# Patient Record
Sex: MALE | Race: WHITE | NOT HISPANIC OR LATINO | Employment: FULL TIME | URBAN - METROPOLITAN AREA
[De-identification: names, ages, dates, MRNs, and addresses within clinical notes are randomized per-mention and may not be internally consistent; named-entity substitution may affect disease eponyms.]

---

## 2018-01-09 ENCOUNTER — HISTORICAL (OUTPATIENT)
Dept: BARIATRICS | Facility: HOSPITAL | Age: 48
End: 2018-01-09

## 2018-01-15 ENCOUNTER — HISTORICAL (OUTPATIENT)
Dept: BARIATRICS | Facility: HOSPITAL | Age: 48
End: 2018-01-15

## 2018-02-06 ENCOUNTER — HISTORICAL (OUTPATIENT)
Dept: BARIATRICS | Facility: HOSPITAL | Age: 48
End: 2018-02-06

## 2018-03-20 ENCOUNTER — HISTORICAL (OUTPATIENT)
Dept: BARIATRICS | Facility: HOSPITAL | Age: 48
End: 2018-03-20

## 2018-05-01 ENCOUNTER — HISTORICAL (OUTPATIENT)
Dept: BARIATRICS | Facility: HOSPITAL | Age: 48
End: 2018-05-01

## 2018-07-17 ENCOUNTER — HISTORICAL (OUTPATIENT)
Dept: BARIATRICS | Facility: HOSPITAL | Age: 48
End: 2018-07-17

## 2018-07-31 ENCOUNTER — HISTORICAL (OUTPATIENT)
Dept: BARIATRICS | Facility: HOSPITAL | Age: 48
End: 2018-07-31

## 2018-08-21 ENCOUNTER — HISTORICAL (OUTPATIENT)
Dept: BARIATRICS | Facility: HOSPITAL | Age: 48
End: 2018-08-21

## 2018-12-14 ENCOUNTER — TELEPHONE (OUTPATIENT)
Dept: SURGERY | Facility: CLINIC | Age: 48
End: 2018-12-14

## 2018-12-17 ENCOUNTER — INITIAL CONSULT (OUTPATIENT)
Dept: SURGERY | Facility: CLINIC | Age: 48
End: 2018-12-17
Payer: COMMERCIAL

## 2018-12-17 DIAGNOSIS — C17.9 SMALL BOWEL CANCER: ICD-10-CM

## 2018-12-17 PROCEDURE — 99999 PR PBB SHADOW E&M-EST. PATIENT-LVL II: CPT | Mod: PBBFAC,,, | Performed by: SURGERY

## 2018-12-17 PROCEDURE — 99204 OFFICE O/P NEW MOD 45 MIN: CPT | Mod: S$GLB,,, | Performed by: SURGERY

## 2018-12-17 RX ORDER — ATENOLOL 100 MG/1
50 TABLET ORAL 2 TIMES DAILY
Status: ON HOLD | COMMUNITY
End: 2019-02-06 | Stop reason: HOSPADM

## 2018-12-17 RX ORDER — FLUTICASONE PROPIONATE 50 MCG
1 SPRAY, SUSPENSION (ML) NASAL DAILY
COMMUNITY

## 2018-12-17 RX ORDER — FEXOFENADINE HCL 60 MG
60 TABLET ORAL DAILY
COMMUNITY

## 2018-12-17 RX ORDER — LORATADINE 10 MG/1
10 TABLET ORAL DAILY
COMMUNITY
End: 2019-01-07

## 2018-12-17 NOTE — PROGRESS NOTES
History & Physical    SUBJECTIVE:     History of Present Illness:  Patient is a 48 y.o. male presents for evaluation of a newly diagnosed small bowel mass. Patient reports that he began having lower abdominal/pelvic pain toward the end of October of this year. This prompted a Urologic evaluation and subsequent treatment for a presumed UTI. He states that his pain continued into November and evolved into more upper abdominal/epigastric pain that was worse with eating. He was subsequently seen by his PCP who ultimately ordered a CT abdomen/pelvis that revealed an abnormal thickening of an 8 cm segment of small bowel. The patient denies any other sx at this time, including nausea, vomiting, fever, chills, night sweats, or anorexia. He does report ~10 lb weight loss over the past 1-2 months, but he is actively taking part in a weight loss program.     Patient denies personal history of cancer. Family history significant for two distant cousins with lymphoma and sister with history of melanoma. Patient denies previous abdominal surgery.     Chief Complaint   Patient presents with    Consult       Review of patient's allergies indicates:  No Known Allergies    Current Outpatient Medications   Medication Sig Dispense Refill    atenolol (TENORMIN) 100 MG tablet Take 50 mg by mouth 2 (two) times daily.      fexofenadine (ALLEGRA) 60 MG tablet Take 60 mg by mouth once daily.      fluticasone (FLONASE) 50 mcg/actuation nasal spray 1 spray by Each Nare route once daily.      loratadine (CLARITIN) 10 mg tablet Take 10 mg by mouth once daily.       No current facility-administered medications for this visit.        No past medical history on file.  No past surgical history on file.  No family history on file.  Social History     Tobacco Use    Smoking status: Never Smoker    Smokeless tobacco: Never Used   Substance Use Topics    Alcohol use: Yes     Frequency: Monthly or less    Drug use: Not on file        Review of  Systems:  Review of Systems  A 10-point ROS was negative, except as above.     OBJECTIVE:     Vital Signs (Most Recent)              Physical Exam:  Physical Exam   Constitutional: He is oriented to person, place, and time. He appears well-developed and well-nourished. No distress.   HENT:   Head: Normocephalic and atraumatic.   Eyes: Conjunctivae are normal.   Neck: Normal range of motion. Neck supple.   No supraclavicular lymphadenopathy   Cardiovascular: Normal rate and regular rhythm.   Pulmonary/Chest: Effort normal and breath sounds normal. No respiratory distress.   Abdominal: Soft. He exhibits no distension and no mass. There is tenderness (mild lower abdominal). There is no rebound and no guarding.   Musculoskeletal: Normal range of motion.   No axillary lymphadenopathy   Lymphadenopathy:     He has no cervical adenopathy.   Neurological: He is alert and oriented to person, place, and time.   Skin: Skin is warm and dry.       Laboratory  Outside labs from 12/5 reviewed; unremarkable     Diagnostic Results:  Outside CT 12/7 reviewed - Irregular significant wall thickening of an 8 cm segment of small bowel (most likely mid to distal jejunum); no stranding or surrounding fluid; no other obvious intra-abdominal lymphadenopathy     ASSESSMENT/PLAN:     47 yo M with history of abdominal pain and radiographic findings concerning for a small bowel malignancy, most likely lymphoma vs carcinoma.     PLAN:Plan     - Discussed findings with patient and recommended open surgical resection, as this could offer both diagnostic and potentially therapeutic results. Discussed that further treatment would likely be indicated if this does prove to be a malignancy. He and his family understand and would like to proceed at the earliest available date.

## 2018-12-17 NOTE — PROGRESS NOTES
Patient seen with Dr Sadler; history obtained, patient examined, outside records reviewed, outside CT personally reviewed.   His sister is a patient of Dr Meneses.  CT shows a short segment of markedly abnormal mid small bowel with a markedly and concentrically thickened, but irregular, bowel wall. No other abnormalities noted. On exam, no peripheral adenopathy noted.   No prior history of small bowel or digestive issues, and no family history of bowel tumor, IBD, celiac disease  The differential diagnosis included lymphoma or carcinoma, with IBD, stromal tumor, or carcinoid much less likely. I believe his GI symptoms are related to the small bowel abnormality.     Long talk with patient and family. I have recommended open surgical small bowel resection and primary anastomosis. Alternatives including attempt at push enteroscopy, and/or percutaneous biopsy discussed.

## 2018-12-17 NOTE — H&P (VIEW-ONLY)
History & Physical    SUBJECTIVE:     History of Present Illness:  Patient is a 48 y.o. male presents for evaluation of a newly diagnosed small bowel mass. Patient reports that he began having lower abdominal/pelvic pain toward the end of October of this year. This prompted a Urologic evaluation and subsequent treatment for a presumed UTI. He states that his pain continued into November and evolved into more upper abdominal/epigastric pain that was worse with eating. He was subsequently seen by his PCP who ultimately ordered a CT abdomen/pelvis that revealed an abnormal thickening of an 8 cm segment of small bowel. The patient denies any other sx at this time, including nausea, vomiting, fever, chills, night sweats, or anorexia. He does report ~10 lb weight loss over the past 1-2 months, but he is actively taking part in a weight loss program.     Patient denies personal history of cancer. Family history significant for two distant cousins with lymphoma and sister with history of melanoma. Patient denies previous abdominal surgery.     Chief Complaint   Patient presents with    Consult       Review of patient's allergies indicates:  No Known Allergies    Current Outpatient Medications   Medication Sig Dispense Refill    atenolol (TENORMIN) 100 MG tablet Take 50 mg by mouth 2 (two) times daily.      fexofenadine (ALLEGRA) 60 MG tablet Take 60 mg by mouth once daily.      fluticasone (FLONASE) 50 mcg/actuation nasal spray 1 spray by Each Nare route once daily.      loratadine (CLARITIN) 10 mg tablet Take 10 mg by mouth once daily.       No current facility-administered medications for this visit.        No past medical history on file.  No past surgical history on file.  No family history on file.  Social History     Tobacco Use    Smoking status: Never Smoker    Smokeless tobacco: Never Used   Substance Use Topics    Alcohol use: Yes     Frequency: Monthly or less    Drug use: Not on file        Review of  Systems:  Review of Systems  A 10-point ROS was negative, except as above.     OBJECTIVE:     Vital Signs (Most Recent)              Physical Exam:  Physical Exam   Constitutional: He is oriented to person, place, and time. He appears well-developed and well-nourished. No distress.   HENT:   Head: Normocephalic and atraumatic.   Eyes: Conjunctivae are normal.   Neck: Normal range of motion. Neck supple.   No supraclavicular lymphadenopathy   Cardiovascular: Normal rate and regular rhythm.   Pulmonary/Chest: Effort normal and breath sounds normal. No respiratory distress.   Abdominal: Soft. He exhibits no distension and no mass. There is tenderness (mild lower abdominal). There is no rebound and no guarding.   Musculoskeletal: Normal range of motion.   No axillary lymphadenopathy   Lymphadenopathy:     He has no cervical adenopathy.   Neurological: He is alert and oriented to person, place, and time.   Skin: Skin is warm and dry.       Laboratory  Outside labs from 12/5 reviewed; unremarkable     Diagnostic Results:  Outside CT 12/7 reviewed - Irregular significant wall thickening of an 8 cm segment of small bowel (most likely mid to distal jejunum); no stranding or surrounding fluid; no other obvious intra-abdominal lymphadenopathy     ASSESSMENT/PLAN:     49 yo M with history of abdominal pain and radiographic findings concerning for a small bowel malignancy, most likely lymphoma vs carcinoma.     PLAN:Plan     - Discussed findings with patient and recommended open surgical resection, as this could offer both diagnostic and potentially therapeutic results. Discussed that further treatment would likely be indicated if this does prove to be a malignancy. He and his family understand and would like to proceed at the earliest available date.

## 2018-12-18 DIAGNOSIS — C17.9 SMALL BOWEL CANCER: Primary | ICD-10-CM

## 2018-12-20 ENCOUNTER — TELEPHONE (OUTPATIENT)
Dept: SURGERY | Facility: CLINIC | Age: 48
End: 2018-12-20

## 2018-12-20 ENCOUNTER — ANESTHESIA EVENT (OUTPATIENT)
Dept: SURGERY | Facility: HOSPITAL | Age: 48
DRG: 824 | End: 2018-12-20
Payer: COMMERCIAL

## 2018-12-20 RX ORDER — LOSARTAN POTASSIUM AND HYDROCHLOROTHIAZIDE 12.5; 1 MG/1; MG/1
TABLET ORAL
Status: ON HOLD | COMMUNITY
End: 2019-02-06 | Stop reason: SDUPTHER

## 2018-12-20 NOTE — TELEPHONE ENCOUNTER
Instructed to shower twice with hibiclens (pm and am), no food after midnight but okay to have clear liquids including 12 oz gatorade before 5am.    Will take allergy medication, tenormin in am.  Will hold losasrtan-HCTZ.

## 2018-12-21 ENCOUNTER — HOSPITAL ENCOUNTER (INPATIENT)
Facility: HOSPITAL | Age: 48
LOS: 3 days | Discharge: HOME | DRG: 824 | End: 2018-12-24
Attending: SURGERY | Admitting: SURGERY
Payer: COMMERCIAL

## 2018-12-21 ENCOUNTER — ANESTHESIA (OUTPATIENT)
Dept: SURGERY | Facility: HOSPITAL | Age: 48
DRG: 824 | End: 2018-12-21
Payer: COMMERCIAL

## 2018-12-21 DIAGNOSIS — K63.89 SMALL BOWEL MASS: Primary | ICD-10-CM

## 2018-12-21 PROBLEM — E66.01 MORBID OBESITY: Status: ACTIVE | Noted: 2018-12-21

## 2018-12-21 LAB
ALBUMIN SERPL BCP-MCNC: 3.2 G/DL
ALP SERPL-CCNC: 51 U/L
ALT SERPL W/O P-5'-P-CCNC: 27 U/L
ANION GAP SERPL CALC-SCNC: 12 MMOL/L
ANION GAP SERPL CALC-SCNC: 12 MMOL/L
AST SERPL-CCNC: 28 U/L
BASOPHILS # BLD AUTO: 0.05 K/UL
BASOPHILS NFR BLD: 0.5 %
BILIRUB SERPL-MCNC: 0.8 MG/DL
BUN SERPL-MCNC: 11 MG/DL
BUN SERPL-MCNC: 11 MG/DL
CALCIUM SERPL-MCNC: 8.2 MG/DL
CALCIUM SERPL-MCNC: 8.2 MG/DL
CHLORIDE SERPL-SCNC: 106 MMOL/L
CHLORIDE SERPL-SCNC: 106 MMOL/L
CO2 SERPL-SCNC: 20 MMOL/L
CO2 SERPL-SCNC: 20 MMOL/L
CREAT SERPL-MCNC: 0.8 MG/DL
CREAT SERPL-MCNC: 0.8 MG/DL
DIFFERENTIAL METHOD: ABNORMAL
EOSINOPHIL # BLD AUTO: 0 K/UL
EOSINOPHIL NFR BLD: 0.3 %
ERYTHROCYTE [DISTWIDTH] IN BLOOD BY AUTOMATED COUNT: 13.2 %
EST. GFR  (AFRICAN AMERICAN): >60 ML/MIN/1.73 M^2
EST. GFR  (AFRICAN AMERICAN): >60 ML/MIN/1.73 M^2
EST. GFR  (NON AFRICAN AMERICAN): >60 ML/MIN/1.73 M^2
EST. GFR  (NON AFRICAN AMERICAN): >60 ML/MIN/1.73 M^2
GLUCOSE SERPL-MCNC: 158 MG/DL
GLUCOSE SERPL-MCNC: 158 MG/DL
HCT VFR BLD AUTO: 36.7 %
HGB BLD-MCNC: 11.9 G/DL
IMM GRANULOCYTES # BLD AUTO: 0.04 K/UL
IMM GRANULOCYTES NFR BLD AUTO: 0.4 %
LYMPHOCYTES # BLD AUTO: 0.6 K/UL
LYMPHOCYTES NFR BLD: 5.2 %
MAGNESIUM SERPL-MCNC: 1.6 MG/DL
MCH RBC QN AUTO: 28.1 PG
MCHC RBC AUTO-ENTMCNC: 32.4 G/DL
MCV RBC AUTO: 87 FL
MONOCYTES # BLD AUTO: 0.2 K/UL
MONOCYTES NFR BLD: 1.8 %
NEUTROPHILS # BLD AUTO: 10.1 K/UL
NEUTROPHILS NFR BLD: 91.8 %
NRBC BLD-RTO: 0 /100 WBC
PHOSPHATE SERPL-MCNC: 2.6 MG/DL
PLATELET # BLD AUTO: 295 K/UL
PMV BLD AUTO: 9.6 FL
POTASSIUM SERPL-SCNC: 3.5 MMOL/L
POTASSIUM SERPL-SCNC: 3.5 MMOL/L
PROT SERPL-MCNC: 6.9 G/DL
RBC # BLD AUTO: 4.23 M/UL
SODIUM SERPL-SCNC: 138 MMOL/L
SODIUM SERPL-SCNC: 138 MMOL/L
WBC # BLD AUTO: 10.95 K/UL

## 2018-12-21 PROCEDURE — 63600175 PHARM REV CODE 636 W HCPCS: Performed by: NURSE ANESTHETIST, CERTIFIED REGISTERED

## 2018-12-21 PROCEDURE — 36415 COLL VENOUS BLD VENIPUNCTURE: CPT

## 2018-12-21 PROCEDURE — 88365 INSITU HYBRIDIZATION (FISH): CPT | Mod: 26,,, | Performed by: PATHOLOGY

## 2018-12-21 PROCEDURE — D9220A PRA ANESTHESIA: Mod: ANES,,, | Performed by: ANESTHESIOLOGY

## 2018-12-21 PROCEDURE — 25000003 PHARM REV CODE 250: Performed by: STUDENT IN AN ORGANIZED HEALTH CARE EDUCATION/TRAINING PROGRAM

## 2018-12-21 PROCEDURE — 88342 TISSUE SPECIMEN TO PATHOLOGY - SURGERY: ICD-10-PCS | Mod: 26,59,, | Performed by: PATHOLOGY

## 2018-12-21 PROCEDURE — 63600175 PHARM REV CODE 636 W HCPCS: Performed by: STUDENT IN AN ORGANIZED HEALTH CARE EDUCATION/TRAINING PROGRAM

## 2018-12-21 PROCEDURE — 25000003 PHARM REV CODE 250: Performed by: NURSE ANESTHETIST, CERTIFIED REGISTERED

## 2018-12-21 PROCEDURE — 63600175 PHARM REV CODE 636 W HCPCS: Performed by: SURGERY

## 2018-12-21 PROCEDURE — D9220A PRA ANESTHESIA: Mod: CRNA,,, | Performed by: NURSE ANESTHETIST, CERTIFIED REGISTERED

## 2018-12-21 PROCEDURE — S0020 INJECTION, BUPIVICAINE HYDRO: HCPCS | Performed by: SURGERY

## 2018-12-21 PROCEDURE — 37000008 HC ANESTHESIA 1ST 15 MINUTES: Performed by: SURGERY

## 2018-12-21 PROCEDURE — 88331 PATH CONSLTJ SURG 1 BLK 1SPC: CPT | Mod: 26,,, | Performed by: PATHOLOGY

## 2018-12-21 PROCEDURE — 27201423 OPTIME MED/SURG SUP & DEVICES STERILE SUPPLY: Performed by: SURGERY

## 2018-12-21 PROCEDURE — S0028 INJECTION, FAMOTIDINE, 20 MG: HCPCS | Performed by: NURSE ANESTHETIST, CERTIFIED REGISTERED

## 2018-12-21 PROCEDURE — 36000708 HC OR TIME LEV III 1ST 15 MIN: Performed by: SURGERY

## 2018-12-21 PROCEDURE — 27100025 HC TUBING, SET FLUID WARMER: Performed by: NURSE ANESTHETIST, CERTIFIED REGISTERED

## 2018-12-21 PROCEDURE — 88341 TISSUE SPECIMEN TO PATHOLOGY - SURGERY: ICD-10-PCS | Mod: 26,59,, | Performed by: PATHOLOGY

## 2018-12-21 PROCEDURE — 88307 TISSUE EXAM BY PATHOLOGIST: CPT | Mod: 26,,, | Performed by: PATHOLOGY

## 2018-12-21 PROCEDURE — 44120 PR RESECT SMALL INTEST,SINGL RESEC/ANAS: ICD-10-PCS | Mod: ,,, | Performed by: SURGERY

## 2018-12-21 PROCEDURE — 84100 ASSAY OF PHOSPHORUS: CPT

## 2018-12-21 PROCEDURE — C9290 INJ, BUPIVACAINE LIPOSOME: HCPCS | Performed by: SURGERY

## 2018-12-21 PROCEDURE — 85025 COMPLETE CBC W/AUTO DIFF WBC: CPT

## 2018-12-21 PROCEDURE — 88189 PR  FLOWCYTOMETRY/READ, 16 & > MARKERS: ICD-10-PCS | Mod: ,,, | Performed by: PATHOLOGY

## 2018-12-21 PROCEDURE — 83735 ASSAY OF MAGNESIUM: CPT

## 2018-12-21 PROCEDURE — 88184 FLOWCYTOMETRY/ TC 1 MARKER: CPT | Performed by: PATHOLOGY

## 2018-12-21 PROCEDURE — 25000003 PHARM REV CODE 250: Performed by: SURGERY

## 2018-12-21 PROCEDURE — 20600001 HC STEP DOWN PRIVATE ROOM

## 2018-12-21 PROCEDURE — 71000039 HC RECOVERY, EACH ADD'L HOUR: Performed by: SURGERY

## 2018-12-21 PROCEDURE — 88342 IMHCHEM/IMCYTCHM 1ST ANTB: CPT | Mod: 26,59,, | Performed by: PATHOLOGY

## 2018-12-21 PROCEDURE — 88342 IMHCHEM/IMCYTCHM 1ST ANTB: CPT | Performed by: PATHOLOGY

## 2018-12-21 PROCEDURE — 37000009 HC ANESTHESIA EA ADD 15 MINS: Performed by: SURGERY

## 2018-12-21 PROCEDURE — 36000709 HC OR TIME LEV III EA ADD 15 MIN: Performed by: SURGERY

## 2018-12-21 PROCEDURE — 71000033 HC RECOVERY, INTIAL HOUR: Performed by: SURGERY

## 2018-12-21 PROCEDURE — 88341 IMHCHEM/IMCYTCHM EA ADD ANTB: CPT | Mod: 26,59,, | Performed by: PATHOLOGY

## 2018-12-21 PROCEDURE — 88365 TISSUE SPECIMEN TO PATHOLOGY - SURGERY: ICD-10-PCS | Mod: 26,,, | Performed by: PATHOLOGY

## 2018-12-21 PROCEDURE — 44120 REMOVAL OF SMALL INTESTINE: CPT | Mod: ,,, | Performed by: SURGERY

## 2018-12-21 PROCEDURE — 88185 FLOWCYTOMETRY/TC ADD-ON: CPT | Mod: 59 | Performed by: PATHOLOGY

## 2018-12-21 PROCEDURE — 88331 TISSUE SPECIMEN TO PATHOLOGY - SURGERY: ICD-10-PCS | Mod: 26,,, | Performed by: PATHOLOGY

## 2018-12-21 PROCEDURE — 94761 N-INVAS EAR/PLS OXIMETRY MLT: CPT

## 2018-12-21 PROCEDURE — 27000221 HC OXYGEN, UP TO 24 HOURS

## 2018-12-21 PROCEDURE — 80053 COMPREHEN METABOLIC PANEL: CPT

## 2018-12-21 PROCEDURE — 88307 TISSUE SPECIMEN TO PATHOLOGY - SURGERY: ICD-10-PCS | Mod: 26,,, | Performed by: PATHOLOGY

## 2018-12-21 PROCEDURE — 88189 FLOWCYTOMETRY/READ 16 & >: CPT | Mod: ,,, | Performed by: PATHOLOGY

## 2018-12-21 RX ORDER — SODIUM CHLORIDE 0.9 % (FLUSH) 0.9 %
3 SYRINGE (ML) INJECTION
Status: DISCONTINUED | OUTPATIENT
Start: 2018-12-21 | End: 2018-12-24 | Stop reason: HOSPADM

## 2018-12-21 RX ORDER — ENOXAPARIN SODIUM 100 MG/ML
40 INJECTION SUBCUTANEOUS ONCE
Status: COMPLETED | OUTPATIENT
Start: 2018-12-21 | End: 2018-12-21

## 2018-12-21 RX ORDER — GLYCOPYRROLATE 0.2 MG/ML
INJECTION INTRAMUSCULAR; INTRAVENOUS
Status: DISCONTINUED | OUTPATIENT
Start: 2018-12-21 | End: 2018-12-21

## 2018-12-21 RX ORDER — HYDROMORPHONE HYDROCHLORIDE 1 MG/ML
INJECTION, SOLUTION INTRAMUSCULAR; INTRAVENOUS; SUBCUTANEOUS
Status: DISPENSED
Start: 2018-12-21 | End: 2018-12-21

## 2018-12-21 RX ORDER — CEFOXITIN 1 G/1
2 INJECTION, POWDER, FOR SOLUTION INTRAVENOUS
Status: DISCONTINUED | OUTPATIENT
Start: 2018-12-21 | End: 2018-12-22

## 2018-12-21 RX ORDER — FAMOTIDINE 10 MG/ML
INJECTION INTRAVENOUS
Status: DISCONTINUED | OUTPATIENT
Start: 2018-12-21 | End: 2018-12-21

## 2018-12-21 RX ORDER — SUCCINYLCHOLINE CHLORIDE 20 MG/ML
INJECTION INTRAMUSCULAR; INTRAVENOUS
Status: DISCONTINUED | OUTPATIENT
Start: 2018-12-21 | End: 2018-12-21

## 2018-12-21 RX ORDER — HYDROMORPHONE HYDROCHLORIDE 1 MG/ML
0.2 INJECTION, SOLUTION INTRAMUSCULAR; INTRAVENOUS; SUBCUTANEOUS EVERY 5 MIN PRN
Status: DISCONTINUED | OUTPATIENT
Start: 2018-12-21 | End: 2018-12-21 | Stop reason: HOSPADM

## 2018-12-21 RX ORDER — NALOXONE HCL 0.4 MG/ML
0.02 VIAL (ML) INJECTION
Status: DISCONTINUED | OUTPATIENT
Start: 2018-12-21 | End: 2018-12-22

## 2018-12-21 RX ORDER — HYDROMORPHONE HCL IN 0.9% NACL 6 MG/30 ML
PATIENT CONTROLLED ANALGESIA SYRINGE INTRAVENOUS CONTINUOUS
Status: DISCONTINUED | OUTPATIENT
Start: 2018-12-21 | End: 2018-12-22

## 2018-12-21 RX ORDER — ROCURONIUM BROMIDE 10 MG/ML
INJECTION, SOLUTION INTRAVENOUS
Status: DISCONTINUED | OUTPATIENT
Start: 2018-12-21 | End: 2018-12-21

## 2018-12-21 RX ORDER — METOPROLOL TARTRATE 1 MG/ML
10 INJECTION, SOLUTION INTRAVENOUS EVERY 6 HOURS
Status: DISCONTINUED | OUTPATIENT
Start: 2018-12-21 | End: 2018-12-21

## 2018-12-21 RX ORDER — LIDOCAINE HYDROCHLORIDE 10 MG/ML
1 INJECTION, SOLUTION EPIDURAL; INFILTRATION; INTRACAUDAL; PERINEURAL ONCE
Status: DISCONTINUED | OUTPATIENT
Start: 2018-12-21 | End: 2018-12-21

## 2018-12-21 RX ORDER — CEFOXITIN 1 G/1
2 INJECTION, POWDER, FOR SOLUTION INTRAVENOUS
Status: COMPLETED | OUTPATIENT
Start: 2018-12-21 | End: 2018-12-21

## 2018-12-21 RX ORDER — LIDOCAINE HCL/PF 100 MG/5ML
SYRINGE (ML) INTRAVENOUS
Status: DISCONTINUED | OUTPATIENT
Start: 2018-12-21 | End: 2018-12-21

## 2018-12-21 RX ORDER — SODIUM CHLORIDE 0.9 % (FLUSH) 0.9 %
3 SYRINGE (ML) INJECTION
Status: DISCONTINUED | OUTPATIENT
Start: 2018-12-21 | End: 2018-12-21

## 2018-12-21 RX ORDER — DEXAMETHASONE SODIUM PHOSPHATE 4 MG/ML
INJECTION, SOLUTION INTRA-ARTICULAR; INTRALESIONAL; INTRAMUSCULAR; INTRAVENOUS; SOFT TISSUE
Status: DISCONTINUED | OUTPATIENT
Start: 2018-12-21 | End: 2018-12-21

## 2018-12-21 RX ORDER — SODIUM CHLORIDE 9 MG/ML
INJECTION, SOLUTION INTRAVENOUS CONTINUOUS
Status: DISCONTINUED | OUTPATIENT
Start: 2018-12-21 | End: 2018-12-21

## 2018-12-21 RX ORDER — CEFOXITIN 1 G/1
2 INJECTION, POWDER, FOR SOLUTION INTRAVENOUS
Status: DISCONTINUED | OUTPATIENT
Start: 2018-12-21 | End: 2018-12-21

## 2018-12-21 RX ORDER — HYDROMORPHONE HYDROCHLORIDE 2 MG/ML
INJECTION, SOLUTION INTRAMUSCULAR; INTRAVENOUS; SUBCUTANEOUS
Status: DISCONTINUED | OUTPATIENT
Start: 2018-12-21 | End: 2018-12-21

## 2018-12-21 RX ORDER — EPHEDRINE SULFATE 50 MG/ML
INJECTION, SOLUTION INTRAVENOUS
Status: DISCONTINUED | OUTPATIENT
Start: 2018-12-21 | End: 2018-12-21

## 2018-12-21 RX ORDER — KETAMINE HYDROCHLORIDE 10 MG/ML
INJECTION, SOLUTION INTRAMUSCULAR; INTRAVENOUS
Status: DISCONTINUED | OUTPATIENT
Start: 2018-12-21 | End: 2018-12-21

## 2018-12-21 RX ORDER — NEOSTIGMINE METHYLSULFATE 1 MG/ML
INJECTION, SOLUTION INTRAVENOUS
Status: DISCONTINUED | OUTPATIENT
Start: 2018-12-21 | End: 2018-12-21

## 2018-12-21 RX ORDER — ACETAMINOPHEN 10 MG/ML
INJECTION, SOLUTION INTRAVENOUS
Status: DISCONTINUED | OUTPATIENT
Start: 2018-12-21 | End: 2018-12-21

## 2018-12-21 RX ORDER — FENTANYL CITRATE 50 UG/ML
INJECTION, SOLUTION INTRAMUSCULAR; INTRAVENOUS
Status: DISCONTINUED | OUTPATIENT
Start: 2018-12-21 | End: 2018-12-21

## 2018-12-21 RX ORDER — MIDAZOLAM HYDROCHLORIDE 1 MG/ML
INJECTION, SOLUTION INTRAMUSCULAR; INTRAVENOUS
Status: DISCONTINUED | OUTPATIENT
Start: 2018-12-21 | End: 2018-12-21

## 2018-12-21 RX ORDER — PROPOFOL 10 MG/ML
VIAL (ML) INTRAVENOUS
Status: DISCONTINUED | OUTPATIENT
Start: 2018-12-21 | End: 2018-12-21

## 2018-12-21 RX ORDER — BUPIVACAINE HYDROCHLORIDE 5 MG/ML
INJECTION, SOLUTION EPIDURAL; INTRACAUDAL
Status: DISCONTINUED | OUTPATIENT
Start: 2018-12-21 | End: 2018-12-21 | Stop reason: HOSPADM

## 2018-12-21 RX ORDER — CEFOXITIN 1 G/1
INJECTION, POWDER, FOR SOLUTION INTRAVENOUS
Status: COMPLETED
Start: 2018-12-21 | End: 2018-12-21

## 2018-12-21 RX ORDER — ONDANSETRON 2 MG/ML
4 INJECTION INTRAMUSCULAR; INTRAVENOUS EVERY 8 HOURS PRN
Status: DISCONTINUED | OUTPATIENT
Start: 2018-12-21 | End: 2018-12-24 | Stop reason: HOSPADM

## 2018-12-21 RX ORDER — METOCLOPRAMIDE HYDROCHLORIDE 5 MG/ML
INJECTION INTRAMUSCULAR; INTRAVENOUS
Status: DISCONTINUED | OUTPATIENT
Start: 2018-12-21 | End: 2018-12-21

## 2018-12-21 RX ORDER — SODIUM CHLORIDE, SODIUM LACTATE, POTASSIUM CHLORIDE, CALCIUM CHLORIDE 600; 310; 30; 20 MG/100ML; MG/100ML; MG/100ML; MG/100ML
INJECTION, SOLUTION INTRAVENOUS CONTINUOUS
Status: DISCONTINUED | OUTPATIENT
Start: 2018-12-21 | End: 2018-12-23

## 2018-12-21 RX ORDER — ONDANSETRON 2 MG/ML
INJECTION INTRAMUSCULAR; INTRAVENOUS
Status: DISCONTINUED | OUTPATIENT
Start: 2018-12-21 | End: 2018-12-21

## 2018-12-21 RX ORDER — ENOXAPARIN SODIUM 100 MG/ML
40 INJECTION SUBCUTANEOUS EVERY 12 HOURS
Status: DISCONTINUED | OUTPATIENT
Start: 2018-12-21 | End: 2018-12-24 | Stop reason: HOSPADM

## 2018-12-21 RX ORDER — METOPROLOL TARTRATE 1 MG/ML
5 INJECTION, SOLUTION INTRAVENOUS EVERY 6 HOURS
Status: DISCONTINUED | OUTPATIENT
Start: 2018-12-22 | End: 2018-12-22

## 2018-12-21 RX ADMIN — GLYCOPYRROLATE 0.6 MG: 0.2 INJECTION, SOLUTION INTRAMUSCULAR; INTRAVENOUS at 09:12

## 2018-12-21 RX ADMIN — LIDOCAINE HYDROCHLORIDE 100 MG: 20 INJECTION, SOLUTION INTRAVENOUS at 07:12

## 2018-12-21 RX ADMIN — CEFOXITIN 2 G: 1 INJECTION, POWDER, FOR SOLUTION INTRAVENOUS at 05:12

## 2018-12-21 RX ADMIN — PROPOFOL 50 MG: 10 INJECTION, EMULSION INTRAVENOUS at 09:12

## 2018-12-21 RX ADMIN — Medication: at 05:12

## 2018-12-21 RX ADMIN — ENOXAPARIN SODIUM 40 MG: 100 INJECTION SUBCUTANEOUS at 06:12

## 2018-12-21 RX ADMIN — SODIUM CHLORIDE, SODIUM GLUCONATE, SODIUM ACETATE, POTASSIUM CHLORIDE, MAGNESIUM CHLORIDE, SODIUM PHOSPHATE, DIBASIC, AND POTASSIUM PHOSPHATE: .53; .5; .37; .037; .03; .012; .00082 INJECTION, SOLUTION INTRAVENOUS at 07:12

## 2018-12-21 RX ADMIN — ROCURONIUM BROMIDE 10 MG: 10 INJECTION, SOLUTION INTRAVENOUS at 09:12

## 2018-12-21 RX ADMIN — DEXAMETHASONE SODIUM PHOSPHATE 4 MG: 4 INJECTION, SOLUTION INTRAMUSCULAR; INTRAVENOUS at 07:12

## 2018-12-21 RX ADMIN — METOCLOPRAMIDE 10 MG: 5 INJECTION, SOLUTION INTRAMUSCULAR; INTRAVENOUS at 07:12

## 2018-12-21 RX ADMIN — SUCCINYLCHOLINE CHLORIDE 160 MG: 20 INJECTION, SOLUTION INTRAMUSCULAR; INTRAVENOUS at 07:12

## 2018-12-21 RX ADMIN — CEFOXITIN 2 G: 1 INJECTION, POWDER, FOR SOLUTION INTRAVENOUS at 07:12

## 2018-12-21 RX ADMIN — KETAMINE HYDROCHLORIDE 30 MG: 10 INJECTION, SOLUTION INTRAMUSCULAR; INTRAVENOUS at 07:12

## 2018-12-21 RX ADMIN — ONDANSETRON 4 MG: 2 INJECTION INTRAMUSCULAR; INTRAVENOUS at 09:12

## 2018-12-21 RX ADMIN — SODIUM CHLORIDE, SODIUM GLUCONATE, SODIUM ACETATE, POTASSIUM CHLORIDE, MAGNESIUM CHLORIDE, SODIUM PHOSPHATE, DIBASIC, AND POTASSIUM PHOSPHATE: .53; .5; .37; .037; .03; .012; .00082 INJECTION, SOLUTION INTRAVENOUS at 09:12

## 2018-12-21 RX ADMIN — SODIUM CHLORIDE, SODIUM LACTATE, POTASSIUM CHLORIDE, AND CALCIUM CHLORIDE: .6; .31; .03; .02 INJECTION, SOLUTION INTRAVENOUS at 10:12

## 2018-12-21 RX ADMIN — FAMOTIDINE 20 MG: 10 INJECTION, SOLUTION INTRAVENOUS at 07:12

## 2018-12-21 RX ADMIN — EPHEDRINE SULFATE 10 MG: 50 INJECTION, SOLUTION INTRAMUSCULAR; INTRAVENOUS; SUBCUTANEOUS at 07:12

## 2018-12-21 RX ADMIN — MAGNESIUM SULFATE HEPTAHYDRATE 3 G: 500 INJECTION, SOLUTION INTRAMUSCULAR; INTRAVENOUS at 05:12

## 2018-12-21 RX ADMIN — Medication: at 10:12

## 2018-12-21 RX ADMIN — CEFOXITIN 2 G: 1 INJECTION, POWDER, FOR SOLUTION INTRAVENOUS at 11:12

## 2018-12-21 RX ADMIN — ROCURONIUM BROMIDE 50 MG: 10 INJECTION, SOLUTION INTRAVENOUS at 07:12

## 2018-12-21 RX ADMIN — GLYCOPYRROLATE 0.1 MG: 0.2 INJECTION, SOLUTION INTRAMUSCULAR; INTRAVENOUS at 06:12

## 2018-12-21 RX ADMIN — MIDAZOLAM HYDROCHLORIDE 2 MG: 1 INJECTION, SOLUTION INTRAMUSCULAR; INTRAVENOUS at 06:12

## 2018-12-21 RX ADMIN — NEOSTIGMINE METHYLSULFATE 5 MG: 1 INJECTION INTRAVENOUS at 09:12

## 2018-12-21 RX ADMIN — HYDROMORPHONE HYDROCHLORIDE 0.5 MG: 2 INJECTION, SOLUTION INTRAMUSCULAR; INTRAVENOUS; SUBCUTANEOUS at 09:12

## 2018-12-21 RX ADMIN — FENTANYL CITRATE 100 MCG: 50 INJECTION, SOLUTION INTRAMUSCULAR; INTRAVENOUS at 07:12

## 2018-12-21 RX ADMIN — METOPROLOL TARTRATE 5 MG: 1 INJECTION, SOLUTION INTRAVENOUS at 11:12

## 2018-12-21 RX ADMIN — PROPOFOL 160 MG: 10 INJECTION, EMULSION INTRAVENOUS at 07:12

## 2018-12-21 RX ADMIN — SODIUM CHLORIDE: 0.9 INJECTION, SOLUTION INTRAVENOUS at 06:12

## 2018-12-21 RX ADMIN — SODIUM CHLORIDE: 0.9 INJECTION, SOLUTION INTRAVENOUS at 10:12

## 2018-12-21 RX ADMIN — ACETAMINOPHEN 1000 MG: 10 INJECTION, SOLUTION INTRAVENOUS at 07:12

## 2018-12-21 RX ADMIN — SODIUM CHLORIDE, SODIUM LACTATE, POTASSIUM CHLORIDE, AND CALCIUM CHLORIDE: .6; .31; .03; .02 INJECTION, SOLUTION INTRAVENOUS at 06:12

## 2018-12-21 RX ADMIN — KETAMINE HYDROCHLORIDE 20 MG: 10 INJECTION, SOLUTION INTRAMUSCULAR; INTRAVENOUS at 07:12

## 2018-12-21 RX ADMIN — PROPOFOL 20 MG: 10 INJECTION, EMULSION INTRAVENOUS at 09:12

## 2018-12-21 RX ADMIN — ENOXAPARIN SODIUM 40 MG: 100 INJECTION SUBCUTANEOUS at 09:12

## 2018-12-21 RX ADMIN — FENTANYL CITRATE 25 MCG: 50 INJECTION, SOLUTION INTRAMUSCULAR; INTRAVENOUS at 09:12

## 2018-12-21 NOTE — OP NOTE
DATE OF PROCEDURE:  12/21/2018    OPERATING SURGEON:  Cecilio Casanova M.D.    ASSISTANT:  Gurdeep Sadler M.D. (RES)    PREOPERATIVE DIAGNOSIS:  Small bowel mass with partial small bowel obstruction.    POSTOPERATIVE DIAGNOSIS:  Small bowel mass with partial small bowel obstruction.    OPERATIVE PROCEDURES:  Segmental small bowel resection.    PROCEDURE IN DETAIL:  The patient is placed in the supine position on the   operating table.  Adequate general endotracheal anesthesia is induced.  The   abdomen is prepped and draped in sterile fashion and entered through a   midabdominal midline incision.  Readily apparent is a sizable mass in the small   bowel.  This is adherent to the perivesical fat.  This attachment is taken down   and the mass is delivered into the wound.  The mass is approximately 6 cm in   length and is circumferential.  There is dilation of the small bowel above it   suggesting a partial bowel obstruction.  There is no adenopathy in the adjacent   mesentery.  The tumor clearly extends to the serosal surface of the bowel.    Careful search is made for peritoneal implants and there are no lesions noted on   the peritoneum or omentum.  The liver cannot be inspected through our incision,   but I carefully palpate the liver and there are no focal lesions within it.    The remainder of the small bowel was run from the ligament of Treitz to the   ileocecal valves and no other small bowel lesions are noted.  The small bowel is   divided on either side of the mass providing about an 8 to 10 cm margin on   either side as deep.  A V of the mesentery is taken in the resection down to the   base of the mesentery.  Mesenteric vessels were tied with 3-0 silk.  A   side-to-side stapled JOELLEN anastomosis is then performed in the usual fashion.    The transverse enterotomies are closed with 3-0 Vicryl.  The mesenteric defect   is closed with interrupted 3-0 silk figure-of-eight sutures.  The abdomen is   irrigated with  sterile saline.  Hemostasis is excellent.  Exparel solution is   infiltrated into the deep muscular layer on either side of the incision.  The   abdominal incision is closed in layers in the usual fashion.  A sterile dressing   is applied.  The pathologist has reported on frozen section that this is a   lymphoproliferative disorder, strongly suggesting a lymphoma.  A tissue is set   aside in tissue medium for flow cytometry and all appropriate lymphoma studies   are ordered.  The patient has tolerated the procedure well.  He is brought to   the recovery room in stable condition.      PARKER  dd: 12/21/2018 09:42:10 (CST)  td: 12/21/2018 12:08:45 (CST)  Doc ID   #[id number]  Job ID #[id number]    CC:

## 2018-12-21 NOTE — ANESTHESIA PREPROCEDURE EVALUATION
12/21/2018  Giorgio Streeter is a 48 y.o., male.    Anesthesia Evaluation    I have reviewed the Patient Summary Reports.    I have reviewed the Nursing Notes.   I have reviewed the Medications.     Review of Systems  Anesthesia Hx:  No problems with previous Anesthesia  History of prior surgery of interest to airway management or planning: Denies Family Hx of Anesthesia complications.   Denies Personal Hx of Anesthesia complications.   Hematology/Oncology:  Hematology Normal   Oncology Normal     EENT/Dental:EENT/Dental Normal   Cardiovascular:   Exercise tolerance: good Hypertension, well controlled    Pulmonary:   Sleep Apnea    Renal/:   renal calculi    Hepatic/GI:  Hepatic/GI Normal    Musculoskeletal:  Musculoskeletal Normal    Neurological:  Neurology Normal    Endocrine:  Endocrine Normal    Dermatological:  Skin Normal    Psych:  Psychiatric Normal           Physical Exam  General:  Morbid Obesity    Airway/Jaw/Neck:  Airway Findings: Mouth Opening: Normal Tongue: Normal  General Airway Assessment: Adult  Mallampati: II  TM Distance: Normal, at least 6 cm        Eyes/Ears/Nose:  EYES/EARS/NOSE FINDINGS: Normal   Dental:  DENTAL FINDINGS: Normal   Chest/Lungs:  Chest/Lungs Clear    Heart/Vascular:  Heart Findings: Normal Heart murmur: negative Vascular Findings: Normal    Abdomen:  Abdomen Findings: Normal    Musculoskeletal:  Musculoskeletal Findings: Normal   Skin:  Skin Findings: Normal    Mental Status:  Mental Status Findings: Normal        Anesthesia Plan  Type of Anesthesia, risks & benefits discussed:  Anesthesia Type:  general  Patient's Preference:   Intra-op Monitoring Plan: standard ASA monitors and arterial line  Intra-op Monitoring Plan Comments:   Post Op Pain Control Plan: per primary service following discharge from PACU, IV/PO Opioids PRN and multimodal analgesia  Post Op  Pain Control Plan Comments:   Induction:   IV  Beta Blocker:  Patient is on a Beta-Blocker and has received one dose within the past 24 hours (No further documentation required).       Informed Consent: Patient understands risks and agrees with Anesthesia plan.  Questions answered. Anesthesia consent signed with patient.  ASA Score: 3     Day of Surgery Review of History & Physical:    H&P update referred to the surgeon.         Ready For Surgery From Anesthesia Perspective.

## 2018-12-21 NOTE — BRIEF OP NOTE
Preop Dx: Small bowel mass with partial small bowel obstruction  Postop Dx: same  Surgeon: Edilberto  Assistant: Doroteo  Operative Procedure: Small bowel resection  Brief Detail: 6 cm, partially obstructing mass in distal jejunum. No metastatic disease. Segmental small bowel resection performed. Frozen section suggestive of lymphoproliferative disorder. Tissue reserved for flow cytometry and lymphoma studies.   EBL: 40 ccs  CPT code:

## 2018-12-21 NOTE — ANESTHESIA POSTPROCEDURE EVALUATION
"Anesthesia Post Evaluation    Patient: Giorigo Streeter    Procedure(s) Performed: Procedure(s) (LRB):  EXCISION, SMALL INTESTINE (N/A)    Final Anesthesia Type: general  Patient location during evaluation: PACU  Patient participation: Yes- Able to Participate  Level of consciousness: awake and alert and oriented  Post-procedure vital signs: reviewed and stable  Pain management: adequate  Airway patency: patent  PONV status at discharge: No PONV  Anesthetic complications: no      Cardiovascular status: blood pressure returned to baseline  Respiratory status: unassisted, spontaneous ventilation and face mask  Hydration status: euvolemic  Follow-up not needed.        Visit Vitals  /62   Pulse 75   Temp 37.2 °C (99 °F) (Axillary)   Resp (!) 35   Ht 5' 7" (1.702 m)   Wt (!) 140.6 kg (310 lb)   SpO2 97%   BMI 48.55 kg/m²       Pain/Demetrius Score: No Data Recorded      "

## 2018-12-21 NOTE — NURSING TRANSFER
Nursing Transfer Note      12/21/2018     Transfer To: 1044    Transfer via bed    Transfer with cardiac monitoring, O2    Transported by PCT    Medicines sent: MIVF/PCA    Chart send with patient: Yes    Notified: spouse    Patient reassessed at: 12/21/18

## 2018-12-21 NOTE — NURSING
Received patient from PACU, AAOx4, speech clear & fluent. Receiving O2 4LNC, patient states it was increased prior to transfer. Vitals assessed, WNL, receiving IV fluid & dilaudid PCA. States pain is 4/10 & tolerable. Abd. Observed, incision CDI with dermabond. Full assessment to follow.

## 2018-12-21 NOTE — TRANSFER OF CARE
"Anesthesia Transfer of Care Note    Patient: Giorgio Streeter    Procedure(s) Performed: Procedure(s) (LRB):  EXCISION, SMALL INTESTINE (N/A)    Patient location: PACU    Anesthesia Type: general    Transport from OR: Transported from OR on 6-10 L/min O2 by face mask with adequate spontaneous ventilation    Post pain: adequate analgesia    Post assessment: no apparent anesthetic complications    Post vital signs: stable    Level of consciousness: awake and lethargic    Nausea/Vomiting: no nausea/vomiting    Complications: none    Transfer of care protocol was followed      Last vitals:   Visit Vitals  BP (!) 144/68 (BP Location: Right arm, Patient Position: Lying)   Pulse 63   Temp 37 °C (98.6 °F) (Oral)   Resp 16   Ht 5' 7" (1.702 m)   Wt (!) 140.6 kg (310 lb)   SpO2 99%   BMI 48.55 kg/m²     "

## 2018-12-22 LAB
ALBUMIN SERPL BCP-MCNC: 3 G/DL
ALP SERPL-CCNC: 47 U/L
ALT SERPL W/O P-5'-P-CCNC: 24 U/L
ANION GAP SERPL CALC-SCNC: 8 MMOL/L
ANION GAP SERPL CALC-SCNC: 8 MMOL/L
AST SERPL-CCNC: 21 U/L
BASOPHILS # BLD AUTO: 0.02 K/UL
BASOPHILS NFR BLD: 0.2 %
BILIRUB SERPL-MCNC: 0.6 MG/DL
BUN SERPL-MCNC: 10 MG/DL
BUN SERPL-MCNC: 10 MG/DL
CALCIUM SERPL-MCNC: 8.3 MG/DL
CALCIUM SERPL-MCNC: 8.3 MG/DL
CHLORIDE SERPL-SCNC: 102 MMOL/L
CHLORIDE SERPL-SCNC: 102 MMOL/L
CO2 SERPL-SCNC: 27 MMOL/L
CO2 SERPL-SCNC: 27 MMOL/L
CREAT SERPL-MCNC: 1 MG/DL
CREAT SERPL-MCNC: 1 MG/DL
DIFFERENTIAL METHOD: ABNORMAL
EOSINOPHIL # BLD AUTO: 0 K/UL
EOSINOPHIL NFR BLD: 0 %
ERYTHROCYTE [DISTWIDTH] IN BLOOD BY AUTOMATED COUNT: 13.3 %
EST. GFR  (AFRICAN AMERICAN): >60 ML/MIN/1.73 M^2
EST. GFR  (AFRICAN AMERICAN): >60 ML/MIN/1.73 M^2
EST. GFR  (NON AFRICAN AMERICAN): >60 ML/MIN/1.73 M^2
EST. GFR  (NON AFRICAN AMERICAN): >60 ML/MIN/1.73 M^2
GLUCOSE SERPL-MCNC: 104 MG/DL
GLUCOSE SERPL-MCNC: 104 MG/DL
HCT VFR BLD AUTO: 33.3 %
HGB BLD-MCNC: 11 G/DL
IMM GRANULOCYTES # BLD AUTO: 0.03 K/UL
IMM GRANULOCYTES NFR BLD AUTO: 0.3 %
LYMPHOCYTES # BLD AUTO: 0.9 K/UL
LYMPHOCYTES NFR BLD: 7.9 %
MAGNESIUM SERPL-MCNC: 2.1 MG/DL
MCH RBC QN AUTO: 28.1 PG
MCHC RBC AUTO-ENTMCNC: 33 G/DL
MCV RBC AUTO: 85 FL
MONOCYTES # BLD AUTO: 0.8 K/UL
MONOCYTES NFR BLD: 7.3 %
NEUTROPHILS # BLD AUTO: 9.5 K/UL
NEUTROPHILS NFR BLD: 84.3 %
NRBC BLD-RTO: 0 /100 WBC
PHOSPHATE SERPL-MCNC: 2.8 MG/DL
PLATELET # BLD AUTO: 272 K/UL
PMV BLD AUTO: 9.6 FL
POTASSIUM SERPL-SCNC: 3.3 MMOL/L
POTASSIUM SERPL-SCNC: 3.3 MMOL/L
PROT SERPL-MCNC: 6.7 G/DL
RBC # BLD AUTO: 3.91 M/UL
SODIUM SERPL-SCNC: 137 MMOL/L
SODIUM SERPL-SCNC: 137 MMOL/L
WBC # BLD AUTO: 11.24 K/UL

## 2018-12-22 PROCEDURE — 85025 COMPLETE CBC W/AUTO DIFF WBC: CPT

## 2018-12-22 PROCEDURE — 84100 ASSAY OF PHOSPHORUS: CPT

## 2018-12-22 PROCEDURE — C9113 INJ PANTOPRAZOLE SODIUM, VIA: HCPCS | Performed by: STUDENT IN AN ORGANIZED HEALTH CARE EDUCATION/TRAINING PROGRAM

## 2018-12-22 PROCEDURE — 25000003 PHARM REV CODE 250: Performed by: SURGERY

## 2018-12-22 PROCEDURE — 20600001 HC STEP DOWN PRIVATE ROOM

## 2018-12-22 PROCEDURE — 63600175 PHARM REV CODE 636 W HCPCS: Performed by: STUDENT IN AN ORGANIZED HEALTH CARE EDUCATION/TRAINING PROGRAM

## 2018-12-22 PROCEDURE — 25000003 PHARM REV CODE 250: Performed by: STUDENT IN AN ORGANIZED HEALTH CARE EDUCATION/TRAINING PROGRAM

## 2018-12-22 PROCEDURE — 36415 COLL VENOUS BLD VENIPUNCTURE: CPT

## 2018-12-22 PROCEDURE — 63600175 PHARM REV CODE 636 W HCPCS: Performed by: SURGERY

## 2018-12-22 PROCEDURE — 80053 COMPREHEN METABOLIC PANEL: CPT

## 2018-12-22 PROCEDURE — 83735 ASSAY OF MAGNESIUM: CPT

## 2018-12-22 RX ORDER — POTASSIUM CHLORIDE 750 MG/1
30 CAPSULE, EXTENDED RELEASE ORAL
Status: COMPLETED | OUTPATIENT
Start: 2018-12-22 | End: 2018-12-22

## 2018-12-22 RX ORDER — ACETAMINOPHEN 10 MG/ML
1000 INJECTION, SOLUTION INTRAVENOUS EVERY 8 HOURS
Status: COMPLETED | OUTPATIENT
Start: 2018-12-22 | End: 2018-12-22

## 2018-12-22 RX ORDER — NALOXONE HCL 0.4 MG/ML
0.02 VIAL (ML) INJECTION
Status: DISCONTINUED | OUTPATIENT
Start: 2018-12-22 | End: 2018-12-23

## 2018-12-22 RX ORDER — DEXTROSE MONOHYDRATE, SODIUM CHLORIDE, AND POTASSIUM CHLORIDE 50; 1.49; 4.5 G/1000ML; G/1000ML; G/1000ML
INJECTION, SOLUTION INTRAVENOUS CONTINUOUS
Status: DISCONTINUED | OUTPATIENT
Start: 2018-12-22 | End: 2018-12-23

## 2018-12-22 RX ORDER — OXYCODONE HYDROCHLORIDE 10 MG/1
10 TABLET ORAL EVERY 6 HOURS PRN
Status: DISCONTINUED | OUTPATIENT
Start: 2018-12-22 | End: 2018-12-24 | Stop reason: HOSPADM

## 2018-12-22 RX ORDER — KETOROLAC TROMETHAMINE 30 MG/ML
30 INJECTION, SOLUTION INTRAMUSCULAR; INTRAVENOUS EVERY 6 HOURS
Status: DISCONTINUED | OUTPATIENT
Start: 2018-12-22 | End: 2018-12-24 | Stop reason: HOSPADM

## 2018-12-22 RX ORDER — CYCLOBENZAPRINE HCL 5 MG
5 TABLET ORAL 3 TIMES DAILY PRN
Status: DISCONTINUED | OUTPATIENT
Start: 2018-12-22 | End: 2018-12-24 | Stop reason: HOSPADM

## 2018-12-22 RX ORDER — HYDROMORPHONE HYDROCHLORIDE 1 MG/ML
1 INJECTION, SOLUTION INTRAMUSCULAR; INTRAVENOUS; SUBCUTANEOUS EVERY 6 HOURS PRN
Status: DISCONTINUED | OUTPATIENT
Start: 2018-12-22 | End: 2018-12-24

## 2018-12-22 RX ORDER — ATENOLOL 50 MG/1
50 TABLET ORAL 2 TIMES DAILY
Status: DISCONTINUED | OUTPATIENT
Start: 2018-12-22 | End: 2018-12-24 | Stop reason: HOSPADM

## 2018-12-22 RX ORDER — PANTOPRAZOLE SODIUM 40 MG/10ML
40 INJECTION, POWDER, LYOPHILIZED, FOR SOLUTION INTRAVENOUS DAILY
Status: DISCONTINUED | OUTPATIENT
Start: 2018-12-22 | End: 2018-12-24 | Stop reason: HOSPADM

## 2018-12-22 RX ORDER — KETOROLAC TROMETHAMINE 15 MG/ML
15 INJECTION, SOLUTION INTRAMUSCULAR; INTRAVENOUS EVERY 6 HOURS
Status: DISCONTINUED | OUTPATIENT
Start: 2018-12-22 | End: 2018-12-22

## 2018-12-22 RX ORDER — OXYCODONE HYDROCHLORIDE 5 MG/1
5 TABLET ORAL EVERY 6 HOURS PRN
Status: DISCONTINUED | OUTPATIENT
Start: 2018-12-22 | End: 2018-12-24 | Stop reason: HOSPADM

## 2018-12-22 RX ORDER — CETIRIZINE HYDROCHLORIDE 10 MG/1
10 TABLET ORAL DAILY
Status: DISCONTINUED | OUTPATIENT
Start: 2018-12-22 | End: 2018-12-24 | Stop reason: HOSPADM

## 2018-12-22 RX ORDER — HYDROMORPHONE HCL IN 0.9% NACL 6 MG/30 ML
PATIENT CONTROLLED ANALGESIA SYRINGE INTRAVENOUS CONTINUOUS
Status: DISCONTINUED | OUTPATIENT
Start: 2018-12-22 | End: 2018-12-23

## 2018-12-22 RX ADMIN — CEFOXITIN 2 G: 1 INJECTION, POWDER, FOR SOLUTION INTRAVENOUS at 10:12

## 2018-12-22 RX ADMIN — POTASSIUM CHLORIDE 30 MEQ: 750 CAPSULE, EXTENDED RELEASE ORAL at 03:12

## 2018-12-22 RX ADMIN — CETIRIZINE HYDROCHLORIDE 10 MG: 10 TABLET, FILM COATED ORAL at 10:12

## 2018-12-22 RX ADMIN — ACETAMINOPHEN 1000 MG: 10 INJECTION, SOLUTION INTRAVENOUS at 10:12

## 2018-12-22 RX ADMIN — Medication: at 11:12

## 2018-12-22 RX ADMIN — KETOROLAC TROMETHAMINE 30 MG: 30 INJECTION, SOLUTION INTRAMUSCULAR at 05:12

## 2018-12-22 RX ADMIN — SODIUM CHLORIDE, SODIUM LACTATE, POTASSIUM CHLORIDE, AND CALCIUM CHLORIDE: .6; .31; .03; .02 INJECTION, SOLUTION INTRAVENOUS at 03:12

## 2018-12-22 RX ADMIN — KETOROLAC TROMETHAMINE 30 MG: 30 INJECTION, SOLUTION INTRAMUSCULAR at 11:12

## 2018-12-22 RX ADMIN — PANTOPRAZOLE SODIUM 40 MG: 40 INJECTION, POWDER, FOR SOLUTION INTRAVENOUS at 10:12

## 2018-12-22 RX ADMIN — ENOXAPARIN SODIUM 40 MG: 100 INJECTION SUBCUTANEOUS at 08:12

## 2018-12-22 RX ADMIN — Medication: at 06:12

## 2018-12-22 RX ADMIN — POTASSIUM CHLORIDE, DEXTROSE MONOHYDRATE AND SODIUM CHLORIDE: 150; 5; 450 INJECTION, SOLUTION INTRAVENOUS at 10:12

## 2018-12-22 RX ADMIN — POTASSIUM CHLORIDE 30 MEQ: 750 CAPSULE, EXTENDED RELEASE ORAL at 11:12

## 2018-12-22 RX ADMIN — CEFOXITIN 2 G: 1 INJECTION, POWDER, FOR SOLUTION INTRAVENOUS at 03:12

## 2018-12-22 RX ADMIN — ACETAMINOPHEN 1000 MG: 10 INJECTION, SOLUTION INTRAVENOUS at 02:12

## 2018-12-22 RX ADMIN — METOPROLOL TARTRATE 5 MG: 1 INJECTION, SOLUTION INTRAVENOUS at 06:12

## 2018-12-22 RX ADMIN — ATENOLOL 50 MG: 50 TABLET ORAL at 08:12

## 2018-12-22 RX ADMIN — ACETAMINOPHEN 1000 MG: 10 INJECTION, SOLUTION INTRAVENOUS at 09:12

## 2018-12-22 RX ADMIN — ENOXAPARIN SODIUM 40 MG: 100 INJECTION SUBCUTANEOUS at 10:12

## 2018-12-22 RX ADMIN — ATENOLOL 50 MG: 50 TABLET ORAL at 10:12

## 2018-12-22 RX ADMIN — POTASSIUM CHLORIDE, DEXTROSE MONOHYDRATE AND SODIUM CHLORIDE: 150; 5; 450 INJECTION, SOLUTION INTRAVENOUS at 08:12

## 2018-12-22 NOTE — ASSESSMENT & PLAN NOTE
48-year-old male POD 1 from small bowel mass excision with resection. Doing well now. Some pain control issues and itching.     -Continue NPO for the time being  -Start multimodal pain therapy  -GI and DVT ppx  -continue mIVF  -daily labs  -ambulate today

## 2018-12-22 NOTE — PLAN OF CARE
Problem: Adult Inpatient Plan of Care  Goal: Plan of Care Review  Outcome: Ongoing (interventions implemented as appropriate)  POC reviewed with patient & family, verbalizes understanding. Able to stand at bedside with minimal/stand by assist. Void in urinal with assist from spouse. Educated on fall risk & calling for help with mobility. Cont. Receiving PCA dilaudid & iv fluids. Mag, currently running. SCD's applied & tolerating well. States pain is well controlled with medication. No c/o nausea or vomiting. O2 weaned to 2LNC. No s/s of distress noted.

## 2018-12-22 NOTE — PLAN OF CARE
"Problem: Adult Inpatient Plan of Care  Goal: Plan of Care Review  Outcome: Ongoing (interventions implemented as appropriate)  Pt alert and oriented 0 distress wound to abdomen intact. Pt c/o pain and feels like something is "pulling" when he moves a certain way it was explained to him that his muscles of the abdomen have been cut and it'll feel that way he has a PCA pump to use at his leisure for the discomfort. During the night he wore a CPAP and was reminded to breathe deeply d/t 02 level decreasing there was also some bradycardia during the shift. VS were monitored closely during the administration of the IV lopressor he's voiding dark concentrated urine in his urinal and stated that his stomach is beginning to growl and hopes to get at least some water to drink since he was made NPO      "

## 2018-12-22 NOTE — SUBJECTIVE & OBJECTIVE
Interval History: No acute events overnight. Has some moderate abdominal soreness and itching. Doing well otherwise.     Medications:  Continuous Infusions:   hydromorphone in 0.9 % NaCl 6 mg/30 ml      lactated ringers 125 mL/hr at 12/22/18 0343     Scheduled Meds:   ceFOXItin (MEFOXIN) IVPB  2 g Intravenous Q6H    enoxparin  40 mg Subcutaneous Q12H    metoprolol  5 mg Intravenous Q6H     PRN Meds:naloxone, ondansetron, sodium chloride 0.9%     Review of patient's allergies indicates:  No Known Allergies  Objective:     Vital Signs (Most Recent):  Temp: 99.2 °F (37.3 °C) (12/22/18 0344)  Pulse: 82 (12/22/18 0747)  Resp: 18 (12/22/18 0344)  BP: (!) 144/76 (12/22/18 0658)  SpO2: 95 % (12/22/18 0658) Vital Signs (24h Range):  Temp:  [97.4 °F (36.3 °C)-99.2 °F (37.3 °C)] 99.2 °F (37.3 °C)  Pulse:  [54-91] 82  Resp:  [14-35] 18  SpO2:  [92 %-99 %] 95 %  BP: (125-149)/(62-85) 144/76     Weight: (!) 140.6 kg (310 lb)  Body mass index is 48.55 kg/m².    Intake/Output - Last 3 Shifts       12/20 0700 - 12/21 0659 12/21 0700 - 12/22 0659 12/22 0700 - 12/23 0659    P.O.  0     I.V. (mL/kg)  3053.6 (21.7)     Total Intake(mL/kg)  3053.6 (21.7)     Urine (mL/kg/hr)  880 (0.3)     Stool  0     Total Output  880     Net  +2173.6            Stool Occurrence  0 x           Physical Exam   Constitutional: He is oriented to person, place, and time. He appears well-developed and well-nourished. No distress.   HENT:   Head: Normocephalic and atraumatic.   Eyes: EOM are normal.   Neck: Normal range of motion.   Pulmonary/Chest: Effort normal.   Abdominal: Soft. There is tenderness (appropriate incisional tenderness).   Midline incision with staples appears clean, dry, and intact.    Neurological: He is alert and oriented to person, place, and time.   Skin: Skin is warm and dry.   Psychiatric: He has a normal mood and affect. His behavior is normal.   Nursing note and vitals reviewed.      Significant Labs:  CBC:   Recent Labs   Lab  12/22/18  0406   WBC 11.24   RBC 3.91*   HGB 11.0*   HCT 33.3*      MCV 85   MCH 28.1   MCHC 33.0     CMP:   Recent Labs   Lab 12/22/18  0406     104   CALCIUM 8.3*  8.3*   ALBUMIN 3.0*   PROT 6.7     137   K 3.3*  3.3*   CO2 27  27     102   BUN 10  10   CREATININE 1.0  1.0   ALKPHOS 47*   ALT 24   AST 21   BILITOT 0.6       Significant Diagnostics:  I have reviewed all pertinent imaging results/findings within the past 24 hours.

## 2018-12-23 LAB
ALBUMIN SERPL BCP-MCNC: 2.9 G/DL
ALP SERPL-CCNC: 49 U/L
ALT SERPL W/O P-5'-P-CCNC: 23 U/L
ANION GAP SERPL CALC-SCNC: 5 MMOL/L
ANION GAP SERPL CALC-SCNC: 5 MMOL/L
AST SERPL-CCNC: 29 U/L
BASOPHILS # BLD AUTO: 0.03 K/UL
BASOPHILS NFR BLD: 0.3 %
BILIRUB SERPL-MCNC: 0.6 MG/DL
BUN SERPL-MCNC: 8 MG/DL
BUN SERPL-MCNC: 8 MG/DL
CALCIUM SERPL-MCNC: 8.6 MG/DL
CALCIUM SERPL-MCNC: 8.6 MG/DL
CHLORIDE SERPL-SCNC: 108 MMOL/L
CHLORIDE SERPL-SCNC: 108 MMOL/L
CO2 SERPL-SCNC: 27 MMOL/L
CO2 SERPL-SCNC: 27 MMOL/L
CREAT SERPL-MCNC: 0.8 MG/DL
CREAT SERPL-MCNC: 0.8 MG/DL
DIFFERENTIAL METHOD: ABNORMAL
EOSINOPHIL # BLD AUTO: 0.2 K/UL
EOSINOPHIL NFR BLD: 2.4 %
ERYTHROCYTE [DISTWIDTH] IN BLOOD BY AUTOMATED COUNT: 13.6 %
EST. GFR  (AFRICAN AMERICAN): >60 ML/MIN/1.73 M^2
EST. GFR  (AFRICAN AMERICAN): >60 ML/MIN/1.73 M^2
EST. GFR  (NON AFRICAN AMERICAN): >60 ML/MIN/1.73 M^2
EST. GFR  (NON AFRICAN AMERICAN): >60 ML/MIN/1.73 M^2
FLOW CYTOMETRY ANTIBODIES ANALYZED - TISSUE: NORMAL
FLOW CYTOMETRY COMMENT - TISSUE: NORMAL
FLOW CYTOMETRY INTERPRETATION - TISSUE: NORMAL
GLUCOSE SERPL-MCNC: 104 MG/DL
GLUCOSE SERPL-MCNC: 104 MG/DL
HCT VFR BLD AUTO: 31.7 %
HGB BLD-MCNC: 10.4 G/DL
IMM GRANULOCYTES # BLD AUTO: 0.04 K/UL
IMM GRANULOCYTES NFR BLD AUTO: 0.5 %
LYMPHOCYTES # BLD AUTO: 0.6 K/UL
LYMPHOCYTES NFR BLD: 7.5 %
MAGNESIUM SERPL-MCNC: 1.9 MG/DL
MCH RBC QN AUTO: 27.8 PG
MCHC RBC AUTO-ENTMCNC: 32.8 G/DL
MCV RBC AUTO: 85 FL
MONOCYTES # BLD AUTO: 0.7 K/UL
MONOCYTES NFR BLD: 7.8 %
NEUTROPHILS # BLD AUTO: 7 K/UL
NEUTROPHILS NFR BLD: 81.5 %
NRBC BLD-RTO: 0 /100 WBC
PHOSPHATE SERPL-MCNC: 1.6 MG/DL
PLATELET # BLD AUTO: 241 K/UL
PMV BLD AUTO: 9.5 FL
POTASSIUM SERPL-SCNC: 4 MMOL/L
POTASSIUM SERPL-SCNC: 4 MMOL/L
PROT SERPL-MCNC: 6.8 G/DL
RBC # BLD AUTO: 3.74 M/UL
SODIUM SERPL-SCNC: 140 MMOL/L
SODIUM SERPL-SCNC: 140 MMOL/L
SPECIMEN TYPE - TISSUE: NORMAL
WBC # BLD AUTO: 8.58 K/UL

## 2018-12-23 PROCEDURE — C9113 INJ PANTOPRAZOLE SODIUM, VIA: HCPCS | Performed by: STUDENT IN AN ORGANIZED HEALTH CARE EDUCATION/TRAINING PROGRAM

## 2018-12-23 PROCEDURE — 25000003 PHARM REV CODE 250: Performed by: SURGERY

## 2018-12-23 PROCEDURE — 36415 COLL VENOUS BLD VENIPUNCTURE: CPT

## 2018-12-23 PROCEDURE — 63600175 PHARM REV CODE 636 W HCPCS: Performed by: STUDENT IN AN ORGANIZED HEALTH CARE EDUCATION/TRAINING PROGRAM

## 2018-12-23 PROCEDURE — 84100 ASSAY OF PHOSPHORUS: CPT

## 2018-12-23 PROCEDURE — 63600175 PHARM REV CODE 636 W HCPCS: Performed by: SURGERY

## 2018-12-23 PROCEDURE — 80053 COMPREHEN METABOLIC PANEL: CPT

## 2018-12-23 PROCEDURE — 25000003 PHARM REV CODE 250: Performed by: STUDENT IN AN ORGANIZED HEALTH CARE EDUCATION/TRAINING PROGRAM

## 2018-12-23 PROCEDURE — 83735 ASSAY OF MAGNESIUM: CPT

## 2018-12-23 PROCEDURE — 85025 COMPLETE CBC W/AUTO DIFF WBC: CPT

## 2018-12-23 PROCEDURE — 20600001 HC STEP DOWN PRIVATE ROOM

## 2018-12-23 RX ORDER — SODIUM,POTASSIUM PHOSPHATES 280-250MG
2 POWDER IN PACKET (EA) ORAL
Status: COMPLETED | OUTPATIENT
Start: 2018-12-23 | End: 2018-12-23

## 2018-12-23 RX ORDER — MAGNESIUM SULFATE HEPTAHYDRATE 40 MG/ML
2 INJECTION, SOLUTION INTRAVENOUS ONCE
Status: COMPLETED | OUTPATIENT
Start: 2018-12-23 | End: 2018-12-23

## 2018-12-23 RX ORDER — LABETALOL HCL 20 MG/4 ML
10 SYRINGE (ML) INTRAVENOUS EVERY 4 HOURS PRN
Status: DISCONTINUED | OUTPATIENT
Start: 2018-12-23 | End: 2018-12-24 | Stop reason: HOSPADM

## 2018-12-23 RX ADMIN — POTASSIUM CHLORIDE, DEXTROSE MONOHYDRATE AND SODIUM CHLORIDE: 150; 5; 450 INJECTION, SOLUTION INTRAVENOUS at 02:12

## 2018-12-23 RX ADMIN — KETOROLAC TROMETHAMINE 30 MG: 30 INJECTION, SOLUTION INTRAMUSCULAR at 11:12

## 2018-12-23 RX ADMIN — MAGNESIUM SULFATE IN WATER 2 G: 40 INJECTION, SOLUTION INTRAVENOUS at 10:12

## 2018-12-23 RX ADMIN — OXYCODONE HYDROCHLORIDE 5 MG: 5 TABLET ORAL at 11:12

## 2018-12-23 RX ADMIN — KETOROLAC TROMETHAMINE 30 MG: 30 INJECTION, SOLUTION INTRAMUSCULAR at 12:12

## 2018-12-23 RX ADMIN — POTASSIUM & SODIUM PHOSPHATES POWDER PACK 280-160-250 MG 2 PACKET: 280-160-250 PACK at 12:12

## 2018-12-23 RX ADMIN — PANTOPRAZOLE SODIUM 40 MG: 40 INJECTION, POWDER, FOR SOLUTION INTRAVENOUS at 08:12

## 2018-12-23 RX ADMIN — KETOROLAC TROMETHAMINE 30 MG: 30 INJECTION, SOLUTION INTRAMUSCULAR at 05:12

## 2018-12-23 RX ADMIN — POTASSIUM & SODIUM PHOSPHATES POWDER PACK 280-160-250 MG 2 PACKET: 280-160-250 PACK at 05:12

## 2018-12-23 RX ADMIN — KETOROLAC TROMETHAMINE 30 MG: 30 INJECTION, SOLUTION INTRAMUSCULAR at 06:12

## 2018-12-23 RX ADMIN — ENOXAPARIN SODIUM 40 MG: 100 INJECTION SUBCUTANEOUS at 08:12

## 2018-12-23 RX ADMIN — OXYCODONE HYDROCHLORIDE 5 MG: 5 TABLET ORAL at 05:12

## 2018-12-23 RX ADMIN — CETIRIZINE HYDROCHLORIDE 10 MG: 10 TABLET, FILM COATED ORAL at 08:12

## 2018-12-23 RX ADMIN — ATENOLOL 50 MG: 50 TABLET ORAL at 08:12

## 2018-12-23 RX ADMIN — POTASSIUM & SODIUM PHOSPHATES POWDER PACK 280-160-250 MG 2 PACKET: 280-160-250 PACK at 08:12

## 2018-12-23 NOTE — SUBJECTIVE & OBJECTIVE
Interval History: No acute events overnight. Ambulating in halls. Feels well. Passing flatus.     Medications:  Continuous Infusions:   dextrose 5 % and 0.45 % NaCl with KCl 20 mEq 125 mL/hr at 12/23/18 0210    hydromorphone in 0.9 % NaCl 6 mg/30 ml      lactated ringers 125 mL/hr at 12/22/18 0343     Scheduled Meds:   atenolol  50 mg Oral BID    cetirizine  10 mg Oral Daily    enoxparin  40 mg Subcutaneous Q12H    ketorolac  30 mg Intravenous Q6H    pantoprazole  40 mg Intravenous Daily     PRN Meds:cyclobenzaprine, HYDROmorphone, naloxone, ondansetron, oxyCODONE, oxyCODONE, sodium chloride 0.9%     Review of patient's allergies indicates:  No Known Allergies  Objective:     Vital Signs (Most Recent):  Temp: 99.3 °F (37.4 °C) (12/23/18 0709)  Pulse: 67 (12/23/18 0709)  Resp: 16 (12/23/18 0709)  BP: (!) 157/83 (12/23/18 0709)  SpO2: 98 % (12/23/18 0709) Vital Signs (24h Range):  Temp:  [98.2 °F (36.8 °C)-99.3 °F (37.4 °C)] 99.3 °F (37.4 °C)  Pulse:  [58-98] 67  Resp:  [16-20] 16  SpO2:  [91 %-98 %] 98 %  BP: (127-169)/(59-84) 157/83     Weight: (!) 140.6 kg (310 lb)  Body mass index is 48.55 kg/m².    Intake/Output - Last 3 Shifts       12/21 0700 - 12/22 0659 12/22 0700 - 12/23 0659 12/23 0700 - 12/24 0659    P.O. 0 580     I.V. (mL/kg) 3053.6 (21.7) 2846.1 (20.2)     Total Intake(mL/kg) 3053.6 (21.7) 3426.1 (24.4)     Urine (mL/kg/hr) 880 (0.3) 1425 (0.4) 600 (4.6)    Stool 0      Total Output 880 1425 600    Net +2173.6 +2001.1 -600           Stool Occurrence 0 x            Physical Exam   Constitutional: He is oriented to person, place, and time. He appears well-developed and well-nourished. No distress.   HENT:   Head: Normocephalic and atraumatic.   Eyes: EOM are normal.   Neck: Normal range of motion.   Pulmonary/Chest: Effort normal.   Abdominal: Soft. There is tenderness (appropriate incisional tenderness).   Midline incision with staples appears clean, dry, and intact.    Neurological: He is alert  and oriented to person, place, and time.   Skin: Skin is warm and dry.   Psychiatric: He has a normal mood and affect. His behavior is normal.   Nursing note and vitals reviewed.      Significant Labs:  Pending    Significant Diagnostics:  none

## 2018-12-23 NOTE — ASSESSMENT & PLAN NOTE
48-year-old male POD 2 from small bowel mass excision with resection. Doing well now. Ambulating in halls. Tolerating some clears. Passing flatus.     -Advance to regular diet  -d/c PCA and start PO pain meds  -d/c mIVF  -GI and DVT ppx  -ambulate  -daily labs

## 2018-12-23 NOTE — PROGRESS NOTES
[facility]  General Surgery  Progress Note    Subjective:     History of Present Illness:  No notes on file    Post-Op Info:  Procedure(s) (LRB):  EXCISION, SMALL INTESTINE (N/A)   2 Days Post-Op     Interval History: No acute events overnight. Ambulating in halls. Feels well. Passing flatus.     Medications:  Continuous Infusions:   dextrose 5 % and 0.45 % NaCl with KCl 20 mEq 125 mL/hr at 12/23/18 0210    hydromorphone in 0.9 % NaCl 6 mg/30 ml      lactated ringers 125 mL/hr at 12/22/18 0343     Scheduled Meds:   atenolol  50 mg Oral BID    cetirizine  10 mg Oral Daily    enoxparin  40 mg Subcutaneous Q12H    ketorolac  30 mg Intravenous Q6H    pantoprazole  40 mg Intravenous Daily     PRN Meds:cyclobenzaprine, HYDROmorphone, naloxone, ondansetron, oxyCODONE, oxyCODONE, sodium chloride 0.9%     Review of patient's allergies indicates:  No Known Allergies  Objective:     Vital Signs (Most Recent):  Temp: 99.3 °F (37.4 °C) (12/23/18 0709)  Pulse: 67 (12/23/18 0709)  Resp: 16 (12/23/18 0709)  BP: (!) 157/83 (12/23/18 0709)  SpO2: 98 % (12/23/18 0709) Vital Signs (24h Range):  Temp:  [98.2 °F (36.8 °C)-99.3 °F (37.4 °C)] 99.3 °F (37.4 °C)  Pulse:  [58-98] 67  Resp:  [16-20] 16  SpO2:  [91 %-98 %] 98 %  BP: (127-169)/(59-84) 157/83     Weight: (!) 140.6 kg (310 lb)  Body mass index is 48.55 kg/m².    Intake/Output - Last 3 Shifts       12/21 0700 - 12/22 0659 12/22 0700 - 12/23 0659 12/23 0700 - 12/24 0659    P.O. 0 580     I.V. (mL/kg) 3053.6 (21.7) 2846.1 (20.2)     Total Intake(mL/kg) 3053.6 (21.7) 3426.1 (24.4)     Urine (mL/kg/hr) 880 (0.3) 1425 (0.4) 600 (4.6)    Stool 0      Total Output 880 1425 600    Net +2173.6 +2001.1 -600           Stool Occurrence 0 x            Physical Exam   Constitutional: He is oriented to person, place, and time. He appears well-developed and well-nourished. No distress.   HENT:   Head: Normocephalic and atraumatic.   Eyes: EOM are normal.   Neck: Normal  range of motion.   Pulmonary/Chest: Effort normal.   Abdominal: Soft. There is tenderness (appropriate incisional tenderness).   Midline incision with staples appears clean, dry, and intact.    Neurological: He is alert and oriented to person, place, and time.   Skin: Skin is warm and dry.   Psychiatric: He has a normal mood and affect. His behavior is normal.   Nursing note and vitals reviewed.      Significant Labs:  Pending    Significant Diagnostics:  none    Assessment/Plan:     * Small bowel mass    48-year-old male POD 2 from small bowel mass excision with resection. Doing well now. Ambulating in halls. Tolerating some clears. Passing flatus.     -Advance to regular diet  -d/c PCA and start PO pain meds  -d/c mIVF  -GI and DVT ppx  -ambulate  -daily labs             Anderson Howard MD  General Surgery  [facility]

## 2018-12-23 NOTE — PROGRESS NOTES
[facility]  General Surgery  Progress Note    Subjective:     Post-Op Info:  Procedure(s) (LRB):  EXCISION, SMALL INTESTINE (N/A)   2 Days Post-Op     Interval History: No acute events overnight. Has some moderate abdominal soreness and itching. Doing well otherwise.     Medications:  Continuous Infusions:   hydromorphone in 0.9 % NaCl 6 mg/30 ml      lactated ringers 125 mL/hr at 12/22/18 0343     Scheduled Meds:   ceFOXItin (MEFOXIN) IVPB  2 g Intravenous Q6H    enoxparin  40 mg Subcutaneous Q12H    metoprolol  5 mg Intravenous Q6H     PRN Meds:naloxone, ondansetron, sodium chloride 0.9%     Review of patient's allergies indicates:  No Known Allergies  Objective:     Vital Signs (Most Recent):  Temp: 99.2 °F (37.3 °C) (12/22/18 0344)  Pulse: 82 (12/22/18 0747)  Resp: 18 (12/22/18 0344)  BP: (!) 144/76 (12/22/18 0658)  SpO2: 95 % (12/22/18 0658) Vital Signs (24h Range):  Temp:  [97.4 °F (36.3 °C)-99.2 °F (37.3 °C)] 99.2 °F (37.3 °C)  Pulse:  [54-91] 82  Resp:  [14-35] 18  SpO2:  [92 %-99 %] 95 %  BP: (125-149)/(62-85) 144/76     Weight: (!) 140.6 kg (310 lb)  Body mass index is 48.55 kg/m².    Intake/Output - Last 3 Shifts       12/20 0700 - 12/21 0659 12/21 0700 - 12/22 0659 12/22 0700 - 12/23 0659    P.O.  0     I.V. (mL/kg)  3053.6 (21.7)     Total Intake(mL/kg)  3053.6 (21.7)     Urine (mL/kg/hr)  880 (0.3)     Stool  0     Total Output  880     Net  +2173.6            Stool Occurrence  0 x           Physical Exam   Constitutional: He is oriented to person, place, and time. He appears well-developed and well-nourished. No distress.   HENT:   Head: Normocephalic and atraumatic.   Eyes: EOM are normal.   Neck: Normal range of motion.   Pulmonary/Chest: Effort normal.   Abdominal: Soft. There is tenderness (appropriate incisional tenderness).   Midline incision with staples appears clean, dry, and intact.    Neurological: He is alert and oriented to person, place, and time.   Skin: Skin is  warm and dry.   Psychiatric: He has a normal mood and affect. His behavior is normal.   Nursing note and vitals reviewed.      Significant Labs:  CBC:   Recent Labs   Lab 12/22/18  0406   WBC 11.24   RBC 3.91*   HGB 11.0*   HCT 33.3*      MCV 85   MCH 28.1   MCHC 33.0     CMP:   Recent Labs   Lab 12/22/18  0406     104   CALCIUM 8.3*  8.3*   ALBUMIN 3.0*   PROT 6.7     137   K 3.3*  3.3*   CO2 27  27     102   BUN 10  10   CREATININE 1.0  1.0   ALKPHOS 47*   ALT 24   AST 21   BILITOT 0.6       Significant Diagnostics:  I have reviewed all pertinent imaging results/findings within the past 24 hours.    Assessment/Plan:     * Small bowel mass    48-year-old male POD 1 from small bowel mass excision with resection. Doing well now. Some pain control issues and itching.     -Continue NPO for the time being  -Start multimodal pain therapy  -GI and DVT ppx  -continue mIVF  -daily labs  -ambulate today           Anderson Howard MD  General Surgery  [facility]

## 2018-12-23 NOTE — PLAN OF CARE
POC reviewed with patient, stated understanding, AOX4, VSS, ABD midline WDL ABD binder in place.  Belching noted, denies flatus at this time, AMB to BR to Void, adequate amounts.  PIV infusing per order, PCA managing pain at this time, CPAP at HS, tolerated well, O2@1LN/NC.  Tele WDL this shift. Tolerated clears, denies any N/V this shift as well. No adverse events, bed low, call light in reach, will cont to manage POC.

## 2018-12-24 VITALS
WEIGHT: 310 LBS | RESPIRATION RATE: 17 BRPM | TEMPERATURE: 99 F | OXYGEN SATURATION: 95 % | DIASTOLIC BLOOD PRESSURE: 90 MMHG | BODY MASS INDEX: 48.65 KG/M2 | SYSTOLIC BLOOD PRESSURE: 141 MMHG | HEART RATE: 76 BPM | HEIGHT: 67 IN

## 2018-12-24 LAB
ALBUMIN SERPL BCP-MCNC: 2.8 G/DL
ALP SERPL-CCNC: 53 U/L
ALT SERPL W/O P-5'-P-CCNC: 30 U/L
ANION GAP SERPL CALC-SCNC: 9 MMOL/L
ANION GAP SERPL CALC-SCNC: 9 MMOL/L
AST SERPL-CCNC: 35 U/L
BASOPHILS # BLD AUTO: 0.03 K/UL
BASOPHILS NFR BLD: 0.4 %
BILIRUB SERPL-MCNC: 0.7 MG/DL
BUN SERPL-MCNC: 11 MG/DL
BUN SERPL-MCNC: 11 MG/DL
CALCIUM SERPL-MCNC: 8.8 MG/DL
CALCIUM SERPL-MCNC: 8.8 MG/DL
CHLORIDE SERPL-SCNC: 107 MMOL/L
CHLORIDE SERPL-SCNC: 107 MMOL/L
CO2 SERPL-SCNC: 25 MMOL/L
CO2 SERPL-SCNC: 25 MMOL/L
CREAT SERPL-MCNC: 0.8 MG/DL
CREAT SERPL-MCNC: 0.8 MG/DL
DIFFERENTIAL METHOD: ABNORMAL
EOSINOPHIL # BLD AUTO: 0.3 K/UL
EOSINOPHIL NFR BLD: 3.2 %
ERYTHROCYTE [DISTWIDTH] IN BLOOD BY AUTOMATED COUNT: 13.6 %
EST. GFR  (AFRICAN AMERICAN): >60 ML/MIN/1.73 M^2
EST. GFR  (AFRICAN AMERICAN): >60 ML/MIN/1.73 M^2
EST. GFR  (NON AFRICAN AMERICAN): >60 ML/MIN/1.73 M^2
EST. GFR  (NON AFRICAN AMERICAN): >60 ML/MIN/1.73 M^2
GLUCOSE SERPL-MCNC: 97 MG/DL
GLUCOSE SERPL-MCNC: 97 MG/DL
HCT VFR BLD AUTO: 32.5 %
HGB BLD-MCNC: 10.2 G/DL
IMM GRANULOCYTES # BLD AUTO: 0.04 K/UL
IMM GRANULOCYTES NFR BLD AUTO: 0.5 %
LYMPHOCYTES # BLD AUTO: 0.7 K/UL
LYMPHOCYTES NFR BLD: 9 %
MAGNESIUM SERPL-MCNC: 1.6 MG/DL
MCH RBC QN AUTO: 27.9 PG
MCHC RBC AUTO-ENTMCNC: 31.4 G/DL
MCV RBC AUTO: 89 FL
MONOCYTES # BLD AUTO: 0.7 K/UL
MONOCYTES NFR BLD: 8.7 %
NEUTROPHILS # BLD AUTO: 6.2 K/UL
NEUTROPHILS NFR BLD: 78.2 %
NRBC BLD-RTO: 0 /100 WBC
PHOSPHATE SERPL-MCNC: 3.2 MG/DL
PLATELET # BLD AUTO: 230 K/UL
PMV BLD AUTO: 9.7 FL
POTASSIUM SERPL-SCNC: 3.9 MMOL/L
POTASSIUM SERPL-SCNC: 3.9 MMOL/L
PROT SERPL-MCNC: 6.8 G/DL
RBC # BLD AUTO: 3.66 M/UL
SODIUM SERPL-SCNC: 141 MMOL/L
SODIUM SERPL-SCNC: 141 MMOL/L
WBC # BLD AUTO: 7.91 K/UL

## 2018-12-24 PROCEDURE — 85025 COMPLETE CBC W/AUTO DIFF WBC: CPT

## 2018-12-24 PROCEDURE — 63600175 PHARM REV CODE 636 W HCPCS: Performed by: STUDENT IN AN ORGANIZED HEALTH CARE EDUCATION/TRAINING PROGRAM

## 2018-12-24 PROCEDURE — 36415 COLL VENOUS BLD VENIPUNCTURE: CPT

## 2018-12-24 PROCEDURE — 84100 ASSAY OF PHOSPHORUS: CPT

## 2018-12-24 PROCEDURE — 80053 COMPREHEN METABOLIC PANEL: CPT

## 2018-12-24 PROCEDURE — 83735 ASSAY OF MAGNESIUM: CPT

## 2018-12-24 PROCEDURE — 25000003 PHARM REV CODE 250: Performed by: SURGERY

## 2018-12-24 PROCEDURE — C9113 INJ PANTOPRAZOLE SODIUM, VIA: HCPCS | Performed by: STUDENT IN AN ORGANIZED HEALTH CARE EDUCATION/TRAINING PROGRAM

## 2018-12-24 PROCEDURE — 25000003 PHARM REV CODE 250: Performed by: STUDENT IN AN ORGANIZED HEALTH CARE EDUCATION/TRAINING PROGRAM

## 2018-12-24 PROCEDURE — 63600175 PHARM REV CODE 636 W HCPCS: Performed by: SURGERY

## 2018-12-24 RX ORDER — POLYETHYLENE GLYCOL 3350 17 G/17G
17 POWDER, FOR SOLUTION ORAL DAILY
Qty: 510 G | Refills: 0 | Status: SHIPPED | OUTPATIENT
Start: 2018-12-24 | End: 2019-01-07

## 2018-12-24 RX ORDER — OXYCODONE HYDROCHLORIDE 5 MG/1
5 TABLET ORAL EVERY 4 HOURS PRN
Qty: 28 TABLET | Refills: 0 | Status: SHIPPED | OUTPATIENT
Start: 2018-12-24 | End: 2019-01-07

## 2018-12-24 RX ORDER — POLYETHYLENE GLYCOL 3350 17 G/17G
17 POWDER, FOR SOLUTION ORAL DAILY
Qty: 30 EACH | Refills: 0 | Status: SHIPPED | OUTPATIENT
Start: 2018-12-24 | End: 2018-12-24 | Stop reason: HOSPADM

## 2018-12-24 RX ORDER — MAGNESIUM SULFATE HEPTAHYDRATE 40 MG/ML
2 INJECTION, SOLUTION INTRAVENOUS ONCE
Status: COMPLETED | OUTPATIENT
Start: 2018-12-24 | End: 2018-12-24

## 2018-12-24 RX ORDER — POLYETHYLENE GLYCOL 3350 17 G/17G
17 POWDER, FOR SOLUTION ORAL DAILY
Qty: 30 EACH | Refills: 0 | Status: SHIPPED | OUTPATIENT
Start: 2018-12-24 | End: 2018-12-24 | Stop reason: SDUPTHER

## 2018-12-24 RX ADMIN — CETIRIZINE HYDROCHLORIDE 10 MG: 10 TABLET, FILM COATED ORAL at 09:12

## 2018-12-24 RX ADMIN — KETOROLAC TROMETHAMINE 30 MG: 30 INJECTION, SOLUTION INTRAMUSCULAR at 05:12

## 2018-12-24 RX ADMIN — ATENOLOL 50 MG: 50 TABLET ORAL at 09:12

## 2018-12-24 RX ADMIN — OXYCODONE HYDROCHLORIDE 5 MG: 5 TABLET ORAL at 07:12

## 2018-12-24 RX ADMIN — ENOXAPARIN SODIUM 40 MG: 100 INJECTION SUBCUTANEOUS at 09:12

## 2018-12-24 RX ADMIN — PANTOPRAZOLE SODIUM 40 MG: 40 INJECTION, POWDER, FOR SOLUTION INTRAVENOUS at 09:12

## 2018-12-24 RX ADMIN — MAGNESIUM SULFATE IN WATER 2 G: 40 INJECTION, SOLUTION INTRAVENOUS at 09:12

## 2018-12-24 NOTE — SUBJECTIVE & OBJECTIVE
Interval History: No acute events overnight. Ambulating in halls. Nazario diet, no nausea, pain controlled. +BM.    Medications:  Continuous Infusions:    Scheduled Meds:   atenolol  50 mg Oral BID    cetirizine  10 mg Oral Daily    enoxparin  40 mg Subcutaneous Q12H    ketorolac  30 mg Intravenous Q6H    magnesium sulfate IVPB  2 g Intravenous Once    pantoprazole  40 mg Intravenous Daily     PRN Meds:cyclobenzaprine, labetalol, ondansetron, oxyCODONE, oxyCODONE, sodium chloride 0.9%     Review of patient's allergies indicates:  No Known Allergies  Objective:     Vital Signs (Most Recent):  Temp: 98.5 °F (36.9 °C) (12/24/18 0727)  Pulse: 72 (12/24/18 0727)  Resp: 17 (12/24/18 0727)  BP: (!) 141/90 (12/24/18 0727)  SpO2: 95 % (12/24/18 0727) Vital Signs (24h Range):  Temp:  [98.5 °F (36.9 °C)-99.3 °F (37.4 °C)] 98.5 °F (36.9 °C)  Pulse:  [63-89] 72  Resp:  [15-17] 17  SpO2:  [94 %-98 %] 95 %  BP: (133-162)/(64-92) 141/90     Weight: (!) 140.6 kg (310 lb)  Body mass index is 48.55 kg/m².    Intake/Output - Last 3 Shifts       12/22 0700 - 12/23 0659 12/23 0700 - 12/24 0659 12/24 0700 - 12/25 0659    P.O. 580 1080     I.V. (mL/kg) 2846.1 (20.2)      Total Intake(mL/kg) 3426.1 (24.4) 1080 (7.7)     Urine (mL/kg/hr) 1425 (0.4) 1850 (0.5) 200 (1)    Stool  0     Total Output 1425 1850 200    Net +2001.1 -770 -200           Urine Occurrence  2 x     Stool Occurrence  2 x           Physical Exam   Constitutional: He is oriented to person, place, and time. He appears well-developed and well-nourished. No distress.   HENT:   Head: Normocephalic and atraumatic.   Eyes: EOM are normal.   Neck: Normal range of motion.   Pulmonary/Chest: Effort normal.   Abdominal: Soft. Tenderness: appropriate incisional tenderness.   Midline incision appears clean, dry, and intact.    Neurological: He is alert and oriented to person, place, and time.   Skin: Skin is warm and dry.   Psychiatric: He has a normal mood and affect. His behavior  is normal.   Nursing note and vitals reviewed.      Significant Labs:  Lab Results   Component Value Date    WBC 7.91 12/24/2018    HGB 10.2 (L) 12/24/2018    HCT 32.5 (L) 12/24/2018    MCV 89 12/24/2018     12/24/2018         Significant Diagnostics:  none

## 2018-12-24 NOTE — NURSING
Patient discharge instructions provided. Medications reconciled. Patient escorted via transport by wheel chair.

## 2018-12-24 NOTE — ASSESSMENT & PLAN NOTE
48-year-old male POD 3 from small bowel resection.      -cont regular diet  -cont PO pain meds  -GI and DVT ppx  -replace lytes prn  -ambulate  -d/c today

## 2018-12-24 NOTE — PLAN OF CARE
Patient is a 48 year old male admitted from home 12/21/2018 and underwent Small Bowel Resection.  Patient discharged home with no needs today.    Patient lives in a one story home with his wife and two boys.  Patient's family will dive him home and obtain anything he needs after discharge.  Patient given [facility] with understanding verbalized.    PCP  Yoanna Ron MD  [address] [facility] [address]  [phone]      [facility] [address] [place] [id number]  Phone: [phone] Fax: [phone]      Extended Emergency Contact Information  Primary Emergency Contact: Tera Valadez  [place] Phone: [phone]  Mobile Phone: [phone]  Relation: Father  Preferred language: English   needed? No  Secondary Emergency Contact: boyd valadez  Mobile Phone: [phone]  Relation: Spouse   needed? No       12/24/18 1342   Discharge Assessment   Assessment Type Discharge Planning Assessment   Confirmed/corrected address and phone number on facesheet? Yes   Assessment information obtained from? Patient   Expected Length of Stay (days) 3   Communicated expected length of stay with patient/caregiver yes   Prior to hospitilization cognitive status: Alert/Oriented   Prior to hospitalization functional status: Independent   Current cognitive status: Alert/Oriented   Current Functional Status: Independent   Lives With spouse;child(samantha), dependent   Able to Return to Prior Arrangements yes   Is patient able to care for self after discharge? Yes   Who are your caregiver(s) and their phone number(s)? family   Patient's perception of discharge disposition home or selfcare   Equipment Currently Used at Home none   Do you have any problems affording any of your prescribed medications? No   Is the patient taking medications as prescribed? yes   Does the patient have transportation home? Yes    Transportation Anticipated family or friend will provide   Discharge Plan A Home with family   Patient/Family in Agreement with Plan yes

## 2018-12-24 NOTE — DISCHARGE SUMMARY
[facility]  General Surgery  Discharge Summary      Patient Name: Giorgio Streeter  MRN: [id number]  Admission Date: 12/21/2018  Hospital Length of Stay: 3 days  Discharge Date and Time:  12/24/2018 8:29 AM  Attending Physician: Cecilio Casanova MD   Discharging Provider: Kristi Luu MD  Primary Care Provider: Yoanna Ron MD     HPI: Patient is a 48 y.o. male presents for evaluation of a newly diagnosed small bowel mass. Patient reports that he began having lower abdominal/pelvic pain toward the end of October of this year. This prompted a Urologic evaluation and subsequent treatment for a presumed UTI. He states that his pain continued into November and evolved into more upper abdominal/epigastric pain that was worse with eating. He was subsequently seen by his PCP who ultimately ordered a CT abdomen/pelvis that revealed an abnormal thickening of an 8 cm segment of small bowel. The patient denies any other sx at this time, including nausea, vomiting, fever, chills, night sweats, or anorexia. He does report ~10 lb weight loss over the past 1-2 months, but he is actively taking part in a weight loss program.      Patient denies personal history of cancer. Family history significant for two distant cousins with lymphoma and sister with history of melanoma. Patient denies previous abdominal surgery.         Procedure(s) (LRB):  EXCISION, SMALL INTESTINE (N/A)     Hospital Course: The patient tolerated the procedure well. He had return of bowel function. He tolerated a diet. His pain was well controlled. He ambulated well. He was discharged to home on POD#3.     Consults:     Significant Diagnostic Studies: Labs:   CBC   Recent Labs   Lab 12/23/18  0700 12/24/18  0538   WBC 8.58 7.91   HGB 10.4* 10.2*   HCT 31.7* 32.5*    230       Pending Diagnostic Studies:     None        Final Active Diagnoses:    Diagnosis Date Noted POA    PRINCIPAL PROBLEM:  Small bowel mass [K63.89]  12/21/2018 Yes    Morbid obesity [E66.01] 12/21/2018 Yes      Problems Resolved During this Admission:      Discharged Condition: good    Disposition:     Follow Up:  Follow-up Information     Cecilio Casanova MD In 2 weeks.    Specialty:  General Surgery  Contact information:  [address]  [facility] [address]  [phone]                 Patient Instructions:      Diet Adult Regular     Other restrictions (specify):   Order Comments: No driving while still taking pain medications daily.   Take a stool softener while taking pain medications daily.   No lifting more than 10 lbs   Ok to shower     Notify your health care provider if you experience any of the following:  temperature >100.4     Notify your health care provider if you experience any of the following:  persistent nausea and vomiting or diarrhea     Notify your health care provider if you experience any of the following:  severe uncontrolled pain     Notify your health care provider if you experience any of the following:  redness, tenderness, or signs of infection (pain, swelling, redness, odor or green/yellow discharge around incision site)     Notify your health care provider if you experience any of the following:  worsening rash     Notify your health care provider if you experience any of the following:  persistent dizziness, light-headedness, or visual disturbances     No dressing needed     Medications:  Reconciled Home Medications:      Medication List      START taking these medications    oxyCODONE 5 MG immediate release tablet  Commonly known as:  ROXICODONE  Take 1 tablet (5 mg total) by mouth every 4 (four) hours as needed.     polyethylene glycol 17 gram Pwpk  Commonly known as:  GLYCOLAX  Take 17 g by mouth once daily.        CONTINUE taking these medications    atenolol 100 MG tablet  Commonly known as:  TENORMIN  Take 50 mg by mouth 2 (two) times daily.     fexofenadine 60 MG tablet  Commonly known as:  ALLEGRA  Take 60 mg by  mouth once daily.     fluticasone 50 mcg/actuation nasal spray  Commonly known as:  FLONASE  1 spray by Each Nare route once daily.     loratadine 10 mg tablet  Commonly known as:  CLARITIN  Take 10 mg by mouth once daily.     losartan-hydrochlorothiazide 100-12.5 mg 100-12.5 mg Tab  Commonly known as:  CHAN Luu MD  General Surgery  [facility]

## 2018-12-24 NOTE — PROGRESS NOTES
[facility]  General Surgery  Progress Note    Subjective:     History of Present Illness:  No notes on file    Post-Op Info:  Procedure(s) (LRB):  EXCISION, SMALL INTESTINE (N/A)   3 Days Post-Op     Interval History: No acute events overnight. Ambulating in halls. Nazario diet, no nausea, pain controlled. +BM.    Medications:  Continuous Infusions:    Scheduled Meds:   atenolol  50 mg Oral BID    cetirizine  10 mg Oral Daily    enoxparin  40 mg Subcutaneous Q12H    ketorolac  30 mg Intravenous Q6H    magnesium sulfate IVPB  2 g Intravenous Once    pantoprazole  40 mg Intravenous Daily     PRN Meds:cyclobenzaprine, labetalol, ondansetron, oxyCODONE, oxyCODONE, sodium chloride 0.9%     Review of patient's allergies indicates:  No Known Allergies  Objective:     Vital Signs (Most Recent):  Temp: 98.5 °F (36.9 °C) (12/24/18 0727)  Pulse: 72 (12/24/18 0727)  Resp: 17 (12/24/18 0727)  BP: (!) 141/90 (12/24/18 0727)  SpO2: 95 % (12/24/18 0727) Vital Signs (24h Range):  Temp:  [98.5 °F (36.9 °C)-99.3 °F (37.4 °C)] 98.5 °F (36.9 °C)  Pulse:  [63-89] 72  Resp:  [15-17] 17  SpO2:  [94 %-98 %] 95 %  BP: (133-162)/(64-92) 141/90     Weight: (!) 140.6 kg (310 lb)  Body mass index is 48.55 kg/m².    Intake/Output - Last 3 Shifts       12/22 0700 - 12/23 0659 12/23 0700 - 12/24 0659 12/24 0700 - 12/25 0659    P.O. 580 1080     I.V. (mL/kg) 2846.1 (20.2)      Total Intake(mL/kg) 3426.1 (24.4) 1080 (7.7)     Urine (mL/kg/hr) 1425 (0.4) 1850 (0.5) 200 (1)    Stool  0     Total Output 1425 1850 200    Net +2001.1 -770 -200           Urine Occurrence  2 x     Stool Occurrence  2 x           Physical Exam   Constitutional: He is oriented to person, place, and time. He appears well-developed and well-nourished. No distress.   HENT:   Head: Normocephalic and atraumatic.   Eyes: EOM are normal.   Neck: Normal range of motion.   Pulmonary/Chest: Effort normal.   Abdominal: Soft. Tenderness: appropriate incisional  tenderness.   Midline incision appears clean, dry, and intact.    Neurological: He is alert and oriented to person, place, and time.   Skin: Skin is warm and dry.   Psychiatric: He has a normal mood and affect. His behavior is normal.   Nursing note and vitals reviewed.      Significant Labs:  Lab Results   Component Value Date    WBC 7.91 12/24/2018    HGB 10.2 (L) 12/24/2018    HCT 32.5 (L) 12/24/2018    MCV 89 12/24/2018     12/24/2018         Significant Diagnostics:  none    Assessment/Plan:     * Small bowel mass    48-year-old male POD 3 from small bowel resection.      -cont regular diet  -cont PO pain meds  -GI and DVT ppx  -replace lytes prn  -ambulate  -d/c today             Kristi Luu MD  General Surgery  [facility]

## 2018-12-24 NOTE — PLAN OF CARE
POC reviewed with pt and wife, stated understanding, AOX4, VSS, BM reported 23 Dec. AMB  IND to BR to void, adequate amounts. Tolerated reg diet, no C/O N/V this shift.  NSR on tele this shift, pain effectively managed with meds per order.  ABD midline WDL, ABD binder in place, no adverse events this shift, bed low, call light in reach, will cont to manage POC.

## 2018-12-24 NOTE — PLAN OF CARE
Patient discharged home with no needs.    Future Appointments   Date Time Provider Department Center   1/7/2019 10:00 AM Isabel Ray NP [facility] Donta Ramírez          12/24/18 8948   Final Note   Assessment Type Final Discharge Note   Anticipated Discharge Disposition Home   Hospital Follow Up  Appt(s) scheduled? Yes   Right Care Referral Info   Post Acute Recommendation No Care

## 2018-12-26 ENCOUNTER — TELEPHONE (OUTPATIENT)
Dept: HEMATOLOGY/ONCOLOGY | Facility: CLINIC | Age: 48
End: 2018-12-26

## 2018-12-26 NOTE — TELEPHONE ENCOUNTER
"No 1-hr opening with Dr Hwang at 10:40.  Offered pt 8:40 am with Dr Hwang or 3pm with Dr Galvin.  Pt & wife decided on 3pm.  Scheduled for 1/7 as requested, with Dr Galvin.    ----- Message from Chrystal Wilkerson RN sent at 12/24/2018 12:12 PM CST -----  I'm off on Wednesday. Can you assist. Only 1040 available with Eri that day for Hem. I asked if they could move up Dr. Ray appt.    ThanksChrystal  ----- Message -----  From: Becky Szymanski RN  Sent: 12/24/2018  10:13 AM  To: LOU Carrizales,  This patient had surgery with Dr Casanova last week.  The following is from his op note" The pathologist has reported on frozen section that this is a   lymphoproliferative disorder, strongly suggesting a lymphoma.  ""    Dr Casanova asks that he get an appt with one of the Oncologists here on 1/7 when he comes back for his post op appt with JOSE Ray NP.    Please set this up.    Thanks,  Kavita            "

## 2018-12-28 ENCOUNTER — PATIENT MESSAGE (OUTPATIENT)
Dept: SURGERY | Facility: CLINIC | Age: 48
End: 2018-12-28

## 2018-12-28 NOTE — TELEPHONE ENCOUNTER
"Called pt. Denies fevers/ chills and no other complaints. Instructed to just keep area clean and dry with soap/ water. Pt states he sweats with binder on and instructed him that he can "air out" when he is sitting down but to reapply the binder when up/ walking. Also instructed to call us back if it gets worse or fails to improve. Ge verbalized understanding.  "

## 2019-01-04 NOTE — PHYSICIAN QUERY
PT Name: Giorgio Streeter  MR #: [id number]    Physician Query Form - Pathology Findings Clarification     CDS/: Brandy E Capley               Contact information:  Spectralink:  [phone]  This form is a permanent document in the medical record.     Query Date: January 4, 2019      By submitting this query, we are merely seeking further clarification of documentation.  Please utilize your independent clinical judgment when addressing the question(s) below.      The medical record contains the following:     Findings Supporting Clinical Information Location in Medical Record   SPECIMEN  1) Segment of jejunum with mass.    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    Comment:   Concurrent flow cytometric analysis confirms a Kappa restricted clonal B-cell population that expresses  CD19, CD20, and CD10 without coexpression of CD5. The histologic sections demonstrate a submucosal mass  comprised of a diffuse population of large CD20 positive B cells compatible with immunoblast intermixed with  moderate numbers of CD5/CD3 positive T cells. The large lymphocytes are BCL6 and CD10 (weak) positive and  BCL6, MUM1, and BCL-2 are negative. CD23 shows no follicular dendritic network. The Ki-67 proliferation index  is high (>60%). In situ hybridization for David bar viral RNA is negative. The overall findings are compatible with  diffuse large B-cell lymphoma, germinal center type. Molecular studies are pending to exclude the possibility of a  high-grade B-cell lymphoma with myc and BCL-2/ BCL6 rearrangements and will be reported. All histologic  chemical stains have satisfactory positive and negative controls                                         DATE OF PROCEDURE:  12/21/2018     POSTOPERATIVE DIAGNOSIS:    Small bowel mass with  partial small bowel obstruction.     OPERATIVE PROCEDURES: Segmental small bowel resection.    Surgical pathology 12/21                                        Op note 12/21     Please document the clinical significance of the Pathologists findings of __Diffuse Large B-cell lymphoma_____.    [xxx   ] I agree with the Pathology Findings   [   ] I do not agree with the Pathology Findings   [   ] Other/Clarification of Findings:   [  ] Clinically Undetermined       Please document in your progress notes daily for the duration of treatment until resolved and include in your discharge summary.

## 2019-01-07 ENCOUNTER — OFFICE VISIT (OUTPATIENT)
Dept: SURGERY | Facility: CLINIC | Age: 49
End: 2019-01-07
Payer: COMMERCIAL

## 2019-01-07 ENCOUNTER — INITIAL CONSULT (OUTPATIENT)
Dept: HEMATOLOGY/ONCOLOGY | Facility: CLINIC | Age: 49
End: 2019-01-07
Payer: COMMERCIAL

## 2019-01-07 ENCOUNTER — LAB VISIT (OUTPATIENT)
Dept: LAB | Facility: HOSPITAL | Age: 49
End: 2019-01-07
Payer: COMMERCIAL

## 2019-01-07 VITALS
TEMPERATURE: 97 F | SYSTOLIC BLOOD PRESSURE: 144 MMHG | HEART RATE: 57 BPM | RESPIRATION RATE: 18 BRPM | SYSTOLIC BLOOD PRESSURE: 144 MMHG | DIASTOLIC BLOOD PRESSURE: 83 MMHG | DIASTOLIC BLOOD PRESSURE: 83 MMHG | HEART RATE: 57 BPM | HEIGHT: 67 IN | WEIGHT: 315 LBS | BODY MASS INDEX: 49.44 KG/M2 | OXYGEN SATURATION: 100 % | TEMPERATURE: 97 F | RESPIRATION RATE: 18 BRPM | BODY MASS INDEX: 49.51 KG/M2 | WEIGHT: 315 LBS

## 2019-01-07 DIAGNOSIS — T81.49XA CELLULITIS, WOUND, POST-OPERATIVE: Primary | ICD-10-CM

## 2019-01-07 DIAGNOSIS — C83.30 DIFFUSE LARGE B-CELL LYMPHOMA, UNSPECIFIED BODY REGION: Primary | ICD-10-CM

## 2019-01-07 DIAGNOSIS — E66.01 MORBID OBESITY: ICD-10-CM

## 2019-01-07 DIAGNOSIS — I10 HYPERTENSION, UNSPECIFIED TYPE: ICD-10-CM

## 2019-01-07 DIAGNOSIS — K63.89 SMALL BOWEL MASS: ICD-10-CM

## 2019-01-07 DIAGNOSIS — K56.600 PARTIAL SMALL BOWEL OBSTRUCTION: ICD-10-CM

## 2019-01-07 DIAGNOSIS — C83.30 DIFFUSE LARGE B-CELL LYMPHOMA, UNSPECIFIED BODY REGION: ICD-10-CM

## 2019-01-07 DIAGNOSIS — Z90.49 S/P SMALL BOWEL RESECTION: ICD-10-CM

## 2019-01-07 LAB
ALBUMIN SERPL BCP-MCNC: 3.8 G/DL
ALP SERPL-CCNC: 63 U/L
ALT SERPL W/O P-5'-P-CCNC: 30 U/L
ANION GAP SERPL CALC-SCNC: 8 MMOL/L
AST SERPL-CCNC: 24 U/L
BASOPHILS # BLD AUTO: 0.09 K/UL
BASOPHILS NFR BLD: 1.1 %
BILIRUB SERPL-MCNC: 0.3 MG/DL
BUN SERPL-MCNC: 18 MG/DL
CALCIUM SERPL-MCNC: 10.1 MG/DL
CHLORIDE SERPL-SCNC: 102 MMOL/L
CO2 SERPL-SCNC: 29 MMOL/L
CREAT SERPL-MCNC: 0.9 MG/DL
DIFFERENTIAL METHOD: ABNORMAL
EOSINOPHIL # BLD AUTO: 0.2 K/UL
EOSINOPHIL NFR BLD: 2.2 %
ERYTHROCYTE [DISTWIDTH] IN BLOOD BY AUTOMATED COUNT: 13.6 %
EST. GFR  (AFRICAN AMERICAN): >60 ML/MIN/1.73 M^2
EST. GFR  (NON AFRICAN AMERICAN): >60 ML/MIN/1.73 M^2
GLUCOSE SERPL-MCNC: 98 MG/DL
HBV CORE IGM SERPL QL IA: NEGATIVE
HBV SURFACE AB SER-ACNC: NEGATIVE M[IU]/ML
HBV SURFACE AG SERPL QL IA: NEGATIVE
HCT VFR BLD AUTO: 39 %
HCV AB SERPL QL IA: NEGATIVE
HGB BLD-MCNC: 12.2 G/DL
HIV 1+2 AB+HIV1 P24 AG SERPL QL IA: NEGATIVE
IMM GRANULOCYTES # BLD AUTO: 0.1 K/UL
IMM GRANULOCYTES NFR BLD AUTO: 1.2 %
LDH SERPL L TO P-CCNC: 207 U/L
LYMPHOCYTES # BLD AUTO: 1.1 K/UL
LYMPHOCYTES NFR BLD: 13.2 %
MCH RBC QN AUTO: 26.7 PG
MCHC RBC AUTO-ENTMCNC: 31.3 G/DL
MCV RBC AUTO: 85 FL
MONOCYTES # BLD AUTO: 0.6 K/UL
MONOCYTES NFR BLD: 7.7 %
NEUTROPHILS # BLD AUTO: 6 K/UL
NEUTROPHILS NFR BLD: 74.6 %
NRBC BLD-RTO: 0 /100 WBC
PLATELET # BLD AUTO: 434 K/UL
PMV BLD AUTO: 9.2 FL
POTASSIUM SERPL-SCNC: 4 MMOL/L
PROT SERPL-MCNC: 8.8 G/DL
RBC # BLD AUTO: 4.57 M/UL
SODIUM SERPL-SCNC: 139 MMOL/L
URATE SERPL-MCNC: 6.8 MG/DL
WBC # BLD AUTO: 8.04 K/UL

## 2019-01-07 PROCEDURE — 99999 PR PBB SHADOW E&M-EST. PATIENT-LVL III: ICD-10-PCS | Mod: PBBFAC,,, | Performed by: NURSE PRACTITIONER

## 2019-01-07 PROCEDURE — 86705 HEP B CORE ANTIBODY IGM: CPT

## 2019-01-07 PROCEDURE — 86706 HEP B SURFACE ANTIBODY: CPT

## 2019-01-07 PROCEDURE — 3079F PR MOST RECENT DIASTOLIC BLOOD PRESSURE 80-89 MM HG: ICD-10-PCS | Mod: CPTII,S$GLB,, | Performed by: INTERNAL MEDICINE

## 2019-01-07 PROCEDURE — 3077F PR MOST RECENT SYSTOLIC BLOOD PRESSURE >= 140 MM HG: ICD-10-PCS | Mod: CPTII,S$GLB,, | Performed by: INTERNAL MEDICINE

## 2019-01-07 PROCEDURE — 3008F BODY MASS INDEX DOCD: CPT | Mod: CPTII,S$GLB,, | Performed by: INTERNAL MEDICINE

## 2019-01-07 PROCEDURE — 85025 COMPLETE CBC W/AUTO DIFF WBC: CPT

## 2019-01-07 PROCEDURE — 99024 POSTOP FOLLOW-UP VISIT: CPT | Mod: S$GLB,,, | Performed by: NURSE PRACTITIONER

## 2019-01-07 PROCEDURE — 99024 PR POST-OP FOLLOW-UP VISIT: ICD-10-PCS | Mod: S$GLB,,, | Performed by: NURSE PRACTITIONER

## 2019-01-07 PROCEDURE — 3079F DIAST BP 80-89 MM HG: CPT | Mod: CPTII,S$GLB,, | Performed by: INTERNAL MEDICINE

## 2019-01-07 PROCEDURE — 3008F PR BODY MASS INDEX (BMI) DOCUMENTED: ICD-10-PCS | Mod: CPTII,S$GLB,, | Performed by: INTERNAL MEDICINE

## 2019-01-07 PROCEDURE — 36415 COLL VENOUS BLD VENIPUNCTURE: CPT

## 2019-01-07 PROCEDURE — 99205 OFFICE O/P NEW HI 60 MIN: CPT | Mod: S$GLB,,, | Performed by: INTERNAL MEDICINE

## 2019-01-07 PROCEDURE — 87340 HEPATITIS B SURFACE AG IA: CPT

## 2019-01-07 PROCEDURE — 99999 PR PBB SHADOW E&M-EST. PATIENT-LVL III: CPT | Mod: PBBFAC,,, | Performed by: NURSE PRACTITIONER

## 2019-01-07 PROCEDURE — 83615 LACTATE (LD) (LDH) ENZYME: CPT

## 2019-01-07 PROCEDURE — 86703 HIV-1/HIV-2 1 RESULT ANTBDY: CPT

## 2019-01-07 PROCEDURE — 99999 PR PBB SHADOW E&M-EST. PATIENT-LVL IV: ICD-10-PCS | Mod: PBBFAC,,, | Performed by: INTERNAL MEDICINE

## 2019-01-07 PROCEDURE — 3077F SYST BP >= 140 MM HG: CPT | Mod: CPTII,S$GLB,, | Performed by: INTERNAL MEDICINE

## 2019-01-07 PROCEDURE — 80053 COMPREHEN METABOLIC PANEL: CPT

## 2019-01-07 PROCEDURE — 99999 PR PBB SHADOW E&M-EST. PATIENT-LVL IV: CPT | Mod: PBBFAC,,, | Performed by: INTERNAL MEDICINE

## 2019-01-07 PROCEDURE — 99205 PR OFFICE/OUTPT VISIT, NEW, LEVL V, 60-74 MIN: ICD-10-PCS | Mod: S$GLB,,, | Performed by: INTERNAL MEDICINE

## 2019-01-07 PROCEDURE — 84550 ASSAY OF BLOOD/URIC ACID: CPT

## 2019-01-07 PROCEDURE — 86803 HEPATITIS C AB TEST: CPT

## 2019-01-07 RX ORDER — IBUPROFEN 200 MG
TABLET ORAL
Status: ON HOLD | COMMUNITY
Start: 2018-12-24 | End: 2019-02-06 | Stop reason: HOSPADM

## 2019-01-07 RX ORDER — SULFAMETHOXAZOLE AND TRIMETHOPRIM 800; 160 MG/1; MG/1
1 TABLET ORAL 2 TIMES DAILY
Qty: 20 TABLET | Refills: 0 | Status: SHIPPED | OUTPATIENT
Start: 2019-01-07 | End: 2019-01-17

## 2019-01-07 NOTE — Clinical Note
Labs today Bone marrow biopsy, [facility] 2 weeksThank you

## 2019-01-07 NOTE — LETTER
January 8, 2019      Cecilio Casanova MD  [address]  [facility] [address]           [facility]  [address]  [facility] [address]  Phone: [phone]          Patient: Giorgio Streeter   MR Number: [id number]   YOB: 1970   Date of Visit: 1/7/2019       Dear Dr. Cecilio Casanova:    Thank you for referring Giorgio Streeter to me for evaluation. Attached you will find relevant portions of my assessment and plan of care.    If you have questions, please do not hesitate to call me. I look forward to following Giorgio Streeter along with you.    Sincerely,    Ector Hwang MD    Enclosure  CC:  No Recipients    If you would like to receive this communication electronically, please contact [email or web address] or [phone] to request more information on [employer] Link access.    For providers and/or their staff who would like to refer a patient to [facility], please contact us through our one-stop-shop provider referral line, [facility] , at [phone].    If you feel you have received this communication in error or would no longer like to receive these types of communications, please e-mail [email or web address]

## 2019-01-07 NOTE — PROGRESS NOTES
HPI:    Mr Streeter is a 48 yp male who is accompanied by his sister, wife, father, mom and brother. 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 He is doing well since surgery- denies fever, chills, N/V/D, constipation, malaise, increasing pain or fatigue. He has been wearing an abdominal binder for support and has had no leakage on to the binder. When examining his incision, there is a very small amount of cellulitis on both sides of the incision, proximal to the navel; there is not drainage or fluctuance.       PHYSICAL EXAM:  Physical Exam   Constitutional: He appears well-developed and well-nourished. He is cooperative. He does not appear ill. No distress.   Cardiovascular: Normal rate, regular rhythm, S1 normal, S2 normal and normal heart sounds.   Pulses:       Radial pulses are 2+ on the right side, and 2+ on the left side.   No edema   Pulmonary/Chest: Effort normal. He has decreased breath sounds in the right lower field and the left lower field.   Abdominal: Soft. Bowel sounds are normal. There is no hepatosplenomegaly. There is no tenderness. No hernia.       Neurological: He is alert.       ASSESSMENT: doing well post op, cellulitis- abx given    PLAN:    1. Bactrim for small amount of cellulitis ay incision- not draining.   2. Pt advised that drainage may come to surface and wound may open- not to worry- come back to clinic, ca send pic for quick eval over Epic messaging  3. Follow up w Dr. Hwang  4. RTC in 2 weeks- sooner if any problems

## 2019-01-07 NOTE — PROGRESS NOTES
PATIENT: Giorgio Streeter  MRN: [id number]  DATE: 1/7/2019      Diagnosis:   1. Diffuse large B-cell lymphoma, unspecified body region    2. Morbid obesity    3. Small bowel mass    4. S/P small bowel resection    5. Partial small bowel obstruction    6. Hypertension, unspecified type        Chief Complaint: Lymphoma and Results     is a 48 y.o. male with PMHx of HTN, THUAN here for hematology consultation.     He began having lower abdominal pain around end of October 2018 and was treated for a UTI by his primary care physician. He continued to have lower abdominal/pelvic pain and was referred to Urology and had renal US and further testing was normal. Around mid November he began to have abdominal pain with eating and loss of appetite. He saw his primary who got a CT abdomen/pelvis which revealed an abnormal thickening of an 8 cm segment of small bowel. He was referred to Dr. Casanova for further evaluation of his small bowel mass.  He lost around 10 lbs which he attributes to taking part in weight loss program along with loss of appetite from abdominal pain on eating.    CT of abdomen on12/7 revelaed significant Irregular wall thickening of an 8 cm segment of small bowel (most likely mid to distal jejunum); no stranding or surrounding fluid; no other obvious intra-abdominal lymphadenopathy     On 12/21/18, he underwent segmental small bowel resection.The mass was approximately 6 cm in length and was circumferential.  There was dilation of the small bowel above it suggesting a partial bowel obstruction.  There was no adenopathy in the adjacent mesentery.  The tumor was clearly extending to the serosal surface of the bowel. Careful search was made for peritoneal implants and there were no lesions noted on the peritoneum or omentum.  The liver could not be inspected through the incision, but it was palpated and there were no focal lesions within it. The remainder of the small bowel was run from the  ligament of Treitz to the ileocecal valves and no other small bowel lesions were noted.     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 The Ki-67 proliferation index  is high (>60%). In situ hybridization for David bar viral RNA is negative. Molecular studies are pending to exclude the possibility of a high-grade B-cell lymphoma with myc and BCL-2/ BCL6 rearrangements.    Currently his appetite is better and does not have any abdominal pain like before but does endorse on mild soreness at the incision site. He denied fever, chills, night sweats at any point. He denied any nausea, vomiting, diarrhea, constipation, headaches, blurring of vision, leg pain or bleeding issues.    His sister has hx of melanoma and his mother first cousins has history of lymphoma       Subjective:    Initial History: Mr. Streeter is a 48 y.o. male who returns for follow up.     Past Medical History:   Past Medical History:   Diagnosis Date    Hypertension     Sleep apnea     Vision abnormalities        Past Surgical HIstory:   Past Surgical History:   Procedure Laterality Date    EXCISION, SMALL INTESTINE N/A 12/21/2018    Performed by Cecilio Casanova MD at [facility] OR [address] [place] [address]       Family History: History reviewed. No pertinent family history.    Social History:  reports that  has never smoked. he has never used smokeless tobacco. He reports that he drinks alcohol. He reports that he does not use drugs.    Allergies:  Review of patient's allergies indicates:  No Known Allergies    Medications:  Current Outpatient Medications   Medication Sig Dispense Refill    atenolol (TENORMIN) 100 MG tablet Take 50 mg by mouth 2 (two) times daily.      fexofenadine (ALLEGRA) 60 MG tablet Take 60 mg by mouth once daily.      fluticasone (FLONASE) 50 mcg/actuation nasal spray 1 spray by Each Nare route once daily.       "ibuprofen (ADVIL,MOTRIN) 200 MG tablet       losartan-hydrochlorothiazide 100-12.5 mg (HYZAAR) 100-12.5 mg Tab       sulfamethoxazole-trimethoprim 800-160mg (BACTRIM DS) 800-160 mg Tab Take 1 tablet by mouth 2 (two) times daily. for 10 days 20 tablet 0     No current facility-administered medications for this visit.        Review of Systems   Constitutional: Positive for unexpected weight change. Negative for appetite change, chills, diaphoresis, fatigue and fever.   HENT: Negative for mouth sores, nosebleeds and sore throat.    Respiratory: Negative for cough and shortness of breath.    Cardiovascular: Negative for chest pain, palpitations and leg swelling.   Gastrointestinal: Negative for abdominal distention, abdominal pain, blood in stool, diarrhea, nausea and vomiting.   Genitourinary: Negative for dysuria, flank pain, frequency and hematuria.   Musculoskeletal: Negative for back pain, joint swelling and neck pain.   Neurological: Negative for seizures, syncope and headaches.   Hematological: Negative for adenopathy. Does not bruise/bleed easily.   Psychiatric/Behavioral: Negative for agitation and behavioral problems.       ECOG Performance Status: 1   Objective:      Vitals:   Vitals:    01/07/19 1034   BP: (!) 144/83   BP Location: Right arm   Patient Position: Sitting   BP Method: Large (Automatic)   Pulse: (!) 57   Resp: 18   Temp: 97 °F (36.1 °C)   TempSrc: Oral   SpO2: 100%   Weight: (!) 143.4 kg (316 lb 2.2 oz)   Height: 5' 7" (1.702 m)     BMI: Body mass index is 49.51 kg/m².    Physical Exam   Constitutional: He is oriented to person, place, and time. He appears well-developed. No distress.   Obese male    HENT:   Head: Normocephalic and atraumatic.   Eyes: EOM are normal. Pupils are equal, round, and reactive to light.   Neck: Normal range of motion. Neck supple.   Cardiovascular: Normal rate, regular rhythm and normal heart sounds.   Pulmonary/Chest: Effort normal and breath sounds normal. No " respiratory distress.   Abdominal: Soft. Bowel sounds are normal. He exhibits no distension. There is no tenderness. There is no guarding.   Mid line incision, healing well. Has mild erythema along the inferior border of incision but no discharge or swelling seen   Musculoskeletal: Normal range of motion. He exhibits no edema, tenderness or deformity.   Neurological: He is alert and oriented to person, place, and time. No cranial nerve deficit.   Skin: Skin is warm and dry. Capillary refill takes less than 2 seconds. No rash noted. He is not diaphoretic.   Psychiatric: He has a normal mood and affect. His behavior is normal.       Laboratory Data:  Lab Visit on 01/07/2019   Component Date Value Ref Range Status    WBC 01/07/2019 8.04  3.90 - 12.70 K/uL Final    RBC 01/07/2019 4.57* 4.60 - 6.20 M/uL Final    Hemoglobin 01/07/2019 12.2* 14.0 - 18.0 g/dL Final    Hematocrit 01/07/2019 39.0* 40.0 - 54.0 % Final    MCV 01/07/2019 85  82 - 98 fL Final    MCH 01/07/2019 26.7* 27.0 - 31.0 pg Final    MCHC 01/07/2019 31.3* 32.0 - 36.0 g/dL Final    RDW 01/07/2019 13.6  11.5 - 14.5 % Final    Platelets 01/07/2019 434* 150 - 350 K/uL Final    MPV 01/07/2019 9.2  9.2 - 12.9 fL Final    Immature Granulocytes 01/07/2019 1.2* 0.0 - 0.5 % Final    Gran # (ANC) 01/07/2019 6.0  1.8 - 7.7 K/uL Final    Immature Grans (Abs) 01/07/2019 0.10* 0.00 - 0.04 K/uL Final    Comment: Mild elevation in immature granulocytes is non specific and   can be seen in a variety of conditions including stress response,   acute inflammation, trauma and pregnancy. Correlation with other   laboratory and clinical findings is essential.      Lymph # 01/07/2019 1.1  1.0 - 4.8 K/uL Final    Mono # 01/07/2019 0.6  0.3 - 1.0 K/uL Final    Eos # 01/07/2019 0.2  0.0 - 0.5 K/uL Final    Baso # 01/07/2019 0.09  0.00 - 0.20 K/uL Final    nRBC 01/07/2019 0  0 /100 WBC Final    Gran% 01/07/2019 74.6* 38.0 - 73.0 % Final    Lymph% 01/07/2019 13.2*  18.0 - 48.0 % Final    Mono% 01/07/2019 7.7  4.0 - 15.0 % Final    Eosinophil% 01/07/2019 2.2  0.0 - 8.0 % Final    Basophil% 01/07/2019 1.1  0.0 - 1.9 % Final    Differential Method 01/07/2019 Automated   Final    Sodium 01/07/2019 139  136 - 145 mmol/L Final    Potassium 01/07/2019 4.0  3.5 - 5.1 mmol/L Final    Chloride 01/07/2019 102  95 - 110 mmol/L Final    CO2 01/07/2019 29  23 - 29 mmol/L Final    Glucose 01/07/2019 98  70 - 110 mg/dL Final    BUN, Bld 01/07/2019 18  6 - 20 mg/dL Final    Creatinine 01/07/2019 0.9  0.5 - 1.4 mg/dL Final    Calcium 01/07/2019 10.1  8.7 - 10.5 mg/dL Final    Total Protein 01/07/2019 8.8* 6.0 - 8.4 g/dL Final    Albumin 01/07/2019 3.8  3.5 - 5.2 g/dL Final    Total Bilirubin 01/07/2019 0.3  0.1 - 1.0 mg/dL Final    Comment: For infants and newborns, interpretation of results should be based  on gestational age, weight and in agreement with clinical  observations.  Premature Infant recommended reference ranges:  Up to 24 hours.............<8.0 mg/dL  Up to 48 hours............<12.0 mg/dL  3-5 days..................<15.0 mg/dL  6-29 days.................<15.0 mg/dL      Alkaline Phosphatase 01/07/2019 63  55 - 135 U/L Final    AST 01/07/2019 24  10 - 40 U/L Final    ALT 01/07/2019 30  10 - 44 U/L Final    Anion Gap 01/07/2019 8  8 - 16 mmol/L Final    eGFR if African American 01/07/2019 >60.0  >60 mL/min/1.73 m^2 Final    eGFR if non African American 01/07/2019 >60.0  >60 mL/min/1.73 m^2 Final    Comment: Calculation used to obtain the estimated glomerular filtration  rate (eGFR) is the CKD-EPI equation.       LD 01/07/2019 207  110 - 260 U/L Final    Results are increased in hemolyzed samples.    Uric Acid 01/07/2019 6.8  3.4 - 7.0 mg/dL Final    HIV 1/2 Ag/Ab 01/07/2019 Negative  Negative Final    Hepatitis C Ab 01/07/2019 Negative   Final    Hep B S Ab 01/07/2019 Negative   Final    Hepatitis B Surface Ag 01/07/2019 Negative   Final    Hep B C IgM  01/07/2019 Negative   Final         Imaging:       Outside CT 12/7 reviewed - Irregular significant wall thickening of an 8 cm segment of small bowel (most likely mid to distal jejunum); no stranding or surrounding fluid; no other obvious intra-abdominal lymphadenopathy       Pathology of Jejunal mass s/p small bowel resection:  -High-grade B-cell lymphoma compatible with Diffuse Large B-cell lymphoma, germinal center phenotype, see  comment.  Comment: Concurrent flow cytometric analysis confirms a Kappa restricted clonal B-cell population that expresses  CD19, CD20, and CD10 without coexpression of CD5. The histologic sections demonstrate a submucosal mass  comprised of a diffuse population of large CD20 positive B cells compatible with immunoblast intermixed with  moderate numbers of CD5/CD3 positive T cells. The large lymphocytes are BCL6 and CD10 (weak) positive and  BCL6, MUM1, and BCL-2 are negative. CD23 shows no follicular dendritic network. The Ki-67 proliferation index  is high (>60%). In situ hybridization for David bar viral RNA is negative. The overall findings are compatible with  diffuse large B-cell lymphoma, germinal center type. Molecular studies are pending to exclude the possibility of a  high-grade B-cell lymphoma with myc and BCL-2/ BCL6 rearrangements and will be reported. All histologic  chemical stains have satisfactory positive and negative controls.    Assessment:       1. Diffuse large B-cell lymphoma, unspecified body region    2. Morbid obesity    3. Small bowel mass    4. S/P small bowel resection    5. Partial small bowel obstruction    6. Hypertension, unspecified type        CT of abdomen on12/7 revelaed significant Irregular wall thickening of an 8 cm segment of small bowel (most likely mid to distal jejunum); no stranding or surrounding fluid; no other obvious intra-abdominal lymphadenopathy     On 12/21/18, he underwent segmental small bowel resection.The mass was approximately  6 cm in length and was circumferential.  There was dilation of the small bowel above it suggesting a partial bowel obstruction.  There was no adenopathy in the adjacent mesentery.  The tumor was clearly extending to the serosal surface of the bowel. Careful search was made for peritoneal implants and there were no lesions noted on the peritoneum or omentum.  The liver could not be inspected through the incision, but it was palpated and there were no focal lesions within it. The remainder of the small bowel was run from the ligament of Treitz to the ileocecal valves and no other small bowel lesions were noted.     1. Pathology of Jejunal mass s/p small bowel resection revealed High-grade B-cell lymphoma compatible with Diffuse Large B-cell lymphoma, germinal center phenotype. The Ki-67 proliferation index  is high (>60%). In situ hybridization for David bar viral RNA is negative.   Molecular studies are pending to exclude the possibility of a high-grade B-cell lymphoma with myc and BCL-2/ BCL6 rearrangements    2. Imaging results, pathology, plan of care regarding the diagnosis, further testing and possible treatment options (R-CHOP vs R-EPOCH vs R-HyperCVAD/MA) have been discussed in detail  3. Patient and family want to know if he could receive treatments at [place]. We told them that treatment choices will depend on the results of molecular studies which are still pending  4. His abdominal surgery was on 12/21/18 and the earliest treatments will be after 1/21/2018.         Plan:     1. We will get Bone marrow biopsy at our clinic to see for any marrow involvement  2. We will get PET/CT, ECHO and IR PORT placement at [facility] as it is closer to his home, as per patient request  3. We will get labs today -CBC, CMP, uric acid, LDH, HIV, Hep B & C   4. We will follow up in 2 weeks with final path results, for chemotherapy consent and  treatments choices (R-CHOP vs R-EPOCH vs R-HyperCVAD/MA)    Plan of  care discussed with Dr. Eri Chavez MD PGY-IV  Hematology and Oncology  Pager:[phone]

## 2019-01-07 NOTE — PROGRESS NOTES
CC: Lymphoma, initial visit      HPI:  is a 48 y.o. male here for hematology consultation. He has hypertension, obstructive sleep apnea. He was hospitalized around Christmas 2018 for small bowel mass. Patient reports that he began having lower abdominal/pelvic pain toward the end of October 2018. This prompted a urologic evaluation and subsequent treatment for a presumed UTI. He states that his pain continued into November and evolved into more upper abdominal/epigastric pain that was worse with eating. He was subsequently seen by his PCP who ultimately ordered a CT abdomen/pelvis that revealed an abnormal thickening of an 8 cm segment of small bowel. He denied fever, chills, night sweats, or anorexia at that time. He had ~10 lb weight loss over the 1-2 months prior to his hospitalization in Dec 2018, but he was also actively taking part in a weight loss program.  On 12/21/18, he underwent Segmental small bowel resection.The mass was approximately 6 cm in length and was circumferential.  There was dilation of the small bowel above it suggesting a partial bowel obstruction.  There was no adenopathy in the adjacent   mesentery.  The tumor was clearly extending to the serosal surface of the bowel.Careful search was made for peritoneal implants and there were no lesions noted on the peritoneum or omentum.  The liver could not be inspected through the incision,   but it was palpated and there were no focal lesions within it. The remainder of the small bowel was run from the ligament of Treitz to the ileocecal valves and no other small bowel lesions were noted.         Review of Systems   Constitutional: Positive for malaise/fatigue and weight loss. Negative for chills, diaphoresis and fever.   HENT: Negative for congestion, ear discharge and nosebleeds.    Eyes: Negative for blurred vision, double vision, photophobia and pain.   Respiratory: Negative for cough, hemoptysis, shortness of breath and stridor.     Cardiovascular: Negative for chest pain, palpitations, orthopnea and claudication.   Gastrointestinal: Negative for abdominal pain, blood in stool, constipation, diarrhea, heartburn, nausea and vomiting.   Genitourinary: Negative for dysuria, frequency, hematuria and urgency.   Skin: Negative for rash.   Neurological: Negative for dizziness, tingling, tremors, sensory change, speech change, focal weakness, weakness and headaches.   Endo/Heme/Allergies: Does not bruise/bleed easily.   Psychiatric/Behavioral: Negative for depression, hallucinations, substance abuse and suicidal ideas.       Past Medical History:   Diagnosis Date    Hypertension     Sleep apnea     Vision abnormalities          Past Surgical History:   Procedure Laterality Date    EXCISION, SMALL INTESTINE N/A 12/21/2018    Performed by Cecilio Casanova MD at [facility] OR [address] [place] [address]         Social History     Socioeconomic History    Marital status:      Spouse name: Not on file    Number of children: Not on file    Years of education: Not on file    Highest education level: Not on file   Social Needs    Financial resource strain: Not on file    Food insecurity - worry: Not on file    Food insecurity - inability: Not on file    Transportation needs - medical: Not on file    Transportation needs - non-medical: Not on file   Occupational History    Not on file   Tobacco Use    Smoking status: Never Smoker    Smokeless tobacco: Never Used   Substance and Sexual Activity    Alcohol use: Yes     Frequency: Monthly or less    Drug use: No    Sexual activity: Not on file   Other Topics Concern    Not on file   Social History Narrative    Not on file       No family history on file.      Review of patient's allergies indicates: No Known Allergies        Current Outpatient Medications   Medication Sig    atenolol (TENORMIN) 100 MG tablet Take 50 mg by mouth 2 (two) times daily.    fexofenadine (ALLEGRA) 60 MG tablet Take 60 mg by mouth  once daily.    fluticasone (FLONASE) 50 mcg/actuation nasal spray 1 spray by Each Nare route once daily.    loratadine (CLARITIN) 10 mg tablet Take 10 mg by mouth once daily.    losartan-hydrochlorothiazide 100-12.5 mg (HYZAAR) 100-12.5 mg Tab     oxyCODONE (ROXICODONE) 5 MG immediate release tablet Take 1 tablet (5 mg total) by mouth every 4 (four) hours as needed.    polyethylene glycol (GLYCOLAX) 17 gram/dose powder Mix 1 capful (17 g) with liquid and take by mouth once daily.     No current facility-administered medications for this visit.          Vitals:    01/07/19 1034   BP: (!) 144/83   Pulse: (!) 57   Resp: 18   Temp: 97 °F (36.1 °C)         PS: ECOG1    Physical Exam   Constitutional: He is oriented to person, place, and time. He appears well-developed. No distress.   HENT:   Head: Normocephalic and atraumatic.   Mouth/Throat: No oropharyngeal exudate.   Eyes: Pupils are equal, round, and reactive to light. No scleral icterus.   Neck: Normal range of motion.   Cardiovascular: Normal rate and regular rhythm.   No murmur heard.  Pulmonary/Chest: Effort normal and breath sounds normal. No respiratory distress. He has no wheezes. He has no rales.   Abdominal: Bowel sounds are normal. He exhibits no distension. There is no tenderness. There is no rebound.   Surgical scar on anterior abdomen healing well.   Musculoskeletal: Normal range of motion. He exhibits no edema.   Lymphadenopathy:     He has no cervical adenopathy.   Neurological: He is alert and oriented to person, place, and time. No cranial nerve deficit.   Skin: Skin is warm.   Psychiatric: He has a normal mood and affect.     Component      Latest Ref Rng & Units 12/24/2018   WBC      3.90 - 12.70 K/uL 7.91   RBC      4.60 - 6.20 M/uL 3.66 (L)   Hemoglobin      14.0 - 18.0 g/dL 10.2 (L)   Hematocrit      40.0 - 54.0 % 32.5 (L)   MCV      82 - 98 fL 89   MCH      27.0 - 31.0 pg 27.9   MCHC      32.0 - 36.0 g/dL 31.4 (L)   RDW      11.5 - 14.5 %  13.6   Platelets      150 - 350 K/uL 230   MPV      9.2 - 12.9 fL 9.7   Immature Granulocytes      0.0 - 0.5 % 0.5   Gran # (ANC)      1.8 - 7.7 K/uL 6.2   Immature Grans (Abs)      0.00 - 0.04 K/uL 0.04   Lymph #      1.0 - 4.8 K/uL 0.7 (L)   Mono #      0.3 - 1.0 K/uL 0.7   Eos #      0.0 - 0.5 K/uL 0.3   Baso #      0.00 - 0.20 K/uL 0.03   nRBC      0 /100 WBC 0   Gran%      38.0 - 73.0 % 78.2 (H)   Lymph%      18.0 - 48.0 % 9.0 (L)   Mono%      4.0 - 15.0 % 8.7   Eosinophil%      0.0 - 8.0 % 3.2   Basophil%      0.0 - 1.9 % 0.4   Differential Method       Automated   Sodium      136 - 145 mmol/L 141   Potassium      3.5 - 5.1 mmol/L 3.9   Chloride      95 - 110 mmol/L 107   CO2      23 - 29 mmol/L 25   Glucose      70 - 110 mg/dL 97   BUN, Bld      6 - 20 mg/dL 11   Creatinine      0.5 - 1.4 mg/dL 0.8   Calcium      8.7 - 10.5 mg/dL 8.8   Total Protein      6.0 - 8.4 g/dL 6.8   Albumin      3.5 - 5.2 g/dL 2.8 (L)   Total Bilirubin      0.1 - 1.0 mg/dL 0.7   Alkaline Phosphatase      55 - 135 U/L 53 (L)   AST      10 - 40 U/L 35   ALT      10 - 44 U/L 30   Anion Gap      8 - 16 mmol/L 9   eGFR if African American      >60 mL/min/1.73 m:2 >60.0   eGFR if non African American      >60 mL/min/1.73 m:2 >60.0   Magnesium      1.6 - 2.6 mg/dL 1.6   Phosphorus      2.7 - 4.5 mg/dL 3.2     Component      Latest Ref Rng & Units 1/7/2019   WBC      3.90 - 12.70 K/uL 8.04   RBC      4.60 - 6.20 M/uL 4.57 (L)   Hemoglobin      14.0 - 18.0 g/dL 12.2 (L)   Hematocrit      40.0 - 54.0 % 39.0 (L)   MCV      82 - 98 fL 85   MCH      27.0 - 31.0 pg 26.7 (L)   MCHC      32.0 - 36.0 g/dL 31.3 (L)   RDW      11.5 - 14.5 % 13.6   Platelets      150 - 350 K/uL 434 (H)   MPV      9.2 - 12.9 fL 9.2   Immature Granulocytes      0.0 - 0.5 % 1.2 (H)   Gran # (ANC)      1.8 - 7.7 K/uL 6.0   Immature Grans (Abs)      0.00 - 0.04 K/uL 0.10 (H)   Lymph #      1.0 - 4.8 K/uL 1.1   Mono #      0.3 - 1.0 K/uL 0.6   Eos #      0.0 - 0.5 K/uL 0.2    Baso #      0.00 - 0.20 K/uL 0.09   nRBC      0 /100 WBC 0   Gran%      38.0 - 73.0 % 74.6 (H)   Lymph%      18.0 - 48.0 % 13.2 (L)   Mono%      4.0 - 15.0 % 7.7   Eosinophil%      0.0 - 8.0 % 2.2   Basophil%      0.0 - 1.9 % 1.1   Differential Method       Automated   Sodium      136 - 145 mmol/L 139   Potassium      3.5 - 5.1 mmol/L 4.0   Chloride      95 - 110 mmol/L 102   CO2      23 - 29 mmol/L 29   Glucose      70 - 110 mg/dL 98   BUN, Bld      6 - 20 mg/dL 18   Creatinine      0.5 - 1.4 mg/dL 0.9   Calcium      8.7 - 10.5 mg/dL 10.1   Total Protein      6.0 - 8.4 g/dL 8.8 (H)   Albumin      3.5 - 5.2 g/dL 3.8   Total Bilirubin      0.1 - 1.0 mg/dL 0.3   Alkaline Phosphatase      55 - 135 U/L 63   AST      10 - 40 U/L 24   ALT      10 - 44 U/L 30   Anion Gap      8 - 16 mmol/L 8   eGFR if African American      >60 mL/min/1.73 m:2 >60.0   eGFR if non African American      >60 mL/min/1.73 m:2 >60.0   LD      110 - 260 U/L 207   Uric Acid      3.4 - 7.0 mg/dL 6.8   HIV 1/2 Ag/Ab      Negative Negative   Hepatitis C Ab       Negative   Hep B S Ab       Negative   Hepatitis B Surface Ag       Negative   Hep B C IgM       Negative       12/21/18 flow cytometry of small bowel mass    Flow cytometric analysis of tissue detects a kappa restricted B lymphocyte population that expresses CD19, CD20, and CD10.  CD5 is negative.  The differential diagnosis includes diffuse large B cell lymphoma, follicular lymphoma, and Burkitt's lymphoma.     Flow differential:  Lymphocytes 88.2%, Monocytes 6.0%, Granulocytes  2.3%, Blast  2.6%, Debris/nRBC 0.5%,  Viability 90.7%.  Conclusion: B cell lymphoma of germinal center origin (CD10+); correlate with morphology and cytogenetic /molecular testing for further sub classification    12/21/18 pathology :Jejunum, small bowel resection:  -High-grade B-cell lymphoma compatible with Diffuse Large B-cell lymphoma, germinal center phenotype, see comment.  Comment: Concurrent flow cytometric  analysis confirms a Kappa restricted clonal B-cell population that expresses CD19, CD20, and CD10 without coexpression of CD5. The histologic sections demonstrate a submucosal mass comprised of a diffuse population of large CD20 positive B cells compatible with immunoblast intermixed with moderate numbers of CD5/CD3 positive T cells. The large lymphocytes are BCL6 and CD10 (weak) positive and BCL6, MUM1, and BCL-2 are negative. CD23 shows no follicular dendritic network. The Ki-67 proliferation index is high (>60%). In situ hybridization for David bar viral RNA is negative. The overall findings are compatible with diffuse large B-cell lymphoma, germinal center type. Molecular studies are pending to exclude the possibility of a  high-grade B-cell lymphoma with myc and BCL-2/ BCL6 rearrangements and will be reported. All histologic chemical stains have satisfactory positive and negative controls.    Assessment:    1. DLBCL of jejunum, GC sub type, high grade  2. Hypertension  3. obstructive sleep apnea  4. Morbid obesity  5. Anemia, hypochromic  6. thrombocytosis    Plan:    1. Molecular studies ( for bcl 2, bcl 6, myc) pending. Staging in process, including bone marrow biopsy, PET CT (both ordered today). LDH, uric acid normal. The mass measured 8 cm in greatest dimension.   If he has germinal center DLBCL with no other adverse features( like high IPI score, double/triple hit pathology), RCHOP for 6 cycles with/without ISRT ( due to bulky diseaae) is appropriate.  If he has double/triple hit pathology, more intensive regimens (like DA R-EPOCH) might be appropriate.  I discussed the diagnosis, prognosis, staging, management in detail.If the final diagnosis is DLBCL NOS with NO high risk features, he will receive chemotherapy in [facility] where he lives.            2.On Atenolol, Losartan HCTZ     5. Mild, asymptomatic.    6. Possibly reactive         Distress Screening Results: Psychosocial Distress screening score  of Distress Score: 3 noted and reviewed. No intervention indicated.

## 2019-01-10 NOTE — PROGRESS NOTES
Pt presents to clinic for BMBX. Hx of DLBCL followed by Dr. Hwang. VS, labs, medications, and allergies reviewed.     PROCEDURE NOTE:  Bone Marrow Biopsy  Date: 1/11/19  Indication: DLBCL  Consent: Informed consent was obtained from patient.  Timeout: Done and documented.  Site: Left posterior illiac crest.  Position: Prone  Prep: chlorohexidine.  Needle used: 11 gauge long Jamshidi needle.  Anesthetic: 2% lidocaine 10 cc.  Biopsy: The biopsy needle was introduced into the marrow cavity and an aspirate was obtained without complications and sent for flow cytometry and cytogenetics. Core biopsy obtained without difficulty using long jamshidi needle and sent for routine histologic examination.  Complications: None.  Disposition: The patient was discharged home after lying flat on back x20 minutes. RN to assess BMBX site for bleeding.  Minimal blood loss    Ashlee Luna NP  Hematology/Oncology/BMT

## 2019-01-11 ENCOUNTER — OFFICE VISIT (OUTPATIENT)
Dept: HEMATOLOGY/ONCOLOGY | Facility: CLINIC | Age: 49
End: 2019-01-11
Payer: COMMERCIAL

## 2019-01-11 ENCOUNTER — HOSPITAL ENCOUNTER (OUTPATIENT)
Dept: CARDIOLOGY | Facility: CLINIC | Age: 49
Discharge: HOME | End: 2019-01-11
Attending: STUDENT IN AN ORGANIZED HEALTH CARE EDUCATION/TRAINING PROGRAM
Payer: COMMERCIAL

## 2019-01-11 ENCOUNTER — PATIENT MESSAGE (OUTPATIENT)
Dept: HEMATOLOGY/ONCOLOGY | Facility: CLINIC | Age: 49
End: 2019-01-11

## 2019-01-11 VITALS
HEART RATE: 63 BPM | OXYGEN SATURATION: 97 % | SYSTOLIC BLOOD PRESSURE: 164 MMHG | TEMPERATURE: 97 F | DIASTOLIC BLOOD PRESSURE: 81 MMHG | RESPIRATION RATE: 20 BRPM

## 2019-01-11 VITALS
SYSTOLIC BLOOD PRESSURE: 164 MMHG | HEIGHT: 67 IN | HEART RATE: 79 BPM | DIASTOLIC BLOOD PRESSURE: 81 MMHG | WEIGHT: 315 LBS | BODY MASS INDEX: 49.44 KG/M2

## 2019-01-11 DIAGNOSIS — C83.30 DIFFUSE LARGE B-CELL LYMPHOMA, UNSPECIFIED BODY REGION: ICD-10-CM

## 2019-01-11 DIAGNOSIS — C83.30 DIFFUSE LARGE B-CELL LYMPHOMA, UNSPECIFIED BODY REGION: Primary | ICD-10-CM

## 2019-01-11 LAB
ASCENDING AORTA: 3.19 CM
AV INDEX (PROSTH): 0.86
AV MEAN GRADIENT: 4.05 MMHG
AV PEAK GRADIENT: 6.35 MMHG
AV VALVE AREA: 3.93 CM2
BONE MARROW WRIGHT STAIN COMMENT: NORMAL
BSA FOR ECHO PROCEDURE: 2.6 M2
CV ECHO LV RWT: 0.28 CM
DOP CALC AO PEAK VEL: 1.26 M/S
DOP CALC AO VTI: 25.44 CM
DOP CALC LVOT AREA: 4.56 CM2
DOP CALC LVOT DIAMETER: 2.41 CM
DOP CALC LVOT STROKE VOLUME: 100.08 CM3
DOP CALCLVOT PEAK VEL VTI: 21.95 CM
E WAVE DECELERATION TIME: 148.74 MSEC
E/A RATIO: 1.71
E/E' RATIO: 6.85
ECHO LV POSTERIOR WALL: 0.81 CM (ref 0.6–1.1)
FRACTIONAL SHORTENING: 35 % (ref 28–44)
INTERVENTRICULAR SEPTUM: 0.77 CM (ref 0.6–1.1)
IVRT: 0.11 MSEC
LA MAJOR: 5.59 CM
LA MINOR: 5.61 CM
LA WIDTH: 4.82 CM
LEFT ATRIUM SIZE: 4.74 CM
LEFT ATRIUM VOLUME INDEX: 44.3 ML/M2
LEFT ATRIUM VOLUME: 108.75 CM3
LEFT INTERNAL DIMENSION IN SYSTOLE: 3.76 CM (ref 2.1–4)
LEFT VENTRICLE DIASTOLIC VOLUME INDEX: 66.78 ML/M2
LEFT VENTRICLE DIASTOLIC VOLUME: 164.01 ML
LEFT VENTRICLE MASS INDEX: 69.5 G/M2
LEFT VENTRICLE SYSTOLIC VOLUME INDEX: 24.5 ML/M2
LEFT VENTRICLE SYSTOLIC VOLUME: 60.23 ML
LEFT VENTRICULAR INTERNAL DIMENSION IN DIASTOLE: 5.76 CM (ref 3.5–6)
LEFT VENTRICULAR MASS: 170.61 G
LV LATERAL E/E' RATIO: 6.85
LV SEPTAL E/E' RATIO: 6.85
MV PEAK A VEL: 0.52 M/S
MV PEAK E VEL: 0.89 M/S
PULM VEIN S/D RATIO: 1.25
PV PEAK D VEL: 0.55 M/S
PV PEAK S VEL: 0.69 M/S
RA MAJOR: 5.43 CM
RA PRESSURE: 3 MMHG
RA WIDTH: 4.75 CM
RIGHT VENTRICULAR END-DIASTOLIC DIMENSION: 4.24 CM
RV TISSUE DOPPLER FREE WALL SYSTOLIC VELOCITY 1 (APICAL 4 CHAMBER VIEW): 15.55 M/S
SINUS: 3.51 CM
STJ: 3.15 CM
TDI LATERAL: 0.13
TDI SEPTAL: 0.13
TDI: 0.13
TRICUSPID ANNULAR PLANE SYSTOLIC EXCURSION: 2.82 CM

## 2019-01-11 PROCEDURE — 88342 IMHCHEM/IMCYTCHM 1ST ANTB: CPT | Performed by: PATHOLOGY

## 2019-01-11 PROCEDURE — 88237 TISSUE CULTURE BONE MARROW: CPT

## 2019-01-11 PROCEDURE — 88189 FLOWCYTOMETRY/READ 16 & >: CPT | Mod: ,,, | Performed by: PATHOLOGY

## 2019-01-11 PROCEDURE — 88184 FLOWCYTOMETRY/ TC 1 MARKER: CPT | Performed by: PATHOLOGY

## 2019-01-11 PROCEDURE — 38222 PR BONE MARROW BIOPSY(IES) W/ASPIRATION(S); DIAGNOSTIC: ICD-10-PCS | Mod: LT,S$GLB,, | Performed by: NURSE PRACTITIONER

## 2019-01-11 PROCEDURE — 88305 TISSUE EXAM BY PATHOLOGIST: CPT | Performed by: PATHOLOGY

## 2019-01-11 PROCEDURE — 88305 TISSUE SPECIMEN TO PATHOLOGY, BONE MARROW ASPIRATION/BIOPSY PROCEDURE: ICD-10-PCS | Mod: 26,,, | Performed by: PATHOLOGY

## 2019-01-11 PROCEDURE — 88311 TISSUE SPECIMEN TO PATHOLOGY, BONE MARROW ASPIRATION/BIOPSY PROCEDURE: ICD-10-PCS | Mod: 26,,, | Performed by: PATHOLOGY

## 2019-01-11 PROCEDURE — 93306 TRANSTHORACIC ECHO (TTE) COMPLETE (CUPID ONLY): ICD-10-PCS | Mod: S$GLB,,, | Performed by: INTERNAL MEDICINE

## 2019-01-11 PROCEDURE — 85097 BONE MARROW INTERPRETATION: CPT | Mod: ,,, | Performed by: PATHOLOGY

## 2019-01-11 PROCEDURE — 88311 DECALCIFY TISSUE: CPT | Mod: 26,,, | Performed by: PATHOLOGY

## 2019-01-11 PROCEDURE — 88341 TISSUE SPECIMEN TO PATHOLOGY, BONE MARROW ASPIRATION/BIOPSY PROCEDURE: ICD-10-PCS | Mod: 26,59,, | Performed by: PATHOLOGY

## 2019-01-11 PROCEDURE — 88342 IMHCHEM/IMCYTCHM 1ST ANTB: CPT | Mod: 26,59,, | Performed by: PATHOLOGY

## 2019-01-11 PROCEDURE — 88342 TISSUE SPECIMEN TO PATHOLOGY, BONE MARROW ASPIRATION/BIOPSY PROCEDURE: ICD-10-PCS | Mod: 26,59,, | Performed by: PATHOLOGY

## 2019-01-11 PROCEDURE — 99499 NO LOS: ICD-10-PCS | Mod: S$GLB,,, | Performed by: NURSE PRACTITIONER

## 2019-01-11 PROCEDURE — 99499 UNLISTED E&M SERVICE: CPT | Mod: S$GLB,,, | Performed by: NURSE PRACTITIONER

## 2019-01-11 PROCEDURE — 88313 SPECIAL STAINS GROUP 2: CPT

## 2019-01-11 PROCEDURE — 88313 TISSUE SPECIMEN TO PATHOLOGY, BONE MARROW ASPIRATION/BIOPSY PROCEDURE: ICD-10-PCS | Mod: 26,,, | Performed by: PATHOLOGY

## 2019-01-11 PROCEDURE — 88341 IMHCHEM/IMCYTCHM EA ADD ANTB: CPT | Mod: 26,59,, | Performed by: PATHOLOGY

## 2019-01-11 PROCEDURE — 88305 TISSUE EXAM BY PATHOLOGIST: CPT | Mod: 26,,, | Performed by: PATHOLOGY

## 2019-01-11 PROCEDURE — 88189 PR  FLOWCYTOMETRY/READ, 16 & > MARKERS: ICD-10-PCS | Mod: ,,, | Performed by: PATHOLOGY

## 2019-01-11 PROCEDURE — 88313 SPECIAL STAINS GROUP 2: CPT | Mod: 26,,, | Performed by: PATHOLOGY

## 2019-01-11 PROCEDURE — 88185 FLOWCYTOMETRY/TC ADD-ON: CPT | Mod: 59 | Performed by: PATHOLOGY

## 2019-01-11 PROCEDURE — 93306 TTE W/DOPPLER COMPLETE: CPT | Mod: S$GLB,,, | Performed by: INTERNAL MEDICINE

## 2019-01-11 PROCEDURE — 99999 PR PBB SHADOW E&M-EST. PATIENT-LVL III: ICD-10-PCS | Mod: PBBFAC,,, | Performed by: NURSE PRACTITIONER

## 2019-01-11 PROCEDURE — 38222 DX BONE MARROW BX & ASPIR: CPT | Mod: LT,S$GLB,, | Performed by: NURSE PRACTITIONER

## 2019-01-11 PROCEDURE — 88341 IMHCHEM/IMCYTCHM EA ADD ANTB: CPT | Performed by: PATHOLOGY

## 2019-01-11 PROCEDURE — 99999 PR PBB SHADOW E&M-EST. PATIENT-LVL III: CPT | Mod: PBBFAC,,, | Performed by: NURSE PRACTITIONER

## 2019-01-11 PROCEDURE — 85097 TISSUE SPECIMEN TO PATHOLOGY, BONE MARROW ASPIRATION/BIOPSY PROCEDURE: ICD-10-PCS | Mod: ,,, | Performed by: PATHOLOGY

## 2019-01-14 LAB
BODY SITE - BONE MARROW: NORMAL
BONE MARROW IRON STAIN COMMENT: NORMAL
CLINICAL DIAGNOSIS - BONE MARROW: NORMAL
FLOW CYTOMETRY ANTIBODIES ANALYZED - BONE MARROW: NORMAL
FLOW CYTOMETRY COMMENT - BONE MARROW: NORMAL
FLOW CYTOMETRY INTERPRETATION - BONE MARROW: NORMAL

## 2019-01-15 ENCOUNTER — HOSPITAL ENCOUNTER (OUTPATIENT)
Dept: RADIOLOGY | Facility: HOSPITAL | Age: 49
Discharge: HOME | End: 2019-01-15
Attending: STUDENT IN AN ORGANIZED HEALTH CARE EDUCATION/TRAINING PROGRAM
Payer: COMMERCIAL

## 2019-01-15 DIAGNOSIS — C83.30 DIFFUSE LARGE B-CELL LYMPHOMA, UNSPECIFIED BODY REGION: ICD-10-CM

## 2019-01-15 LAB — POCT GLUCOSE: 94 MG/DL (ref 70–110)

## 2019-01-15 PROCEDURE — 78815 NM PET CT ROUTINE: ICD-10-PCS | Mod: 26,PS,, | Performed by: RADIOLOGY

## 2019-01-15 PROCEDURE — 78815 PET IMAGE W/CT SKULL-THIGH: CPT | Mod: TC

## 2019-01-15 PROCEDURE — 78815 PET IMAGE W/CT SKULL-THIGH: CPT | Mod: 26,PS,, | Performed by: RADIOLOGY

## 2019-01-15 PROCEDURE — A9552 F18 FDG: HCPCS

## 2019-01-16 LAB
CHROM BANDING METHOD: NORMAL
CHROMOSOME ANALYSIS BM ADDITIONAL INFORMATION: NORMAL
CHROMOSOME ANALYSIS BM RELEASED BY: NORMAL
CHROMOSOME ANALYSIS BM RESULT SUMMARY: NORMAL
CLINICAL CYTOGENETICIST REVIEW: NORMAL
KARYOTYP MAR: NORMAL
REASON FOR REFERRAL (NARRATIVE): NORMAL
REF LAB TEST METHOD: NORMAL
SPECIMEN SOURCE: NORMAL
SPECIMEN: NORMAL

## 2019-01-17 ENCOUNTER — PATIENT MESSAGE (OUTPATIENT)
Dept: HEMATOLOGY/ONCOLOGY | Facility: CLINIC | Age: 49
End: 2019-01-17

## 2019-01-18 NOTE — PROGRESS NOTES
"Post-Op Follow-up Visit:   1/21/2019  Patient ID: Giorgio Streeter is a 48 y.o. male, born 1970     Chief Complaint   Patient presents with    Follow-up     History:   Mr. Streeter is a 47 y/o male from [place] who is accompanied by several family members today.   Has appt later today with Dr. Hwang.     He began having lower abdominal pain around end of October 2018 and was treated for a UTI. He continued to have lower abdominal/pelvic pain and was referred to Urology. Renal US and further testing was normal. Around mid November 2018 he began to have abdominal pain with eating and loss of appetite. His primary provider ordered a CT abdomen/pelvis which revealed an abnormal thickening of an 8 cm segment of small bowel. Seen by Dr. Casnaova on 12/17/18.     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   Seen post operatively by Dr. Ray 1/7/19, met med onc Dr. Hwang 1/8/19.   Underwent bone marrow bx 1/11/19.   Pending port placement 1/28/19.     He is doing well since surgery. He has been wearing an abdominal binder for support and has had no leakage on to the binder.  Denies N/V/D, CP, SOB.   Eating 3 meals daily, +3# weight gain since last appt.   Walking 1-2 miles daily.     Physical Exam:  BP (!) 145/83   Pulse 78   Temp 98.5 °F (36.9 °C) (Oral)   Ht 5' 7" (1.702 m)   Wt (!) 144.9 kg (319 lb 7.1 oz)   BMI 50.03 kg/m²     General:  Non-toxic, ambulatory  Cardiac: S1 S2 heard, no rubs, no murmurs; radial pulses +2 bilaterally  Abd:  Soft, non-tender, + BS x 4  Incision: midline abd incision healing well, edges approximated, without exudate or erythema      ICD-10-CM ICD-9-CM    1. Diffuse large B-cell lymphoma of solid organ excluding spleen C83.39 202.80 F/u with Dr. Hwang as scheduled.            Follow-up:  prn      Pt seen today in conjunction with Dr. Casanova.         "

## 2019-01-21 ENCOUNTER — OFFICE VISIT (OUTPATIENT)
Dept: HEMATOLOGY/ONCOLOGY | Facility: CLINIC | Age: 49
End: 2019-01-21
Payer: COMMERCIAL

## 2019-01-21 ENCOUNTER — OFFICE VISIT (OUTPATIENT)
Dept: SURGERY | Facility: CLINIC | Age: 49
End: 2019-01-21
Payer: COMMERCIAL

## 2019-01-21 ENCOUNTER — PATIENT MESSAGE (OUTPATIENT)
Dept: SURGERY | Facility: CLINIC | Age: 49
End: 2019-01-21

## 2019-01-21 VITALS
SYSTOLIC BLOOD PRESSURE: 145 MMHG | WEIGHT: 315 LBS | BODY MASS INDEX: 49.44 KG/M2 | TEMPERATURE: 99 F | HEART RATE: 78 BPM | HEIGHT: 67 IN | DIASTOLIC BLOOD PRESSURE: 83 MMHG

## 2019-01-21 VITALS
BODY MASS INDEX: 49.44 KG/M2 | TEMPERATURE: 99 F | RESPIRATION RATE: 18 BRPM | HEIGHT: 67 IN | WEIGHT: 315 LBS | SYSTOLIC BLOOD PRESSURE: 145 MMHG | OXYGEN SATURATION: 100 % | HEART RATE: 78 BPM | DIASTOLIC BLOOD PRESSURE: 83 MMHG

## 2019-01-21 DIAGNOSIS — C83.39 DIFFUSE LARGE B-CELL LYMPHOMA OF SOLID ORGAN EXCLUDING SPLEEN: ICD-10-CM

## 2019-01-21 DIAGNOSIS — C83.39 DIFFUSE LARGE B-CELL LYMPHOMA OF SOLID ORGAN EXCLUDING SPLEEN: Primary | ICD-10-CM

## 2019-01-21 DIAGNOSIS — E66.01 MORBID OBESITY: Primary | ICD-10-CM

## 2019-01-21 DIAGNOSIS — C17.9 SMALL BOWEL CANCER: ICD-10-CM

## 2019-01-21 PROBLEM — T81.49XA CELLULITIS, WOUND, POST-OPERATIVE: Status: RESOLVED | Noted: 2019-01-07 | Resolved: 2019-01-21

## 2019-01-21 PROBLEM — C83.398 DIFFUSE LARGE B-CELL LYMPHOMA OF SOLID ORGAN EXCLUDING SPLEEN: Status: ACTIVE | Noted: 2019-01-07

## 2019-01-21 PROBLEM — K63.89 SMALL BOWEL MASS: Status: RESOLVED | Noted: 2018-12-21 | Resolved: 2019-01-21

## 2019-01-21 PROCEDURE — 3077F SYST BP >= 140 MM HG: CPT | Mod: CPTII,S$GLB,, | Performed by: INTERNAL MEDICINE

## 2019-01-21 PROCEDURE — 3079F PR MOST RECENT DIASTOLIC BLOOD PRESSURE 80-89 MM HG: ICD-10-PCS | Mod: CPTII,S$GLB,, | Performed by: INTERNAL MEDICINE

## 2019-01-21 PROCEDURE — 99214 OFFICE O/P EST MOD 30 MIN: CPT | Mod: S$GLB,,, | Performed by: INTERNAL MEDICINE

## 2019-01-21 PROCEDURE — 3077F PR MOST RECENT SYSTOLIC BLOOD PRESSURE >= 140 MM HG: ICD-10-PCS | Mod: CPTII,S$GLB,, | Performed by: INTERNAL MEDICINE

## 2019-01-21 PROCEDURE — 99999 PR PBB SHADOW E&M-EST. PATIENT-LVL IV: CPT | Mod: PBBFAC,,, | Performed by: INTERNAL MEDICINE

## 2019-01-21 PROCEDURE — 99999 PR PBB SHADOW E&M-EST. PATIENT-LVL III: ICD-10-PCS | Mod: PBBFAC,,, | Performed by: NURSE PRACTITIONER

## 2019-01-21 PROCEDURE — 3008F BODY MASS INDEX DOCD: CPT | Mod: CPTII,S$GLB,, | Performed by: INTERNAL MEDICINE

## 2019-01-21 PROCEDURE — 99214 PR OFFICE/OUTPT VISIT, EST, LEVL IV, 30-39 MIN: ICD-10-PCS | Mod: S$GLB,,, | Performed by: INTERNAL MEDICINE

## 2019-01-21 PROCEDURE — 99999 PR PBB SHADOW E&M-EST. PATIENT-LVL III: CPT | Mod: PBBFAC,,, | Performed by: NURSE PRACTITIONER

## 2019-01-21 PROCEDURE — 99024 PR POST-OP FOLLOW-UP VISIT: ICD-10-PCS | Mod: S$GLB,,, | Performed by: NURSE PRACTITIONER

## 2019-01-21 PROCEDURE — 99999 PR PBB SHADOW E&M-EST. PATIENT-LVL IV: ICD-10-PCS | Mod: PBBFAC,,, | Performed by: INTERNAL MEDICINE

## 2019-01-21 PROCEDURE — 3079F DIAST BP 80-89 MM HG: CPT | Mod: CPTII,S$GLB,, | Performed by: INTERNAL MEDICINE

## 2019-01-21 PROCEDURE — 99024 POSTOP FOLLOW-UP VISIT: CPT | Mod: S$GLB,,, | Performed by: NURSE PRACTITIONER

## 2019-01-21 PROCEDURE — 3008F PR BODY MASS INDEX (BMI) DOCUMENTED: ICD-10-PCS | Mod: CPTII,S$GLB,, | Performed by: INTERNAL MEDICINE

## 2019-01-21 NOTE — PROGRESS NOTES
Seen with CHONG Martinez. Doing well. Wound instructions given. Mr Streeter brings greetings from Dr Cecilio Chanel, with whom he is acquainted through a [employer] group.

## 2019-01-21 NOTE — PROGRESS NOTES
CC: Lymphoma, follow up visit      HPI:  is a 48 y.o. male here for followup visit. He has hypertension, obstructive sleep apnea. He was hospitalized around Christmas 2018 for small bowel mass. Patient reports that he began having lower abdominal/pelvic pain toward the end of October 2018. This prompted a urologic evaluation and subsequent treatment for a presumed UTI. He states that his pain continued into November and evolved into more upper abdominal/epigastric pain that was worse with eating. He was subsequently seen by his PCP who ultimately ordered a CT abdomen/pelvis that revealed an abnormal thickening of an 8 cm segment of small bowel. He denied fever, chills, night sweats, or anorexia at that time. He had ~10 lb weight loss over the 1-2 months prior to his hospitalization in Dec 2018, but he was also actively taking part in a weight loss program.  On 12/21/18, he underwent Segmental small bowel resection.The mass was approximately 6 cm in length and was circumferential.  There was dilation of the small bowel above it suggesting a partial bowel obstruction.  There was no adenopathy in the adjacent   mesentery.  The tumor was clearly extending to the serosal surface of the bowel.Careful search was made for peritoneal implants and there were no lesions noted on the peritoneum or omentum.  The liver could not be inspected through the incision,   but it was palpated and there were no focal lesions within it. The remainder of the small bowel was run from the ligament of Treitz to the ileocecal valves and no other small bowel lesions were noted.       Interval History: He is here after PET CT,. 2D ECHO, bone marrow biopsy. He feels well.      Review of Systems   Constitutional: Negative for chills, fever, malaise/fatigue and weight loss.   HENT: Negative for ear discharge, ear pain and nosebleeds.    Eyes: Negative for blurred vision, double vision and photophobia.   Respiratory: Negative for cough and  hemoptysis.    Cardiovascular: Negative for chest pain, palpitations, orthopnea and claudication.   Gastrointestinal: Negative for abdominal pain, constipation, diarrhea, heartburn, nausea and vomiting.   Genitourinary: Negative for dysuria, frequency and urgency.   Musculoskeletal: Negative for back pain, myalgias and neck pain.   Skin: Negative for rash.   Neurological: Negative for dizziness, tingling, tremors, speech change and headaches.   Endo/Heme/Allergies: Does not bruise/bleed easily.   Psychiatric/Behavioral: Negative for depression and suicidal ideas.       Current Outpatient Medications   Medication Sig    atenolol (TENORMIN) 100 MG tablet Take 50 mg by mouth 2 (two) times daily.    fexofenadine (ALLEGRA) 60 MG tablet Take 60 mg by mouth once daily.    fluticasone (FLONASE) 50 mcg/actuation nasal spray 1 spray by Each Nare route once daily.    ibuprofen (ADVIL,MOTRIN) 200 MG tablet     losartan-hydrochlorothiazide 100-12.5 mg (HYZAAR) 100-12.5 mg Tab      No current facility-administered medications for this visit.           Vitals:    01/21/19 1538   BP: (!) 145/83   Pulse: 78   Resp: 18   Temp: 98.5 °F (36.9 °C)     Physical Exam   Constitutional: He is oriented to person, place, and time. He appears well-developed. No distress.   HENT:   Head: Atraumatic.   Mouth/Throat: No oropharyngeal exudate.   Eyes: Pupils are equal, round, and reactive to light. No scleral icterus.   Neck: Normal range of motion.   Cardiovascular: Normal rate and regular rhythm.   No murmur heard.  Pulmonary/Chest: Effort normal and breath sounds normal. No respiratory distress. He has no wheezes. He has no rales.   Abdominal: Soft. He exhibits no distension. There is no tenderness. There is no rebound.   Scar healing well   Musculoskeletal: He exhibits no edema.   Lymphadenopathy:     He has no cervical adenopathy.   Neurological: He is alert and oriented to person, place, and time. No cranial nerve deficit.   Skin: Skin  "is warm.   Psychiatric: He has a normal mood and affect.           12/21/18 flow cytometry of small bowel mass     Flow cytometric analysis of tissue detects a kappa restricted B lymphocyte population that expresses CD19, CD20, and CD10.  CD5 is negative.  The differential diagnosis includes diffuse large B cell lymphoma, follicular lymphoma, and Burkitt's lymphoma.     Flow differential:  Lymphocytes 88.2%, Monocytes 6.0%, Granulocytes  2.3%, Blast  2.6%, Debris/nRBC 0.5%,  Viability 90.7%.  Conclusion: B cell lymphoma of germinal center origin (CD10+); correlate with morphology and cytogenetic /molecular testing for further sub classification     12/21/18 pathology :Jejunum, small bowel resection:  -High-grade B-cell lymphoma compatible with Diffuse Large B-cell lymphoma, germinal center phenotype, see comment.  Comment: Concurrent flow cytometric analysis confirms a Kappa restricted clonal B-cell population that expresses CD19, CD20, and CD10 without coexpression of CD5. The histologic sections demonstrate a submucosal mass comprised of a diffuse population of large CD20 positive B cells compatible with immunoblast intermixed with moderate numbers of CD5/CD3 positive T cells. The large lymphocytes are BCL6 and CD10 (weak) positive and BCL6, MUM1, and BCL-2 are negative. CD23 shows no follicular dendritic network. The Ki-67 proliferation index is high (>60%). In situ hybridization for David bar viral RNA is negative. The overall findings are compatible with diffuse large B-cell lymphoma, germinal center type. Molecular studies are pending to exclude the possibility of a  high-grade B-cell lymphoma with myc and BCL-2/ BCL6 rearrangements and will be reported. All histologic chemical stains have satisfactory positive and negative controls.    "B-cell lymphoma, FISH, tissue:  Interpretation: Positive. The result is abnormal and indicates MYC/IGH fusion and 27% of nuclei. No rearrangement of BCL-2 or BCL6 was " "observed.  No MYC rearrangement was observed, therefore this is unlikely to be "high-grade B-cell lymphoma, with MYC and  BCL2 and/or BCL6 translocations" (previously known as "genetic double-hit lymphoma", currently reclassified per  the 2016 World Health Organization Classification of Lymphoid Neoplasms).      1/11/19 bone marrow biopsy      FINAL PATHOLOGIC DIAGNOSIS  BONE MARROW, LEFT ILIAC CREST (ASPIRATE SMEAR, TOUCH PREPARATION, CLOT SECTION, AND CORE BIOPSY):  -- NORMOCELLULAR MARROW (60%) WITH TRILINEAGE HEMATOPOIESIS.  -- NO EVIDENCE OF MARROW INVOLVEMENT BY B-CELL NON-HODGKIN LYMPHOMA.  -- MARKEDLY DECREASED STORAGE IRON.    Recent CBC data (1/7/2019) showed normocytic anemia and thrombocytosis.  Flow cytometric analysis of bone marrow shows populations of polyclonal B lymphocytes and T lymphocytes that are immunophenotypically unremarkable. The blast gate is not increased.  Immunohistochemical stains are performed on the biopsy core and clot section for greater sensitivity and further architectural assessment with adequate controls. The interstitial lymphocytes and small lymphoid aggregates are  composed of mixed CD3-positive T cells and CD20-positive B cells. Correlation with other laboratory results is recommended.        1/15/19 PET CT    COMPARISON:  CT abdomen and pelvis 12/07/2018.    FINDINGS:  Quality of the study: Adequate.    Mildly increased FDG uptake is noted along the midline anterior abdominal wall, likely related to prior celiotomy incision, SUV max 5.60.  Small, non lesion like focus of tracer avidity is seen involving the descending colon, without any corresponding CT abnormality, SUV max 6.48..  This likely represents normal colonic peristalsis or a small area of inflammation.  Diffusely increased marrow uptake likely represents red marrow reconversion. No suspicious abnormally increased uptake is seen to suggest residual/recurrent disease.    Physiologic uptake of the tracer is " present within the brain, salivary glands, myocardium, GI and  tracts.    Incidental CT findings: Mild mosaic type ground-glass density in the lungs.  Splenomegaly, with the spleen measuring 12.7 cm in AP dimension.  Nonobstructive right renal stone measuring 0.7 cm.  Postoperative changes from small bowel resection.  Scattered colonic diverticula.  Circumferential urinary bladder wall thickening, favored to relate to underdistention.  Mild degenerative changes in the spine.  Stable, mild anterior wedge compression deformity of T12.      Impression       No convincing evidence of residual or recurrent disease.  Midline abdominal wall FDG avidity coincides with prior celiotomy incision.     Component      Latest Ref Rng & Units 1/7/2019   WBC      3.90 - 12.70 K/uL 8.04   RBC      4.60 - 6.20 M/uL 4.57 (L)   Hemoglobin      14.0 - 18.0 g/dL 12.2 (L)   Hematocrit      40.0 - 54.0 % 39.0 (L)   MCV      82 - 98 fL 85   MCH      27.0 - 31.0 pg 26.7 (L)   MCHC      32.0 - 36.0 g/dL 31.3 (L)   RDW      11.5 - 14.5 % 13.6   Platelets      150 - 350 K/uL 434 (H)   MPV      9.2 - 12.9 fL 9.2   Immature Granulocytes      0.0 - 0.5 % 1.2 (H)   Gran # (ANC)      1.8 - 7.7 K/uL 6.0   Immature Grans (Abs)      0.00 - 0.04 K/uL 0.10 (H)   Lymph #      1.0 - 4.8 K/uL 1.1   Mono #      0.3 - 1.0 K/uL 0.6   Eos #      0.0 - 0.5 K/uL 0.2   Baso #      0.00 - 0.20 K/uL 0.09   nRBC      0 /100 WBC 0   Gran%      38.0 - 73.0 % 74.6 (H)   Lymph%      18.0 - 48.0 % 13.2 (L)   Mono%      4.0 - 15.0 % 7.7   Eosinophil%      0.0 - 8.0 % 2.2   Basophil%      0.0 - 1.9 % 1.1   Differential Method       Automated   Sodium      136 - 145 mmol/L 139   Potassium      3.5 - 5.1 mmol/L 4.0   Chloride      95 - 110 mmol/L 102   CO2      23 - 29 mmol/L 29   Glucose      70 - 110 mg/dL 98   BUN, Bld      6 - 20 mg/dL 18   Creatinine      0.5 - 1.4 mg/dL 0.9   Calcium      8.7 - 10.5 mg/dL 10.1   Total Protein      6.0 - 8.4 g/dL 8.8 (H)   Albumin       3.5 - 5.2 g/dL 3.8   Total Bilirubin      0.1 - 1.0 mg/dL 0.3   Alkaline Phosphatase      55 - 135 U/L 63   AST      10 - 40 U/L 24   ALT      10 - 44 U/L 30   Anion Gap      8 - 16 mmol/L 8   eGFR if African American      >60 mL/min/1.73 m:2 >60.0   eGFR if non African American      >60 mL/min/1.73 m:2 >60.0   LD      110 - 260 U/L 207   Uric Acid      3.4 - 7.0 mg/dL 6.8   HIV 1/2 Ag/Ab      Negative Negative   Hepatitis C Ab       Negative   Hep B S Ab       Negative   Hepatitis B Surface Ag       Negative   Hep B C IgM       Negative     1/11/19 2D ECHO    · Normal left ventricular systolic function. The estimated ejection fraction is 63%  · Normal LV diastolic function.  · Moderate left atrial enlargement.  · Normal right ventricular systolic function.  · Mild right atrial enlargement.  · Normal central venous pressure (3 mm Hg).     Assessment:     1. DLBCL of jejunum, GC sub type, high grade  2. Hypertension  3. obstructive sleep apnea  4. Morbid obesity  5. Anemia, hypochromic  6. thrombocytosis     Plan:     1. 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 IPI score 0. It does not fulfil the criteria for Burkitts( ki 67 high, but not high enough), double or triple hit ( no bcl2 or bcl6 translocation). No PET avid measurable disease. No ly phoma in bone marrow. I discussed the treatment of stage I GI high grade lymphoma. I explained that although there is no active/measurable disease, chemotherapy with RCHOP or dose adjusted R-EPOCH is recommended, as he had bulky (8cm), high grade ( ki 67 > 60% ) lymphoma with MYC/IgH fusion. He will also need diagnostic LP. He has plans to visit [facility] for 2nd opinion. He will come back from there and [facility] [place].  Risks and benefits explained and patient was consent ed for REPOCH.              2.On Atenolol, Losartan HCTZ      5. Mild, asymptomatic.     6. Possibly  reactive               Distress Screening Results: Psychosocial Distress screening score of Distress Score: 2 noted and reviewed. No intervention indicated.

## 2019-01-28 ENCOUNTER — TELEPHONE (OUTPATIENT)
Dept: HEMATOLOGY/ONCOLOGY | Facility: CLINIC | Age: 49
End: 2019-01-28

## 2019-01-28 ENCOUNTER — HOSPITAL ENCOUNTER (OUTPATIENT)
Dept: INTERVENTIONAL RADIOLOGY/VASCULAR | Facility: OTHER | Age: 49
Discharge: HOME | End: 2019-01-28
Attending: STUDENT IN AN ORGANIZED HEALTH CARE EDUCATION/TRAINING PROGRAM
Payer: COMMERCIAL

## 2019-01-28 ENCOUNTER — PATIENT MESSAGE (OUTPATIENT)
Dept: HEMATOLOGY/ONCOLOGY | Facility: CLINIC | Age: 49
End: 2019-01-28

## 2019-01-28 ENCOUNTER — HOSPITAL ENCOUNTER (OUTPATIENT)
Facility: OTHER | Age: 49
Discharge: HOME | End: 2019-01-28
Attending: RADIOLOGY | Admitting: RADIOLOGY
Payer: COMMERCIAL

## 2019-01-28 VITALS
OXYGEN SATURATION: 98 % | TEMPERATURE: 98 F | SYSTOLIC BLOOD PRESSURE: 143 MMHG | BODY MASS INDEX: 48.81 KG/M2 | DIASTOLIC BLOOD PRESSURE: 65 MMHG | RESPIRATION RATE: 20 BRPM | HEART RATE: 67 BPM | HEIGHT: 67 IN | WEIGHT: 311 LBS

## 2019-01-28 DIAGNOSIS — C83.30 DIFFUSE LARGE B-CELL LYMPHOMA, UNSPECIFIED BODY REGION: ICD-10-CM

## 2019-01-28 DIAGNOSIS — C83.30 LARGE CELL (DIFFUSE) NON-HODGKIN'S LYMPHOMA: ICD-10-CM

## 2019-01-28 PROCEDURE — 63600175 PHARM REV CODE 636 W HCPCS: Performed by: RADIOLOGY

## 2019-01-28 PROCEDURE — C1788 PORT, INDWELLING, IMP: HCPCS

## 2019-01-28 PROCEDURE — 27201423 OPTIME MED/SURG SUP & DEVICES STERILE SUPPLY

## 2019-01-28 PROCEDURE — 99153 MOD SED SAME PHYS/QHP EA: CPT

## 2019-01-28 PROCEDURE — 36561 INSERT TUNNELED CV CATH: CPT

## 2019-01-28 PROCEDURE — 99152 MOD SED SAME PHYS/QHP 5/>YRS: CPT

## 2019-01-28 PROCEDURE — C1894 INTRO/SHEATH, NON-LASER: HCPCS

## 2019-01-28 PROCEDURE — 76937 US GUIDE VASCULAR ACCESS: CPT | Mod: TC

## 2019-01-28 DEVICE — POWERPORT CLEARVUE IMPLANTABLE PORT WITH ATTACHABLE 8F POLYURETHANE OPEN-ENDED SINGLE-LUMEN VENOUS CATHETER INTERMEDIATE KIT
Type: IMPLANTABLE DEVICE | Site: CHEST  WALL | Status: FUNCTIONAL
Brand: POWERPORT CLEARVUE

## 2019-01-28 RX ORDER — HYDROCODONE BITARTRATE AND ACETAMINOPHEN 5; 325 MG/1; MG/1
1 TABLET ORAL EVERY 4 HOURS PRN
Status: CANCELLED | OUTPATIENT
Start: 2019-01-28

## 2019-01-28 RX ORDER — MIDAZOLAM HYDROCHLORIDE 1 MG/ML
INJECTION, SOLUTION INTRAMUSCULAR; INTRAVENOUS
Status: DISCONTINUED | OUTPATIENT
Start: 2019-01-28 | End: 2019-01-28 | Stop reason: HOSPADM

## 2019-01-28 RX ORDER — FENTANYL CITRATE 50 UG/ML
INJECTION, SOLUTION INTRAMUSCULAR; INTRAVENOUS
Status: DISCONTINUED | OUTPATIENT
Start: 2019-01-28 | End: 2019-01-28 | Stop reason: HOSPADM

## 2019-01-28 RX ORDER — CEFAZOLIN SODIUM 1 G/50ML
SOLUTION INTRAVENOUS
Status: DISCONTINUED | OUTPATIENT
Start: 2019-01-28 | End: 2019-01-28 | Stop reason: HOSPADM

## 2019-01-28 RX ORDER — LORATADINE 10 MG/1
10 TABLET ORAL DAILY
COMMUNITY

## 2019-01-28 NOTE — DISCHARGE SUMMARY
Radiology Discharge Summary      Hospital Course: No complications    Admit Date: 1/28/2019  Discharge Date: 01/28/2019     Instructions Given to Patient: Yes  Diet: Resume prior diet  Activity: activity as tolerated    Description of Condition on Discharge: Stable  Vital Signs (Most Recent): Temp: 98.2 °F (36.8 °C) (01/28/19 1300)  Pulse: (!) 59 (01/28/19 1445)  Resp: 20 (01/28/19 1445)  BP: (!) 151/74 (01/28/19 1445)  SpO2: 98 % (01/28/19 1445)    Discharge Disposition: Home    Discharge Diagnosis: NHL s/p port placement     Follow-up: SYDNI Gonzalez MD  Diagnostic and Interventional Radiologist  Department of Radiology  Pager: [phone]

## 2019-01-28 NOTE — PROCEDURES
Radiology Post-Procedure Note    Pre Op Diagnosis: NHL  Post Op Diagnosis: Same    Procedure: Port placement    Procedure performed by: Juan Francisco Gonzalez MD    Written Informed Consent Obtained: Yes  Specimen Removed: NO  Estimated Blood Loss: Minimal    Findings:   Right chest wall port placed via R IJ access.  Total catheter length is 23cm.    Patient tolerated procedure well.    Juan Francisco Gonzalez MD  Diagnostic and Interventional Radiologist  Department of Radiology  Pager: [phone]

## 2019-01-28 NOTE — TELEPHONE ENCOUNTER
Patient is interested in seeing dr rothman at the 11am time he was initially offered this Friday---he would like to be admitted on Friday post the MD visit, if possible.    Message routed to jennifer (is the 11am still OK?)

## 2019-01-28 NOTE — TELEPHONE ENCOUNTER
----- Message from Joseline Alex sent at 1/28/2019  3:48 PM CST -----  Contact: Patient  Pt returning call from [place] in regards to coming in tomorrow for admission.     Contact:: [phone]

## 2019-01-28 NOTE — H&P
Radiology History & Physical      SUBJECTIVE:     Chief Complaint: Non-Hodgkins Lymphoma    History of Present Illness:  Giorgio Streeter is a 48 y.o. male with NHL who presents for port placement.    Past Medical History:   Diagnosis Date    Cancer     non hodgkins lymphoma    Hypertension     Sleep apnea     Vision abnormalities      Past Surgical History:   Procedure Laterality Date    BONE MARROW BIOPSY  01/11/2019    EXCISION, SMALL INTESTINE N/A 12/21/2018    Performed by [facility] at [facility] OR 2ND F[facility]    SMALL INTESTINE SURGERY  12/21/2018    Dr. Casanova, segmental small bowel resection        Home Meds:   Prior to Admission medications    Medication Sig Start Date End Date Taking? Authorizing Provider   atenolol (TENORMIN) 100 MG tablet Take 50 mg by mouth 2 (two) times daily.   Yes Historical Provider, MD   fluticasone (FLONASE) 50 mcg/actuation nasal spray 1 spray by Each Nare route once daily.   Yes Historical Provider, MD   ibuprofen (ADVIL,MOTRIN) 200 MG tablet  12/24/18  Yes Historical Provider, MD   loratadine (CLARITIN) 10 mg tablet Take 10 mg by mouth once daily.   Yes Historical Provider, MD   losartan-hydrochlorothiazide 100-12.5 mg (HYZAAR) 100-12.5 mg Tab    Yes Historical Provider, MD   fexofenadine (ALLEGRA) 60 MG tablet Take 60 mg by mouth once daily.    Historical Provider, MD     Anticoagulants/Antiplatelets: no anticoagulation    Allergies: Review of patient's allergies indicates:  No Known Allergies  Sedation History:  no adverse reactions    Review of Systems:   Hematological: no known coagulopathies  Respiratory: no shortness of breath  Cardiovascular: no chest pain  Gastrointestinal: no abdominal pain  Genito-Urinary: no dysuria  Musculoskeletal: negative  Neurological: no TIA or stroke symptoms         OBJECTIVE:     Vital Signs (Most Recent)  Temp: 98.2 °F (36.8 °C) (01/28/19 1300)  Pulse: (!) 58 (01/28/19 1300)  Resp: 18 (01/28/19 1300)  BP: 135/75 (01/28/19  1300)  SpO2: 98 % (01/28/19 1300)    Physical Exam:  ASA: 3  Mallampati: 2    General: no acute distress  Mental Status: alert and oriented to person, place and time  HEENT: normocephalic, atraumatic  Chest: unlabored breathing  Heart: regular heart rate  Abdomen: nondistended  Extremity: moves all extremities    Laboratory  No results found for: INR    Lab Results   Component Value Date    WBC 8.04 01/07/2019    HGB 12.2 (L) 01/07/2019    HCT 39.0 (L) 01/07/2019    MCV 85 01/07/2019     (H) 01/07/2019      Lab Results   Component Value Date    GLU 98 01/07/2019     01/07/2019    K 4.0 01/07/2019     01/07/2019    CO2 29 01/07/2019    BUN 18 01/07/2019    CREATININE 0.9 01/07/2019    CALCIUM 10.1 01/07/2019    MG 1.6 12/24/2018    ALT 30 01/07/2019    AST 24 01/07/2019    ALBUMIN 3.8 01/07/2019    BILITOT 0.3 01/07/2019       ASSESSMENT/PLAN:     Sedation Plan: Moderate.  Patient will undergo port placement.    Juan Francisco Gonzalez MD  Diagnostic and Interventional Radiologist  Department of Radiology  Pager: [phone]

## 2019-01-28 NOTE — TELEPHONE ENCOUNTER
----- Message from Manuel Bailey sent at 1/28/2019 12:27 PM CST -----  Contact: Pt  Pt would like to be called back regarding needing to schedule for [facility]. No further information was given.    Pt can be reached at [phone].    Thank You.

## 2019-01-28 NOTE — DISCHARGE INSTRUCTIONS
Anesthesia: Monitored Anesthesia Care (MAC)    Anesthesia Safety  · Have an adult family member or friend drive you home after the procedure.  · For the first 24 hours after your surgery:  ¨ Do not drive or use heavy equipment.  ¨ Do not make important decisions or sign documents.  ¨ Avoid alcohol.  ¨ Have someone stay with you, if possible. They can watch for problems and help keep you safe.    PLEASE FOLLOW ANY OTHER INSTRUCTIONS PROVIDED TO YOU BY YOUR DOCTOR!

## 2019-01-29 ENCOUNTER — DOCUMENTATION ONLY (OUTPATIENT)
Dept: HEMATOLOGY/ONCOLOGY | Facility: CLINIC | Age: 49
End: 2019-01-29

## 2019-01-30 DIAGNOSIS — C83.39 DIFFUSE LARGE B-CELL LYMPHOMA OF SOLID ORGAN EXCLUDING SPLEEN: Primary | ICD-10-CM

## 2019-01-30 NOTE — PROGRESS NOTES
"Received a call this morning from patient's father Tera Streeter ([phone]) asking for 's assistance. Explained that my coworker Marcy ("Megan") Nury will be patient's  as she works with Dr. Hwang's patients who have Hem malignancies, and that I would inform her of his call. He has questions (about the chemo recommended, side effects, Dr. Hwang's experience with this chemo, etc.) which I explained that Dr. Hwang and the nursing staff could best answer. He asked if the next scheduled appointment is a 20 minute one, which it is. I explained that staff may be able to change it to a longer time to allow them enough time to get all of their questions answered. I informed him about the [facility] as patient lives in [place]. Spoke with Megan, and gave her patient's father's contact information; she will reach out to patient first for approval and then call patient's father.     "

## 2019-01-31 ENCOUNTER — TELEPHONE (OUTPATIENT)
Dept: HEMATOLOGY/ONCOLOGY | Facility: CLINIC | Age: 49
End: 2019-01-31

## 2019-01-31 NOTE — TELEPHONE ENCOUNTER
Spoke to father confirmed appointments for the first.  Explained the lab appointment did confirm that we had the report from MD Frankel.

## 2019-02-01 ENCOUNTER — HOSPITAL ENCOUNTER (INPATIENT)
Facility: HOSPITAL | Age: 49
LOS: 5 days | Discharge: HOME | DRG: 847 | End: 2019-02-06
Attending: INTERNAL MEDICINE | Admitting: INTERNAL MEDICINE
Payer: COMMERCIAL

## 2019-02-01 ENCOUNTER — OFFICE VISIT (OUTPATIENT)
Dept: HEMATOLOGY/ONCOLOGY | Facility: CLINIC | Age: 49
End: 2019-02-01
Payer: COMMERCIAL

## 2019-02-01 VITALS
SYSTOLIC BLOOD PRESSURE: 133 MMHG | BODY MASS INDEX: 49.13 KG/M2 | RESPIRATION RATE: 18 BRPM | WEIGHT: 313.06 LBS | OXYGEN SATURATION: 97 % | HEART RATE: 60 BPM | DIASTOLIC BLOOD PRESSURE: 69 MMHG | TEMPERATURE: 98 F | HEIGHT: 67 IN

## 2019-02-01 DIAGNOSIS — C83.33 DIFFUSE LARGE B-CELL LYMPHOMA, INTRA-ABDOMINAL LYMPH NODES: ICD-10-CM

## 2019-02-01 DIAGNOSIS — E66.01 MORBID OBESITY: Primary | ICD-10-CM

## 2019-02-01 DIAGNOSIS — C83.30 LARGE CELL (DIFFUSE) NON-HODGKIN'S LYMPHOMA: ICD-10-CM

## 2019-02-01 DIAGNOSIS — C83.39 DIFFUSE LARGE B-CELL LYMPHOMA OF SOLID ORGAN EXCLUDING SPLEEN: Primary | ICD-10-CM

## 2019-02-01 PROBLEM — D63.0 ANEMIA IN NEOPLASTIC DISEASE: Status: ACTIVE | Noted: 2019-02-01

## 2019-02-01 PROBLEM — G47.33 OSA (OBSTRUCTIVE SLEEP APNEA): Status: ACTIVE | Noted: 2019-02-01

## 2019-02-01 PROBLEM — J30.2 SEASONAL ALLERGIES: Status: ACTIVE | Noted: 2019-02-01

## 2019-02-01 PROBLEM — D64.9 ANEMIA: Status: RESOLVED | Noted: 2019-02-01 | Resolved: 2019-02-01

## 2019-02-01 PROBLEM — R21 RASH AND NONSPECIFIC SKIN ERUPTION: Status: ACTIVE | Noted: 2019-02-01

## 2019-02-01 PROBLEM — I10 ESSENTIAL HYPERTENSION: Status: ACTIVE | Noted: 2019-02-01

## 2019-02-01 PROBLEM — D64.9 ANEMIA: Status: ACTIVE | Noted: 2019-02-01

## 2019-02-01 PROBLEM — Z90.49 S/P SMALL BOWEL RESECTION: Status: ACTIVE | Noted: 2019-02-01

## 2019-02-01 LAB — POCT GLUCOSE: 105 MG/DL (ref 70–110)

## 2019-02-01 PROCEDURE — 3078F DIAST BP <80 MM HG: CPT | Mod: CPTII,S$GLB,, | Performed by: INTERNAL MEDICINE

## 2019-02-01 PROCEDURE — 63600175 PHARM REV CODE 636 W HCPCS: Performed by: NURSE PRACTITIONER

## 2019-02-01 PROCEDURE — 3075F SYST BP GE 130 - 139MM HG: CPT | Mod: CPTII,S$GLB,, | Performed by: INTERNAL MEDICINE

## 2019-02-01 PROCEDURE — 99999 PR PBB SHADOW E&M-EST. PATIENT-LVL IV: ICD-10-PCS | Mod: PBBFAC,,, | Performed by: INTERNAL MEDICINE

## 2019-02-01 PROCEDURE — 3078F PR MOST RECENT DIASTOLIC BLOOD PRESSURE < 80 MM HG: ICD-10-PCS | Mod: CPTII,S$GLB,, | Performed by: INTERNAL MEDICINE

## 2019-02-01 PROCEDURE — 99999 PR PBB SHADOW E&M-EST. PATIENT-LVL IV: CPT | Mod: PBBFAC,,, | Performed by: INTERNAL MEDICINE

## 2019-02-01 PROCEDURE — 20600001 HC STEP DOWN PRIVATE ROOM

## 2019-02-01 PROCEDURE — 63600175 PHARM REV CODE 636 W HCPCS: Performed by: INTERNAL MEDICINE

## 2019-02-01 PROCEDURE — 99499 UNLISTED E&M SERVICE: CPT | Mod: S$GLB,,, | Performed by: INTERNAL MEDICINE

## 2019-02-01 PROCEDURE — 25000003 PHARM REV CODE 250: Performed by: INTERNAL MEDICINE

## 2019-02-01 PROCEDURE — 99223 PR INITIAL HOSPITAL CARE,LEVL III: ICD-10-PCS | Mod: ,,, | Performed by: INTERNAL MEDICINE

## 2019-02-01 PROCEDURE — 99499 NO LOS: ICD-10-PCS | Mod: S$GLB,,, | Performed by: INTERNAL MEDICINE

## 2019-02-01 PROCEDURE — 3008F BODY MASS INDEX DOCD: CPT | Mod: CPTII,S$GLB,, | Performed by: INTERNAL MEDICINE

## 2019-02-01 PROCEDURE — 3008F PR BODY MASS INDEX (BMI) DOCUMENTED: ICD-10-PCS | Mod: CPTII,S$GLB,, | Performed by: INTERNAL MEDICINE

## 2019-02-01 PROCEDURE — 3075F PR MOST RECENT SYSTOLIC BLOOD PRESS GE 130-139MM HG: ICD-10-PCS | Mod: CPTII,S$GLB,, | Performed by: INTERNAL MEDICINE

## 2019-02-01 PROCEDURE — 99223 1ST HOSP IP/OBS HIGH 75: CPT | Mod: ,,, | Performed by: INTERNAL MEDICINE

## 2019-02-01 PROCEDURE — 25000003 PHARM REV CODE 250: Performed by: NURSE PRACTITIONER

## 2019-02-01 RX ORDER — IBUPROFEN 200 MG
24 TABLET ORAL
Status: DISCONTINUED | OUTPATIENT
Start: 2019-02-01 | End: 2019-02-06 | Stop reason: HOSPADM

## 2019-02-01 RX ORDER — ONDANSETRON 4 MG/1
8 TABLET, FILM COATED ORAL
Status: CANCELLED | OUTPATIENT
Start: 2019-02-01

## 2019-02-01 RX ORDER — HEPARIN 100 UNIT/ML
300 SYRINGE INTRAVENOUS
Status: DISCONTINUED | OUTPATIENT
Start: 2019-02-01 | End: 2019-02-06 | Stop reason: HOSPADM

## 2019-02-01 RX ORDER — SODIUM CHLORIDE 0.9 % (FLUSH) 0.9 %
10 SYRINGE (ML) INJECTION
Status: DISCONTINUED | OUTPATIENT
Start: 2019-02-01 | End: 2019-02-06 | Stop reason: HOSPADM

## 2019-02-01 RX ORDER — INSULIN ASPART 100 [IU]/ML
0-5 INJECTION, SOLUTION INTRAVENOUS; SUBCUTANEOUS
Status: DISCONTINUED | OUTPATIENT
Start: 2019-02-01 | End: 2019-02-06 | Stop reason: HOSPADM

## 2019-02-01 RX ORDER — SODIUM CHLORIDE 9 MG/ML
INJECTION, SOLUTION INTRAVENOUS CONTINUOUS
Status: DISCONTINUED | OUTPATIENT
Start: 2019-02-01 | End: 2019-02-06 | Stop reason: HOSPADM

## 2019-02-01 RX ORDER — GLUCAGON 1 MG
1 KIT INJECTION
Status: DISCONTINUED | OUTPATIENT
Start: 2019-02-01 | End: 2019-02-06 | Stop reason: HOSPADM

## 2019-02-01 RX ORDER — CETIRIZINE HYDROCHLORIDE 10 MG/1
10 TABLET ORAL DAILY
Status: DISCONTINUED | OUTPATIENT
Start: 2019-02-02 | End: 2019-02-06 | Stop reason: HOSPADM

## 2019-02-01 RX ORDER — ENOXAPARIN SODIUM 100 MG/ML
40 INJECTION SUBCUTANEOUS EVERY 12 HOURS
Status: DISCONTINUED | OUTPATIENT
Start: 2019-02-01 | End: 2019-02-04

## 2019-02-01 RX ORDER — LOSARTAN POTASSIUM AND HYDROCHLOROTHIAZIDE 12.5; 1 MG/1; MG/1
1 TABLET ORAL DAILY
Status: DISCONTINUED | OUTPATIENT
Start: 2019-02-02 | End: 2019-02-06 | Stop reason: HOSPADM

## 2019-02-01 RX ORDER — SODIUM CHLORIDE 0.9 % (FLUSH) 0.9 %
10 SYRINGE (ML) INJECTION
Status: CANCELLED | OUTPATIENT
Start: 2019-02-01

## 2019-02-01 RX ORDER — SODIUM CHLORIDE 9 MG/ML
INJECTION, SOLUTION INTRAVENOUS CONTINUOUS
Status: CANCELLED | OUTPATIENT
Start: 2019-02-01

## 2019-02-01 RX ORDER — DIPHENHYDRAMINE HYDROCHLORIDE 50 MG/ML
50 INJECTION INTRAMUSCULAR; INTRAVENOUS
Status: CANCELLED | OUTPATIENT
Start: 2019-02-01

## 2019-02-01 RX ORDER — POTASSIUM CHLORIDE 20 MEQ/1
20 TABLET, EXTENDED RELEASE ORAL
Status: DISCONTINUED | OUTPATIENT
Start: 2019-02-01 | End: 2019-02-06 | Stop reason: HOSPADM

## 2019-02-01 RX ORDER — ACETAMINOPHEN 325 MG/1
650 TABLET ORAL
Status: CANCELLED | OUTPATIENT
Start: 2019-02-01

## 2019-02-01 RX ORDER — ATENOLOL 25 MG/1
50 TABLET ORAL 2 TIMES DAILY
Status: DISCONTINUED | OUTPATIENT
Start: 2019-02-01 | End: 2019-02-04

## 2019-02-01 RX ORDER — PREDNISONE 20 MG/1
160 TABLET ORAL 2 TIMES DAILY
Status: DISCONTINUED | OUTPATIENT
Start: 2019-02-01 | End: 2019-02-01

## 2019-02-01 RX ORDER — LANOLIN ALCOHOL/MO/W.PET/CERES
400 CREAM (GRAM) TOPICAL EVERY 4 HOURS PRN
Status: DISCONTINUED | OUTPATIENT
Start: 2019-02-01 | End: 2019-02-06 | Stop reason: HOSPADM

## 2019-02-01 RX ORDER — ONDANSETRON 4 MG/1
16 TABLET, FILM COATED ORAL
Status: CANCELLED | OUTPATIENT
Start: 2019-02-05

## 2019-02-01 RX ORDER — IBUPROFEN 200 MG
16 TABLET ORAL
Status: DISCONTINUED | OUTPATIENT
Start: 2019-02-01 | End: 2019-02-06 | Stop reason: HOSPADM

## 2019-02-01 RX ORDER — SODIUM,POTASSIUM PHOSPHATES 280-250MG
2 POWDER IN PACKET (EA) ORAL EVERY 4 HOURS PRN
Status: DISCONTINUED | OUTPATIENT
Start: 2019-02-01 | End: 2019-02-06 | Stop reason: HOSPADM

## 2019-02-01 RX ORDER — MEPERIDINE HYDROCHLORIDE 100 MG/ML
25 INJECTION INTRAMUSCULAR; INTRAVENOUS; SUBCUTANEOUS
Status: CANCELLED | OUTPATIENT
Start: 2019-02-01

## 2019-02-01 RX ORDER — FAMOTIDINE 10 MG/ML
20 INJECTION INTRAVENOUS
Status: CANCELLED | OUTPATIENT
Start: 2019-02-01

## 2019-02-01 RX ORDER — SODIUM CHLORIDE 0.9 % (FLUSH) 0.9 %
5 SYRINGE (ML) INJECTION
Status: DISCONTINUED | OUTPATIENT
Start: 2019-02-01 | End: 2019-02-06 | Stop reason: HOSPADM

## 2019-02-01 RX ORDER — ONDANSETRON 4 MG/1
16 TABLET, FILM COATED ORAL
Status: COMPLETED | OUTPATIENT
Start: 2019-02-05 | End: 2019-02-05

## 2019-02-01 RX ORDER — LANOLIN ALCOHOL/MO/W.PET/CERES
800 CREAM (GRAM) TOPICAL EVERY 4 HOURS PRN
Status: DISCONTINUED | OUTPATIENT
Start: 2019-02-01 | End: 2019-02-06 | Stop reason: HOSPADM

## 2019-02-01 RX ORDER — HEPARIN 100 UNIT/ML
300 SYRINGE INTRAVENOUS
Status: CANCELLED | OUTPATIENT
Start: 2019-02-01

## 2019-02-01 RX ORDER — ONDANSETRON 4 MG/1
8 TABLET, FILM COATED ORAL
Status: COMPLETED | OUTPATIENT
Start: 2019-02-01 | End: 2019-02-05

## 2019-02-01 RX ORDER — MICONAZOLE NITRATE 2 %
POWDER (GRAM) TOPICAL 2 TIMES DAILY
Status: DISCONTINUED | OUTPATIENT
Start: 2019-02-01 | End: 2019-02-06 | Stop reason: HOSPADM

## 2019-02-01 RX ORDER — PREDNISONE 20 MG/1
160 TABLET ORAL 2 TIMES DAILY
Status: COMPLETED | OUTPATIENT
Start: 2019-02-01 | End: 2019-02-06

## 2019-02-01 RX ORDER — HEPARIN 100 UNIT/ML
500 SYRINGE INTRAVENOUS
Status: CANCELLED | OUTPATIENT
Start: 2019-02-01

## 2019-02-01 RX ORDER — SODIUM,POTASSIUM PHOSPHATES 280-250MG
1 POWDER IN PACKET (EA) ORAL EVERY 4 HOURS PRN
Status: DISCONTINUED | OUTPATIENT
Start: 2019-02-01 | End: 2019-02-06 | Stop reason: HOSPADM

## 2019-02-01 RX ORDER — KETOCONAZOLE 20 MG/G
CREAM TOPICAL
COMMUNITY
Start: 2019-01-31

## 2019-02-01 RX ORDER — PREDNISONE 1 MG/1
60 TABLET ORAL
Status: CANCELLED | OUTPATIENT
Start: 2019-02-01

## 2019-02-01 RX ADMIN — ONDANSETRON 8 MG: 4 TABLET, FILM COATED ORAL at 09:02

## 2019-02-01 RX ADMIN — ATENOLOL 50 MG: 25 TABLET ORAL at 09:02

## 2019-02-01 RX ADMIN — DOXORUBICIN HYDROCHLORIDE 26 MG: 2 INJECTION, POWDER, LYOPHILIZED, FOR SOLUTION INTRAVENOUS at 11:02

## 2019-02-01 RX ADMIN — SODIUM CHLORIDE: 0.9 INJECTION, SOLUTION INTRAVENOUS at 08:02

## 2019-02-01 RX ADMIN — PREDNISONE 160 MG: 20 TABLET ORAL at 07:02

## 2019-02-01 RX ADMIN — ENOXAPARIN SODIUM 40 MG: 100 INJECTION SUBCUTANEOUS at 09:02

## 2019-02-01 RX ADMIN — MICONAZOLE NITRATE: 20 POWDER TOPICAL at 09:02

## 2019-02-01 NOTE — ASSESSMENT & PLAN NOTE
- BMI: 48.31  - nutrition to follow while inpatient  - monitoring blood glucose levels with meals and HS while on high dose steroids

## 2019-02-01 NOTE — SUBJECTIVE & OBJECTIVE
Patient information was obtained from patient and past medical records.     Oncology History:   He began having lower abdominal pain around end of October 2018 and was treated for a UTI by his primary care physician. He continued to have lower abdominal/pelvic pain and was referred to Urology and had renal US and further testing was normal. Around mid November he began to have abdominal pain with eating and loss of appetite. He saw his primary who got a CT abdomen/pelvis which revealed an abnormal thickening of an 8 cm segment of small bowel. He was referred to Dr. Casanova for further evaluation of his small bowel mass.  He lost around 10 lbs which he attributes to taking part in weight loss program along with loss of appetite from abdominal pain on eating. CT of abdomen on 12/7 revelaed significant Irregular wall thickening of an 8 cm segment of small bowel (most likely mid to distal jejunum); no stranding or surrounding fluid; no other obvious intra-abdominal lymphadenopathy. On 12/21/18, he underwent segmental small bowel resection.The mass was approximately 6 cm in length and was circumferential.  There was dilation of the small bowel above it suggesting a partial bowel obstruction.  There was no adenopathy in the adjacent mesentery.  The tumor was clearly extending to the serosal surface of the bowel. Careful search was made for peritoneal implants and there were no lesions noted on the peritoneum or omentum.  The liver could not be inspected through the incision, but it was palpated and there were no focal lesions within it. The remainder of the small bowel was run from the ligament of Treitz to the ileocecal valves and no other small bowel lesions were noted. 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 The Ki-67 proliferation index is high (>60%). In situ hybridization for David  "bar viral RNA is negative. Molecular studies are pending to exclude the possibility of a high-grade B-cell lymphoma with myc and BCL-2/ BCL6 rearrangements.  He received a Bmbx on 1/11/19 showing no evidence of marrow involvement.     Per Dr. Hwang's note: "MYC/IgH fusion noted on FISH , in 27% of nuclei. 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 IPI score 0. It does not fulfil the criteria for Burkitts(ki 67 high, but not high enough), double or triple hit ( no bcl2 or bcl6 translocation). No PET avid measurable disease. No lymphoma in bone marrow. I discussed the treatment of stage I GI high grade lymphoma. I explained that although there is no active/measurable disease, chemotherapy with RCHOP or dose adjusted R-EPOCH is recommended, as he had bulky (8cm), high grade ( ki 67 > 60% ) lymphoma with MYC/IgH fusion. He will also need diagnostic LP. He went to [facility] for 2nd opinion."    Noted from [facility]: The pathology report has been released, and the opinion of our Hematopathologist is that there are two features of concern: one is that the cellular reproductive rate is as high as 90% in some portions of the tumor, and the other is that at the DNA analysis level, there is indication of a genomic alteration of poor prognosis, that is a rearrangement of the gene c-myc.  Therefore, given these two factors, she considers this lymphoma to be biologically aggressive, although it DOES NOT meet the criteria to define this as a Burkitt Lymphoma .   She does agree that the marrow biopsy has no indication of lymphoma.   RECOMMENDATION: Given these findings, our clinical recommendation therefore would be that the patient be treated with the REPOCH regimen, which includes intrathecal chemotherapy. This recommendation is based on the recently published Phase 2 multi-institution report, showing that REPOCH treatment has very favorable results in patients with Diffuse Large B-Cell Lymphoma, regardless of high rate of cellular reproduction " and c-myc rearrangement features.   Lancet Haematol. 2018 Dec;5(12):v696-k846. doi: 10.1016/-3586(83)65253-7.   Dose-adjusted EPOCH-R (etoposide, prednisone, vincristine, cyclophosphamide, doxorubicin, and rituximab) in untreated aggressive diffuse large B-cell lymphoma with MYC rearrangement: a prospective, multicentre, single-arm phase 2 study.  Brinda K1, [place]ale MA2, Nadya JS3, Patria A4, Caimi PF5, [place] S6, Melo S7, Lacasce A3, [place] JW8, Colin D9, Zhane S10, Lechowicz MJ11, Suman R12, Sudeep A1, Linda C1, Roschewski M1, Asa SM13, Donte ES6, Shin B14, Ace JW15, Little RF16, Corrigan NL14, Stanford WH17.       Medications Prior to Admission   Medication Sig Dispense Refill Last Dose    atenolol (TENORMIN) 100 MG tablet Take 50 mg by mouth 2 (two) times daily.   2/1/2019 at 0800    fexofenadine (ALLEGRA) 60 MG tablet Take 60 mg by mouth once daily.   Past Month at Unknown time    fluticasone (FLONASE) 50 mcg/actuation nasal spray 1 spray by Each Nare route once daily.   Past Month at Unknown time    ibuprofen (ADVIL,MOTRIN) 200 MG tablet    Past Month at Unknown time    ketoconazole (NIZORAL) 2 % cream    2/1/2019 at 0800    loratadine (CLARITIN) 10 mg tablet Take 10 mg by mouth once daily.   2/1/2019 at 0800    losartan-hydrochlorothiazide 100-12.5 mg (HYZAAR) 100-12.5 mg Tab    2/1/2019 at 0800       Patient has no known allergies.     Past Medical History:   Diagnosis Date    Cancer     non hodgkins lymphoma    Hypertension     Sleep apnea     Vision abnormalities      Past Surgical History:   Procedure Laterality Date    BONE MARROW BIOPSY  01/11/2019    EXCISION, SMALL INTESTINE N/A 12/21/2018    Performed by Cecilio [facility] MD at [facility] OR 2ND FLR    INSERTION, PORT-A-CATH N/A 1/28/2019    Performed by [facility] Provider at [facility], [place] by [facility] CATH LAB    SMALL INTESTINE SURGERY  12/21/2018    Dr. Casanova, segmental small bowel resection      Family History     None         Tobacco Use    Smoking status: Never Smoker    Smokeless tobacco: Never Used   Substance and Sexual Activity    Alcohol use: Yes     Frequency: Monthly or less    Drug use: No    Sexual activity: Not on file       Review of Systems   Constitutional: Negative for chills, diaphoresis, fatigue and fever.   HENT: Negative for congestion, ear pain, mouth sores, nosebleeds, postnasal drip, rhinorrhea, sinus pain, sore throat and trouble swallowing.    Eyes: Negative for pain, redness and visual disturbance.   Respiratory: Negative for cough, chest tightness, shortness of breath, wheezing and stridor.    Cardiovascular: Negative for chest pain, palpitations and leg swelling.   Gastrointestinal: Negative for abdominal distention, abdominal pain, blood in stool, constipation, diarrhea, nausea and vomiting.   Genitourinary: Negative for hematuria.   Musculoskeletal: Negative for arthralgias and back pain.   Skin: Positive for rash (fungal rash to bilateral flank).   Neurological: Negative for dizziness, syncope, weakness, light-headedness, numbness and headaches.   Psychiatric/Behavioral: Negative for confusion.     Objective:     Vital Signs (Most Recent):  Temp: 98.1 °F (36.7 °C) (02/01/19 1607)  Pulse: (!) 59 (02/01/19 1607)  Resp: 16 (02/01/19 1607)  BP: (!) 158/89 (02/01/19 1607)  SpO2: 96 % (02/01/19 1607) Vital Signs (24h Range):  Temp:  [98.1 °F (36.7 °C)-98.2 °F (36.8 °C)] 98.1 °F (36.7 °C)  Pulse:  [59-60] 59  Resp:  [16-18] 16  SpO2:  [96 %-97 %] 96 %  BP: (133-158)/(69-89) 158/89     Weight: (!) 139.8 kg (308 lb 5 oz)  Body mass index is 48.29 kg/m².  Body surface area is 2.57 meters squared.    ECOG SCORE         [unfilled]    Lines/Drains/Airways     Central Venous Catheter Line                 Port A Cath Single Lumen 01/28/19 1409 right internal jugular 4 days                Physical Exam   Constitutional: He is oriented to person, place, and time. He appears well-developed and well-nourished.   Obese    HENT:   Head: Normocephalic and atraumatic.   Right Ear: External ear normal.   Left Ear: External ear normal.   Mouth/Throat: Oropharynx is clear and moist. No oropharyngeal exudate.   Eyes: Conjunctivae and EOM are normal.   Neck: Normal range of motion. Neck supple.   Cardiovascular: Normal rate, regular rhythm, normal heart sounds and intact distal pulses.   No murmur heard.  Pulmonary/Chest: Effort normal and breath sounds normal. No stridor. No respiratory distress. He has no wheezes. He has no rales.   Abdominal: Soft. Bowel sounds are normal. There is no tenderness.   Midline surgical scar; healed   Musculoskeletal: Normal range of motion. He exhibits no edema.   Neurological: He is alert and oriented to person, place, and time.   Skin: Skin is warm and dry. Rash (reddened rash to skin folds; likely fungal due to area of chronic moisture) noted.   Psychiatric: He has a normal mood and affect. His behavior is normal. Judgment and thought content normal.   Nursing note and vitals reviewed.      Significant Labs:   CBC:   Recent Labs   Lab 02/01/19  1157   WBC 7.60   HGB 12.9*   HCT 40.9      , CMP:   Recent Labs   Lab 02/01/19  1157      K 3.8      CO2 31*   GLU 94   BUN 14   CREATININE 0.9   CALCIUM 9.8   PROT 8.5*   ALBUMIN 3.8   BILITOT 0.4   ALKPHOS 62   AST 24   ALT 24   ANIONGAP 8   EGFRNONAA >60.0   , LFTs:   Recent Labs   Lab 02/01/19  1157   ALT 24   AST 24   ALKPHOS 62   BILITOT 0.4   PROT 8.5*   ALBUMIN 3.8    and Uric Acid   Recent Labs   Lab 02/01/19  1157   URICACID 6.9       Diagnostic Results:  I have reviewed all pertinent imaging results/findings within the past 24 hours.

## 2019-02-01 NOTE — Clinical Note
--chemo admit today--cbc, cmp twice weekly after hopsitsl d/c at [facility]--cbc, cmp, ldh, uric acid, f/u for chemo ( OUT PT EPOCH ) in 3 wks

## 2019-02-01 NOTE — ASSESSMENT & PLAN NOTE
- CT abdomen/pelvis 12/7/18 revealed an abnormal thickening of an 8 cm segment of small bowel (most likely mid to distal jejunum)  - referred to Dr. Casanova for further evaluation of his small bowel mass.  - underwent segmental small bowel resection on 12/21/18, mass was approximately 6 cm in length and was circumferential. There was dilation of the small bowel above it suggesting a partial bowel obstruction.  There was no adenopathy in the adjacent mesentery.  The tumor was clearly extending to the serosal surface of the bowel. Careful search was made for peritoneal implants and there were no lesions noted on the peritoneum or omentum.  The liver could not be inspected through the incision, but it was palpated and there were no focal lesions within it. The remainder of the small bowel was run from the ligament of Treitz to the ileocecal valves and no other small bowel lesions were noted.   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 The Ki-67 proliferation index is high (>60%). In situ hybridization for David bar viral RNA is negative. Molecular studies are pending to exclude the possibility of a high-grade B-cell lymphoma with myc and BCL-2/ BCL6 rearrangements.

## 2019-02-01 NOTE — HOSPITAL COURSE
02/01/2019 Pt presents today for cycle 1 R-EPOCH +IT Mtx, today is Day 1. Pt denies any pain, fevers, chills, night sweats, chest pain, sob, n/v/d/c, neuropathy. Pt does have bilateral fungal rash to lower axilla/flank, was given rx for ketoconazole by PCP, will start nystatin powder while inpatient.  02/03/2019 Tolerating chemo well; endorses flushing of his skin in the middle of the infusion. Is afebrile during this time and flushing clears without therapy. Constipation has resolved.   02/04/2019 Day 4 EPOCH, tolerating chemo well. Continues with facial flushing, afebrile. Generalized edema and SOBOE, 8lb weight gain since admission, likely fluid volume overload, will give 40mg lasix today with 40meq K replacement. Rash to bilateral flank improving with use of nystatin powder. Pt denies chest pain, n/v/d/c, or neuropathy.  02/05/2019 D5 EPOCH, continues to tolerate chemo well. Improved facial flushing, pt remains afebrile. Improved edema and SOBOE. Rash to bilateral flank improving with use of nystatin powder. Pt denies chest pain, n/v/d/c, or neuropathy.

## 2019-02-01 NOTE — ASSESSMENT & PLAN NOTE
"- From 2nd opinion at [facility]: "The pathology report has been released (attached below), and the opinion of our Hematopathologist is that there are two features of concern: one is that the cellular reproductive rate is as high as 90% in some portions of the tumor, and the other is that at the DNA analysis level, there is indication of a genomic alteration of poor prognosis, that is a rearrangement of the gene c-myc.  Therefore, given these two factors, she considers this lymphoma to be biologically aggressive, although it DOES NOT meet the criteria to define this as a Burkitt Lymphoma. She does agree that the marrow biopsy has no indication of lymphoma.     RECOMMENDATION: Given these findings, our clinical recommendation therefore would be that the patient be treated with the REPOCH regimen, which includes intrathecal chemotherapy. This recommendation is based on the recently published Phase 2 multi-institution report, showing that REPOCH treatment has very favorable results in patients with Diffuse Large B-Cell Lymphoma, regardless of high rate of cellular reproduction and c-myc rearrangement features.   Lancet Haematol. 2018 Dec;5(12):q634-s212. doi: 10.1016/-6781(73)23246-6.   Dose-adjusted EPOCH-R (etoposide, prednisone, vincristine, cyclophosphamide, doxorubicin, and rituximab) in untreated aggressive diffuse large B-cell lymphoma with MYC rearrangement: a prospective, multicentre, single-arm phase 2 study.  Brinda K1, Vinnie MA2, Nadya JS3, Patria A4, Caimi PF5, [place] S6, Melo S7, Lacasce A3, [place] JW8, Colin D9, Zhane S10, Lecivoneicz MJ11, Suman R12, Sudeep A1, Linda C1, Roschewski M1, Asa SM13, Donte ES6, Shin B14, Ace JW15, Little RF16, Corrigan NL14, Stanford WH17."    - MYC/IgH fusion noted on FISH, in 27% of nuclei. 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 IPI score 0. It does not fulfil the criteria for Burkitts (ki 67 high, but not high enough), double or triple hit (no bcl2 or " bcl6 translocation). No PET avid measurable disease. No lymphoma in bone marrow. I discussed the treatment of stage I GI high grade lymphoma. I explained that although there is no active/measurable disease, chemotherapy with RCHOP or dose adjusted R-EPOCH is recommended, as he had bulky (8cm), high grade (ki 67 > 60%) lymphoma with MYC/IgH fusion. He will also need diagnostic LP. He went to [facility] for 2nd opinion.   - He is starting cycle1 DA  R-EPOCH with IT MTX today. Risks and benefits explained in detail and consent obtained by Dr. Hwang.  - Today is Day 1

## 2019-02-01 NOTE — HPI
Mr. Streeter is a 48 year old patient of Dr. Hwang's who presents today for R-EPOCH +IT Mtx for lymphoma. 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 Pt denies any pain, fevers, chills, night sweats, chest pain, sob, n/v/d/c, neuropathy. Pt does have bilateral fungal rash to lower axilla/flank, was given rx for ketoconazole by PCP, will start nystatin powder while inpatient. See Onc Hx for full oncologic workup.

## 2019-02-01 NOTE — PROGRESS NOTES
CC: Lymphoma, follow up visit      HPI:  is a 48 y.o. male here for followup visit. He has hypertension, obstructive sleep apnea. He was hospitalized around Christmas 2018 for small bowel mass. Patient reports that he began having lower abdominal/pelvic pain toward the end of October 2018. This prompted a urologic evaluation and subsequent treatment for a presumed UTI. He states that his pain continued into November and evolved into more upper abdominal/epigastric pain that was worse with eating. He was subsequently seen by his PCP who ultimately ordered a CT abdomen/pelvis that revealed an abnormal thickening of an 8 cm segment of small bowel. He denied fever, chills, night sweats, or anorexia at that time. He had ~10 lb weight loss over the 1-2 months prior to his hospitalization in Dec 2018, but he was also actively taking part in a weight loss program.  On 12/21/18, he underwent Segmental small bowel resection.The mass was approximately 6 cm in length and was circumferential.  There was dilation of the small bowel above it suggesting a partial bowel obstruction.  There was no adenopathy in the adjacent   mesentery.  The tumor was clearly extending to the serosal surface of the bowel.Careful search was made for peritoneal implants and there were no lesions noted on the peritoneum or omentum.  The liver could not be inspected through the incision,   but it was palpated and there were no focal lesions within it. The remainder of the small bowel was run from the ligament of Treitz to the ileocecal valves and no other small bowel lesions were noted.    He had PET CT,. 2D ECHO, bone marrow biopsy. PET CT did not reveal any evidence of lymphoma. 2D ECHO was normal. Bone marrow was negative for involvement by lymphoma.       Interval History: He is here for a follow up visit.      Review of Systems   Constitutional: Positive for malaise/fatigue. Negative for chills, fever and weight loss.   HENT: Negative for  ear discharge, ear pain and nosebleeds.    Eyes: Negative for blurred vision, double vision and photophobia.   Respiratory: Negative for cough, hemoptysis and sputum production.    Cardiovascular: Negative for chest pain, palpitations and orthopnea.   Gastrointestinal: Negative for heartburn, nausea and vomiting.   Genitourinary: Negative for dysuria, frequency and urgency.   Musculoskeletal: Negative for myalgias.   Skin: Negative for rash.   Neurological: Negative for dizziness, tingling, tremors and headaches.   Endo/Heme/Allergies: Does not bruise/bleed easily.   Psychiatric/Behavioral: Negative for depression.       Current Outpatient Medications   Medication Sig    atenolol (TENORMIN) 100 MG tablet Take 50 mg by mouth 2 (two) times daily.    fexofenadine (ALLEGRA) 60 MG tablet Take 60 mg by mouth once daily.    fluticasone (FLONASE) 50 mcg/actuation nasal spray 1 spray by Each Nare route once daily.    ibuprofen (ADVIL,MOTRIN) 200 MG tablet     loratadine (CLARITIN) 10 mg tablet Take 10 mg by mouth once daily.    losartan-hydrochlorothiazide 100-12.5 mg (HYZAAR) 100-12.5 mg Tab      No current facility-administered medications for this visit.      Vitals:    02/01/19 1317   BP: 133/69   Pulse: 60   Resp: 18   Temp: 98.2 °F (36.8 °C)     PS: ECOG1    Physical Exam   Constitutional: He is oriented to person, place, and time. He appears well-developed.   HENT:   Head: Normocephalic and atraumatic.   Mouth/Throat: No oropharyngeal exudate.   Eyes: Pupils are equal, round, and reactive to light. No scleral icterus.   Neck: Normal range of motion.   Cardiovascular: Normal rate and regular rhythm.   No murmur heard.  Pulmonary/Chest: Effort normal and breath sounds normal. No respiratory distress. He has no wheezes.   Abdominal: Soft. Bowel sounds are normal. He exhibits no distension. There is no tenderness. There is no rebound.   Scar from recent surgery healing well   Musculoskeletal: He exhibits no edema.  "  Lymphadenopathy:     He has no cervical adenopathy.   Neurological: He is alert and oriented to person, place, and time. No cranial nerve deficit.   Skin:   He has erythematous rash under right breast       12/21/18 flow cytometry of small bowel mass     Flow cytometric analysis of tissue detects a kappa restricted B lymphocyte population that expresses CD19, CD20, and CD10.  CD5 is negative.  The differential diagnosis includes diffuse large B cell lymphoma, follicular lymphoma, and Burkitt's lymphoma.     Flow differential:  Lymphocytes 88.2%, Monocytes 6.0%, Granulocytes  2.3%, Blast  2.6%, Debris/nRBC 0.5%,  Viability 90.7%.  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  Comment: Concurrent flow cytometric analysis confirms a Kappa restricted clonal B-cell population that expresses CD19, CD20, and CD10 without coexpression of CD5. The histologic sections demonstrate a submucosal mass comprised of a diffuse population of large CD20 positive B cells compatible with immunoblast intermixed with moderate numbers of CD5/CD3 positive T cells. The large lymphocytes are BCL6 and CD10 (weak) positive and BCL6, MUM1, and BCL-2 are negative. CD23 shows no follicular dendritic network. The Ki-67 proliferation index is high (>60%). In situ hybridization for David bar viral RNA is negative. The overall findings are compatible with diffuse large B-cell lymphoma, germinal center type. Molecular studies are pending to exclude the possibility of a  high-grade B-cell lymphoma with myc and BCL-2/ BCL6 rearrangements and will be reported. All histologic chemical stains have satisfactory positive and negative controls.     "B-cell lymphoma, FISH, " "tissue:  Interpretation: Positive. The result is abnormal and indicates MYC/IGH fusion and 27% of nuclei. No rearrangement of BCL-2 or BCL6 was observed.  No MYC rearrangement was observed, therefore this is unlikely to be "high-grade B-cell lymphoma, with MYC and  BCL2 and/or BCL6 translocations" (previously known as "genetic double-hit lymphoma", currently reclassified per  the 2016 World Health Organization Classification of Lymphoid Neoplasms).        1/11/19 bone marrow biopsy       FINAL PATHOLOGIC DIAGNOSIS  BONE MARROW, LEFT ILIAC CREST (ASPIRATE SMEAR, TOUCH PREPARATION, CLOT SECTION, AND CORE BIOPSY):  -- NORMOCELLULAR MARROW (60%) WITH TRILINEAGE HEMATOPOIESIS.  -- NO EVIDENCE OF MARROW INVOLVEMENT BY B-CELL NON-HODGKIN LYMPHOMA.  -- MARKEDLY DECREASED STORAGE IRON.     Recent CBC data (1/7/2019) showed normocytic anemia and thrombocytosis.  Flow cytometric analysis of bone marrow shows populations of polyclonal B lymphocytes and T lymphocytes that are immunophenotypically unremarkable. The blast gate is not increased.  Immunohistochemical stains are performed on the biopsy core and clot section for greater sensitivity and further architectural assessment with adequate controls. The interstitial lymphocytes and small lymphoid aggregates are  composed of mixed CD3-positive T cells and CD20-positive B cells. Correlation with other laboratory results is recommended.           1/15/19 PET CT          COMPARISON:  CT abdomen and pelvis 12/07/2018.    FINDINGS:  Quality of the study: Adequate.    Mildly increased FDG uptake is noted along the midline anterior abdominal wall, likely related to prior celiotomy incision, SUV max 5.60.  Small, non lesion like focus of tracer avidity is seen involving the descending colon, without any corresponding CT abnormality, SUV max 6.48..  This likely represents normal colonic peristalsis or a small area of inflammation.  Diffusely increased marrow uptake likely represents " red marrow reconversion. No suspicious abnormally increased uptake is seen to suggest residual/recurrent disease.    Physiologic uptake of the tracer is present within the brain, salivary glands, myocardium, GI and  tracts.    Incidental CT findings: Mild mosaic type ground-glass density in the lungs.  Splenomegaly, with the spleen measuring 12.7 cm in AP dimension.  Nonobstructive right renal stone measuring 0.7 cm.  Postoperative changes from small bowel resection.  Scattered colonic diverticula.  Circumferential urinary bladder wall thickening, favored to relate to underdistention.  Mild degenerative changes in the spine.  Stable, mild anterior wedge compression deformity of T12.       Impression         No convincing evidence of residual or recurrent disease.  Midline abdominal wall FDG avidity coincides with prior celiotomy incision            1/11/19 2D ECHO     · Normal left ventricular systolic function. The estimated ejection fraction is 63%  · Normal LV diastolic function.  · Moderate left atrial enlargement.  · Normal right ventricular systolic function.  · Mild right atrial enlargement.  · Normal central venous pressure (3 mm Hg).       Component      Latest Ref Rng & Units 2/1/2019   WBC      3.90 - 12.70 K/uL 7.60   RBC      4.60 - 6.20 M/uL 4.90   Hemoglobin      14.0 - 18.0 g/dL 12.9 (L)   Hematocrit      40.0 - 54.0 % 40.9   MCV      82 - 98 fL 84   MCH      27.0 - 31.0 pg 26.3 (L)   MCHC      32.0 - 36.0 g/dL 31.5 (L)   RDW      11.5 - 14.5 % 14.6 (H)   Platelets      150 - 350 K/uL 290   MPV      9.2 - 12.9 fL 9.8   Immature Granulocytes      0.0 - 0.5 % 0.4   Gran # (ANC)      1.8 - 7.7 K/uL 5.6   Immature Grans (Abs)      0.00 - 0.04 K/uL 0.03   Lymph #      1.0 - 4.8 K/uL 1.1   Mono #      0.3 - 1.0 K/uL 0.7   Eos #      0.0 - 0.5 K/uL 0.2   Baso #      0.00 - 0.20 K/uL 0.06   nRBC      0 /100 WBC 0   Gran%      38.0 - 73.0 % 74.0 (H)   Lymph%      18.0 - 48.0 % 13.8 (L)   Mono%      4.0 -  15.0 % 8.9   Eosinophil%      0.0 - 8.0 % 2.1   Basophil%      0.0 - 1.9 % 0.8   Differential Method       Automated   Sodium      136 - 145 mmol/L 142   Potassium      3.5 - 5.1 mmol/L 3.8   Chloride      95 - 110 mmol/L 103   CO2      23 - 29 mmol/L 31 (H)   Glucose      70 - 110 mg/dL 94   BUN, Bld      6 - 20 mg/dL 14   Creatinine      0.5 - 1.4 mg/dL 0.9   Calcium      8.7 - 10.5 mg/dL 9.8   Total Protein      6.0 - 8.4 g/dL 8.5 (H)   Albumin      3.5 - 5.2 g/dL 3.8   Total Bilirubin      0.1 - 1.0 mg/dL 0.4   Alkaline Phosphatase      55 - 135 U/L 62   AST      10 - 40 U/L 24   ALT      10 - 44 U/L 24   Anion Gap      8 - 16 mmol/L 8   eGFR if African American      >60 mL/min/1.73 m:2 >60.0   eGFR if non African American      >60 mL/min/1.73 m:2 >60.0   LD      110 - 260 U/L 194   Uric Acid      3.4 - 7.0 mg/dL 6.9     Assessment:     1. DLBCL of jejunum, GC sub type, high grade  2. Hypertension  3. obstructive sleep apnea  4. Morbid obesity  5. Anemia, hypochromic       Plan:     1. 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 IPI score 0. It does not fulfil the criteria for Burkitts( ki 67 high, but not high enough), double or triple hit ( no bcl2 or bcl6 translocation). No PET avid measurable disease. No ly phoma in bone marrow. I discussed the treatment of stage I GI high grade lymphoma. I explained that although there is no active/measurable disease, chemotherapy with RCHOP or dose adjusted R-EPOCH is recommended, as he had bulky (8cm), high grade ( ki 67 > 60% ) lymphoma with MYC/IgH fusion. He will also need diagnostic LP. He went to [facility] for 2nd opinion.   He is starting cycle 1 DA  RPOCH -R with IT MTX today. Risks and benefits explained in detail and consent obtained.        2.On Atenolol, Losartan HCTZ      5. Mild, asymptomatic.             Distress Screening Results: Psychosocial Distress screening score of Distress Score: 4 noted and reviewed. No  intervention indicated.

## 2019-02-02 LAB
ALBUMIN SERPL BCP-MCNC: 3.5 G/DL
ALP SERPL-CCNC: 58 U/L
ALT SERPL W/O P-5'-P-CCNC: 23 U/L
ANION GAP SERPL CALC-SCNC: 10 MMOL/L
APTT BLDCRRT: 27.7 SEC
AST SERPL-CCNC: 20 U/L
BASOPHILS # BLD AUTO: 0.01 K/UL
BASOPHILS NFR BLD: 0.1 %
BILIRUB SERPL-MCNC: 0.5 MG/DL
BUN SERPL-MCNC: 14 MG/DL
CALCIUM SERPL-MCNC: 9.4 MG/DL
CHLORIDE SERPL-SCNC: 104 MMOL/L
CO2 SERPL-SCNC: 24 MMOL/L
CREAT SERPL-MCNC: 0.8 MG/DL
DIFFERENTIAL METHOD: ABNORMAL
EOSINOPHIL # BLD AUTO: 0 K/UL
EOSINOPHIL NFR BLD: 0 %
ERYTHROCYTE [DISTWIDTH] IN BLOOD BY AUTOMATED COUNT: 14.4 %
EST. GFR  (AFRICAN AMERICAN): >60 ML/MIN/1.73 M^2
EST. GFR  (NON AFRICAN AMERICAN): >60 ML/MIN/1.73 M^2
GLUCOSE SERPL-MCNC: 146 MG/DL
HCT VFR BLD AUTO: 39 %
HGB BLD-MCNC: 12.4 G/DL
IMM GRANULOCYTES # BLD AUTO: 0.03 K/UL
IMM GRANULOCYTES NFR BLD AUTO: 0.4 %
INR PPP: 1
LDH SERPL L TO P-CCNC: 253 U/L
LYMPHOCYTES # BLD AUTO: 0.4 K/UL
LYMPHOCYTES NFR BLD: 5.4 %
MAGNESIUM SERPL-MCNC: 1.8 MG/DL
MCH RBC QN AUTO: 26.3 PG
MCHC RBC AUTO-ENTMCNC: 31.8 G/DL
MCV RBC AUTO: 83 FL
MONOCYTES # BLD AUTO: 0.1 K/UL
MONOCYTES NFR BLD: 0.7 %
NEUTROPHILS # BLD AUTO: 7.5 K/UL
NEUTROPHILS NFR BLD: 93.4 %
NRBC BLD-RTO: 0 /100 WBC
PHOSPHATE SERPL-MCNC: 3 MG/DL
PLATELET # BLD AUTO: 254 K/UL
PMV BLD AUTO: 10 FL
POCT GLUCOSE: 117 MG/DL (ref 70–110)
POCT GLUCOSE: 132 MG/DL (ref 70–110)
POCT GLUCOSE: 139 MG/DL (ref 70–110)
POCT GLUCOSE: 147 MG/DL (ref 70–110)
POTASSIUM SERPL-SCNC: 3.9 MMOL/L
PROT SERPL-MCNC: 7.7 G/DL
PROTHROMBIN TIME: 10.8 SEC
RBC # BLD AUTO: 4.71 M/UL
SODIUM SERPL-SCNC: 138 MMOL/L
URATE SERPL-MCNC: 7 MG/DL
WBC # BLD AUTO: 8.03 K/UL

## 2019-02-02 PROCEDURE — 83615 LACTATE (LD) (LDH) ENZYME: CPT

## 2019-02-02 PROCEDURE — 63600175 PHARM REV CODE 636 W HCPCS: Performed by: INTERNAL MEDICINE

## 2019-02-02 PROCEDURE — 63600175 PHARM REV CODE 636 W HCPCS: Performed by: NURSE PRACTITIONER

## 2019-02-02 PROCEDURE — 25000003 PHARM REV CODE 250: Performed by: NURSE PRACTITIONER

## 2019-02-02 PROCEDURE — 85730 THROMBOPLASTIN TIME PARTIAL: CPT

## 2019-02-02 PROCEDURE — 84550 ASSAY OF BLOOD/URIC ACID: CPT

## 2019-02-02 PROCEDURE — 99233 PR SUBSEQUENT HOSPITAL CARE,LEVL III: ICD-10-PCS | Mod: ,,, | Performed by: INTERNAL MEDICINE

## 2019-02-02 PROCEDURE — 20600001 HC STEP DOWN PRIVATE ROOM

## 2019-02-02 PROCEDURE — 85610 PROTHROMBIN TIME: CPT

## 2019-02-02 PROCEDURE — 85025 COMPLETE CBC W/AUTO DIFF WBC: CPT

## 2019-02-02 PROCEDURE — 25000003 PHARM REV CODE 250: Performed by: INTERNAL MEDICINE

## 2019-02-02 PROCEDURE — 84100 ASSAY OF PHOSPHORUS: CPT

## 2019-02-02 PROCEDURE — 80053 COMPREHEN METABOLIC PANEL: CPT

## 2019-02-02 PROCEDURE — 83735 ASSAY OF MAGNESIUM: CPT

## 2019-02-02 PROCEDURE — 99233 SBSQ HOSP IP/OBS HIGH 50: CPT | Mod: ,,, | Performed by: INTERNAL MEDICINE

## 2019-02-02 RX ADMIN — SODIUM CHLORIDE: 0.9 INJECTION, SOLUTION INTRAVENOUS at 03:02

## 2019-02-02 RX ADMIN — ONDANSETRON 8 MG: 4 TABLET, FILM COATED ORAL at 09:02

## 2019-02-02 RX ADMIN — MICONAZOLE NITRATE: 20 POWDER TOPICAL at 09:02

## 2019-02-02 RX ADMIN — PREDNISONE 160 MG: 20 TABLET ORAL at 09:02

## 2019-02-02 RX ADMIN — MAGNESIUM OXIDE TAB 400 MG (241.3 MG ELEMENTAL MG) 400 MG: 400 (241.3 MG) TAB at 09:02

## 2019-02-02 RX ADMIN — DOXORUBICIN HYDROCHLORIDE 26 MG: 2 INJECTION, POWDER, LYOPHILIZED, FOR SOLUTION INTRAVENOUS at 10:02

## 2019-02-02 RX ADMIN — PREDNISONE 160 MG: 20 TABLET ORAL at 08:02

## 2019-02-02 RX ADMIN — MICONAZOLE NITRATE: 20 POWDER TOPICAL at 08:02

## 2019-02-02 RX ADMIN — CETIRIZINE HYDROCHLORIDE 10 MG: 10 TABLET, FILM COATED ORAL at 09:02

## 2019-02-02 RX ADMIN — MAGNESIUM OXIDE TAB 400 MG (241.3 MG ELEMENTAL MG) 400 MG: 400 (241.3 MG) TAB at 01:02

## 2019-02-02 RX ADMIN — ATENOLOL 50 MG: 25 TABLET ORAL at 09:02

## 2019-02-02 RX ADMIN — ENOXAPARIN SODIUM 40 MG: 100 INJECTION SUBCUTANEOUS at 09:02

## 2019-02-02 RX ADMIN — LOSARTAN POTASSIUM AND HYDROCHLOROTHIAZIDE 1 TABLET: 12.5; 1 TABLET ORAL at 09:02

## 2019-02-02 RX ADMIN — ATENOLOL 50 MG: 25 TABLET ORAL at 08:02

## 2019-02-02 RX ADMIN — ENOXAPARIN SODIUM 40 MG: 100 INJECTION SUBCUTANEOUS at 08:02

## 2019-02-02 RX ADMIN — ONDANSETRON 8 MG: 4 TABLET, FILM COATED ORAL at 08:02

## 2019-02-02 NOTE — H&P
[facility]  Hematology  Bone Marrow Transplant  H&P    Subjective:     Principal Problem: Diffuse large b-cell lymphoma, intra-abdominal lymph nodes    HPI: Mr. Streeter is a 48 year old patient of Dr. Hwang's who presents today for R-EPOCH +IT Mtx for lymphoma. 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 Pt denies any pain, fevers, chills, night sweats, chest pain, sob, n/v/d/c, neuropathy. Pt does have bilateral fungal rash to lower axilla/flank, was given rx for ketoconazole by PCP, will start nystatin powder while inpatient. See Onc Hx for full oncologic workup.    Patient information was obtained from patient and past medical records.     Oncology History:   He began having lower abdominal pain around end of October 2018 and was treated for a UTI by his primary care physician. He continued to have lower abdominal/pelvic pain and was referred to Urology and had renal US and further testing was normal. Around mid November he began to have abdominal pain with eating and loss of appetite. He saw his primary who got a CT abdomen/pelvis which revealed an abnormal thickening of an 8 cm segment of small bowel. He was referred to Dr. Casanova for further evaluation of his small bowel mass.  He lost around 10 lbs which he attributes to taking part in weight loss program along with loss of appetite from abdominal pain on eating. CT of abdomen on 12/7 revelaed significant Irregular wall thickening of an 8 cm segment of small bowel (most likely mid to distal jejunum); no stranding or surrounding fluid; no other obvious intra-abdominal lymphadenopathy. On 12/21/18, he underwent segmental small bowel resection.The mass was approximately 6 cm in length and was circumferential.  There was dilation of the small bowel above it suggesting a partial bowel obstruction.  There was no adenopathy in the adjacent mesentery.  The tumor was clearly extending to the  "serosal surface of the bowel. Careful search was made for peritoneal implants and there were no lesions noted on the peritoneum or omentum.  The liver could not be inspected through the incision, but it was palpated and there were no focal lesions within it. The remainder of the small bowel was run from the ligament of Treitz to the ileocecal valves and no other small bowel lesions were noted. 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 The Ki-67 proliferation index is high (>60%). In situ hybridization for David bar viral RNA is negative. Molecular studies are pending to exclude the possibility of a high-grade B-cell lymphoma with myc and BCL-2/ BCL6 rearrangements.  He received a Bmbx on 1/11/19 showing no evidence of marrow involvement.     Per Dr. Hwang's note: "MYC/IgH fusion noted on FISH , in 27% of nuclei. 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 IPI score 0. It does not fulfil the criteria for Burkitts(ki 67 high, but not high enough), double or triple hit ( no bcl2 or bcl6 translocation). No PET avid measurable disease. No lymphoma in bone marrow. I discussed the treatment of stage I GI high grade lymphoma. I explained that although there is no active/measurable disease, chemotherapy with RCHOP or dose adjusted R-EPOCH is recommended, as he had bulky (8cm), high grade ( ki 67 > 60% ) lymphoma with MYC/IgH fusion. He will also need diagnostic LP. He went to [facility] for 2nd opinion."    Noted from [facility]: The pathology report has been released, and the opinion of our Hematopathologist is that there are two features of concern: one is that the cellular reproductive rate is as high as 90% in some portions of the tumor, and the other is that at the DNA analysis level, there is indication of a genomic alteration of poor prognosis, that is a rearrangement of the gene c-myc.  Therefore, given " these two factors, she considers this lymphoma to be biologically aggressive, although it DOES NOT meet the criteria to define this as a Burkitt Lymphoma .   She does agree that the marrow biopsy has no indication of lymphoma.   RECOMMENDATION: Given these findings, our clinical recommendation therefore would be that the patient be treated with the REPOCH regimen, which includes intrathecal chemotherapy. This recommendation is based on the recently published Phase 2 multi-institution report, showing that REPOCH treatment has very favorable results in patients with Diffuse Large B-Cell Lymphoma, regardless of high rate of cellular reproduction and c-myc rearrangement features.   Lancet Haematol. 2018 Dec;5(12):w202-p327. doi: 10.1016/-8080(15)91210-7.   Dose-adjusted EPOCH-R (etoposide, prednisone, vincristine, cyclophosphamide, doxorubicin, and rituximab) in untreated aggressive diffuse large B-cell lymphoma with MYC rearrangement: a prospective, multicentre, single-arm phase 2 study.  Brinda K1, Fanale MA2, Nadya JS3, Patria A4, Caimi PF5, [place] S6, Melo S7, Lacasce A3, [place] JW8, Colin D9, Zhane S10, Lechowicz MJ11, Suman R12, Sudeep A1, Linda C1, Roschewski M1, Asa SM13, Donte ES6, Shin B14, Ace JW15, Ellen RF16, Corrigan NL14, Stanford WH17.       Medications Prior to Admission   Medication Sig Dispense Refill Last Dose    atenolol (TENORMIN) 100 MG tablet Take 50 mg by mouth 2 (two) times daily.   2/1/2019 at 0800    fexofenadine (ALLEGRA) 60 MG tablet Take 60 mg by mouth once daily.   Past Month at Unknown time    fluticasone (FLONASE) 50 mcg/actuation nasal spray 1 spray by Each Nare route once daily.   Past Month at Unknown time    ibuprofen (ADVIL,MOTRIN) 200 MG tablet    Past Month at Unknown time    ketoconazole (NIZORAL) 2 % cream    2/1/2019 at 0800    loratadine (CLARITIN) 10 mg tablet Take 10 mg by mouth once daily.   2/1/2019 at 0800     losartan-hydrochlorothiazide 100-12.5 mg (HYZAAR) 100-12.5 mg Tab    2/1/2019 at 0800       Patient has no known allergies.     Past Medical History:   Diagnosis Date    Cancer     non hodgkins lymphoma    Hypertension     Sleep apnea     Vision abnormalities      Past Surgical History:   Procedure Laterality Date    BONE MARROW BIOPSY  01/11/2019    EXCISION, SMALL INTESTINE N/A 12/21/2018    [facility]    INSERTION, PORT-A-CATH N/A 1/28/2019    Performed by [facility] at [facility] CATH LAB    SMALL INTESTINE SURGERY  12/21/2018    Dr. Casanova, segmental small bowel resection      Family History     None        Tobacco Use    Smoking status: Never Smoker    Smokeless tobacco: Never Used   Substance and Sexual Activity    Alcohol use: Yes     Frequency: Monthly or less    Drug use: No    Sexual activity: Not on file       Review of Systems   Constitutional: Negative for chills, diaphoresis, fatigue and fever.   HENT: Negative for congestion, ear pain, mouth sores, nosebleeds, postnasal drip, rhinorrhea, sinus pain, sore throat and trouble swallowing.    Eyes: Negative for pain, redness and visual disturbance.   Respiratory: Negative for cough, chest tightness, shortness of breath, wheezing and stridor.    Cardiovascular: Negative for chest pain, palpitations and leg swelling.   Gastrointestinal: Negative for abdominal distention, abdominal pain, blood in stool, constipation, diarrhea, nausea and vomiting.   Genitourinary: Negative for hematuria.   Musculoskeletal: Negative for arthralgias and back pain.   Skin: Positive for rash (fungal rash to bilateral flank).   Neurological: Negative for dizziness, syncope, weakness, light-headedness, numbness and headaches.   Psychiatric/Behavioral: Negative for confusion.     Objective:     Vital Signs (Most Recent):  Temp: 98.1 °F (36.7 °C) (02/01/19 1607)  Pulse: (!) 59 (02/01/19 1607)  Resp: 16 (02/01/19 1607)  BP:  (!) 158/89 (02/01/19 1607)  SpO2: 96 % (02/01/19 1607) Vital Signs (24h Range):  Temp:  [98.1 °F (36.7 °C)-98.2 °F (36.8 °C)] 98.1 °F (36.7 °C)  Pulse:  [59-60] 59  Resp:  [16-18] 16  SpO2:  [96 %-97 %] 96 %  BP: (133-158)/(69-89) 158/89     Weight: (!) 139.8 kg (308 lb 5 oz)  Body mass index is 48.29 kg/m².  Body surface area is 2.57 meters squared.    ECOG SCORE         [unfilled]    Lines/Drains/Airways     Central Venous Catheter Line                 Port A Cath Single Lumen 01/28/19 1409 right internal jugular 4 days                Physical Exam   Constitutional: He is oriented to person, place, and time. He appears well-developed and well-nourished.   Obese   HENT:   Head: Normocephalic and atraumatic.   Right Ear: External ear normal.   Left Ear: External ear normal.   Mouth/Throat: Oropharynx is clear and moist. No oropharyngeal exudate.   Eyes: Conjunctivae and EOM are normal.   Neck: Normal range of motion. Neck supple.   Cardiovascular: Normal rate, regular rhythm, normal heart sounds and intact distal pulses.   No murmur heard.  Pulmonary/Chest: Effort normal and breath sounds normal. No stridor. No respiratory distress. He has no wheezes. He has no rales.   Abdominal: Soft. Bowel sounds are normal. There is no tenderness.   Midline surgical scar; healed   Musculoskeletal: Normal range of motion. He exhibits no edema.   Neurological: He is alert and oriented to person, place, and time.   Skin: Skin is warm and dry. Rash (reddened rash to skin folds; likely fungal due to area of chronic moisture) noted.   Psychiatric: He has a normal mood and affect. His behavior is normal. Judgment and thought content normal.   Nursing note and vitals reviewed.      Significant Labs:   CBC:   Recent Labs   Lab 02/01/19  1157   WBC 7.60   HGB 12.9*   HCT 40.9      , CMP:   Recent Labs   Lab 02/01/19  1157      K 3.8      CO2 31*   GLU 94   BUN 14   CREATININE 0.9   CALCIUM 9.8   PROT 8.5*   ALBUMIN 3.8  "  BILITOT 0.4   ALKPHOS 62   AST 24   ALT 24   ANIONGAP 8   EGFRNONAA >60.0   , LFTs:   Recent Labs   Lab 02/01/19  1157   ALT 24   AST 24   ALKPHOS 62   BILITOT 0.4   PROT 8.5*   ALBUMIN 3.8    and Uric Acid   Recent Labs   Lab 02/01/19  1157   URICACID 6.9       Diagnostic Results:  I have reviewed all pertinent imaging results/findings within the past 24 hours.    Assessment/Plan:     * Diffuse large b-cell lymphoma, intra-abdominal lymph nodes    - From 2nd opinion at [facility]: "The pathology report has been released (attached below), and the opinion of our Hematopathologist is that there are two features of concern: one is that the cellular reproductive rate is as high as 90% in some portions of the tumor, and the other is that at the DNA analysis level, there is indication of a genomic alteration of poor prognosis, that is a rearrangement of the gene c-myc.  Therefore, given these two factors, she considers this lymphoma to be biologically aggressive, although it DOES NOT meet the criteria to define this as a Burkitt Lymphoma. She does agree that the marrow biopsy has no indication of lymphoma.     RECOMMENDATION: Given these findings, our clinical recommendation therefore would be that the patient be treated with the REPOCH regimen, which includes intrathecal chemotherapy. This recommendation is based on the recently published Phase 2 multi-institution report, showing that REPOCH treatment has very favorable results in patients with Diffuse Large B-Cell Lymphoma, regardless of high rate of cellular reproduction and c-myc rearrangement features.   Lancet Haematol. 2018 Dec;5(12):r704-m635. doi: 10.1016/-7991(10)55671-7.   Dose-adjusted EPOCH-R (etoposide, prednisone, vincristine, cyclophosphamide, doxorubicin, and rituximab) in untreated aggressive diffuse large B-cell lymphoma with MYC rearrangement: a prospective, multicentre, single-arm phase 2 study.  Brinda K1, Vinnie SALDIVAR2, Nadya JS3, Patria A4, Lilly " "PF5, [place] S6, Melo S7, Lacasce A3, [place] JW8, Colin D9, Zhane S10, Lechowicz MJ11, Suman R12, Sudeep A1, Linda C1, Roschewski M1, Asa SM13, Donte ES6, Shin B14, Ace JW15, Little RF16, Corrigan NL14, Stanford WH17."    - MYC/IgH fusion noted on FISH, in 27% of nuclei. 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 IPI score 0. It does not fulfil the criteria for Burkitts (ki 67 high, but not high enough), double or triple hit (no bcl2 or bcl6 translocation). No PET avid measurable disease. No lymphoma in bone marrow. I discussed the treatment of stage I GI high grade lymphoma. I explained that although there is no active/measurable disease, chemotherapy with RCHOP or dose adjusted R-EPOCH is recommended, as he had bulky (8cm), high grade (ki 67 > 60%) lymphoma with MYC/IgH fusion. He will also need diagnostic LP. He went to [facility] for 2nd opinion.   - He is starting cycle1 DA  R-EPOCH with IT MTX today. Risks and benefits explained in detail and consent obtained by Dr. Hwang.  - Today is Day 1          Anemia in neoplastic disease    - Hgb 12.9 on admit  - transfuse Hgb <7     Rash and nonspecific skin eruption    - reddened rash to skin folds on bilateral trunk skin folds  - saw PCP who prescribed ketoconazole 2% cream  - will give nystatin powder while inpatient; keep areas dry     S/P small bowel resection    - see small bowel cancer     Seasonal allergies    - uses claritin or allegra at home regularly  - will start zyrtec daily while inpatient     THUAN (obstructive sleep apnea)    - Cpap PRN     Essential hypertension    - continue home atenolol, losartan-Hctz     Morbid obesity    - BMI: 48.31  - nutrition to follow while inpatient  - monitoring blood glucose levels with meals and HS while on high dose steroids     Small bowel cancer    - CT abdomen/pelvis 12/7/18 revealed an abnormal thickening of an 8 cm segment of small bowel (most likely mid to distal jejunum)  - referred to Dr. Casanova for further " evaluation of his small bowel mass.  - underwent segmental small bowel resection on 12/21/18, mass was approximately 6 cm in length and was circumferential. There was dilation of the small bowel above it suggesting a partial bowel obstruction.  There was no adenopathy in the adjacent mesentery.  The tumor was clearly extending to the serosal surface of the bowel. Careful search was made for peritoneal implants and there were no lesions noted on the peritoneum or omentum.  The liver could not be inspected through the incision, but it was palpated and there were no focal lesions within it. The remainder of the small bowel was run from the ligament of Treitz to the ileocecal valves and no other small bowel lesions were noted.   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 The Ki-67 proliferation index is high (>60%). In situ hybridization for David bar viral RNA is negative. Molecular studies are pending to exclude the possibility of a high-grade B-cell lymphoma with myc and BCL-2/ BCL6 rearrangements.             VTE Risk Mitigation (From admission, onward)        Ordered     enoxaparin injection 40 mg  Every 12 hours      02/01/19 1651     heparin, porcine (PF) 100 unit/mL injection flush 300 Units  As needed (PRN)      02/01/19 1650     IP VTE HIGH RISK PATIENT  Once      02/01/19 1651          Disposition: Inpatient for chemotherapy    Ashlee Luna NP  Bone Marrow Transplant  Hematology  [facility]

## 2019-02-02 NOTE — PROGRESS NOTES
[facility]  Hematology  Bone Marrow Transplant  Progress Note    Patient Name: Giorgio Streeter  Admission Date: 2/1/2019  Hospital Length of Stay: 1 days  Code Status: Full Code    Subjective:     Interval History:   Tolerating first day of R-EPOCH yesterday. He endorsed some sweating behind his neck this morning but denies sweating otherwise. No abdominal pain, fevers, chills, nausea, vomiting. He states he has regular BM but feels a bit constipated this morning. Last BM was yesterday evening.     Objective:     Vital Signs (Most Recent):  Temp: 98 °F (36.7 °C) (02/02/19 1146)  Pulse: (!) 54 (02/02/19 1146)  Resp: 17 (02/02/19 1146)  BP: 138/74 (02/02/19 1146)  SpO2: 96 % (02/02/19 1146) Vital Signs (24h Range):  Temp:  [97.4 °F (36.3 °C)-98.3 °F (36.8 °C)] 98 °F (36.7 °C)  Pulse:  [51-61] 54  Resp:  [16-18] 17  SpO2:  [94 %-100 %] 96 %  BP: (121-158)/(60-89) 138/74     Weight: (!) 140.6 kg (310 lb 1.2 oz)  Body mass index is 48.56 kg/m².  Body surface area is 2.58 meters squared.    ECOG SCORE         [unfilled]    Intake/Output - Last 3 Shifts       01/31 0700 - 02/01 0659 02/01 0700 - 02/02 0659 02/02 0700 - 02/03 0659    P.O.  1140 480    I.V. (mL/kg)  641.8 (4.6) 301.7 (2.1)    IV Piggyback  112.9 120.7    Total Intake(mL/kg)  1894.7 (13.5) 902.3 (6.4)    Urine (mL/kg/hr)  400 1050 (1.4)    Stool   0    Total Output  400 1050    Net  +1494.7 -147.7           Stool Occurrence  0 x 1 x          Physical Exam   Constitutional: He is oriented to person, place, and time. He appears well-developed and well-nourished.   Obese   HENT:   Head: Normocephalic and atraumatic.   Right Ear: External ear normal.   Left Ear: External ear normal.   Mouth/Throat: Oropharynx is clear and moist. No oropharyngeal exudate.   Eyes: Conjunctivae and EOM are normal.   Neck: Normal range of motion. Neck supple.   Cardiovascular: Normal rate, regular rhythm, normal heart sounds and intact distal pulses.   No  "murmur heard.  Pulmonary/Chest: Effort normal and breath sounds normal. No stridor. No respiratory distress. He has no wheezes. He has no rales.   Abdominal: Soft. Bowel sounds are normal. There is no tenderness.   Midline surgical scar; healed   Musculoskeletal: Normal range of motion. He exhibits no edema.   Neurological: He is alert and oriented to person, place, and time.   Skin: Skin is warm and dry. Rash (reddened rash to skin folds; likely fungal due to area of chronic moisture) noted.   Psychiatric: He has a normal mood and affect. His behavior is normal. Judgment and thought content normal.   Nursing note and vitals reviewed.      Significant Labs:   CBC:   Recent Labs   Lab 02/01/19  1157 02/02/19  0443   WBC 7.60 8.03   HGB 12.9* 12.4*   HCT 40.9 39.0*    254   , CMP:   Recent Labs   Lab 02/01/19 1157 02/02/19  0443    138   K 3.8 3.9    104   CO2 31* 24   GLU 94 146*   BUN 14 14   CREATININE 0.9 0.8   CALCIUM 9.8 9.4   PROT 8.5* 7.7   ALBUMIN 3.8 3.5   BILITOT 0.4 0.5   ALKPHOS 62 58   AST 24 20   ALT 24 23   ANIONGAP 8 10   EGFRNONAA >60.0 >60.0    and Uric Acid   Recent Labs   Lab 02/01/19 1157 02/02/19  0443   URICACID 6.9 7.0         Assessment/Plan:     * Diffuse large b-cell lymphoma, intra-abdominal lymph nodes    - From 2nd opinion at [facility]: "The pathology report has been released (attached below), and the opinion of our Hematopathologist is that there are two features of concern: one is that the cellular reproductive rate is as high as 90% in some portions of the tumor, and the other is that at the DNA analysis level, there is indication of a genomic alteration of poor prognosis, that is a rearrangement of the gene c-myc.  Therefore, given these two factors, she considers this lymphoma to be biologically aggressive, although it DOES NOT meet the criteria to define this as a Burkitt Lymphoma. She does agree that the marrow biopsy has no indication of lymphoma. " "    RECOMMENDATION: Given these findings, our clinical recommendation therefore would be that the patient be treated with the REPOCH regimen, which includes intrathecal chemotherapy. This recommendation is based on the recently published Phase 2 multi-institution report, showing that REPOCH treatment has very favorable results in patients with Diffuse Large B-Cell Lymphoma, regardless of high rate of cellular reproduction and c-myc rearrangement features.   Lancet Haematol. 2018 Dec;5(12):n559-y093. doi: 10.1016/-2340(53)27929-7.   Dose-adjusted EPOCH-R (etoposide, prednisone, vincristine, cyclophosphamide, doxorubicin, and rituximab) in untreated aggressive diffuse large B-cell lymphoma with MYC rearrangement: a prospective, multicentre, single-arm phase 2 study.  Brinda K1, Vinnie MA2, Nadya JS3, Patria A4, Caimi PF5, [place] S6, Melo S7, Lacasce A3, [place] JW8, Colin D9, Zhane S10, Lechowicz MJ11, Suman R12, Sudeep A1, Linda C1, Roschewski M1, Asa SM13, Donte ES6, Shin B14, Ace JW15, Little RF16, Corrigan NL14, Stanford WH17."    - MYC/IgH fusion noted on FISH, in 27% of nuclei. 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 IPI score 0. It does not fulfil the criteria for Burkitts (ki 67 high, but not high enough), double or triple hit (no bcl2 or bcl6 translocation). No PET avid measurable disease. No lymphoma in bone marrow. I discussed the treatment of stage I GI high grade lymphoma. I explained that although there is no active/measurable disease, chemotherapy with RCHOP or dose adjusted R-EPOCH is recommended, as he had bulky (8cm), high grade (ki 67 > 60%) lymphoma with MYC/IgH fusion. He will also need diagnostic LP. He went to [facility] for 2nd opinion.   - Starting cycle1 DA  R-EPOCH with IT MTX 2/1. Risks and benefits explained in detail and consent obtained by Dr. Hwang.  - Today is Day 2; currently tolerating therapy with mild constipation and no other complications.           Essential " hypertension    - continue home atenolol, losartan-Hctz     THUAN (obstructive sleep apnea)    - Cpap PRN     Rash and nonspecific skin eruption    - reddened rash to skin folds on bilateral trunk skin folds  - saw PCP who prescribed ketoconazole 2% cream  - will give nystatin powder while inpatient; keep areas dry     Anemia in neoplastic disease    - Hgb 12.9 on admit  - transfuse Hgb <7     S/P small bowel resection    - see small bowel cancer     Seasonal allergies    - uses claritin or allegra at home regularly  - will start zyrtec daily while inpatient     Morbid obesity    - BMI: 48.31  - nutrition to follow while inpatient  - monitoring blood glucose levels with meals and HS while on high dose steroids     Small bowel cancer    - CT abdomen/pelvis 12/7/18 revealed an abnormal thickening of an 8 cm segment of small bowel (most likely mid to distal jejunum)  - referred to Dr. Casanova for further evaluation of his small bowel mass.  - underwent segmental small bowel resection on 12/21/18, mass was approximately 6 cm in length and was circumferential. There was dilation of the small bowel above it suggesting a partial bowel obstruction.  There was no adenopathy in the adjacent mesentery.  The tumor was clearly extending to the serosal surface of the bowel. Careful search was made for peritoneal implants and there were no lesions noted on the peritoneum or omentum.  The liver could not be inspected through the incision, but it was palpated and there were no focal lesions within it. The remainder of the small bowel was run from the ligament of Treitz to the ileocecal valves and no other small bowel lesions were noted.   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 The Ki-67 proliferation index is high (>60%). In situ hybridization for David bar viral RNA is negative. Molecular  studies are pending to exclude the possibility of a high-grade B-cell lymphoma with myc and BCL-2/ BCL6 rearrangements.             VTE Risk Mitigation (From admission, onward)        Ordered     enoxaparin injection 40 mg  Every 12 hours      02/01/19 1651     heparin, porcine (PF) 100 unit/mL injection flush 300 Units  As needed (PRN)      02/01/19 1650     IP VTE HIGH RISK PATIENT  Once      02/01/19 1651          Disposition: Pending completion R-EPOCH; tolerating well.     Russ Leblanc MD  PGY-3 Internal Medicine  [phone]    Bone Marrow Transplant  [facility]

## 2019-02-02 NOTE — SUBJECTIVE & OBJECTIVE
Subjective:     Interval History:   Tolerating first day of R-EPOCH yesterday. He endorsed some sweating behind his neck this morning but denies sweating otherwise. No abdominal pain, fevers, chills, nausea, vomiting. He states he has regular BM but feels a bit constipated this morning. Last BM was yesterday evening.     Objective:     Vital Signs (Most Recent):  Temp: 98 °F (36.7 °C) (02/02/19 1146)  Pulse: (!) 54 (02/02/19 1146)  Resp: 17 (02/02/19 1146)  BP: 138/74 (02/02/19 1146)  SpO2: 96 % (02/02/19 1146) Vital Signs (24h Range):  Temp:  [97.4 °F (36.3 °C)-98.3 °F (36.8 °C)] 98 °F (36.7 °C)  Pulse:  [51-61] 54  Resp:  [16-18] 17  SpO2:  [94 %-100 %] 96 %  BP: (121-158)/(60-89) 138/74     Weight: (!) 140.6 kg (310 lb 1.2 oz)  Body mass index is 48.56 kg/m².  Body surface area is 2.58 meters squared.    ECOG SCORE         [unfilled]    Intake/Output - Last 3 Shifts       01/31 0700 - 02/01 0659 02/01 0700 - 02/02 0659 02/02 0700 - 02/03 0659    P.O.  1140 480    I.V. (mL/kg)  641.8 (4.6) 301.7 (2.1)    IV Piggyback  112.9 120.7    Total Intake(mL/kg)  1894.7 (13.5) 902.3 (6.4)    Urine (mL/kg/hr)  400 1050 (1.4)    Stool   0    Total Output  400 1050    Net  +1494.7 -147.7           Stool Occurrence  0 x 1 x          Physical Exam   Constitutional: He is oriented to person, place, and time. He appears well-developed and well-nourished.   Obese   HENT:   Head: Normocephalic and atraumatic.   Right Ear: External ear normal.   Left Ear: External ear normal.   Mouth/Throat: Oropharynx is clear and moist. No oropharyngeal exudate.   Eyes: Conjunctivae and EOM are normal.   Neck: Normal range of motion. Neck supple.   Cardiovascular: Normal rate, regular rhythm, normal heart sounds and intact distal pulses.   No murmur heard.  Pulmonary/Chest: Effort normal and breath sounds normal. No stridor. No respiratory distress. He has no wheezes. He has no rales.   Abdominal: Soft. Bowel sounds are normal. There is no  tenderness.   Midline surgical scar; healed   Musculoskeletal: Normal range of motion. He exhibits no edema.   Neurological: He is alert and oriented to person, place, and time.   Skin: Skin is warm and dry. Rash (reddened rash to skin folds; likely fungal due to area of chronic moisture) noted.   Psychiatric: He has a normal mood and affect. His behavior is normal. Judgment and thought content normal.   Nursing note and vitals reviewed.      Significant Labs:   CBC:   Recent Labs   Lab 02/01/19  1157 02/02/19  0443   WBC 7.60 8.03   HGB 12.9* 12.4*   HCT 40.9 39.0*    254   , CMP:   Recent Labs   Lab 02/01/19  1157 02/02/19  0443    138   K 3.8 3.9    104   CO2 31* 24   GLU 94 146*   BUN 14 14   CREATININE 0.9 0.8   CALCIUM 9.8 9.4   PROT 8.5* 7.7   ALBUMIN 3.8 3.5   BILITOT 0.4 0.5   ALKPHOS 62 58   AST 24 20   ALT 24 23   ANIONGAP 8 10   EGFRNONAA >60.0 >60.0    and Uric Acid   Recent Labs   Lab 02/01/19  1157 02/02/19  0443   URICACID 6.9 7.0

## 2019-02-02 NOTE — PLAN OF CARE
Problem: Adult Inpatient Plan of Care  Goal: Plan of Care Review  Outcome: Ongoing (interventions implemented as appropriate)  Day 1 of EPOCH. Afebrile. Free from falls or injury. Pt complains of slight lower back pain at times. NS infusing at 50. Doxorubicin at 23. Zofran given as scheduled. Bed locked in lowest position, non skid socks on, call light within reach. Pt instructed to call if any assistance is needed. Vitals stable. Chemo precautions maintained. Wife at bedside. Will cont to olivier pt.

## 2019-02-02 NOTE — ASSESSMENT & PLAN NOTE
"- From 2nd opinion at [facility]: "The pathology report has been released (attached below), and the opinion of our Hematopathologist is that there are two features of concern: one is that the cellular reproductive rate is as high as 90% in some portions of the tumor, and the other is that at the DNA analysis level, there is indication of a genomic alteration of poor prognosis, that is a rearrangement of the gene c-myc.  Therefore, given these two factors, she considers this lymphoma to be biologically aggressive, although it DOES NOT meet the criteria to define this as a Burkitt Lymphoma. She does agree that the marrow biopsy has no indication of lymphoma.     RECOMMENDATION: Given these findings, our clinical recommendation therefore would be that the patient be treated with the REPOCH regimen, which includes intrathecal chemotherapy. This recommendation is based on the recently published Phase 2 multi-institution report, showing that REPOCH treatment has very favorable results in patients with Diffuse Large B-Cell Lymphoma, regardless of high rate of cellular reproduction and c-myc rearrangement features.   Lancet Haematol. 2018 Dec;5(12):m423-h312. doi: 10.1016/-3787(85)51272-6.   Dose-adjusted EPOCH-R (etoposide, prednisone, vincristine, cyclophosphamide, doxorubicin, and rituximab) in untreated aggressive diffuse large B-cell lymphoma with MYC rearrangement: a prospective, multicentre, single-arm phase 2 study.  Brinda K1, Vinnie MA2, Nadya JS3, Patria A4, Caimi PF5, [place] S6, Melo S7, Lacasce A3, [place] JW8, Colin D9, Zhane S10, Lecivoneicz MJ11, Suman R12, Sudeep A1, Linda C1, Roschewski M1, Asa SM13, Donte ES6, Shin B14, Ace JW15, Little RF16, Corrigan NL14, Stanford WH17."    - MYC/IgH fusion noted on FISH, in 27% of nuclei. 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 IPI score 0. It does not fulfil the criteria for Burkitts (ki 67 high, but not high enough), double or triple hit (no bcl2 or " bcl6 translocation). No PET avid measurable disease. No lymphoma in bone marrow. I discussed the treatment of stage I GI high grade lymphoma. I explained that although there is no active/measurable disease, chemotherapy with RCHOP or dose adjusted R-EPOCH is recommended, as he had bulky (8cm), high grade (ki 67 > 60%) lymphoma with MYC/IgH fusion. He will also need diagnostic LP. He went to [facility] for 2nd opinion.   - Starting cycle1 DA  R-EPOCH with IT MTX 2/1. Risks and benefits explained in detail and consent obtained by Dr. Hwang.  - Today is Day 2; currently tolerating therapy with mild constipation and no other complications.

## 2019-02-02 NOTE — ASSESSMENT & PLAN NOTE
- reddened rash to skin folds on bilateral trunk skin folds  - saw PCP who prescribed ketoconazole 2% cream  - will give nystatin powder while inpatient; keep areas dry

## 2019-02-02 NOTE — PROGRESS NOTES
DOXOrubicin (ADRIAMYCIN) 26 mg, etoposide (VEPESID) 132 mg, vinCRIStine (ONCOVIN) 1 mg in sodium chloride 0.9% 480 mL chemo infusion started through right chest port noted for having positive blood return. Chemotherapy dosage and BSA checked by two chemotherapy certified nurses prior to administration.  Chemotherapy education done prior to hanging of chemotherapy. Chemotherapeutic precautions in place throughout therapy.  Will continue to monitor.

## 2019-02-03 LAB
ALBUMIN SERPL BCP-MCNC: 3.3 G/DL
ALP SERPL-CCNC: 58 U/L
ALT SERPL W/O P-5'-P-CCNC: 19 U/L
ANION GAP SERPL CALC-SCNC: 9 MMOL/L
APTT BLDCRRT: 24.4 SEC
AST SERPL-CCNC: 15 U/L
BASOPHILS # BLD AUTO: 0.01 K/UL
BASOPHILS NFR BLD: 0.1 %
BILIRUB SERPL-MCNC: 0.4 MG/DL
BUN SERPL-MCNC: 13 MG/DL
CALCIUM SERPL-MCNC: 9.2 MG/DL
CHLORIDE SERPL-SCNC: 106 MMOL/L
CO2 SERPL-SCNC: 23 MMOL/L
CREAT SERPL-MCNC: 0.8 MG/DL
DIFFERENTIAL METHOD: ABNORMAL
EOSINOPHIL # BLD AUTO: 0 K/UL
EOSINOPHIL NFR BLD: 0 %
ERYTHROCYTE [DISTWIDTH] IN BLOOD BY AUTOMATED COUNT: 14.5 %
EST. GFR  (AFRICAN AMERICAN): >60 ML/MIN/1.73 M^2
EST. GFR  (NON AFRICAN AMERICAN): >60 ML/MIN/1.73 M^2
GLUCOSE SERPL-MCNC: 157 MG/DL
HCT VFR BLD AUTO: 35.9 %
HGB BLD-MCNC: 11.6 G/DL
IMM GRANULOCYTES # BLD AUTO: 0.07 K/UL
IMM GRANULOCYTES NFR BLD AUTO: 0.5 %
INR PPP: 1
LDH SERPL L TO P-CCNC: 151 U/L
LYMPHOCYTES # BLD AUTO: 0.4 K/UL
LYMPHOCYTES NFR BLD: 3.2 %
MAGNESIUM SERPL-MCNC: 2.2 MG/DL
MCH RBC QN AUTO: 26.2 PG
MCHC RBC AUTO-ENTMCNC: 32.3 G/DL
MCV RBC AUTO: 81 FL
MONOCYTES # BLD AUTO: 0.2 K/UL
MONOCYTES NFR BLD: 1.5 %
NEUTROPHILS # BLD AUTO: 12.9 K/UL
NEUTROPHILS NFR BLD: 94.7 %
NRBC BLD-RTO: 0 /100 WBC
PHOSPHATE SERPL-MCNC: 3.1 MG/DL
PLATELET # BLD AUTO: 237 K/UL
PMV BLD AUTO: 10 FL
POCT GLUCOSE: 135 MG/DL (ref 70–110)
POCT GLUCOSE: 139 MG/DL (ref 70–110)
POCT GLUCOSE: 142 MG/DL (ref 70–110)
POCT GLUCOSE: 156 MG/DL (ref 70–110)
POTASSIUM SERPL-SCNC: 3.6 MMOL/L
PROT SERPL-MCNC: 7.5 G/DL
PROTHROMBIN TIME: 10.6 SEC
RBC # BLD AUTO: 4.42 M/UL
SODIUM SERPL-SCNC: 138 MMOL/L
URATE SERPL-MCNC: 6.4 MG/DL
WBC # BLD AUTO: 13.62 K/UL

## 2019-02-03 PROCEDURE — 25000003 PHARM REV CODE 250: Performed by: NURSE PRACTITIONER

## 2019-02-03 PROCEDURE — 20600001 HC STEP DOWN PRIVATE ROOM

## 2019-02-03 PROCEDURE — 25000003 PHARM REV CODE 250: Performed by: INTERNAL MEDICINE

## 2019-02-03 PROCEDURE — 63600175 PHARM REV CODE 636 W HCPCS: Performed by: NURSE PRACTITIONER

## 2019-02-03 PROCEDURE — 99233 PR SUBSEQUENT HOSPITAL CARE,LEVL III: ICD-10-PCS | Mod: ,,, | Performed by: INTERNAL MEDICINE

## 2019-02-03 PROCEDURE — 80053 COMPREHEN METABOLIC PANEL: CPT

## 2019-02-03 PROCEDURE — 84550 ASSAY OF BLOOD/URIC ACID: CPT

## 2019-02-03 PROCEDURE — 85025 COMPLETE CBC W/AUTO DIFF WBC: CPT

## 2019-02-03 PROCEDURE — 83735 ASSAY OF MAGNESIUM: CPT

## 2019-02-03 PROCEDURE — 99233 SBSQ HOSP IP/OBS HIGH 50: CPT | Mod: ,,, | Performed by: INTERNAL MEDICINE

## 2019-02-03 PROCEDURE — 85610 PROTHROMBIN TIME: CPT

## 2019-02-03 PROCEDURE — 63600175 PHARM REV CODE 636 W HCPCS: Performed by: INTERNAL MEDICINE

## 2019-02-03 PROCEDURE — 84100 ASSAY OF PHOSPHORUS: CPT

## 2019-02-03 PROCEDURE — 83615 LACTATE (LD) (LDH) ENZYME: CPT

## 2019-02-03 PROCEDURE — 85730 THROMBOPLASTIN TIME PARTIAL: CPT

## 2019-02-03 RX ADMIN — PREDNISONE 160 MG: 20 TABLET ORAL at 08:02

## 2019-02-03 RX ADMIN — MICONAZOLE NITRATE: 20 POWDER TOPICAL at 08:02

## 2019-02-03 RX ADMIN — ENOXAPARIN SODIUM 40 MG: 100 INJECTION SUBCUTANEOUS at 08:02

## 2019-02-03 RX ADMIN — ATENOLOL 50 MG: 25 TABLET ORAL at 09:02

## 2019-02-03 RX ADMIN — SODIUM CHLORIDE: 0.9 INJECTION, SOLUTION INTRAVENOUS at 10:02

## 2019-02-03 RX ADMIN — MICONAZOLE NITRATE: 20 POWDER TOPICAL at 09:02

## 2019-02-03 RX ADMIN — ENOXAPARIN SODIUM 40 MG: 100 INJECTION SUBCUTANEOUS at 09:02

## 2019-02-03 RX ADMIN — ONDANSETRON 8 MG: 4 TABLET, FILM COATED ORAL at 08:02

## 2019-02-03 RX ADMIN — LOSARTAN POTASSIUM AND HYDROCHLOROTHIAZIDE 1 TABLET: 12.5; 1 TABLET ORAL at 09:02

## 2019-02-03 RX ADMIN — CETIRIZINE HYDROCHLORIDE 10 MG: 10 TABLET, FILM COATED ORAL at 09:02

## 2019-02-03 RX ADMIN — ONDANSETRON 8 MG: 4 TABLET, FILM COATED ORAL at 09:02

## 2019-02-03 RX ADMIN — PREDNISONE 160 MG: 20 TABLET ORAL at 09:02

## 2019-02-03 RX ADMIN — POTASSIUM CHLORIDE 20 MEQ: 1500 TABLET, EXTENDED RELEASE ORAL at 09:02

## 2019-02-03 RX ADMIN — ATENOLOL 50 MG: 25 TABLET ORAL at 08:02

## 2019-02-03 RX ADMIN — DOXORUBICIN HYDROCHLORIDE 26 MG: 2 INJECTION, POWDER, LYOPHILIZED, FOR SOLUTION INTRAVENOUS at 09:02

## 2019-02-03 NOTE — PROGRESS NOTES
DOXOrubicin (ADRIAMYCIN) 26 mg, etoposide (VEPESID) 132 mg, vinCRIStine (ONCOVIN) 1 mg in sodium chloride 0.9% 480 mL chemo infusion started through R chest port noted for having positive blood return. Chemotherapy dosage and BSA checked by two chemotherapy certified nurses prior to administration.  Chemotherapy education done prior to hanging of chemotherapy. Chemotherapeutic precautions in place throughout therapy.  Will continue to monitor.

## 2019-02-03 NOTE — PROGRESS NOTES
[facility]  Hematology  Bone Marrow Transplant  Progress Note    Patient Name: Giorgio Streeter  Admission Date: 2/1/2019  Hospital Length of Stay: 2 days  Code Status: Full Code    Subjective:     Interval History:   Tolerating chemo well; endorses flushing of his skin in the middle of the infusion. Is afebrile during this time and flushing clears without therapy. Constipation has resolved.     Objective:     Vital Signs (Most Recent):  Temp: 99.1 °F (37.3 °C) (02/03/19 1325)  Pulse: (!) 55 (02/03/19 1325)  Resp: 16 (02/03/19 1325)  BP: 127/78 (02/03/19 1325)  SpO2: 96 % (02/03/19 1325) Vital Signs (24h Range):  Temp:  [97.6 °F (36.4 °C)-99.1 °F (37.3 °C)] 99.1 °F (37.3 °C)  Pulse:  [55-69] 55  Resp:  [16-18] 16  SpO2:  [94 %-97 %] 96 %  BP: (107-149)/(58-78) 127/78     Weight: (!) 142.1 kg (313 lb 4.4 oz)  Body mass index is 49.07 kg/m².  Body surface area is 2.59 meters squared.    ECOG SCORE         [unfilled]    Intake/Output - Last 3 Shifts       02/01 0700 - 02/02 0659 02/02 0700 - 02/03 0659 02/03 0700 - 02/04 0659    P.O. 1140 1920 840    I.V. (mL/kg) 641.8 (4.6) 1269.5 (8.9) 468.3 (3.3)    IV Piggyback 112.9 418.5 187.3    Total Intake(mL/kg) 1894.7 (13.5) 3608 (25.4) 1495.7 (10.5)    Urine (mL/kg/hr) 400 4150 (1.2) 1425 (1.3)    Stool  0 0    Total Output 400 4150 1425    Net +1494.7 -542 +70.7           Stool Occurrence 0 x 1 x 0 x          Physical Exam   Constitutional: He is oriented to person, place, and time. He appears well-developed and well-nourished.   Obese   HENT:   Head: Normocephalic and atraumatic.   Right Ear: External ear normal.   Left Ear: External ear normal.   Mouth/Throat: Oropharynx is clear and moist. No oropharyngeal exudate.   Eyes: Conjunctivae and EOM are normal.   Neck: Normal range of motion. Neck supple.   Cardiovascular: Normal rate, regular rhythm, normal heart sounds and intact distal pulses.   No murmur heard.  Pulmonary/Chest: Effort normal and  "breath sounds normal. No stridor. No respiratory distress. He has no wheezes. He has no rales.   Abdominal: Soft. Bowel sounds are normal. There is no tenderness.   Midline surgical scar; healed   Musculoskeletal: Normal range of motion. He exhibits no edema.   Neurological: He is alert and oriented to person, place, and time.   Skin: Skin is warm and dry.   Psychiatric: He has a normal mood and affect. His behavior is normal. Judgment and thought content normal.   Nursing note and vitals reviewed.      Significant Labs:   CBC:   Recent Labs   Lab 02/02/19 0443 02/03/19  0407   WBC 8.03 13.62*   HGB 12.4* 11.6*   HCT 39.0* 35.9*    237    and CMP:   Recent Labs   Lab 02/02/19 0443 02/03/19  0407    138   K 3.9 3.6    106   CO2 24 23   * 157*   BUN 14 13   CREATININE 0.8 0.8   CALCIUM 9.4 9.2   PROT 7.7 7.5   ALBUMIN 3.5 3.3*   BILITOT 0.5 0.4   ALKPHOS 58 58   AST 20 15   ALT 23 19   ANIONGAP 10 9   EGFRNONAA >60.0 >60.0     Assessment/Plan:     * Diffuse large b-cell lymphoma, intra-abdominal lymph nodes    - From 2nd opinion at [facility]: "The pathology report has been released (attached below), and the opinion of our Hematopathologist is that there are two features of concern: one is that the cellular reproductive rate is as high as 90% in some portions of the tumor, and the other is that at the DNA analysis level, there is indication of a genomic alteration of poor prognosis, that is a rearrangement of the gene c-myc.  Therefore, given these two factors, she considers this lymphoma to be biologically aggressive, although it DOES NOT meet the criteria to define this as a Burkitt Lymphoma. She does agree that the marrow biopsy has no indication of lymphoma.     RECOMMENDATION: Given these findings, our clinical recommendation therefore would be that the patient be treated with the REPOCH regimen, which includes intrathecal chemotherapy. This recommendation is based on the recently published " "Phase 2 multi-institution report, showing that REPOCH treatment has very favorable results in patients with Diffuse Large B-Cell Lymphoma, regardless of high rate of cellular reproduction and c-myc rearrangement features.   Lancet Haematol. 2018 Dec;5(12):n524-u316. doi: 10.1016/-0482(96)94550-7.   Dose-adjusted EPOCH-R (etoposide, prednisone, vincristine, cyclophosphamide, doxorubicin, and rituximab) in untreated aggressive diffuse large B-cell lymphoma with MYC rearrangement: a prospective, multicentre, single-arm phase 2 study.  Brinda K1, Fanale MA2, Nadya JS3, Patria A4, Caimi PF5, [place] S6, Melo S7, Lacasce A3, [place] JW8, Colin D9, Zhane S10, Lechowicz MJ11, Suman R12, Sudeep A1, Linda C1, Roschewski M1, Asa SM13, Donte ES6, Shin B14, Ace JW15, Ellen RF16, Shekhar NL14, Stanford WH17."    - MYC/IgH fusion noted on FISH, in 27% of nuclei. 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 IPI score 0. It does not fulfil the criteria for Burkitts (ki 67 high, but not high enough), double or triple hit (no bcl2 or bcl6 translocation). No PET avid measurable disease. No lymphoma in bone marrow. I discussed the treatment of stage I GI high grade lymphoma. I explained that although there is no active/measurable disease, chemotherapy with RCHOP or dose adjusted R-EPOCH is recommended, as he had bulky (8cm), high grade (ki 67 > 60%) lymphoma with MYC/IgH fusion. He will also need diagnostic LP. He went to [facility] for 2nd opinion.   - Starting cycle1 DA  R-EPOCH with IT MTX 2/1. Risks and benefits explained in detail and consent obtained by Dr. Hwang.  - Today is Day 3; currently tolerating therapy with facial flushing during infusion that resolves promptly after.          Essential hypertension    - continue home atenolol, losartan-Hctz     THUAN (obstructive sleep apnea)    - Cpap PRN, compliant      Rash and nonspecific skin eruption    - reddened rash to skin folds on bilateral trunk skin folds  - saw " PCP who prescribed ketoconazole 2% cream  - will give nystatin powder while inpatient; keep areas dry     Anemia in neoplastic disease    - Hgb 12.9 on admit - has remained stable   - transfuse Hgb <7     S/P small bowel resection    - see small bowel cancer     Seasonal allergies    - uses claritin or allegra at home regularly  - will start zyrtec daily while inpatient     Morbid obesity    - BMI: 48.31  - nutrition to follow while inpatient  - monitoring blood glucose levels with meals and HS while on high dose steroids     Small bowel cancer    - CT abdomen/pelvis 12/7/18 revealed an abnormal thickening of an 8 cm segment of small bowel (most likely mid to distal jejunum)  - referred to Dr. Casanova for further evaluation of his small bowel mass.  - underwent segmental small bowel resection on 12/21/18, mass was approximately 6 cm in length and was circumferential. There was dilation of the small bowel above it suggesting a partial bowel obstruction.  There was no adenopathy in the adjacent mesentery.  The tumor was clearly extending to the serosal surface of the bowel. Careful search was made for peritoneal implants and there were no lesions noted on the peritoneum or omentum.  The liver could not be inspected through the incision, but it was palpated and there were no focal lesions within it. The remainder of the small bowel was run from the ligament of Treitz to the ileocecal valves and no other small bowel lesions were noted.   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 The Ki-67 proliferation index is high (>60%). In situ hybridization for David bar viral RNA is negative. Molecular studies are pending to exclude the possibility of a high-grade B-cell lymphoma with myc and BCL-2/ BCL6 rearrangements.             VTE Risk Mitigation (From admission, onward)        Ordered      enoxaparin injection 40 mg  Every 12 hours      02/01/19 1651     heparin, porcine (PF) 100 unit/mL injection flush 300 Units  As needed (PRN)      02/01/19 1650     IP VTE HIGH RISK PATIENT  Once      02/01/19 1651          Disposition:   Pending completion of R-EPOCH; tolerating well.     Russ Leblanc MD  PGY-3 Internal Medicine  [phone]    Bone Marrow Transplant  [facility]

## 2019-02-03 NOTE — PLAN OF CARE
Problem: Adult Inpatient Plan of Care  Goal: Plan of Care Review  Outcome: Ongoing (interventions implemented as appropriate)  Pt involved in plan of care and communicating needs throughout shift.  Family at bedside and involved in care.  Pt up in room independently; ambulating in hallway throughout shift with steady gait. No c/o pain or discomfort today.  Tolerating regular diet, voiding without difficulty; had BM today. Accuchecks as ordered; no ss insulin required this shift.  IVF and chemo infusing as ordered; pt to start day #2 of R-EPOCH tonight.  All VSS; no acute events so far this shift.  Pt remaining free from falls or injury throughout shift; bed in lowest position; call light within reach.  Pt instructed to call for assistance as needed.  Q1H rounding done on pt.

## 2019-02-03 NOTE — PLAN OF CARE
Problem: Adult Inpatient Plan of Care  Goal: Plan of Care Review  Outcome: Ongoing (interventions implemented as appropriate)  Pt involved in plan of care and communicating needs throughout shift.  Family at bedside and involved in care.  Pt up in room independently; ambulating in hallway throughout shift with steady gait. No c/o pain or discomfort today.  Tolerating regular diet, voiding without difficulty. Accuchecks as ordered; no ss insulin required this shift.  IVF and chemo infusing as ordered; pt to start day #3 of R-EPOCH tonight. Potassium replaced per PRN electrolyte protocol for K=3.6. All VSS; no acute events so far this shift.  Pt remaining free from falls or injury throughout shift; bed in lowest position; call light within reach.  Pt instructed to call for assistance as needed.  Q1H rounding done on pt.

## 2019-02-03 NOTE — PLAN OF CARE
Problem: Adult Inpatient Plan of Care  Goal: Plan of Care Review  Outcome: Ongoing (interventions implemented as appropriate)  Day 2 of EPOCH. Afebrile. Free from falls or injury. NS infusing at 50. Doxorubicin at 22. Zofran and Prednisone given as scheduled. Accu checks AC/HS. No correction coverage needed. Bed locked in lowest position, non skid socks on, call light within reach. Pt instructed to call if any assistance is needed. Vitals stable. Chemo precautions maintained. Wife at bedside. Will cont to olivier pt.

## 2019-02-03 NOTE — SUBJECTIVE & OBJECTIVE
Subjective:     Interval History:   Tolerating chemo well; endorses flushing of his skin in the middle of the infusion. Is afebrile during this time and flushing clears without therapy. Constipation has resolved.     Objective:     Vital Signs (Most Recent):  Temp: 99.1 °F (37.3 °C) (02/03/19 1325)  Pulse: (!) 55 (02/03/19 1325)  Resp: 16 (02/03/19 1325)  BP: 127/78 (02/03/19 1325)  SpO2: 96 % (02/03/19 1325) Vital Signs (24h Range):  Temp:  [97.6 °F (36.4 °C)-99.1 °F (37.3 °C)] 99.1 °F (37.3 °C)  Pulse:  [55-69] 55  Resp:  [16-18] 16  SpO2:  [94 %-97 %] 96 %  BP: (107-149)/(58-78) 127/78     Weight: (!) 142.1 kg (313 lb 4.4 oz)  Body mass index is 49.07 kg/m².  Body surface area is 2.59 meters squared.    ECOG SCORE         [unfilled]    Intake/Output - Last 3 Shifts       02/01 0700 - 02/02 0659 02/02 0700 - 02/03 0659 02/03 0700 - 02/04 0659    P.O. 1140 1920 840    I.V. (mL/kg) 641.8 (4.6) 1269.5 (8.9) 468.3 (3.3)    IV Piggyback 112.9 418.5 187.3    Total Intake(mL/kg) 1894.7 (13.5) 3608 (25.4) 1495.7 (10.5)    Urine (mL/kg/hr) 400 4150 (1.2) 1425 (1.3)    Stool  0 0    Total Output 400 4150 1425    Net +1494.7 -542 +70.7           Stool Occurrence 0 x 1 x 0 x          Physical Exam   Constitutional: He is oriented to person, place, and time. He appears well-developed and well-nourished.   Obese   HENT:   Head: Normocephalic and atraumatic.   Right Ear: External ear normal.   Left Ear: External ear normal.   Mouth/Throat: Oropharynx is clear and moist. No oropharyngeal exudate.   Eyes: Conjunctivae and EOM are normal.   Neck: Normal range of motion. Neck supple.   Cardiovascular: Normal rate, regular rhythm, normal heart sounds and intact distal pulses.   No murmur heard.  Pulmonary/Chest: Effort normal and breath sounds normal. No stridor. No respiratory distress. He has no wheezes. He has no rales.   Abdominal: Soft. Bowel sounds are normal. There is no tenderness.   Midline surgical scar; healed    Musculoskeletal: Normal range of motion. He exhibits no edema.   Neurological: He is alert and oriented to person, place, and time.   Skin: Skin is warm and dry.   Psychiatric: He has a normal mood and affect. His behavior is normal. Judgment and thought content normal.   Nursing note and vitals reviewed.      Significant Labs:   CBC:   Recent Labs   Lab 02/02/19  0443 02/03/19  0407   WBC 8.03 13.62*   HGB 12.4* 11.6*   HCT 39.0* 35.9*    237    and CMP:   Recent Labs   Lab 02/02/19  0443 02/03/19  0407    138   K 3.9 3.6    106   CO2 24 23   * 157*   BUN 14 13   CREATININE 0.8 0.8   CALCIUM 9.4 9.2   PROT 7.7 7.5   ALBUMIN 3.5 3.3*   BILITOT 0.5 0.4   ALKPHOS 58 58   AST 20 15   ALT 23 19   ANIONGAP 10 9   EGFRNONAA >60.0 >60.0

## 2019-02-03 NOTE — ASSESSMENT & PLAN NOTE
"- From 2nd opinion at [facility]: "The pathology report has been released (attached below), and the opinion of our Hematopathologist is that there are two features of concern: one is that the cellular reproductive rate is as high as 90% in some portions of the tumor, and the other is that at the DNA analysis level, there is indication of a genomic alteration of poor prognosis, that is a rearrangement of the gene c-myc.  Therefore, given these two factors, she considers this lymphoma to be biologically aggressive, although it DOES NOT meet the criteria to define this as a Burkitt Lymphoma. She does agree that the marrow biopsy has no indication of lymphoma.     RECOMMENDATION: Given these findings, our clinical recommendation therefore would be that the patient be treated with the REPOCH regimen, which includes intrathecal chemotherapy. This recommendation is based on the recently published Phase 2 multi-institution report, showing that REPOCH treatment has very favorable results in patients with Diffuse Large B-Cell Lymphoma, regardless of high rate of cellular reproduction and c-myc rearrangement features.   Lancet Haematol. 2018 Dec;5(12):s442-v293. doi: 10.1016/-0345(43)92681-1.   Dose-adjusted EPOCH-R (etoposide, prednisone, vincristine, cyclophosphamide, doxorubicin, and rituximab) in untreated aggressive diffuse large B-cell lymphoma with MYC rearrangement: a prospective, multicentre, single-arm phase 2 study.  Brinda K1, Vinnie MA2, Nadya JS3, Patria A4, Caimi PF5, [place] S6, Melo S7, Lacasce A3, [place] JW8, Colin D9, Zhane S10, Lecivoneicz MJ11, Suman R12, Sudeep A1, Linda C1, Roschewski M1, Asa SM13, Donte ES6, Shni B14, Ace JW15, Little RF16, Corrigan NL14, Stanford WH17."    - MYC/IgH fusion noted on FISH, in 27% of nuclei. 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 IPI score 0. It does not fulfil the criteria for Burkitts (ki 67 high, but not high enough), double or triple hit (no bcl2 or " bcl6 translocation). No PET avid measurable disease. No lymphoma in bone marrow. I discussed the treatment of stage I GI high grade lymphoma. I explained that although there is no active/measurable disease, chemotherapy with RCHOP or dose adjusted R-EPOCH is recommended, as he had bulky (8cm), high grade (ki 67 > 60%) lymphoma with MYC/IgH fusion. He will also need diagnostic LP. He went to [facility] for 2nd opinion.   - Starting cycle1 DA  R-EPOCH with IT MTX 2/1. Risks and benefits explained in detail and consent obtained by Dr. Hwang.  - Today is Day 3; currently tolerating therapy with facial flushing during infusion that resolves promptly after.

## 2019-02-04 DIAGNOSIS — C83.39 DIFFUSE LARGE B-CELL LYMPHOMA OF SOLID ORGAN EXCLUDING SPLEEN: Primary | ICD-10-CM

## 2019-02-04 PROBLEM — R45.89 ANXIETY ABOUT HEALTH: Status: ACTIVE | Noted: 2019-02-04

## 2019-02-04 PROBLEM — R60.1 GENERALIZED EDEMA: Status: ACTIVE | Noted: 2019-02-04

## 2019-02-04 LAB
ALBUMIN SERPL BCP-MCNC: 3.4 G/DL
ALP SERPL-CCNC: 48 U/L
ALT SERPL W/O P-5'-P-CCNC: 21 U/L
ANION GAP SERPL CALC-SCNC: 8 MMOL/L
APTT BLDCRRT: 23.9 SEC
AST SERPL-CCNC: 16 U/L
BASOPHILS # BLD AUTO: 0 K/UL
BASOPHILS NFR BLD: 0 %
BILIRUB SERPL-MCNC: 0.4 MG/DL
BUN SERPL-MCNC: 15 MG/DL
CALCIUM SERPL-MCNC: 8.9 MG/DL
CHLORIDE SERPL-SCNC: 104 MMOL/L
CO2 SERPL-SCNC: 27 MMOL/L
CREAT SERPL-MCNC: 0.8 MG/DL
DIFFERENTIAL METHOD: ABNORMAL
EOSINOPHIL # BLD AUTO: 0 K/UL
EOSINOPHIL NFR BLD: 0 %
ERYTHROCYTE [DISTWIDTH] IN BLOOD BY AUTOMATED COUNT: 14.4 %
EST. GFR  (AFRICAN AMERICAN): >60 ML/MIN/1.73 M^2
EST. GFR  (NON AFRICAN AMERICAN): >60 ML/MIN/1.73 M^2
GLUCOSE SERPL-MCNC: 136 MG/DL
HCT VFR BLD AUTO: 35.6 %
HGB BLD-MCNC: 11.6 G/DL
IMM GRANULOCYTES # BLD AUTO: 0.05 K/UL
IMM GRANULOCYTES NFR BLD AUTO: 0.4 %
INR PPP: 1
LDH SERPL L TO P-CCNC: 150 U/L
LYMPHOCYTES # BLD AUTO: 0.3 K/UL
LYMPHOCYTES NFR BLD: 2.9 %
MAGNESIUM SERPL-MCNC: 2.2 MG/DL
MCH RBC QN AUTO: 26.9 PG
MCHC RBC AUTO-ENTMCNC: 32.6 G/DL
MCV RBC AUTO: 82 FL
MONOCYTES # BLD AUTO: 0.3 K/UL
MONOCYTES NFR BLD: 2.3 %
NEUTROPHILS # BLD AUTO: 10.6 K/UL
NEUTROPHILS NFR BLD: 94.4 %
NRBC BLD-RTO: 0 /100 WBC
PHOSPHATE SERPL-MCNC: 3.2 MG/DL
PLATELET # BLD AUTO: 240 K/UL
PMV BLD AUTO: 10.2 FL
POCT GLUCOSE: 114 MG/DL (ref 70–110)
POCT GLUCOSE: 170 MG/DL (ref 70–110)
POCT GLUCOSE: 96 MG/DL (ref 70–110)
POTASSIUM SERPL-SCNC: 3.6 MMOL/L
PROT SERPL-MCNC: 7.3 G/DL
PROTHROMBIN TIME: 10.8 SEC
RBC # BLD AUTO: 4.32 M/UL
SODIUM SERPL-SCNC: 139 MMOL/L
URATE SERPL-MCNC: 5.9 MG/DL
WBC # BLD AUTO: 11.19 K/UL

## 2019-02-04 PROCEDURE — 99233 SBSQ HOSP IP/OBS HIGH 50: CPT | Mod: ,,, | Performed by: INTERNAL MEDICINE

## 2019-02-04 PROCEDURE — 25000003 PHARM REV CODE 250: Performed by: NURSE PRACTITIONER

## 2019-02-04 PROCEDURE — 84550 ASSAY OF BLOOD/URIC ACID: CPT

## 2019-02-04 PROCEDURE — 83615 LACTATE (LD) (LDH) ENZYME: CPT

## 2019-02-04 PROCEDURE — 25000003 PHARM REV CODE 250: Performed by: INTERNAL MEDICINE

## 2019-02-04 PROCEDURE — 80053 COMPREHEN METABOLIC PANEL: CPT

## 2019-02-04 PROCEDURE — 63600175 PHARM REV CODE 636 W HCPCS: Performed by: NURSE PRACTITIONER

## 2019-02-04 PROCEDURE — 20600001 HC STEP DOWN PRIVATE ROOM

## 2019-02-04 PROCEDURE — 99233 PR SUBSEQUENT HOSPITAL CARE,LEVL III: ICD-10-PCS | Mod: ,,, | Performed by: INTERNAL MEDICINE

## 2019-02-04 PROCEDURE — 83735 ASSAY OF MAGNESIUM: CPT

## 2019-02-04 PROCEDURE — 85025 COMPLETE CBC W/AUTO DIFF WBC: CPT

## 2019-02-04 PROCEDURE — 84100 ASSAY OF PHOSPHORUS: CPT

## 2019-02-04 PROCEDURE — 85730 THROMBOPLASTIN TIME PARTIAL: CPT

## 2019-02-04 PROCEDURE — 85610 PROTHROMBIN TIME: CPT

## 2019-02-04 PROCEDURE — 63600175 PHARM REV CODE 636 W HCPCS: Performed by: INTERNAL MEDICINE

## 2019-02-04 RX ORDER — POTASSIUM CHLORIDE 20 MEQ/1
40 TABLET, EXTENDED RELEASE ORAL ONCE
Status: COMPLETED | OUTPATIENT
Start: 2019-02-04 | End: 2019-02-04

## 2019-02-04 RX ORDER — FUROSEMIDE 10 MG/ML
40 INJECTION INTRAMUSCULAR; INTRAVENOUS ONCE
Status: COMPLETED | OUTPATIENT
Start: 2019-02-04 | End: 2019-02-04

## 2019-02-04 RX ORDER — FAMOTIDINE 20 MG/1
20 TABLET, FILM COATED ORAL 2 TIMES DAILY
Status: DISCONTINUED | OUTPATIENT
Start: 2019-02-04 | End: 2019-02-06 | Stop reason: HOSPADM

## 2019-02-04 RX ORDER — ENOXAPARIN SODIUM 100 MG/ML
40 INJECTION SUBCUTANEOUS DAILY
Status: DISCONTINUED | OUTPATIENT
Start: 2019-02-05 | End: 2019-02-06 | Stop reason: HOSPADM

## 2019-02-04 RX ORDER — ATENOLOL 25 MG/1
50 TABLET ORAL DAILY
Status: DISCONTINUED | OUTPATIENT
Start: 2019-02-05 | End: 2019-02-06 | Stop reason: HOSPADM

## 2019-02-04 RX ORDER — ALPRAZOLAM 0.25 MG/1
0.25 TABLET ORAL 2 TIMES DAILY PRN
Status: DISCONTINUED | OUTPATIENT
Start: 2019-02-04 | End: 2019-02-06 | Stop reason: HOSPADM

## 2019-02-04 RX ADMIN — MICONAZOLE NITRATE: 20 POWDER TOPICAL at 09:02

## 2019-02-04 RX ADMIN — FAMOTIDINE 20 MG: 20 TABLET ORAL at 12:02

## 2019-02-04 RX ADMIN — PREDNISONE 160 MG: 20 TABLET ORAL at 09:02

## 2019-02-04 RX ADMIN — DOXORUBICIN HYDROCHLORIDE 26 MG: 2 INJECTION, POWDER, LYOPHILIZED, FOR SOLUTION INTRAVENOUS at 10:02

## 2019-02-04 RX ADMIN — PREDNISONE 160 MG: 20 TABLET ORAL at 08:02

## 2019-02-04 RX ADMIN — CETIRIZINE HYDROCHLORIDE 10 MG: 10 TABLET, FILM COATED ORAL at 08:02

## 2019-02-04 RX ADMIN — SODIUM CHLORIDE: 0.9 INJECTION, SOLUTION INTRAVENOUS at 10:02

## 2019-02-04 RX ADMIN — ENOXAPARIN SODIUM 40 MG: 100 INJECTION SUBCUTANEOUS at 08:02

## 2019-02-04 RX ADMIN — FAMOTIDINE 20 MG: 20 TABLET ORAL at 09:02

## 2019-02-04 RX ADMIN — POTASSIUM CHLORIDE 20 MEQ: 1500 TABLET, EXTENDED RELEASE ORAL at 08:02

## 2019-02-04 RX ADMIN — ONDANSETRON 8 MG: 4 TABLET, FILM COATED ORAL at 09:02

## 2019-02-04 RX ADMIN — ONDANSETRON 8 MG: 4 TABLET, FILM COATED ORAL at 08:02

## 2019-02-04 RX ADMIN — POTASSIUM CHLORIDE 40 MEQ: 1500 TABLET, EXTENDED RELEASE ORAL at 08:02

## 2019-02-04 RX ADMIN — FUROSEMIDE 40 MG: 10 INJECTION, SOLUTION INTRAVENOUS at 08:02

## 2019-02-04 RX ADMIN — ATENOLOL 50 MG: 25 TABLET ORAL at 08:02

## 2019-02-04 RX ADMIN — ALPRAZOLAM 0.25 MG: 0.25 TABLET ORAL at 10:02

## 2019-02-04 RX ADMIN — LOSARTAN POTASSIUM AND HYDROCHLOROTHIAZIDE 1 TABLET: 12.5; 1 TABLET ORAL at 08:02

## 2019-02-04 NOTE — SUBJECTIVE & OBJECTIVE
Subjective:     Interval History: Day 4 EPOCH, tolerating chemo well. Continues with facial flushing, afebrile. Generalized edema and SOBOE, 8lb weight gain since admission, likely fluid volume overload, will give 40mg lasix today with 40meq K replacement. Rash to bilateral flank improving with use of nystatin powder. Pt denies chest pain, n/v/d/c, or neuropathy.    Objective:     Vital Signs (Most Recent):  Temp: 97.9 °F (36.6 °C) (02/04/19 0449)  Pulse: (!) 52 (02/04/19 0449)  Resp: 18 (02/04/19 0449)  BP: (!) 164/96 (02/04/19 0449)  SpO2: 97 % (02/04/19 0449) Vital Signs (24h Range):  Temp:  [97.5 °F (36.4 °C)-99.1 °F (37.3 °C)] 97.9 °F (36.6 °C)  Pulse:  [52-64] 52  Resp:  [16-19] 18  SpO2:  [91 %-97 %] 97 %  BP: (127-164)/(61-96) 164/96     Weight: (!) 143.7 kg (316 lb 12.8 oz)  Body mass index is 49.62 kg/m².  Body surface area is 2.61 meters squared.    ECOG SCORE         [unfilled]    Intake/Output - Last 3 Shifts       02/02 0700 - 02/03 0659 02/03 0700 - 02/04 0659 02/04 0700 - 02/05 0659    P.O. 1920 1620     I.V. (mL/kg) 1269.5 (8.9) 1107.5 (7.7)     IV Piggyback 418.5 438.7     Total Intake(mL/kg) 3608 (25.4) 3166.2 (22)     Urine (mL/kg/hr) 4150 (1.2) 4275 (1.2)     Stool 0 0     Total Output 4150 4275     Net -542 -1108.8            Stool Occurrence 1 x 0 x           Physical Exam   Constitutional: He is oriented to person, place, and time. He appears well-developed and well-nourished.   Obese   HENT:   Head: Normocephalic and atraumatic.   Right Ear: External ear normal.   Left Ear: External ear normal.   Mouth/Throat: Oropharynx is clear and moist. No oropharyngeal exudate.   Eyes: Conjunctivae and EOM are normal.   Neck: Normal range of motion. Neck supple.   Cardiovascular: Normal rate, regular rhythm, normal heart sounds and intact distal pulses.   No murmur heard.  Pulmonary/Chest: Effort normal and breath sounds normal. No stridor. No respiratory distress. He has no wheezes. He has no rales.    Abdominal: Soft. Bowel sounds are normal. There is no tenderness.   Midline surgical scar; healed   Musculoskeletal: Normal range of motion. He exhibits no edema.   Neurological: He is alert and oriented to person, place, and time.   Skin: Skin is warm and dry.   Psychiatric: He has a normal mood and affect. His behavior is normal. Judgment and thought content normal.   Nursing note and vitals reviewed.      Significant Labs:   CBC:   Recent Labs   Lab 02/03/19  0407 02/04/19  0436   WBC 13.62* 11.19   HGB 11.6* 11.6*   HCT 35.9* 35.6*    240    and CMP:   Recent Labs   Lab 02/03/19  0407 02/04/19  0436    139   K 3.6 3.6    104   CO2 23 27   * 136*   BUN 13 15   CREATININE 0.8 0.8   CALCIUM 9.2 8.9   PROT 7.5 7.3   ALBUMIN 3.3* 3.4*   BILITOT 0.4 0.4   ALKPHOS 58 48*   AST 15 16   ALT 19 21   ANIONGAP 9 8   EGFRNONAA >60.0 >60.0

## 2019-02-04 NOTE — PLAN OF CARE
Problem: Adult Inpatient Plan of Care  Goal: Plan of Care Review  Outcome: Ongoing (interventions implemented as appropriate)  Day 3 of EPOCH. Afebrile. Free from falls or injury. NS infusing at 50. Doxorubicin at 20. Zofran and Prednisone given as scheduled. Accu checks AC/HS. No correction coverage needed. Bed locked in lowest position, non skid socks on, call light within reach. Pt instructed to call if any assistance is needed. Vitals stable. Chemo precautions maintained. Wife at bedside. Will cont to olivier pt.

## 2019-02-04 NOTE — PLAN OF CARE
Patient is admitted for cycle # 1 of EPOCH.  He is tolerating without complication.  Wife at bedside for support.  CM reviewed the plan of care with the patient and his wife.  Patient is from [place] but will be staying with his sister on the [facility] for the next few weeks.  Patient's wife states she has a room reserved at the [facility] through Thursday night.  CM explained that the patient will need OP LP w/ IT chemo, Neulasta and Rituxan after d/c.  Message sent to the onc clinic requesting it all be scheduled on Thursday so his [facility] reservation does not have to be extended.  CM will continue to follow.

## 2019-02-04 NOTE — ASSESSMENT & PLAN NOTE
"- From 2nd opinion at [facility]: "The pathology report has been released (attached below), and the opinion of our Hematopathologist is that there are two features of concern: one is that the cellular reproductive rate is as high as 90% in some portions of the tumor, and the other is that at the DNA analysis level, there is indication of a genomic alteration of poor prognosis, that is a rearrangement of the gene c-myc.  Therefore, given these two factors, she considers this lymphoma to be biologically aggressive, although it DOES NOT meet the criteria to define this as a Burkitt Lymphoma. She does agree that the marrow biopsy has no indication of lymphoma.     RECOMMENDATION: Given these findings, our clinical recommendation therefore would be that the patient be treated with the REPOCH regimen, which includes intrathecal chemotherapy. This recommendation is based on the recently published Phase 2 multi-institution report, showing that REPOCH treatment has very favorable results in patients with Diffuse Large B-Cell Lymphoma, regardless of high rate of cellular reproduction and c-myc rearrangement features.   Lancet Haematol. 2018 Dec;5(12):p442-h554. doi: 10.1016/-2327(45)85986-8.   Dose-adjusted EPOCH-R (etoposide, prednisone, vincristine, cyclophosphamide, doxorubicin, and rituximab) in untreated aggressive diffuse large B-cell lymphoma with MYC rearrangement: a prospective, multicentre, single-arm phase 2 study.  Brinda K1, Vinnie MA2, Nadya JS3, Patria A4, Caimi PF5, [place] S6, Melo S7, Lacasce A3, [place] JW8, Colin D9, Zhane S10, Lecivoneicz MJ11, Suman R12, Sudeep A1, Linda C1, Roschewski M1, Asa SM13, Donte ES6, Shin B14, Ace JW15, Little RF16, Corrigan NL14, Stanford WH17."    - MYC/IgH fusion noted on FISH, in 27% of nuclei. 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 IPI score 0. It does not fulfil the criteria for Burkitts (ki 67 high, but not high enough), double or triple hit (no bcl2 or " bcl6 translocation). No PET avid measurable disease. No lymphoma in bone marrow. I discussed the treatment of stage I GI high grade lymphoma. I explained that although there is no active/measurable disease, chemotherapy with RCHOP or dose adjusted R-EPOCH is recommended, as he had bulky (8cm), high grade (ki 67 > 60%) lymphoma with MYC/IgH fusion. He will also need diagnostic LP. He went to [facility] for 2nd opinion.   - Starting cycle1 DA  R-EPOCH with IT MTX 2/1. Risks and benefits explained in detail and consent obtained by Dr. Hwang.  - Today is Day 4; currently tolerating therapy with some mild facial flushing during infusion that resolves promptly after.

## 2019-02-04 NOTE — ASSESSMENT & PLAN NOTE
- continue home losartan-Hctz  - atenolol changed from BID to daily d/t bradycardia 2/4, hold for HR<55

## 2019-02-04 NOTE — PROGRESS NOTES
[facility]  Hematology  Bone Marrow Transplant  Progress Note    Patient Name: Giorgio Streeter  Admission Date: 2/1/2019  Hospital Length of Stay: 3 days  Code Status: Full Code    Subjective:     Interval History: Day 4 EPOCH, tolerating chemo well. Continues with facial flushing, afebrile. Generalized edema and SOBOE, 8lb weight gain since admission, likely fluid volume overload, will give 40mg lasix today with 40meq K replacement. Rash to bilateral flank improving with use of nystatin powder. Atenolol changed from Bid to daily due to bradycardia. Will start on PPI with high dose steroids. Pt denies chest pain, n/v/d/c, or neuropathy.    Objective:     Vital Signs (Most Recent):  Temp: 97.9 °F (36.6 °C) (02/04/19 0449)  Pulse: (!) 52 (02/04/19 0449)  Resp: 18 (02/04/19 0449)  BP: (!) 164/96 (02/04/19 0449)  SpO2: 97 % (02/04/19 0449) Vital Signs (24h Range):  Temp:  [97.5 °F (36.4 °C)-99.1 °F (37.3 °C)] 97.9 °F (36.6 °C)  Pulse:  [52-64] 52  Resp:  [16-19] 18  SpO2:  [91 %-97 %] 97 %  BP: (127-164)/(61-96) 164/96     Weight: (!) 143.7 kg (316 lb 12.8 oz)  Body mass index is 49.62 kg/m².  Body surface area is 2.61 meters squared.    ECOG SCORE         [unfilled]    Intake/Output - Last 3 Shifts       02/02 0700 - 02/03 0659 02/03 0700 - 02/04 0659 02/04 0700 - 02/05 0659    P.O. 1920 1620     I.V. (mL/kg) 1269.5 (8.9) 1107.5 (7.7)     IV Piggyback 418.5 438.7     Total Intake(mL/kg) 3608 (25.4) 3166.2 (22)     Urine (mL/kg/hr) 4150 (1.2) 4275 (1.2)     Stool 0 0     Total Output 4150 4275     Net -542 -1108.8            Stool Occurrence 1 x 0 x           Physical Exam   Constitutional: He is oriented to person, place, and time. He appears well-developed and well-nourished.   Obese   HENT:   Head: Normocephalic and atraumatic.   Right Ear: External ear normal.   Left Ear: External ear normal.   Mouth/Throat: Oropharynx is clear and moist. No oropharyngeal exudate.   Eyes: Conjunctivae and  "EOM are normal.   Neck: Normal range of motion. Neck supple.   Cardiovascular: Normal rate, regular rhythm, normal heart sounds and intact distal pulses.   No murmur heard.  Pulmonary/Chest: Effort normal and breath sounds normal. No stridor. No respiratory distress. He has no wheezes. He has no rales.   Abdominal: Soft. Bowel sounds are normal. There is no tenderness.   Midline surgical scar; healed   Musculoskeletal: Normal range of motion. He exhibits no edema.   Neurological: He is alert and oriented to person, place, and time.   Skin: Skin is warm and dry.   Psychiatric: He has a normal mood and affect. His behavior is normal. Judgment and thought content normal.   Nursing note and vitals reviewed.      Significant Labs:   CBC:   Recent Labs   Lab 02/03/19 0407 02/04/19  0436   WBC 13.62* 11.19   HGB 11.6* 11.6*   HCT 35.9* 35.6*    240    and CMP:   Recent Labs   Lab 02/03/19 0407 02/04/19 0436    139   K 3.6 3.6    104   CO2 23 27   * 136*   BUN 13 15   CREATININE 0.8 0.8   CALCIUM 9.2 8.9   PROT 7.5 7.3   ALBUMIN 3.3* 3.4*   BILITOT 0.4 0.4   ALKPHOS 58 48*   AST 15 16   ALT 19 21   ANIONGAP 9 8   EGFRNONAA >60.0 >60.0     Assessment/Plan:     * Diffuse large b-cell lymphoma, intra-abdominal lymph nodes    - From 2nd opinion at [facility]: "The pathology report has been released (attached below), and the opinion of our Hematopathologist is that there are two features of concern: one is that the cellular reproductive rate is as high as 90% in some portions of the tumor, and the other is that at the DNA analysis level, there is indication of a genomic alteration of poor prognosis, that is a rearrangement of the gene c-myc.  Therefore, given these two factors, she considers this lymphoma to be biologically aggressive, although it DOES NOT meet the criteria to define this as a Burkitt Lymphoma. She does agree that the marrow biopsy has no indication of lymphoma.     RECOMMENDATION: Given " "these findings, our clinical recommendation therefore would be that the patient be treated with the REPOCH regimen, which includes intrathecal chemotherapy. This recommendation is based on the recently published Phase 2 multi-institution report, showing that REPOCH treatment has very favorable results in patients with Diffuse Large B-Cell Lymphoma, regardless of high rate of cellular reproduction and c-myc rearrangement features.   Lancet Haematol. 2018 Dec;5(12):r831-e961. doi: 10.1016/-4808([id number])71913-7.   Dose-adjusted EPOCH-R (etoposide, prednisone, vincristine, cyclophosphamide, doxorubicin, and rituximab) in untreated aggressive diffuse large B-cell lymphoma with MYC rearrangement: a prospective, multicentre, single-arm phase 2 study.  Brnida K1, Vinnie MA2, Nadya JS3, Patria A4, Caimi PF5, [place] S6, Melo S7, Lacasce A3, [place] JW8, Colin D9, Zhane S10, Lechowicz MJ11, Suman R12, Sudeep A1, Linda C1, Roschedavidki M1, Asa SM13, Donte ES6, Shin B14, Ace JW15, Little RF16, Corrigan NL14, Stanford WH17."    - MYC/IgH fusion noted on FISH, in 27% of nuclei. 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 IPI score 0. It does not fulfil the criteria for Burkitts (ki 67 high, but not high enough), double or triple hit (no bcl2 or bcl6 translocation). No PET avid measurable disease. No lymphoma in bone marrow. I discussed the treatment of stage I GI high grade lymphoma. I explained that although there is no active/measurable disease, chemotherapy with RCHOP or dose adjusted R-EPOCH is recommended, as he had bulky (8cm), high grade (ki 67 > 60%) lymphoma with MYC/IgH fusion. He will also need diagnostic LP. He went to [facility] for 2nd opinion.   - Starting cycle1 DA  R-EPOCH with IT MTX 2/1. Risks and benefits explained in detail and consent obtained by Dr. Hwang.  - Today is Day 4; currently tolerating therapy with some mild facial flushing during infusion that resolves promptly after.          Anxiety about " health    - prn xanax BID     Generalized edema    - increased generalized edema  - new onset mild SOBOE  - 8lb weight gain since admission  - will give 40mg IV lasix today with 40meq PO potassium replacement     Anemia in neoplastic disease    - Hgb 12.9 on admit - has remained stable  - monitoring daily CBC   - transfuse Hgb <7     Rash and nonspecific skin eruption    - reddened rash to skin folds on bilateral trunk skin folds  - saw PCP who prescribed ketoconazole 2% cream  - will give nystatin powder while inpatient; keep areas dry  - improving per patient     S/P small bowel resection    - see small bowel cancer      Seasonal allergies    - uses claritin or allegra at home regularly  - will start zyrtec daily while inpatient      THUAN (obstructive sleep apnea)    - Cpap PRN, compliant       Essential hypertension    - continue home losartan-Hctz  - atenolol changed from Bid to daily d/t bradycardia      Morbid obesity    - BMI: 48.31  - nutrition to follow while inpatient  - monitoring blood glucose levels with meals and HS while on high dose steroids      Small bowel cancer    - CT abdomen/pelvis 12/7/18 revealed an abnormal thickening of an 8 cm segment of small bowel (most likely mid to distal jejunum)  - referred to Dr. Casanova for further evaluation of his small bowel mass.  - underwent segmental small bowel resection on 12/21/18, mass was approximately 6 cm in length and was circumferential. There was dilation of the small bowel above it suggesting a partial bowel obstruction.  There was no adenopathy in the adjacent mesentery.  The tumor was clearly extending to the serosal surface of the bowel. Careful search was made for peritoneal implants and there were no lesions noted on the peritoneum or omentum.  The liver could not be inspected through the incision, but it was palpated and there were no focal lesions within it. The remainder of the small bowel was run from the ligament of Treitz to the ileocecal  valves and no other small bowel lesions were noted.   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 The Ki-67 proliferation index is high (>60%). In situ hybridization for David bar viral RNA is negative. Molecular studies are pending to exclude the possibility of a high-grade B-cell lymphoma with myc and BCL-2/ BCL6 rearrangements.              VTE Risk Mitigation (From admission, onward)        Ordered     enoxaparin injection 40 mg  Every 12 hours      02/01/19 1651     heparin, porcine (PF) 100 unit/mL injection flush 300 Units  As needed (PRN)      02/01/19 1650     IP VTE HIGH RISK PATIENT  Once      02/01/19 1651          Disposition: Continue inpatient chemo    Ashlee Luna NP  Bone Marrow Transplant  [facility]

## 2019-02-04 NOTE — PLAN OF CARE
Problem: Adult Inpatient Plan of Care  Goal: Plan of Care Review  Outcome: Ongoing (interventions implemented as appropriate)  Pt involved in plan of care and communicating needs throughout shift.  Family at bedside and involved in care.  Pt up in room independently; ambulating in hallway throughout shift with steady gait. No c/o pain or discomfort today.  Tolerating regular diet, voiding without difficulty. Accuchecks as ordered; no ss insulin required this shift.  IVF and chemo infusing as ordered; pt to start day #4 of R-EPOCH tonight. Potassium replaced per PRN electrolyte protocol for K=3.6; additional potassium admin with lasix per NP order for pt c/o feeling swollen and weight gain overnight; good urine output noted after lasix. All VSS; no acute events so far this shift.  Pt remaining free from falls or injury throughout shift; bed in lowest position; call light within reach.  Pt instructed to call for assistance as needed.  Q1H rounding done on pt.

## 2019-02-04 NOTE — ASSESSMENT & PLAN NOTE
- increased generalized edema  - new onset mild SOBOE  - 8lb weight gain since admission  - will give 40mg IV lasix today with 40meq PO potassium replacement

## 2019-02-04 NOTE — ASSESSMENT & PLAN NOTE
- reddened rash to skin folds on bilateral trunk skin folds  - saw PCP who prescribed ketoconazole 2% cream  - will give nystatin powder while inpatient; keep areas dry  - improving per patient

## 2019-02-05 PROBLEM — E79.0 HYPERURICEMIA: Status: ACTIVE | Noted: 2019-02-05

## 2019-02-05 PROBLEM — Z91.89 AT HIGH RISK OF TUMOR LYSIS SYNDROME: Status: ACTIVE | Noted: 2019-02-05

## 2019-02-05 LAB
ALBUMIN SERPL BCP-MCNC: 3.2 G/DL
ALP SERPL-CCNC: 43 U/L
ALT SERPL W/O P-5'-P-CCNC: 20 U/L
ANION GAP SERPL CALC-SCNC: 8 MMOL/L
APTT BLDCRRT: 23.7 SEC
AST SERPL-CCNC: 16 U/L
BASOPHILS # BLD AUTO: 0.01 K/UL
BASOPHILS NFR BLD: 0.1 %
BILIRUB SERPL-MCNC: 0.4 MG/DL
BUN SERPL-MCNC: 16 MG/DL
CALCIUM SERPL-MCNC: 8.9 MG/DL
CHLORIDE SERPL-SCNC: 104 MMOL/L
CO2 SERPL-SCNC: 27 MMOL/L
CREAT SERPL-MCNC: 0.8 MG/DL
DIFFERENTIAL METHOD: ABNORMAL
EOSINOPHIL # BLD AUTO: 0 K/UL
EOSINOPHIL NFR BLD: 0 %
ERYTHROCYTE [DISTWIDTH] IN BLOOD BY AUTOMATED COUNT: 14.4 %
EST. GFR  (AFRICAN AMERICAN): >60 ML/MIN/1.73 M^2
EST. GFR  (NON AFRICAN AMERICAN): >60 ML/MIN/1.73 M^2
GLUCOSE SERPL-MCNC: 137 MG/DL
HCT VFR BLD AUTO: 34.6 %
HGB BLD-MCNC: 11 G/DL
IMM GRANULOCYTES # BLD AUTO: 0.05 K/UL
IMM GRANULOCYTES NFR BLD AUTO: 0.6 %
INR PPP: 1.1
LDH SERPL L TO P-CCNC: 152 U/L
LYMPHOCYTES # BLD AUTO: 0.3 K/UL
LYMPHOCYTES NFR BLD: 3.7 %
MAGNESIUM SERPL-MCNC: 2.2 MG/DL
MCH RBC QN AUTO: 26.2 PG
MCHC RBC AUTO-ENTMCNC: 31.8 G/DL
MCV RBC AUTO: 82 FL
MONOCYTES # BLD AUTO: 0.2 K/UL
MONOCYTES NFR BLD: 1.9 %
NEUTROPHILS # BLD AUTO: 7.4 K/UL
NEUTROPHILS NFR BLD: 93.7 %
NRBC BLD-RTO: 0 /100 WBC
PHOSPHATE SERPL-MCNC: 3.2 MG/DL
PLATELET # BLD AUTO: 240 K/UL
PMV BLD AUTO: 9.7 FL
POCT GLUCOSE: 120 MG/DL (ref 70–110)
POCT GLUCOSE: 124 MG/DL (ref 70–110)
POCT GLUCOSE: 125 MG/DL (ref 70–110)
POCT GLUCOSE: 135 MG/DL (ref 70–110)
POCT GLUCOSE: 159 MG/DL (ref 70–110)
POTASSIUM SERPL-SCNC: 3.6 MMOL/L
PROT SERPL-MCNC: 6.8 G/DL
PROTHROMBIN TIME: 11.2 SEC
RBC # BLD AUTO: 4.2 M/UL
SODIUM SERPL-SCNC: 139 MMOL/L
URATE SERPL-MCNC: 6.3 MG/DL
WBC # BLD AUTO: 7.93 K/UL

## 2019-02-05 PROCEDURE — 83735 ASSAY OF MAGNESIUM: CPT

## 2019-02-05 PROCEDURE — 84100 ASSAY OF PHOSPHORUS: CPT

## 2019-02-05 PROCEDURE — 25000003 PHARM REV CODE 250: Performed by: NURSE PRACTITIONER

## 2019-02-05 PROCEDURE — 20600001 HC STEP DOWN PRIVATE ROOM

## 2019-02-05 PROCEDURE — 80053 COMPREHEN METABOLIC PANEL: CPT

## 2019-02-05 PROCEDURE — 85025 COMPLETE CBC W/AUTO DIFF WBC: CPT

## 2019-02-05 PROCEDURE — 83615 LACTATE (LD) (LDH) ENZYME: CPT

## 2019-02-05 PROCEDURE — 84550 ASSAY OF BLOOD/URIC ACID: CPT

## 2019-02-05 PROCEDURE — 63600175 PHARM REV CODE 636 W HCPCS: Performed by: NURSE PRACTITIONER

## 2019-02-05 PROCEDURE — 99233 SBSQ HOSP IP/OBS HIGH 50: CPT | Mod: ,,, | Performed by: INTERNAL MEDICINE

## 2019-02-05 PROCEDURE — 25000003 PHARM REV CODE 250: Performed by: INTERNAL MEDICINE

## 2019-02-05 PROCEDURE — 85610 PROTHROMBIN TIME: CPT

## 2019-02-05 PROCEDURE — 85730 THROMBOPLASTIN TIME PARTIAL: CPT

## 2019-02-05 PROCEDURE — 99233 PR SUBSEQUENT HOSPITAL CARE,LEVL III: ICD-10-PCS | Mod: ,,, | Performed by: INTERNAL MEDICINE

## 2019-02-05 RX ORDER — PREDNISONE 20 MG/1
160 TABLET ORAL 2 TIMES DAILY
Qty: 80 TABLET | Refills: 4 | Status: CANCELLED | OUTPATIENT
Start: 2019-02-22 | End: 2019-02-27

## 2019-02-05 RX ORDER — LOSARTAN POTASSIUM AND HYDROCHLOROTHIAZIDE 12.5; 1 MG/1; MG/1
1 TABLET ORAL DAILY
Qty: 90 TABLET | Refills: 3 | Status: CANCELLED
Start: 2019-02-06 | End: 2020-02-06

## 2019-02-05 RX ORDER — ALLOPURINOL 100 MG/1
300 TABLET ORAL DAILY
Status: DISCONTINUED | OUTPATIENT
Start: 2019-02-05 | End: 2019-02-06 | Stop reason: HOSPADM

## 2019-02-05 RX ADMIN — PREDNISONE 160 MG: 20 TABLET ORAL at 09:02

## 2019-02-05 RX ADMIN — ONDANSETRON 8 MG: 4 TABLET, FILM COATED ORAL at 09:02

## 2019-02-05 RX ADMIN — SODIUM CHLORIDE: 0.9 INJECTION, SOLUTION INTRAVENOUS at 06:02

## 2019-02-05 RX ADMIN — LOSARTAN POTASSIUM AND HYDROCHLOROTHIAZIDE 1 TABLET: 12.5; 1 TABLET ORAL at 10:02

## 2019-02-05 RX ADMIN — SODIUM CHLORIDE 500 ML: 0.9 INJECTION, SOLUTION INTRAVENOUS at 09:02

## 2019-02-05 RX ADMIN — MICONAZOLE NITRATE: 20 POWDER TOPICAL at 09:02

## 2019-02-05 RX ADMIN — POTASSIUM CHLORIDE 20 MEQ: 1500 TABLET, EXTENDED RELEASE ORAL at 05:02

## 2019-02-05 RX ADMIN — FAMOTIDINE 20 MG: 20 TABLET ORAL at 09:02

## 2019-02-05 RX ADMIN — ONDANSETRON 16 MG: 4 TABLET, FILM COATED ORAL at 11:02

## 2019-02-05 RX ADMIN — CETIRIZINE HYDROCHLORIDE 10 MG: 10 TABLET, FILM COATED ORAL at 09:02

## 2019-02-05 RX ADMIN — ALLOPURINOL 300 MG: 100 TABLET ORAL at 09:02

## 2019-02-05 RX ADMIN — ALPRAZOLAM 0.25 MG: 0.25 TABLET ORAL at 09:02

## 2019-02-05 RX ADMIN — ENOXAPARIN SODIUM 40 MG: 100 INJECTION SUBCUTANEOUS at 09:02

## 2019-02-05 RX ADMIN — MICONAZOLE NITRATE: 20 POWDER TOPICAL at 10:02

## 2019-02-05 NOTE — PLAN OF CARE
Problem: Adult Inpatient Plan of Care  Goal: Plan of Care Review  Outcome: Ongoing (interventions implemented as appropriate)  Pt tolerating chemo at this time. IVF @ 50 continued. Pt ambulates frequently. Xanax given before bed for insomnia. Pt wears own CPAP at night. Instructed patient to call for assistance, bed low and locked, call bell within reach, nonskid socks on, patient verbalized understanding.

## 2019-02-05 NOTE — PLAN OF CARE
Problem: Adult Inpatient Plan of Care  Goal: Plan of Care Review  Patient alert, awake and oriented to person, place, time and situation.  VSS.  Patient remains free of fall or injury this shift.  Ambulates frequently in hallway.  Excellent appetite and is drinking adequate hydration as encouraged.  No complaints of pain or nausea.  Tolerating chemo without difficulty.  Patient does complain of mild anxiety at times but did not require any medication this shift.  No questions/concerns at this time.

## 2019-02-05 NOTE — PROGRESS NOTES
Chemo note:DOXOrubicin (ADRIAMYCIN) 26 mg, etoposide (VEPESID) 132 mg, vinCRIStine (ONCOVIN) 1 mg in sodium chloride 0.9% 480 mL chemo infusion started infusing to PAC.  Positive blood return noted. Consent in chart.. Chemo precautions maintained. Premedications given as ordered. Will monitor patient.

## 2019-02-05 NOTE — SUBJECTIVE & OBJECTIVE
Subjective:     Interval History: D5 EPOCH, continues to tolerate chemo well. Improved facial flushing, pt remains afebrile. Improved edema and SOBOE. Rash to bilateral flank improving with use of nystatin powder. Pt denies chest pain, n/v/d/c, or neuropathy.    Objective:     Vital Signs (Most Recent):  Temp: 98.5 °F (36.9 °C) (02/05/19 0750)  Pulse: (!) 48 (02/05/19 0750)  Resp: 18 (02/05/19 0750)  BP: 135/73 (02/05/19 0750)  SpO2: 97 % (02/05/19 0750) Vital Signs (24h Range):  Temp:  [97.8 °F (36.6 °C)-98.5 °F (36.9 °C)] 98.5 °F (36.9 °C)  Pulse:  [48-65] 48  Resp:  [16-18] 18  SpO2:  [96 %-97 %] 97 %  BP: (114-139)/(51-75) 135/73     Weight: (!) 142.8 kg (314 lb 14.8 oz)  Body mass index is 49.32 kg/m².  Body surface area is 2.6 meters squared.    ECOG SCORE         [unfilled]    Intake/Output - Last 3 Shifts       02/03 0700 - 02/04 0659 02/04 0700 - 02/05 0659 02/05 0700 - 02/06 0659    P.O. 1620 1380     I.V. (mL/kg) 1107.5 (7.7) 1259.2 (8.8)     IV Piggyback 438.7 1527     Total Intake(mL/kg) 3166.2 (22) 4166.2 (29.2)     Urine (mL/kg/hr) 4275 (1.2) 6750 (2)     Stool 0      Total Output 4275 6750     Net -1108.8 -2583.9            Stool Occurrence 0 x            Physical Exam   Constitutional: He is oriented to person, place, and time. He appears well-developed and well-nourished.   Obese   HENT:   Head: Normocephalic and atraumatic.   Right Ear: External ear normal.   Left Ear: External ear normal.   Mouth/Throat: Oropharynx is clear and moist. No oropharyngeal exudate.   Eyes: Conjunctivae and EOM are normal.   Neck: Normal range of motion. Neck supple.   Cardiovascular: Normal rate, regular rhythm, normal heart sounds and intact distal pulses.   No murmur heard.  Pulmonary/Chest: Effort normal and breath sounds normal. No stridor. No respiratory distress. He has no wheezes. He has no rales.   Abdominal: Soft. Bowel sounds are normal. There is no tenderness.   Midline surgical scar; healed    Musculoskeletal: Normal range of motion. He exhibits no edema.   Neurological: He is alert and oriented to person, place, and time.   Skin: Skin is warm and dry.   Improving erythema to skin folds   Psychiatric: He has a normal mood and affect. His behavior is normal. Judgment and thought content normal.   Nursing note and vitals reviewed.      Significant Labs:   CBC:   Recent Labs   Lab 02/04/19 0436 02/05/19 0439   WBC 11.19 7.93   HGB 11.6* 11.0*   HCT 35.6* 34.6*    240    and CMP:   Recent Labs   Lab 02/04/19 0436 02/05/19 0439    139   K 3.6 3.6    104   CO2 27 27   * 137*   BUN 15 16   CREATININE 0.8 0.8   CALCIUM 8.9 8.9   PROT 7.3 6.8   ALBUMIN 3.4* 3.2*   BILITOT 0.4 0.4   ALKPHOS 48* 43*   AST 16 16   ALT 21 20   ANIONGAP 8 8   EGFRNONAA >60.0 >60.0

## 2019-02-05 NOTE — PROGRESS NOTES
[facility]  Hematology  Bone Marrow Transplant  Progress Note    Patient Name: Giorgio Streeter  Admission Date: 2/1/2019  Hospital Length of Stay: 4 days  Code Status: Full Code    Subjective:     Interval History: D5 EPOCH, continues to tolerate chemo well. Improved facial flushing, pt remains afebrile. Improved edema and SOBOE. Rash to bilateral flank improving with use of nystatin powder. Pt denies chest pain, n/v/d/c, or neuropathy.    Objective:     Vital Signs (Most Recent):  Temp: 98.5 °F (36.9 °C) (02/05/19 0750)  Pulse: (!) 48 (02/05/19 0750)  Resp: 18 (02/05/19 0750)  BP: 135/73 (02/05/19 0750)  SpO2: 97 % (02/05/19 0750) Vital Signs (24h Range):  Temp:  [97.8 °F (36.6 °C)-98.5 °F (36.9 °C)] 98.5 °F (36.9 °C)  Pulse:  [48-65] 48  Resp:  [16-18] 18  SpO2:  [96 %-97 %] 97 %  BP: (114-139)/(51-75) 135/73     Weight: (!) 142.8 kg (314 lb 14.8 oz)  Body mass index is 49.32 kg/m².  Body surface area is 2.6 meters squared.    ECOG SCORE         [unfilled]    Intake/Output - Last 3 Shifts       02/03 0700 - 02/04 0659 02/04 0700 - 02/05 0659 02/05 0700 - 02/06 0659    P.O. 1620 1380     I.V. (mL/kg) 1107.5 (7.7) 1259.2 (8.8)     IV Piggyback 438.7 1527     Total Intake(mL/kg) 3166.2 (22) 4166.2 (29.2)     Urine (mL/kg/hr) 4275 (1.2) 6750 (2)     Stool 0      Total Output 4275 6750     Net -1108.8 -2583.9            Stool Occurrence 0 x            Physical Exam   Constitutional: He is oriented to person, place, and time. He appears well-developed and well-nourished.   Obese   HENT:   Head: Normocephalic and atraumatic.   Right Ear: External ear normal.   Left Ear: External ear normal.   Mouth/Throat: Oropharynx is clear and moist. No oropharyngeal exudate.   Eyes: Conjunctivae and EOM are normal.   Neck: Normal range of motion. Neck supple.   Cardiovascular: Normal rate, regular rhythm, normal heart sounds and intact distal pulses.   No murmur heard.  Pulmonary/Chest: Effort normal and  "breath sounds normal. No stridor. No respiratory distress. He has no wheezes. He has no rales.   Abdominal: Soft. Bowel sounds are normal. There is no tenderness.   Midline surgical scar; healed   Musculoskeletal: Normal range of motion. He exhibits no edema.   Neurological: He is alert and oriented to person, place, and time.   Skin: Skin is warm and dry.   Improving erythema to skin folds   Psychiatric: He has a normal mood and affect. His behavior is normal. Judgment and thought content normal.   Nursing note and vitals reviewed.      Significant Labs:   CBC:   Recent Labs   Lab 02/04/19 0436 02/05/19 0439   WBC 11.19 7.93   HGB 11.6* 11.0*   HCT 35.6* 34.6*    240    and CMP:   Recent Labs   Lab 02/04/19 0436 02/05/19 0439    139   K 3.6 3.6    104   CO2 27 27   * 137*   BUN 15 16   CREATININE 0.8 0.8   CALCIUM 8.9 8.9   PROT 7.3 6.8   ALBUMIN 3.4* 3.2*   BILITOT 0.4 0.4   ALKPHOS 48* 43*   AST 16 16   ALT 21 20   ANIONGAP 8 8   EGFRNONAA >60.0 >60.0     Assessment/Plan:     * Diffuse large b-cell lymphoma, intra-abdominal lymph nodes    - From 2nd opinion at [facility]: "The pathology report has been released (attached below), and the opinion of our Hematopathologist is that there are two features of concern: one is that the cellular reproductive rate is as high as 90% in some portions of the tumor, and the other is that at the DNA analysis level, there is indication of a genomic alteration of poor prognosis, that is a rearrangement of the gene c-myc.  Therefore, given these two factors, she considers this lymphoma to be biologically aggressive, although it DOES NOT meet the criteria to define this as a Burkitt Lymphoma. She does agree that the marrow biopsy has no indication of lymphoma.     RECOMMENDATION: Given these findings, our clinical recommendation therefore would be that the patient be treated with the REPOCH regimen, which includes intrathecal chemotherapy. This " "recommendation is based on the recently published Phase 2 multi-institution report, showing that REPOCH treatment has very favorable results in patients with Diffuse Large B-Cell Lymphoma, regardless of high rate of cellular reproduction and c-myc rearrangement features.   Lancet Haematol. 2018 Dec;5(12):f474-p158. doi: 10.1016/-0294(18)75647-9.   Dose-adjusted EPOCH-R (etoposide, prednisone, vincristine, cyclophosphamide, doxorubicin, and rituximab) in untreated aggressive diffuse large B-cell lymphoma with MYC rearrangement: a prospective, multicentre, single-arm phase 2 study.  Brinda K1, Fanale MA2, Nadya JS3, Patria A4, Caimi PF5, [place] S6, Melo S7, Lacasce A3, [place] JW8, Colin D9, Zhane S10, Lechowicz MJ11, Suman R12, Sudeep A1, Linda C1, Roschewski M1, Asa SM13, Donte ES6, Shin B14, Ace JW15, Ellen RF16, Shekhar NL14, Stanford WH17."    - MYC/IgH fusion noted on FISH, in 27% of nuclei. 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 IPI score 0. It does not fulfil the criteria for Burkitts (ki 67 high, but not high enough), double or triple hit (no bcl2 or bcl6 translocation). No PET avid measurable disease. No lymphoma in bone marrow. I discussed the treatment of stage I GI high grade lymphoma. I explained that although there is no active/measurable disease, chemotherapy with RCHOP or dose adjusted R-EPOCH is recommended, as he had bulky (8cm), high grade (ki 67 > 60%) lymphoma with MYC/IgH fusion. He will also need diagnostic LP. He went to [facility] for 2nd opinion.   - Starting cycle1 DA  R-EPOCH with IT MTX 2/1. Risks and benefits explained in detail and consent obtained by Dr. Hwang.  - Today is Day 5; currently tolerating therapy with some mild facial flushing during infusion that resolves promptly after.          At high risk of tumor lysis syndrome    - uric acid continues to up trend since admission and start of chemotherapy  - started on 300mg allopurinol daily 2/5, continue to " monitor     Anxiety about health    - prn xanax BID      Generalized edema    - increased generalized edema  - new onset mild SOBOE  - 8lb weight gain since admission  - will give 40mg IV lasix today with 40meq PO potassium replacement 2/4  - now improved s/p lasix     Anemia in neoplastic disease    - Hgb 12.9 on admit - has remained stable  - monitoring daily CBC   - transfuse Hgb <7      Rash and nonspecific skin eruption    - reddened rash to skin folds on bilateral trunk skin folds  - saw PCP who prescribed ketoconazole 2% cream  - will give nystatin powder while inpatient; keep areas dry  - improving per patient      S/P small bowel resection    - see small bowel cancer       Seasonal allergies    - uses claritin or allegra at home regularly  - will start zyrtec daily while inpatient       THUAN (obstructive sleep apnea)    - Cpap PRN, compliant        Essential hypertension    - continue home losartan-Hctz  - atenolol changed from BID to daily d/t bradycardia 2/4, hold for HR<55     Morbid obesity    - BMI: 48.31  - nutrition to follow while inpatient  - monitoring blood glucose levels with meals and HS while on high dose steroids       Small bowel cancer    - CT abdomen/pelvis 12/7/18 revealed an abnormal thickening of an 8 cm segment of small bowel (most likely mid to distal jejunum)  - referred to Dr. Casanova for further evaluation of his small bowel mass.  - underwent segmental small bowel resection on 12/21/18, mass was approximately 6 cm in length and was circumferential. There was dilation of the small bowel above it suggesting a partial bowel obstruction.  There was no adenopathy in the adjacent mesentery.  The tumor was clearly extending to the serosal surface of the bowel. Careful search was made for peritoneal implants and there were no lesions noted on the peritoneum or omentum.  The liver could not be inspected through the incision, but it was palpated and there were no focal lesions within it. The  remainder of the small bowel was run from the ligament of Treitz to the ileocecal valves and no other small bowel lesions were noted.   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 The Ki-67 proliferation index is high (>60%). In situ hybridization for David bar viral RNA is negative. Molecular studies are pending to exclude the possibility of a high-grade B-cell lymphoma with myc and BCL-2/ BCL6 rearrangements.               VTE Risk Mitigation (From admission, onward)        Ordered     enoxaparin injection 40 mg  Daily      02/04/19 1110     heparin, porcine (PF) 100 unit/mL injection flush 300 Units  As needed (PRN)      02/01/19 1650     IP VTE HIGH RISK PATIENT  Once      02/01/19 1651          Disposition: Remains inpatient to continue chemotherapy. Plan for discharge tomorrow following completion    Ashlee Luna NP  Bone Marrow Transplant  [facility]

## 2019-02-05 NOTE — ASSESSMENT & PLAN NOTE
- increased generalized edema  - new onset mild SOBOE  - 8lb weight gain since admission  - will give 40mg IV lasix today with 40meq PO potassium replacement 2/4  - now improved s/p lasix

## 2019-02-05 NOTE — ASSESSMENT & PLAN NOTE
- uric acid continues to up trend since admission and start of chemotherapy  - started on 300mg allopurinol daily 2/5, continue to monitor

## 2019-02-05 NOTE — ASSESSMENT & PLAN NOTE
"- From 2nd opinion at [facility]: "The pathology report has been released (attached below), and the opinion of our Hematopathologist is that there are two features of concern: one is that the cellular reproductive rate is as high as 90% in some portions of the tumor, and the other is that at the DNA analysis level, there is indication of a genomic alteration of poor prognosis, that is a rearrangement of the gene c-myc.  Therefore, given these two factors, she considers this lymphoma to be biologically aggressive, although it DOES NOT meet the criteria to define this as a Burkitt Lymphoma. She does agree that the marrow biopsy has no indication of lymphoma.     RECOMMENDATION: Given these findings, our clinical recommendation therefore would be that the patient be treated with the REPOCH regimen, which includes intrathecal chemotherapy. This recommendation is based on the recently published Phase 2 multi-institution report, showing that REPOCH treatment has very favorable results in patients with Diffuse Large B-Cell Lymphoma, regardless of high rate of cellular reproduction and c-myc rearrangement features.   Lancet Haematol. 2018 Dec;5(12):m668-d152. doi: 10.1016/-8622(31)80052-1.   Dose-adjusted EPOCH-R (etoposide, prednisone, vincristine, cyclophosphamide, doxorubicin, and rituximab) in untreated aggressive diffuse large B-cell lymphoma with MYC rearrangement: a prospective, multicentre, single-arm phase 2 study.  Brinda K1, Vinnie MA2, Nadya JS3, Patria A4, Caimi PF5, [place] S6, Melo S7, Lacasce A3, [place] JW8, Colin D9, Zhane S10, Lecivoneicz MJ11, Suman R12, Sudeep A1, Linda C1, Roschewski M1, Asa SM13, Donte ES6, Shin B14, Ace JW15, Little RF16, Corrigan NL14, Stanford WH17."    - MYC/IgH fusion noted on FISH, in 27% of nuclei. 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 IPI score 0. It does not fulfil the criteria for Burkitts (ki 67 high, but not high enough), double or triple hit (no bcl2 or " bcl6 translocation). No PET avid measurable disease. No lymphoma in bone marrow. I discussed the treatment of stage I GI high grade lymphoma. I explained that although there is no active/measurable disease, chemotherapy with RCHOP or dose adjusted R-EPOCH is recommended, as he had bulky (8cm), high grade (ki 67 > 60%) lymphoma with MYC/IgH fusion. He will also need diagnostic LP. He went to [facility] for 2nd opinion.   - Starting cycle1 DA  R-EPOCH with IT MTX 2/1. Risks and benefits explained in detail and consent obtained by Dr. Hwang.  - Today is Day 5; currently tolerating therapy with some mild facial flushing during infusion that resolves promptly after.

## 2019-02-06 VITALS
OXYGEN SATURATION: 95 % | RESPIRATION RATE: 16 BRPM | BODY MASS INDEX: 49.44 KG/M2 | HEIGHT: 67 IN | DIASTOLIC BLOOD PRESSURE: 56 MMHG | HEART RATE: 62 BPM | SYSTOLIC BLOOD PRESSURE: 118 MMHG | WEIGHT: 315 LBS | TEMPERATURE: 98 F

## 2019-02-06 PROBLEM — E87.6 HYPOKALEMIA: Status: ACTIVE | Noted: 2019-02-06

## 2019-02-06 LAB
ALBUMIN SERPL BCP-MCNC: 3.2 G/DL
ALP SERPL-CCNC: 44 U/L
ALT SERPL W/O P-5'-P-CCNC: 23 U/L
ANION GAP SERPL CALC-SCNC: 9 MMOL/L
APTT BLDCRRT: 22.9 SEC
AST SERPL-CCNC: 18 U/L
BASOPHILS # BLD AUTO: 0 K/UL
BASOPHILS NFR BLD: 0 %
BILIRUB SERPL-MCNC: 0.6 MG/DL
BUN SERPL-MCNC: 17 MG/DL
CALCIUM SERPL-MCNC: 8.7 MG/DL
CHLORIDE SERPL-SCNC: 102 MMOL/L
CO2 SERPL-SCNC: 27 MMOL/L
CREAT SERPL-MCNC: 0.8 MG/DL
DIFFERENTIAL METHOD: ABNORMAL
EOSINOPHIL # BLD AUTO: 0 K/UL
EOSINOPHIL NFR BLD: 0 %
ERYTHROCYTE [DISTWIDTH] IN BLOOD BY AUTOMATED COUNT: 13.9 %
EST. GFR  (AFRICAN AMERICAN): >60 ML/MIN/1.73 M^2
EST. GFR  (NON AFRICAN AMERICAN): >60 ML/MIN/1.73 M^2
GLUCOSE SERPL-MCNC: 126 MG/DL
HCT VFR BLD AUTO: 34.1 %
HGB BLD-MCNC: 11.2 G/DL
IMM GRANULOCYTES # BLD AUTO: 0.04 K/UL
IMM GRANULOCYTES NFR BLD AUTO: 0.6 %
INR PPP: 1
LDH SERPL L TO P-CCNC: 146 U/L
LYMPHOCYTES # BLD AUTO: 0.3 K/UL
LYMPHOCYTES NFR BLD: 3.6 %
MAGNESIUM SERPL-MCNC: 2.2 MG/DL
MCH RBC QN AUTO: 26.4 PG
MCHC RBC AUTO-ENTMCNC: 32.8 G/DL
MCV RBC AUTO: 80 FL
MONOCYTES # BLD AUTO: 0.1 K/UL
MONOCYTES NFR BLD: 1 %
NEUTROPHILS # BLD AUTO: 6.6 K/UL
NEUTROPHILS NFR BLD: 94.8 %
NRBC BLD-RTO: 0 /100 WBC
PHOSPHATE SERPL-MCNC: 2.9 MG/DL
PLATELET # BLD AUTO: 228 K/UL
PMV BLD AUTO: 9.8 FL
POCT GLUCOSE: 119 MG/DL (ref 70–110)
POTASSIUM SERPL-SCNC: 3.4 MMOL/L
PROT SERPL-MCNC: 6.7 G/DL
PROTHROMBIN TIME: 10.8 SEC
RBC # BLD AUTO: 4.24 M/UL
SODIUM SERPL-SCNC: 138 MMOL/L
URATE SERPL-MCNC: 5 MG/DL
WBC # BLD AUTO: 6.96 K/UL

## 2019-02-06 PROCEDURE — 99239 HOSP IP/OBS DSCHRG MGMT >30: CPT | Mod: ,,, | Performed by: INTERNAL MEDICINE

## 2019-02-06 PROCEDURE — 63600175 PHARM REV CODE 636 W HCPCS: Performed by: INTERNAL MEDICINE

## 2019-02-06 PROCEDURE — 83735 ASSAY OF MAGNESIUM: CPT

## 2019-02-06 PROCEDURE — 84550 ASSAY OF BLOOD/URIC ACID: CPT

## 2019-02-06 PROCEDURE — 80053 COMPREHEN METABOLIC PANEL: CPT

## 2019-02-06 PROCEDURE — 25000003 PHARM REV CODE 250: Performed by: NURSE PRACTITIONER

## 2019-02-06 PROCEDURE — 85610 PROTHROMBIN TIME: CPT

## 2019-02-06 PROCEDURE — 25000003 PHARM REV CODE 250: Performed by: INTERNAL MEDICINE

## 2019-02-06 PROCEDURE — 85730 THROMBOPLASTIN TIME PARTIAL: CPT

## 2019-02-06 PROCEDURE — 84100 ASSAY OF PHOSPHORUS: CPT

## 2019-02-06 PROCEDURE — 85025 COMPLETE CBC W/AUTO DIFF WBC: CPT

## 2019-02-06 PROCEDURE — 83615 LACTATE (LD) (LDH) ENZYME: CPT

## 2019-02-06 PROCEDURE — 99239 PR HOSPITAL DISCHARGE DAY,>30 MIN: ICD-10-PCS | Mod: ,,, | Performed by: INTERNAL MEDICINE

## 2019-02-06 PROCEDURE — 63600175 PHARM REV CODE 636 W HCPCS: Mod: JG | Performed by: INTERNAL MEDICINE

## 2019-02-06 PROCEDURE — 63600175 PHARM REV CODE 636 W HCPCS: Performed by: NURSE PRACTITIONER

## 2019-02-06 RX ORDER — HEPARIN 100 UNIT/ML
5 SYRINGE INTRAVENOUS ONCE
Status: DISCONTINUED | OUTPATIENT
Start: 2019-02-06 | End: 2019-02-06 | Stop reason: HOSPADM

## 2019-02-06 RX ORDER — ALPRAZOLAM 0.25 MG/1
0.25 TABLET ORAL 2 TIMES DAILY PRN
Qty: 90 TABLET | Refills: 0 | Status: SHIPPED | OUTPATIENT
Start: 2019-02-06 | End: 2020-02-26

## 2019-02-06 RX ORDER — ALLOPURINOL 300 MG/1
300 TABLET ORAL DAILY
Qty: 30 TABLET | Refills: 1 | Status: SHIPPED | OUTPATIENT
Start: 2019-02-06 | End: 2019-04-01

## 2019-02-06 RX ORDER — NYSTATIN 100000 [USP'U]/G
POWDER TOPICAL 2 TIMES DAILY
Qty: 60 G | Refills: 1 | Status: SHIPPED | OUTPATIENT
Start: 2019-02-06 | End: 2019-09-23 | Stop reason: SDUPTHER

## 2019-02-06 RX ORDER — LOSARTAN POTASSIUM AND HYDROCHLOROTHIAZIDE 12.5; 1 MG/1; MG/1
1 TABLET ORAL DAILY
Qty: 30 TABLET | Refills: 5 | Status: SHIPPED | OUTPATIENT
Start: 2019-02-06 | End: 2020-07-20

## 2019-02-06 RX ORDER — ATENOLOL 50 MG/1
50 TABLET ORAL DAILY
Qty: 30 TABLET | Refills: 11
Start: 2019-02-06 | End: 2019-11-27

## 2019-02-06 RX ORDER — PREDNISONE 10 MG/1
TABLET ORAL
Qty: 10 TABLET | Refills: 5 | Status: SHIPPED | OUTPATIENT
Start: 2019-02-06 | End: 2019-04-01

## 2019-02-06 RX ORDER — FLUCONAZOLE 200 MG/1
400 TABLET ORAL DAILY
Qty: 60 TABLET | Refills: 0 | Status: SHIPPED | OUTPATIENT
Start: 2019-02-06 | End: 2019-02-28 | Stop reason: SDUPTHER

## 2019-02-06 RX ORDER — PREDNISONE 50 MG/1
150 TABLET ORAL 2 TIMES DAILY
Qty: 30 TABLET | Refills: 5 | Status: SHIPPED | OUTPATIENT
Start: 2019-02-06 | End: 2019-02-06 | Stop reason: SDUPTHER

## 2019-02-06 RX ORDER — LEVOFLOXACIN 250 MG/1
250 TABLET ORAL DAILY
Qty: 30 TABLET | Refills: 2 | Status: SHIPPED | OUTPATIENT
Start: 2019-02-06 | End: 2019-02-06 | Stop reason: SDUPTHER

## 2019-02-06 RX ORDER — FAMOTIDINE 20 MG/1
20 TABLET, FILM COATED ORAL 2 TIMES DAILY
Qty: 60 TABLET | Refills: 11 | Status: SHIPPED | OUTPATIENT
Start: 2019-02-06 | End: 2019-04-01

## 2019-02-06 RX ORDER — PREDNISONE 50 MG/1
150 TABLET ORAL 2 TIMES DAILY
Qty: 30 TABLET | Refills: 5 | Status: SHIPPED | OUTPATIENT
Start: 2019-02-06 | End: 2019-04-01

## 2019-02-06 RX ORDER — LEVOFLOXACIN 500 MG/1
500 TABLET, FILM COATED ORAL DAILY
Qty: 30 TABLET | Refills: 2 | Status: CANCELLED | OUTPATIENT
Start: 2019-02-06

## 2019-02-06 RX ORDER — LEVOFLOXACIN 500 MG/1
500 TABLET, FILM COATED ORAL DAILY
Qty: 30 TABLET | Refills: 6 | Status: SHIPPED | OUTPATIENT
Start: 2019-02-06 | End: 2019-04-01

## 2019-02-06 RX ORDER — ACYCLOVIR 400 MG/1
400 TABLET ORAL 2 TIMES DAILY
Qty: 60 TABLET | Refills: 11 | Status: SHIPPED | OUTPATIENT
Start: 2019-02-06 | End: 2019-04-01

## 2019-02-06 RX ADMIN — POTASSIUM CHLORIDE 20 MEQ: 1500 TABLET, EXTENDED RELEASE ORAL at 06:02

## 2019-02-06 RX ADMIN — ATENOLOL 50 MG: 25 TABLET ORAL at 08:02

## 2019-02-06 RX ADMIN — FAMOTIDINE 20 MG: 20 TABLET ORAL at 08:02

## 2019-02-06 RX ADMIN — ALLOPURINOL 300 MG: 100 TABLET ORAL at 08:02

## 2019-02-06 RX ADMIN — CYCLOPHOSPHAMIDE 1965 MG: 1 INJECTION, POWDER, FOR SOLUTION INTRAVENOUS; ORAL at 12:02

## 2019-02-06 RX ADMIN — MICONAZOLE NITRATE: 20 POWDER TOPICAL at 08:02

## 2019-02-06 RX ADMIN — CETIRIZINE HYDROCHLORIDE 10 MG: 10 TABLET, FILM COATED ORAL at 08:02

## 2019-02-06 RX ADMIN — HEPARIN 300 UNITS: 100 SYRINGE at 11:02

## 2019-02-06 RX ADMIN — SODIUM CHLORIDE 500 ML: 0.9 INJECTION, SOLUTION INTRAVENOUS at 01:02

## 2019-02-06 RX ADMIN — PREDNISONE 160 MG: 20 TABLET ORAL at 09:02

## 2019-02-06 RX ADMIN — LOSARTAN POTASSIUM AND HYDROCHLOROTHIAZIDE 1 TABLET: 12.5; 1 TABLET ORAL at 08:02

## 2019-02-06 RX ADMIN — POTASSIUM CHLORIDE 20 MEQ: 1500 TABLET, EXTENDED RELEASE ORAL at 09:02

## 2019-02-06 RX ADMIN — ENOXAPARIN SODIUM 40 MG: 100 INJECTION SUBCUTANEOUS at 08:02

## 2019-02-06 NOTE — PLAN OF CARE
Problem: Adult Inpatient Plan of Care  Goal: Plan of Care Review  Outcome: Ongoing (interventions implemented as appropriate)  Patient remains free from falls and injury this shift. Bed in low, locked position with call light in reach. Spouse at bedside. Patient encouraged to call for assistance when needed. Pt is independent and ambulatory.  Patient verbalized understanding. Pt had no complaints of pain or discomfort. NS infusing 50 ml/hr. Pt to be discharged, discharge instructions given all question and concerns addressed, medications to be delivered at the bedside, port to be de accessed and heparinized prior. All belongings within reach will continue to monitor.'

## 2019-02-06 NOTE — PLAN OF CARE
MDR's with Dr Galvin.  Patient has completed chemo without complication.  He has a reservation at the [facility] and will have OP Rituxan and Neulasta tomorrow.  CM reviewed the d/c plan and  follow up with the patient and his wife.  The patient and his family have anxiety of not knowing what to expect and whats next.  The patient plans to stay with his sister on the [facility] while his counts are low.  He will have twice weekly labs drawn in [place].  All questions answered regarding what the patient should do if he has a fever, precautions, etc.   Informed that there will be a number on the d/c instructions to call if after hours.  Relayed that if they have any questions or concerned during business hours they should call the clinic or send a message through [facility] [employer] if non-urgent.  The patient and his family verbalized understanding.  Meds have been delivered to bedside.  No HH/DME needs.  The patient and his family are in agreement with the d/c plan.      Future Appointments   Date Time Provider Department Center   2/7/2019  7:00 AM [facility] [place] [facility]   2/7/2019  3:00 PM [facility] FLIP2 500 LB LIMIT [facility] XRAY IP [facility]   2/11/2019 10:30 AM LAB, [facility] LAB [place]   2/14/2019 10:30 AM LAB, [facility] LAB [place]   2/18/2019 10:20 AM LAB, [facility] LAB [place]   2/21/2019 10:20 AM LAB, [facility] LAB [place]   2/25/2019  7:10 AM LAB, [facility] CANCER BLDG [facility] LAB HO [place] [facility]   2/25/2019  8:00 AM Ector Hwang MD [facility] HEM ONC [place] [facility]   2/25/2019  9:00 AM [facility], [facility]MO [facility] CHE[facility] [place] [facility]   2/26/2019  2:00 PM [facility]MO [facility] CHE[facility] [place] [facility]   2/27/2019  2:00 PM [facility]MO [facility] [place] [facility]   2/28/2019  1:00 PM [facility], [facility]MO [facility] CHE[facility] [place] [facility]   3/1/2019  2:00 PM [facility], CHEMO [facility] CHEMO [place] [facility]        02/06/19 1140   Final Note   Assessment Type Final Discharge Note   Anticipated Discharge Disposition Home   What phone number can  be called within the next 1-3 days to see how you are doing after discharge? ([phone])   Hospital Follow Up  Appt(s) scheduled? Yes   Discharge plans and expectations educations in teach back method with documentation complete? Yes

## 2019-02-06 NOTE — ASSESSMENT & PLAN NOTE
"- From 2nd opinion at [facility]: "The pathology report has been released (attached below), and the opinion of our Hematopathologist is that there are two features of concern: one is that the cellular reproductive rate is as high as 90% in some portions of the tumor, and the other is that at the DNA analysis level, there is indication of a genomic alteration of poor prognosis, that is a rearrangement of the gene c-myc.  Therefore, given these two factors, she considers this lymphoma to be biologically aggressive, although it DOES NOT meet the criteria to define this as a Burkitt Lymphoma. She does agree that the marrow biopsy has no indication of lymphoma.     RECOMMENDATION: Given these findings, our clinical recommendation therefore would be that the patient be treated with the REPOCH regimen, which includes intrathecal chemotherapy. This recommendation is based on the recently published Phase 2 multi-institution report, showing that REPOCH treatment has very favorable results in patients with Diffuse Large B-Cell Lymphoma, regardless of high rate of cellular reproduction and c-myc rearrangement features.   Lancet Haematol. 2018 Dec;5(12):u919-y430. doi: 10.1016/-1255(60)11082-5.   Dose-adjusted EPOCH-R (etoposide, prednisone, vincristine, cyclophosphamide, doxorubicin, and rituximab) in untreated aggressive diffuse large B-cell lymphoma with MYC rearrangement: a prospective, multicentre, single-arm phase 2 study.  Brinda K1, Vinnie MA2, Nadya JS3, Patria A4, Caimi PF5, [place] S6, Melo S7, Lacasce A3, [place] JW8, Colin D9, Zhane S10, Lecivoneicz MJ11, Suman R12, Sudeep A1, Linda C1, Roschewski M1, Asa SM13, Donte ES6, Shin B14, Ace JW15, Little RF16, Corrigan NL14, Stanford WH17."    - MYC/IgH fusion noted on FISH, in 27% of nuclei. 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 IPI score 0. It does not fulfil the criteria for Burkitts (ki 67 high, but not high enough), double or triple hit (no bcl2 or " bcl6 translocation). No PET avid measurable disease. No lymphoma in bone marrow. I discussed the treatment of stage I GI high grade lymphoma. I explained that although there is no active/measurable disease, chemotherapy with RCHOP or dose adjusted R-EPOCH is recommended, as he had bulky (8cm), high grade (ki 67 > 60%) lymphoma with MYC/IgH fusion. He will also need diagnostic LP. He went to [facility] for 2nd opinion.   - Starting cycle1 DA  R-EPOCH with IT MTX 2/1. Risks and benefits explained in detail and consent obtained by Dr. Hwang.  - Today is Day 6; completed therapy today without incident.

## 2019-02-06 NOTE — PROGRESS NOTES
Chemo note:  cyclophosphamide (CYTOXAN) 750 mg/m2 = 1,965 mg in sodium chloride 0.9% 250 mL chemo infusion  started infusing to PAC. NS bolus given before and after. Positive blood return noted. Consent in chart.  Chemo precautions maintained. Premedications given as ordered. Will monitor patient.

## 2019-02-06 NOTE — ASSESSMENT & PLAN NOTE
- Hgb 12.9 on admit - has remained stable  - will monitor labs twice weekly while outpatient  - transfuse Hgb <7

## 2019-02-06 NOTE — ASSESSMENT & PLAN NOTE
- reddened rash to skin folds on bilateral trunk skin folds  - saw PCP who prescribed ketoconazole 2% cream  - will give nystatin powder while inpatient; keep areas dry  - improving per patient; will discharge with ketoconazole cream and nystatin powder

## 2019-02-06 NOTE — ASSESSMENT & PLAN NOTE
- continue home losartan-Hctz  - atenolol changed from BID to daily d/t bradycardia 2/4  - dose reduced from 100mg to 50mg daily 2/5

## 2019-02-06 NOTE — PLAN OF CARE
Problem: Adult Inpatient Plan of Care  Goal: Plan of Care Review  Outcome: Ongoing (interventions implemented as appropriate)  Pt completed chemotherapy overnight, IVF continued at 50. Pt reported not sleeping well overnight. Plan for discharge today. Instructed patient to call for assistance, bed low and locked, call bell within reach, nonskid socks on, patient verbalized understanding.

## 2019-02-06 NOTE — ASSESSMENT & PLAN NOTE
- BMI: 48.31  - nutrition to follow while inpatient  - close monitoring of blood glucose levels in outpatient while on high dose steroids

## 2019-02-06 NOTE — ASSESSMENT & PLAN NOTE
- uses claritin or allegra at home regularly  - will start zyrtec daily while inpatient  - can resume claritin and/or allegra on discharge

## 2019-02-06 NOTE — ASSESSMENT & PLAN NOTE
- uric acid continues to up trend since admission and start of chemotherapy  - started on 300mg allopurinol daily 2/5  - will continue at discharge

## 2019-02-06 NOTE — PROGRESS NOTES
Dr. Galvin and team notified of heartrate of 48 this am.  Patient is asymptomatic.  See new parameters for atenolol (which was held this am).  Heartrate now 50-60.

## 2019-02-06 NOTE — DISCHARGE SUMMARY
[facility]  Hematology  Bone Marrow Transplant  Discharge Summary      Patient Name: Giorgio Streeter  MRN: [id number]  Admission Date: 2/1/2019  Hospital Length of Stay: 5 days  Discharge Date and Time: 2/6/2019 12:29 PM  Attending Physician: No att. providers found   Discharging Provider: Ashlee Luna NP  Primary Care Provider: Yoanna Ron MD    HPI: Mr. Streeter is a 48 year old patient of Dr. Gill's who presents today for R-EPOCH +IT Mtx for lymphoma. 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 Pt denies any pain, fevers, chills, night sweats, chest pain, sob, n/v/d/c, neuropathy. Pt does have bilateral fungal rash to lower axilla/flank, was given rx for ketoconazole by PCP, will start nystatin powder while inpatient. See Onc Hx for full oncologic workup.    * No surgery found *     Hospital Course: Pt presents on 2/1/19 for cycle 1 R-EPOCH +IT Mtx. Presents with bilateral fungal rash to lower axilla/flank, was given rx for ketoconazole by PCP, will start nystatin powder while inpatient. Started on PRN xanax for anxiety. Tolerated chemotherapy well, only intermittent mild flushing during transfusions. Generalized edema and SOBOE noted on 2/4, 8lb weight gain since admission, likely fluid volume overload, will given 40mg lasix x1 with 40meq K replacement. One episode of hypokalemia 2/6, K level 3.4, replaced with 40mEq PO K prior to discharge.    Consults (From admission, onward)        Status Ordering Provider     Inpatient consult to Social Work/Case Management  Once     Provider:  (Not yet assigned)    Acknowledged CATALINO GILL          Significant Diagnostic Studies: Labs:   CMP   Recent Labs   Lab 02/05/19  0439 02/06/19  0434    138   K 3.6 3.4*    102   CO2 27 27   * 126*   BUN 16 17   CREATININE 0.8 0.8   CALCIUM 8.9 8.7   PROT 6.8 6.7   ALBUMIN 3.2* 3.2*   BILITOT 0.4 0.6   ALKPHOS 43* 44*    AST 16 18   ALT 20 23   ANIONGAP 8 9   ESTGFRAFRICA >60.0 >60.0   EGFRNONAA >60.0 >60.0    and CBC   Recent Labs   Lab 02/05/19  0439 02/06/19  0434   WBC 7.93 6.96   HGB 11.0* 11.2*   HCT 34.6* 34.1*    228       Pending Diagnostic Studies:     None        Final Active Diagnoses:    Diagnosis Date Noted POA    PRINCIPAL PROBLEM:  Diffuse large b-cell lymphoma, intra-abdominal lymph nodes [C83.33] 02/01/2019 Yes    Hypokalemia [E87.6] 02/06/2019 No    At high risk of tumor lysis syndrome [Z91.89] 02/05/2019 No    Generalized edema [R60.1] 02/04/2019 No    Anxiety about health [F41.8] 02/04/2019 Yes    Essential hypertension [I10] 02/01/2019 Yes    THUAN (obstructive sleep apnea) [G47.33] 02/01/2019 Yes    Seasonal allergies [J30.2] 02/01/2019 Yes    S/P small bowel resection [Z90.49] 02/01/2019 Not Applicable    Rash and nonspecific skin eruption [R21] 02/01/2019 Yes    Anemia in neoplastic disease [D63.0] 02/01/2019 Yes    Morbid obesity [E66.01] 12/21/2018 Yes    Small bowel cancer [C17.9] 12/17/2018 Yes      Problems Resolved During this Admission:    Diagnosis Date Noted Date Resolved POA    Anemia [D64.9] 02/01/2019 02/01/2019 Unknown      Discharged Condition: good    Disposition: Home or Self Care    Follow Up:    Patient Instructions:      CSF cell count with differential     Order Specific Question Answer Comments   Specimen Tube Number Tube 1 [1]      Flow Cytometry Analysis (CSF)   Standing Status: Future Standing Exp. Date: 04/04/20     Order Specific Question Answer Comments   Indication: C85.10 B cell lymphoma    Indication for ordering: DLBCL      Diet Adult Regular     Notify your health care provider if you experience any of the following:  temperature >100.4     Notify your health care provider if you experience any of the following:  persistent nausea and vomiting or diarrhea     Notify your health care provider if you experience any of the following:  severe uncontrolled pain      Notify your health care provider if you experience any of the following:  redness, tenderness, or signs of infection (pain, swelling, redness, odor or green/yellow discharge around incision site)     Notify your health care provider if you experience any of the following:  difficulty breathing or increased cough     Notify your health care provider if you experience any of the following:  severe persistent headache     Notify your health care provider if you experience any of the following:  worsening rash     Notify your health care provider if you experience any of the following:  persistent dizziness, light-headedness, or visual disturbances     Notify your health care provider if you experience any of the following:  increased confusion or weakness     Cytology Specimen-Medical Cytology (Fluid/Wash/Brush)     Order Specific Question Answer Comments   Clinical Information: DLBCL    Specific Site: IT    Other Requests: none      Activity as tolerated     Medications:  Reconciled Home Medications:      Medication List      START taking these medications    acyclovir 400 MG tablet  Commonly known as:  ZOVIRAX  Take 1 tablet (400 mg total) by mouth 2 (two) times daily.     allopurinol 300 MG tablet  Commonly known as:  ZYLOPRIM  Take 1 tablet (300 mg total) by mouth once daily.     ALPRAZolam 0.25 MG tablet  Commonly known as:  XANAX  Take 1 tablet (0.25 mg total) by mouth 2 (two) times daily as needed for Insomnia or Anxiety.     famotidine 20 MG tablet  Commonly known as:  PEPCID  Take 1 tablet (20 mg total) by mouth 2 (two) times daily.     fluconazole 200 MG Tab  Commonly known as:  DIFLUCAN  Take 2 tablets (400 mg total) by mouth once daily.     levoFLOXacin 500 MG tablet  Commonly known as:  LEVAQUIN  Take 1 tablet (500 mg total) by mouth once daily.     nystatin powder  Commonly known as:  MYCOSTATIN  Apply topically 2 (two) times daily.     * predniSONE 10 MG tablet  Commonly known as:  DELTASONE  Take  one tablet by mouth 2 times a day. On days 1-5 of chemotherapy cycles only. To be combined with 50mg tabs for a total of 160mg     * predniSONE 50 MG Tab  Commonly known as:  DELTASONE  Take 3 tablets (150 mg total) by mouth 2 (two) times daily. On days 1-5 of chemotherapy cycles. To be combined with 10mg tabs for a total of 160mg         * This list has 2 medication(s) that are the same as other medications prescribed for you. Read the directions carefully, and ask your doctor or other care provider to review them with you.            CHANGE how you take these medications    atenolol 50 MG tablet  Commonly known as:  TENORMIN  Take 1 tablet (50 mg total) by mouth once daily.  What changed:    · medication strength  · when to take this     losartan-hydrochlorothiazide 100-12.5 mg 100-12.5 mg Tab  Commonly known as:  HYZAAR  Take 1 tablet by mouth once daily.  What changed:    · how much to take  · how to take this  · when to take this        CONTINUE taking these medications    fexofenadine 60 MG tablet  Commonly known as:  ALLEGRA  Take 60 mg by mouth once daily.     fluticasone 50 mcg/actuation nasal spray  Commonly known as:  FLONASE  1 spray by Each Nare route once daily.     ketoconazole 2 % cream  Commonly known as:  NIZORAL     loratadine 10 mg tablet  Commonly known as:  CLARITIN  Take 10 mg by mouth once daily.        STOP taking these medications    ibuprofen 200 MG tablet  Commonly known as:  RAMA JACOBSEN NP  Bone Marrow Transplant  [facility]

## 2019-02-07 ENCOUNTER — INFUSION (OUTPATIENT)
Dept: INFUSION THERAPY | Facility: HOSPITAL | Age: 49
End: 2019-02-07
Attending: INTERNAL MEDICINE
Payer: COMMERCIAL

## 2019-02-07 ENCOUNTER — HOSPITAL ENCOUNTER (OUTPATIENT)
Dept: RADIOLOGY | Facility: HOSPITAL | Age: 49
Discharge: HOME | End: 2019-02-07
Payer: COMMERCIAL

## 2019-02-07 VITALS
HEART RATE: 100 BPM | RESPIRATION RATE: 17 BRPM | TEMPERATURE: 99 F | SYSTOLIC BLOOD PRESSURE: 139 MMHG | DIASTOLIC BLOOD PRESSURE: 63 MMHG

## 2019-02-07 DIAGNOSIS — C83.39 DIFFUSE LARGE B-CELL LYMPHOMA OF SOLID ORGAN EXCLUDING SPLEEN: Primary | ICD-10-CM

## 2019-02-07 DIAGNOSIS — C83.39 DIFFUSE LARGE B-CELL LYMPHOMA OF SOLID ORGAN EXCLUDING SPLEEN: ICD-10-CM

## 2019-02-07 LAB
CLARITY CSF: CLEAR
COLOR CSF: COLORLESS
LYMPHOCYTES NFR CSF MANUAL: 89 %
MONOS+MACROS NFR CSF MANUAL: 11 %
RBC # CSF: 1 /CU MM
SPECIMEN VOL CSF: 1 ML
WBC # CSF: 1 /CU MM

## 2019-02-07 PROCEDURE — 25000003 PHARM REV CODE 250: Performed by: INTERNAL MEDICINE

## 2019-02-07 PROCEDURE — 96367 TX/PROPH/DG ADDL SEQ IV INF: CPT

## 2019-02-07 PROCEDURE — 88108 CYTOPATH CONCENTRATE TECH: CPT | Mod: 26,,, | Performed by: PATHOLOGY

## 2019-02-07 PROCEDURE — 88108 CYTOLOGY SPECIMEN- MEDICAL CYTOLOGY (FLUID/WASH/BRUSH): ICD-10-PCS | Mod: 26,,, | Performed by: PATHOLOGY

## 2019-02-07 PROCEDURE — 96366 THER/PROPH/DIAG IV INF ADDON: CPT

## 2019-02-07 PROCEDURE — S0028 INJECTION, FAMOTIDINE, 20 MG: HCPCS | Performed by: INTERNAL MEDICINE

## 2019-02-07 PROCEDURE — 96375 TX/PRO/DX INJ NEW DRUG ADDON: CPT

## 2019-02-07 PROCEDURE — 96372 THER/PROPH/DIAG INJ SC/IM: CPT

## 2019-02-07 PROCEDURE — 63600175 PHARM REV CODE 636 W HCPCS: Performed by: INTERNAL MEDICINE

## 2019-02-07 PROCEDURE — 96450 CHEMOTHERAPY INTO CNS: CPT

## 2019-02-07 PROCEDURE — 96450 CHEMOTHERAPY INTO CNS: CPT | Mod: 58,,, | Performed by: RADIOLOGY

## 2019-02-07 PROCEDURE — 88108 CYTOPATH CONCENTRATE TECH: CPT | Performed by: PATHOLOGY

## 2019-02-07 PROCEDURE — 89051 BODY FLUID CELL COUNT: CPT

## 2019-02-07 PROCEDURE — 63600175 PHARM REV CODE 636 W HCPCS: Mod: JG | Performed by: INTERNAL MEDICINE

## 2019-02-07 PROCEDURE — 96450 FL CHEMO ADMINISTRATION INTRATHECAL WITH LP: ICD-10-PCS | Mod: 58,,, | Performed by: RADIOLOGY

## 2019-02-07 PROCEDURE — 96413 CHEMO IV INFUSION 1 HR: CPT

## 2019-02-07 PROCEDURE — 96415 CHEMO IV INFUSION ADDL HR: CPT

## 2019-02-07 RX ORDER — FAMOTIDINE 10 MG/ML
20 INJECTION INTRAVENOUS
Status: COMPLETED | OUTPATIENT
Start: 2019-02-07 | End: 2019-02-07

## 2019-02-07 RX ORDER — DIPHENHYDRAMINE HYDROCHLORIDE 50 MG/ML
50 INJECTION INTRAMUSCULAR; INTRAVENOUS
Status: COMPLETED | OUTPATIENT
Start: 2019-02-07 | End: 2019-02-07

## 2019-02-07 RX ORDER — MEPERIDINE HYDROCHLORIDE 50 MG/ML
25 INJECTION INTRAMUSCULAR; INTRAVENOUS; SUBCUTANEOUS
Status: DISCONTINUED | OUTPATIENT
Start: 2019-02-07 | End: 2019-02-07 | Stop reason: HOSPADM

## 2019-02-07 RX ORDER — SODIUM CHLORIDE 0.9 % (FLUSH) 0.9 %
10 SYRINGE (ML) INJECTION
Status: DISCONTINUED | OUTPATIENT
Start: 2019-02-07 | End: 2019-02-07 | Stop reason: HOSPADM

## 2019-02-07 RX ORDER — ACETAMINOPHEN 325 MG/1
650 TABLET ORAL
Status: COMPLETED | OUTPATIENT
Start: 2019-02-07 | End: 2019-02-07

## 2019-02-07 RX ORDER — HEPARIN 100 UNIT/ML
500 SYRINGE INTRAVENOUS
Status: CANCELLED | OUTPATIENT
Start: 2019-02-07

## 2019-02-07 RX ORDER — FAMOTIDINE 10 MG/ML
20 INJECTION INTRAVENOUS
Status: CANCELLED | OUTPATIENT
Start: 2019-02-07

## 2019-02-07 RX ORDER — HEPARIN 100 UNIT/ML
500 SYRINGE INTRAVENOUS
Status: DISCONTINUED | OUTPATIENT
Start: 2019-02-07 | End: 2019-02-07 | Stop reason: HOSPADM

## 2019-02-07 RX ORDER — MEPERIDINE HYDROCHLORIDE 100 MG/ML
25 INJECTION INTRAMUSCULAR; INTRAVENOUS; SUBCUTANEOUS
Status: CANCELLED | OUTPATIENT
Start: 2019-02-07

## 2019-02-07 RX ORDER — DIPHENHYDRAMINE HYDROCHLORIDE 50 MG/ML
50 INJECTION INTRAMUSCULAR; INTRAVENOUS
Status: CANCELLED | OUTPATIENT
Start: 2019-02-07

## 2019-02-07 RX ORDER — ACETAMINOPHEN 325 MG/1
650 TABLET ORAL
Status: CANCELLED | OUTPATIENT
Start: 2019-02-07

## 2019-02-07 RX ORDER — SODIUM CHLORIDE 0.9 % (FLUSH) 0.9 %
10 SYRINGE (ML) INJECTION
Status: CANCELLED | OUTPATIENT
Start: 2019-02-07

## 2019-02-07 RX ADMIN — DIPHENHYDRAMINE HYDROCHLORIDE 50 MG: 50 INJECTION INTRAMUSCULAR; INTRAVENOUS at 07:02

## 2019-02-07 RX ADMIN — SODIUM CHLORIDE: 9 INJECTION, SOLUTION INTRAVENOUS at 04:02

## 2019-02-07 RX ADMIN — FAMOTIDINE 20 MG: 10 INJECTION, SOLUTION INTRAVENOUS at 07:02

## 2019-02-07 RX ADMIN — ACETAMINOPHEN 650 MG: 325 TABLET ORAL at 07:02

## 2019-02-07 RX ADMIN — PEGFILGRASTIM 6 MG: 6 INJECTION SUBCUTANEOUS at 01:02

## 2019-02-07 RX ADMIN — HEPARIN 500 UNITS: 100 SYRINGE at 01:02

## 2019-02-07 RX ADMIN — RITUXIMAB 983 MG: 10 INJECTION, SOLUTION INTRAVENOUS at 08:02

## 2019-02-07 NOTE — PLAN OF CARE
Problem: Adult Inpatient Plan of Care  Goal: Plan of Care Review  Outcome: Ongoing (interventions implemented as appropriate)  1335:  Patient tolerated Rituxan infusion well, VSS, NAD noted, aside from anxiety.  Neulasta given as ordered, port flushed per protocol and de-accessed.  AVS given to pt's wife, as well as [facility] printouts on Rituxan and MTX.  Pt released in stable condition, ambulated off unit, escorted by RN to radiology for procedure.

## 2019-02-07 NOTE — H&P
Radiology History & Physical      SUBJECTIVE:     Chief Complaint: lymphoma.    History of Present Illness:  Giorgio Streeter is a 48 y.o. male who presents for image guided lumbar puncture with intrathecal chemotherapy.     Past Medical History:   Diagnosis Date    Cancer     non hodgkins lymphoma    Hypertension     Sleep apnea     Vision abnormalities      Past Surgical History:   Procedure Laterality Date    BONE MARROW BIOPSY  01/11/2019    EXCISION, SMALL INTESTINE N/A 12/21/2018    Performed [facility] at [facility] 2ND F[facility]    INSERTION, PORT-A-CATH N/A 1/28/2019    Performed by [facility] at [facility] C[facility]    SMALL INTESTINE SURGERY  12/21/2018    Dr. Casanova, segmental small bowel resection        Home Meds:   Prior to Admission medications    Medication Sig Start Date End Date Taking? Authorizing Provider   acyclovir (ZOVIRAX) 400 MG tablet Take 1 tablet (400 mg total) by mouth 2 (two) times daily. 2/6/19 2/6/20  Ashlee Luna NP   allopurinol (ZYLOPRIM) 300 MG tablet Take 1 tablet (300 mg total) by mouth once daily. 2/6/19 3/8/19  Ashlee Luna NP   ALPRAZolam (XANAX) 0.25 MG tablet Take 1 tablet (0.25 mg total) by mouth 2 (two) times daily as needed for Insomnia or Anxiety. 2/6/19 3/23/19  Ashlee Luna NP   atenolol (TENORMIN) 50 MG tablet Take 1 tablet (50 mg total) by mouth once daily. 2/6/19 2/6/20  Ashlee Luna NP   famotidine (PEPCID) 20 MG tablet Take 1 tablet (20 mg total) by mouth 2 (two) times daily. 2/6/19 2/6/20  Ashlee Luna NP   fexofenadine (ALLEGRA) 60 MG tablet Take 60 mg by mouth once daily.    Historical Provider, MD   fluconazole (DIFLUCAN) 200 MG Tab Take 2 tablets (400 mg total) by mouth once daily. 2/6/19 3/8/19  Ashlee Luna NP   fluticasone (FLONASE) 50 mcg/actuation nasal spray 1 spray by Each Nare route once daily.    Historical Provider, MD   ketoconazole (NIZORAL) 2 % cream  1/31/19   Historical  Provider, MD   levoFLOXacin (LEVAQUIN) 500 MG tablet Take 1 tablet (500 mg total) by mouth once daily. 2/6/19   Ashlee Luna NP   loratadine (CLARITIN) 10 mg tablet Take 10 mg by mouth once daily.    Historical Provider, MD   losartan-hydrochlorothiazide 100-12.5 mg (HYZAAR) 100-12.5 mg Tab Take 1 tablet by mouth once daily. 2/6/19   Ashlee Luna NP   nystatin (MYCOSTATIN) powder Apply topically 2 (two) times daily. 2/6/19   Ashlee Luna NP   predniSONE (DELTASONE) 10 MG tablet Take one tablet by mouth 2 times a day. On days 1-5 of chemotherapy cycles only. To be combined with 50mg tabs for a total of 160mg 2/6/19   Ashlee Luna NP   predniSONE (DELTASONE) 50 MG Tab Take 3 tablets (150 mg total) by mouth 2 (two) times daily. On days 1-5 of chemotherapy cycles. To be combined with 10mg tabs for a total of 160mg 2/6/19   Ashlee Luna NP     Anticoagulants/Antiplatelets: Lovenox    Allergies: Review of patient's allergies indicates:  No Known Allergies  Sedation History:  No sedation.           OBJECTIVE:     Vital Signs (Most Recent)  There were no vitals filed for this visit.      Physical Exam:  ASA: 2  Mallampati: 2    General: no acute distress  Mental Status: alert and oriented to person, place and time  HEENT: normocephalic, atraumatic  Chest: unlabored breathing  Heart: regular heart rate  Abdomen: nondistended  Extremity: moves all extremities    Laboratory  Lab Results   Component Value Date    INR 1.0 02/06/2019       Lab Results   Component Value Date    WBC 6.96 02/06/2019    HGB 11.2 (L) 02/06/2019    HCT 34.1 (L) 02/06/2019    MCV 80 (L) 02/06/2019     02/06/2019      Lab Results   Component Value Date     (H) 02/06/2019     02/06/2019    K 3.4 (L) 02/06/2019     02/06/2019    CO2 27 02/06/2019    BUN 17 02/06/2019    CREATININE 0.8 02/06/2019    CALCIUM 8.7 02/06/2019    MG 2.2 02/06/2019    ALT 23 02/06/2019    AST 18 02/06/2019    ALBUMIN  3.2 (L) 02/06/2019    BILITOT 0.6 02/06/2019       ASSESSMENT/PLAN:     Sedation Plan: no sedation.   Patient will undergo image guided lumbar puncture with intrathecal chemotherapy.    Alejandro Sheikh MD   Radiology Resident.

## 2019-02-07 NOTE — PROGRESS NOTES
Patient seen at Fluoroscopy. LP done per radiology. CSF studies sent. Timeout done. Chemo checked with MD. 12mg MTX in 3cc of NS given intrathecally without difficulty.    Ashlee Luna NP  Hematology/Oncology/BMT

## 2019-02-07 NOTE — PROCEDURES
Radiology Post-Procedure Note    Pre Op Diagnosis: Lymphoma.     Post Op Diagnosis: Same    Procedure: lumbar puncture    Procedure performed by: Alejandro Sheikh MD, Joel Martel MD.     Written Informed Consent Obtained: Yes    Specimen Removed: 7 mL CSF    Estimated Blood Loss: Minimal    Findings: Following written informed consent and sterile prep and drape, a 22 gauge spinal needle was inserted at L3-L4 intralaminar space under fluoroscopic surveillance.  7 mL clear CSF removed and sent to the lab for further analysis.  There were no complications.    Patient tolerated procedure well.    Alejandro Sheikh MD   Radiology Resident.

## 2019-02-08 ENCOUNTER — PATIENT MESSAGE (OUTPATIENT)
Dept: HEMATOLOGY/ONCOLOGY | Facility: CLINIC | Age: 49
End: 2019-02-08

## 2019-02-08 NOTE — PROGRESS NOTES
Admit Assessment    Patient Identification  Giorgio Streeter   :  1970  Admit Date:  2019  Attending Provider:  Priscilla att. providers found              Referral:   Pt was admitted to  with a diagnosis of Diffuse large b-cell lymphoma, intra-abdominal lymph nodes, and was admitted this hospital stay due to Diffuse large b-cell lymphoma, intra-abdominal lymph nodes [C83.33].   is involved was referred to the Social Work Department via routine referral.  Patient presents as a 48 y.o. year old  male.    Persons interviewed: patient and spouse (Sachi [email or web address]-[phone])    Living Situation:  Pt. States that he resides at home with his wife and 2 children (Ages 19 &13). 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 Pt. Plans to continue to work thru treatment as able but does have banked time that he can take off if needed. Pts. Wife is also a teacher and is working but has the ability to work via her computer if needed.       Resides at [address] [place] [address]   [place] [address], phone: [phone] (home).      (RETIRED) Functional Status Prior  Ambulation Prior: 0-->independent  Transferrin-->independent  Toiletin-->independent    Current or Past Agencies and Description of Services/Supplies    DME  Agency Name: none  Agency Phone Number: n/a  Equipment Currently Used at Home: CPAP    Home Health  Agency Name: none  Agency Phone Number: n/a  Services: n/a    IV Infusion  Agency Name: none  Agency Phone Number: n/a    Nutrition: oral    Outpatient Pharmacy:     [facility] [address] [place] [address]  [facility] [place] [id number]  Phone: [phone] Fax: [phone]    [address]  [address]  [facility] [address]  Phone: [phone] Fax: [phone]      Patient Preference of agencies include: none  noted    Patient/Caregiver informed of right to choose providers or agencies.  Patient provides permission to release any necessary information to [facility] and to [facility] agencies as needed to facilitate patient care, treatment planning, and patient discharge planning.  Written and verbal resources provided.      Coping: pt. States that he is doing well. He reports some anxiety when trying to decide where he would receive treatment as he did  For for 2nd opinion at [facility] but states that he overall felt better about a decision to seek care at [facility]. Pts. Wife at bedside and is very involved in pts. Care. Pt. Also reports that his dad is very involved and this at times has caused some strain because pt. Has had to ask his dad to allow him to manage his own care. Pt. Does report that he knows his dad's concern is from a good place and that he is only concerned about his children as both pt. And his sister have cancer.           Adjustment to Diagnosis and Treatment: appropriate      Emotional/Behavioral/Cognitive Issues: none noted            History/Current Symptoms of Anxiety/Depression: Yes (related to new diagnosis)  History/Current Substance Use:   Social History     Tobacco Use    Smoking status: Never Smoker    Smokeless tobacco: Never Used   Substance and Sexual Activity    Alcohol use: Yes     Frequency: Monthly or less    Drug use: No    Sexual activity: Not on file       Indications of Abuse/Neglect: No:   Abuse Screen (yes response referral indicated)  Feels Unsafe at Home or Work/School: no    Financial:  Payor/Plan Subscr  Sex Relation Sub. Ins. ID Effective Group Num   1. [place] CROSS * KIRSTIE STANFORD* 1970 Male  [id number]                                    [id number]                            Other identified concerns/needs: none noted    Plan: pt. States that his sister lives in [place] and that he and his wife plan to stay with her post treatment as this will  be closer for them then travel to/from [place]. Informed pt. And wife also of [facility] should they need to stay closer to hospital for future treatment.       Interventions/Referrals: TBD  Patient/caregiver engaged in treatment planning process.     providing psychosocial and supportive counseling, resources, education, assistance and discharge planning as appropriate.  Patient/caregiver state understanding of  available resources,  following, remains available. Provided pt. With sw'er phone # and encouraged him to call if needed. Will follow.

## 2019-02-11 ENCOUNTER — LAB VISIT (OUTPATIENT)
Dept: LAB | Facility: HOSPITAL | Age: 49
End: 2019-02-11
Attending: INTERNAL MEDICINE
Payer: COMMERCIAL

## 2019-02-11 DIAGNOSIS — C83.30 LARGE CELL (DIFFUSE) NON-HODGKIN'S LYMPHOMA: ICD-10-CM

## 2019-02-11 LAB
ALBUMIN SERPL BCP-MCNC: 3.5 G/DL
ALP SERPL-CCNC: 56 U/L
ALT SERPL W/O P-5'-P-CCNC: 26 U/L
ANION GAP SERPL CALC-SCNC: 9 MMOL/L
ANISOCYTOSIS BLD QL SMEAR: SLIGHT
AST SERPL-CCNC: 18 U/L
BASOPHILS NFR BLD: 0 %
BILIRUB SERPL-MCNC: 0.8 MG/DL
BUN SERPL-MCNC: 10 MG/DL
CALCIUM SERPL-MCNC: 9.1 MG/DL
CHLORIDE SERPL-SCNC: 101 MMOL/L
CO2 SERPL-SCNC: 28 MMOL/L
CREAT SERPL-MCNC: 0.8 MG/DL
DIFFERENTIAL METHOD: ABNORMAL
EOSINOPHIL NFR BLD: 20 %
ERYTHROCYTE [DISTWIDTH] IN BLOOD BY AUTOMATED COUNT: 14.3 %
EST. GFR  (AFRICAN AMERICAN): >60 ML/MIN/1.73 M^2
EST. GFR  (NON AFRICAN AMERICAN): >60 ML/MIN/1.73 M^2
GLUCOSE SERPL-MCNC: 148 MG/DL
HCT VFR BLD AUTO: 40.4 %
HGB BLD-MCNC: 12.9 G/DL
IMM GRANULOCYTES # BLD AUTO: ABNORMAL K/UL
IMM GRANULOCYTES NFR BLD AUTO: ABNORMAL %
LYMPHOCYTES NFR BLD: 30 %
MCH RBC QN AUTO: 25.3 PG
MCHC RBC AUTO-ENTMCNC: 31.9 G/DL
MCV RBC AUTO: 79 FL
MONOCYTES NFR BLD: 5 %
NEUTROPHILS NFR BLD: 45 %
NRBC BLD-RTO: 0 /100 WBC
OVALOCYTES BLD QL SMEAR: ABNORMAL
PLATELET # BLD AUTO: 128 K/UL
PLATELET BLD QL SMEAR: ABNORMAL
PMV BLD AUTO: 10 FL
POIKILOCYTOSIS BLD QL SMEAR: SLIGHT
POTASSIUM SERPL-SCNC: 3.7 MMOL/L
PROT SERPL-MCNC: 7.5 G/DL
RBC # BLD AUTO: 5.1 M/UL
SODIUM SERPL-SCNC: 138 MMOL/L
WBC # BLD AUTO: 0.52 K/UL

## 2019-02-11 PROCEDURE — 80053 COMPREHEN METABOLIC PANEL: CPT

## 2019-02-11 PROCEDURE — 85007 BL SMEAR W/DIFF WBC COUNT: CPT

## 2019-02-11 PROCEDURE — 36415 COLL VENOUS BLD VENIPUNCTURE: CPT | Mod: PO

## 2019-02-11 PROCEDURE — 85027 COMPLETE CBC AUTOMATED: CPT

## 2019-02-11 NOTE — TELEPHONE ENCOUNTER
What would be causing this?  I looked up each medication he received---the only one which <1% mentions this is   Cytoxan?  kamari

## 2019-02-12 ENCOUNTER — PATIENT MESSAGE (OUTPATIENT)
Dept: HEMATOLOGY/ONCOLOGY | Facility: CLINIC | Age: 49
End: 2019-02-12

## 2019-02-14 ENCOUNTER — LAB VISIT (OUTPATIENT)
Dept: LAB | Facility: HOSPITAL | Age: 49
End: 2019-02-14
Attending: INTERNAL MEDICINE
Payer: COMMERCIAL

## 2019-02-14 DIAGNOSIS — C83.30 LARGE CELL (DIFFUSE) NON-HODGKIN'S LYMPHOMA: ICD-10-CM

## 2019-02-14 LAB
ALBUMIN SERPL BCP-MCNC: 3.9 G/DL
ALP SERPL-CCNC: 65 U/L
ALT SERPL W/O P-5'-P-CCNC: 38 U/L
ANION GAP SERPL CALC-SCNC: 14 MMOL/L
ANISOCYTOSIS BLD QL SMEAR: SLIGHT
AST SERPL-CCNC: 24 U/L
BASOPHILS # BLD AUTO: ABNORMAL K/UL
BASOPHILS NFR BLD: 1 %
BILIRUB SERPL-MCNC: 0.4 MG/DL
BUN SERPL-MCNC: 10 MG/DL
CALCIUM SERPL-MCNC: 10.2 MG/DL
CHLORIDE SERPL-SCNC: 99 MMOL/L
CO2 SERPL-SCNC: 26 MMOL/L
CREAT SERPL-MCNC: 1 MG/DL
DIFFERENTIAL METHOD: ABNORMAL
EOSINOPHIL # BLD AUTO: ABNORMAL K/UL
EOSINOPHIL NFR BLD: 0 %
ERYTHROCYTE [DISTWIDTH] IN BLOOD BY AUTOMATED COUNT: 14.2 %
EST. GFR  (AFRICAN AMERICAN): >60 ML/MIN/1.73 M^2
EST. GFR  (NON AFRICAN AMERICAN): >60 ML/MIN/1.73 M^2
GIANT PLATELETS BLD QL SMEAR: ABNORMAL
GLUCOSE SERPL-MCNC: 127 MG/DL
HCT VFR BLD AUTO: 43.6 %
HGB BLD-MCNC: 14.1 G/DL
IMM GRANULOCYTES # BLD AUTO: ABNORMAL K/UL
IMM GRANULOCYTES NFR BLD AUTO: ABNORMAL %
LYMPHOCYTES # BLD AUTO: ABNORMAL K/UL
LYMPHOCYTES NFR BLD: 22 %
MCH RBC QN AUTO: 26.2 PG
MCHC RBC AUTO-ENTMCNC: 32.3 G/DL
MCV RBC AUTO: 81 FL
METAMYELOCYTES NFR BLD MANUAL: 1 %
MONOCYTES # BLD AUTO: ABNORMAL K/UL
MONOCYTES NFR BLD: 13 %
NEUTROPHILS # BLD AUTO: ABNORMAL K/UL
NEUTROPHILS NFR BLD: 63 %
NRBC BLD-RTO: 1 /100 WBC
PLATELET # BLD AUTO: 154 K/UL
PLATELET BLD QL SMEAR: ABNORMAL
PMV BLD AUTO: 10.5 FL
POTASSIUM SERPL-SCNC: 4 MMOL/L
PROT SERPL-MCNC: 8 G/DL
RBC # BLD AUTO: 5.38 M/UL
SODIUM SERPL-SCNC: 139 MMOL/L
WBC # BLD AUTO: 3.63 K/UL

## 2019-02-14 PROCEDURE — 85027 COMPLETE CBC AUTOMATED: CPT

## 2019-02-14 PROCEDURE — 80053 COMPREHEN METABOLIC PANEL: CPT

## 2019-02-14 PROCEDURE — 85007 BL SMEAR W/DIFF WBC COUNT: CPT

## 2019-02-14 PROCEDURE — 36415 COLL VENOUS BLD VENIPUNCTURE: CPT | Mod: PO

## 2019-02-17 ENCOUNTER — PATIENT MESSAGE (OUTPATIENT)
Dept: HEMATOLOGY/ONCOLOGY | Facility: CLINIC | Age: 49
End: 2019-02-17

## 2019-02-18 ENCOUNTER — LAB VISIT (OUTPATIENT)
Dept: LAB | Facility: HOSPITAL | Age: 49
End: 2019-02-18
Attending: INTERNAL MEDICINE
Payer: COMMERCIAL

## 2019-02-18 DIAGNOSIS — C83.30 LARGE CELL (DIFFUSE) NON-HODGKIN'S LYMPHOMA: ICD-10-CM

## 2019-02-18 LAB
ALBUMIN SERPL BCP-MCNC: 3.7 G/DL
ALP SERPL-CCNC: 83 U/L
ALT SERPL W/O P-5'-P-CCNC: 43 U/L
ANION GAP SERPL CALC-SCNC: 10 MMOL/L
ANISOCYTOSIS BLD QL SMEAR: SLIGHT
AST SERPL-CCNC: 30 U/L
BASOPHILS NFR BLD: 0 %
BILIRUB SERPL-MCNC: 0.3 MG/DL
BUN SERPL-MCNC: 13 MG/DL
CALCIUM SERPL-MCNC: 10.1 MG/DL
CHLORIDE SERPL-SCNC: 103 MMOL/L
CO2 SERPL-SCNC: 28 MMOL/L
CREAT SERPL-MCNC: 1.1 MG/DL
DIFFERENTIAL METHOD: ABNORMAL
EOSINOPHIL NFR BLD: 0 %
ERYTHROCYTE [DISTWIDTH] IN BLOOD BY AUTOMATED COUNT: 15.5 %
EST. GFR  (AFRICAN AMERICAN): >60 ML/MIN/1.73 M^2
EST. GFR  (NON AFRICAN AMERICAN): >60 ML/MIN/1.73 M^2
GLUCOSE SERPL-MCNC: 165 MG/DL
HCT VFR BLD AUTO: 41.9 %
HGB BLD-MCNC: 13.3 G/DL
HYPOCHROMIA BLD QL SMEAR: ABNORMAL
IMM GRANULOCYTES # BLD AUTO: ABNORMAL K/UL
IMM GRANULOCYTES NFR BLD AUTO: ABNORMAL %
LYMPHOCYTES NFR BLD: 13 %
MCH RBC QN AUTO: 25.6 PG
MCHC RBC AUTO-ENTMCNC: 31.7 G/DL
MCV RBC AUTO: 81 FL
MONOCYTES NFR BLD: 6 %
NEUTROPHILS NFR BLD: 81 %
NRBC BLD-RTO: 0 /100 WBC
OVALOCYTES BLD QL SMEAR: ABNORMAL
PLATELET # BLD AUTO: 208 K/UL
PLATELET BLD QL SMEAR: ABNORMAL
PMV BLD AUTO: 10.1 FL
POIKILOCYTOSIS BLD QL SMEAR: SLIGHT
POLYCHROMASIA BLD QL SMEAR: ABNORMAL
POTASSIUM SERPL-SCNC: 4.3 MMOL/L
PROT SERPL-MCNC: 7.6 G/DL
RBC # BLD AUTO: 5.19 M/UL
SODIUM SERPL-SCNC: 141 MMOL/L
TOXIC GRANULES BLD QL SMEAR: PRESENT
WBC # BLD AUTO: 13.92 K/UL

## 2019-02-18 PROCEDURE — 85027 COMPLETE CBC AUTOMATED: CPT

## 2019-02-18 PROCEDURE — 85007 BL SMEAR W/DIFF WBC COUNT: CPT

## 2019-02-18 PROCEDURE — 80053 COMPREHEN METABOLIC PANEL: CPT

## 2019-02-18 PROCEDURE — 36415 COLL VENOUS BLD VENIPUNCTURE: CPT | Mod: PO

## 2019-02-21 ENCOUNTER — LAB VISIT (OUTPATIENT)
Dept: LAB | Facility: HOSPITAL | Age: 49
End: 2019-02-21
Attending: INTERNAL MEDICINE
Payer: COMMERCIAL

## 2019-02-21 ENCOUNTER — PATIENT MESSAGE (OUTPATIENT)
Dept: SURGERY | Facility: CLINIC | Age: 49
End: 2019-02-21

## 2019-02-21 DIAGNOSIS — C83.30 LARGE CELL (DIFFUSE) NON-HODGKIN'S LYMPHOMA: ICD-10-CM

## 2019-02-21 LAB
ALBUMIN SERPL BCP-MCNC: 3.6 G/DL
ALP SERPL-CCNC: 72 U/L
ALT SERPL W/O P-5'-P-CCNC: 32 U/L
ANION GAP SERPL CALC-SCNC: 10 MMOL/L
AST SERPL-CCNC: 25 U/L
BASOPHILS # BLD AUTO: 0.14 K/UL
BASOPHILS NFR BLD: 1.2 %
BILIRUB SERPL-MCNC: 0.2 MG/DL
BUN SERPL-MCNC: 15 MG/DL
CALCIUM SERPL-MCNC: 10.3 MG/DL
CHLORIDE SERPL-SCNC: 105 MMOL/L
CO2 SERPL-SCNC: 27 MMOL/L
CREAT SERPL-MCNC: 1 MG/DL
DIFFERENTIAL METHOD: ABNORMAL
EOSINOPHIL # BLD AUTO: 0 K/UL
EOSINOPHIL NFR BLD: 0 %
ERYTHROCYTE [DISTWIDTH] IN BLOOD BY AUTOMATED COUNT: 15.9 %
EST. GFR  (AFRICAN AMERICAN): >60 ML/MIN/1.73 M^2
EST. GFR  (NON AFRICAN AMERICAN): >60 ML/MIN/1.73 M^2
GLUCOSE SERPL-MCNC: 105 MG/DL
HCT VFR BLD AUTO: 39.3 %
HGB BLD-MCNC: 12.3 G/DL
IMM GRANULOCYTES # BLD AUTO: 0.37 K/UL
IMM GRANULOCYTES NFR BLD AUTO: 3.2 %
LYMPHOCYTES # BLD AUTO: 0.9 K/UL
LYMPHOCYTES NFR BLD: 7.8 %
MCH RBC QN AUTO: 25.7 PG
MCHC RBC AUTO-ENTMCNC: 31.3 G/DL
MCV RBC AUTO: 82 FL
MONOCYTES # BLD AUTO: 1.2 K/UL
MONOCYTES NFR BLD: 10.8 %
NEUTROPHILS # BLD AUTO: 8.8 K/UL
NEUTROPHILS NFR BLD: 77 %
NRBC BLD-RTO: 0 /100 WBC
PLATELET # BLD AUTO: 252 K/UL
PMV BLD AUTO: 10.6 FL
POTASSIUM SERPL-SCNC: 4.2 MMOL/L
PROT SERPL-MCNC: 7.3 G/DL
RBC # BLD AUTO: 4.78 M/UL
SODIUM SERPL-SCNC: 142 MMOL/L
WBC # BLD AUTO: 11.41 K/UL

## 2019-02-21 PROCEDURE — 36415 COLL VENOUS BLD VENIPUNCTURE: CPT | Mod: PO

## 2019-02-21 PROCEDURE — 80053 COMPREHEN METABOLIC PANEL: CPT

## 2019-02-21 PROCEDURE — 85025 COMPLETE CBC W/AUTO DIFF WBC: CPT

## 2019-02-24 NOTE — PROGRESS NOTES
CC: Lymphoma, follow up visit      HPI:  is a 48 y.o. male here for followup visit. He has hypertension, obstructive sleep apnea. He was hospitalized around Christmas 2018 for small bowel mass. Patient reports that he began having lower abdominal/pelvic pain toward the end of October 2018. This prompted a urologic evaluation and subsequent treatment for a presumed UTI. He states that his pain continued into November and evolved into more upper abdominal/epigastric pain that was worse with eating. He was subsequently seen by his PCP who ultimately ordered a CT abdomen/pelvis that revealed an abnormal thickening of an 8 cm segment of small bowel. He denied fever, chills, night sweats, or anorexia at that time. He had ~10 lb weight loss over the 1-2 months prior to his hospitalization in Dec 2018, but he was also actively taking part in a weight loss program.  On 12/21/18, he underwent Segmental small bowel resection.The mass was approximately 6 cm in length and was circumferential.  There was dilation of the small bowel above it suggesting a partial bowel obstruction.  There was no adenopathy in the adjacent   mesentery.  The tumor was clearly extending to the serosal surface of the bowel.Careful search was made for peritoneal implants and there were no lesions noted on the peritoneum or omentum.  The liver could not be inspected through the incision,   but it was palpated and there were no focal lesions within it. The remainder of the small bowel was run from the ligament of Treitz to the ileocecal valves and no other small bowel lesions were noted.    He had PET CT,. 2D ECHO, bone marrow biopsy. PET CT did not reveal any evidence of lymphoma. 2D ECHO was normal. Bone marrow was negative for involvement by lymphoma.       Interval History: He is here for a follow up visit.He received C1 EPOCH -R 2/1-2/5/19 without complications        Review of Systems   Constitutional: Positive for malaise/fatigue.  Negative for chills, fever and weight loss.   HENT: Negative for ear discharge, ear pain, nosebleeds and tinnitus.    Eyes: Negative for blurred vision, double vision and photophobia.   Respiratory: Negative for cough, hemoptysis and sputum production.    Cardiovascular: Negative for chest pain, palpitations and orthopnea.   Gastrointestinal: Negative for diarrhea, heartburn, nausea and vomiting.   Genitourinary: Negative for dysuria, frequency and urgency.   Musculoskeletal: Negative for back pain, myalgias and neck pain.   Skin: Negative for rash.   Neurological: Positive for tingling. Negative for dizziness, tremors and headaches.   Endo/Heme/Allergies: Does not bruise/bleed easily.   Psychiatric/Behavioral: Negative for depression, substance abuse and suicidal ideas.     Current Outpatient Medications   Medication Sig    acyclovir (ZOVIRAX) 400 MG tablet Take 1 tablet (400 mg total) by mouth 2 (two) times daily.    allopurinol (ZYLOPRIM) 300 MG tablet Take 1 tablet (300 mg total) by mouth once daily.    ALPRAZolam (XANAX) 0.25 MG tablet Take 1 tablet (0.25 mg total) by mouth 2 (two) times daily as needed for Insomnia or Anxiety.    atenolol (TENORMIN) 50 MG tablet Take 1 tablet (50 mg total) by mouth once daily.    famotidine (PEPCID) 20 MG tablet Take 1 tablet (20 mg total) by mouth 2 (two) times daily.    fexofenadine (ALLEGRA) 60 MG tablet Take 60 mg by mouth once daily.    fluconazole (DIFLUCAN) 200 MG Tab Take 2 tablets (400 mg total) by mouth once daily.    fluticasone (FLONASE) 50 mcg/actuation nasal spray 1 spray by Each Nare route once daily.    ketoconazole (NIZORAL) 2 % cream     levoFLOXacin (LEVAQUIN) 500 MG tablet Take 1 tablet (500 mg total) by mouth once daily.    loratadine (CLARITIN) 10 mg tablet Take 10 mg by mouth once daily.    losartan-hydrochlorothiazide 100-12.5 mg (HYZAAR) 100-12.5 mg Tab Take 1 tablet by mouth once daily.    nystatin (MYCOSTATIN) powder Apply topically 2  (two) times daily.    predniSONE (DELTASONE) 10 MG tablet Take one tablet by mouth 2 times a day. On days 1-5 of chemotherapy cycles only. To be combined with 50mg tabs for a total of 160mg    predniSONE (DELTASONE) 50 MG Tab Take 3 tablets (150 mg total) by mouth 2 (two) times daily. On days 1-5 of chemotherapy cycles. To be combined with 10mg tabs for a total of 160mg     No current facility-administered medications for this visit.        Vitals:    02/25/19 0812   BP: 137/81   Pulse: 75   Resp: 18   Temp: 98.5 °F (36.9 °C)     Physical Exam   Constitutional: He is oriented to person, place, and time. He appears well-developed.   HENT:   Head: Normocephalic and atraumatic.   Mouth/Throat: No oropharyngeal exudate.   Eyes: Pupils are equal, round, and reactive to light. No scleral icterus.   Neck: Normal range of motion.   Cardiovascular: Normal rate and regular rhythm.   Pulmonary/Chest: Effort normal and breath sounds normal. No respiratory distress. He has no wheezes.   Abdominal: Soft. Bowel sounds are normal. He exhibits no distension. There is no tenderness. There is no rebound and no guarding.   Musculoskeletal: He exhibits no edema.   Lymphadenopathy:     He has no cervical adenopathy.   Neurological: He is alert and oriented to person, place, and time. No cranial nerve deficit.   Skin:   Scar from surgery healing well.    Psychiatric: He has a normal mood and affect.        12/21/18 flow cytometry of small bowel mass     Flow cytometric analysis of tissue detects a kappa restricted B lymphocyte population that expresses CD19, CD20, and CD10.  CD5 is negative.  The differential diagnosis includes diffuse large B cell lymphoma, follicular lymphoma, and Burkitt's lymphoma.     Flow differential:  Lymphocytes 88.2%, Monocytes 6.0%, Granulocytes  2.3%, Blast  2.6%, Debris/nRBC 0.5%,  Viability 90.7%.  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  Comment: Concurrent flow cytometric analysis confirms a Kappa restricted clonal B-cell population that expresses CD19, CD20, and CD10 without coexpression of CD5. The histologic sections demonstrate a submucosal mass comprised of a diffuse population of large CD20 positive B cells compatible with immunoblast intermixed with moderate numbers of CD5/CD3 positive T cells. The large lymphocytes are BCL6 and CD10 (weak) positive and BCL6, MUM1, and BCL-2 are negative. CD23 shows no follicular dendritic network. The Ki-67 proliferation index is high (>60%). In situ hybridization for David bar viral RNA is negative. The overall findings are compatible with diffuse large B-cell lymphoma, germinal center type. Molecular studies are pending to exclude the possibility of a  high-grade B-cell lymphoma with myc and BCL-2/ BCL6 rearrangements and will be reported. All histologic chemical stains have satisfactory positive and negative controls.     "B-cell lymphoma, FISH, tissue:  Interpretation: Positive. The result is abnormal and indicates MYC/IGH fusion and 27% of nuclei. No rearrangement of BCL-2 or BCL6 was observed.  No MYC rearrangement was observed, therefore this is unlikely to be "high-grade B-cell lymphoma, with MYC and  BCL2 and/or BCL6 translocations" (previously known as "genetic double-hit lymphoma", currently reclassified per  the 2016 World Health Organization Classification of Lymphoid Neoplasms).        1/11/19 bone marrow biopsy       FINAL PATHOLOGIC DIAGNOSIS  BONE MARROW, LEFT ILIAC CREST (ASPIRATE SMEAR, TOUCH PREPARATION, CLOT SECTION, AND CORE BIOPSY):  -- NORMOCELLULAR MARROW (60%) WITH TRILINEAGE HEMATOPOIESIS.  -- NO EVIDENCE OF MARROW INVOLVEMENT BY B-CELL NON-HODGKIN " LYMPHOMA.  -- MARKEDLY DECREASED STORAGE IRON.     Recent CBC data (1/7/2019) showed normocytic anemia and thrombocytosis.  Flow cytometric analysis of bone marrow shows populations of polyclonal B lymphocytes and T lymphocytes that are immunophenotypically unremarkable. The blast gate is not increased.  Immunohistochemical stains are performed on the biopsy core and clot section for greater sensitivity and further architectural assessment with adequate controls. The interstitial lymphocytes and small lymphoid aggregates are  composed of mixed CD3-positive T cells and CD20-positive B cells. Correlation with other laboratory results is recommended.           1/15/19 PET CT             COMPARISON:  CT abdomen and pelvis 12/07/2018.    FINDINGS:  Quality of the study: Adequate.    Mildly increased FDG uptake is noted along the midline anterior abdominal wall, likely related to prior celiotomy incision, SUV max 5.60.  Small, non lesion like focus of tracer avidity is seen involving the descending colon, without any corresponding CT abnormality, SUV max 6.48..  This likely represents normal colonic peristalsis or a small area of inflammation.  Diffusely increased marrow uptake likely represents red marrow reconversion. No suspicious abnormally increased uptake is seen to suggest residual/recurrent disease.    Physiologic uptake of the tracer is present within the brain, salivary glands, myocardium, GI and  tracts.    Incidental CT findings: Mild mosaic type ground-glass density in the lungs.  Splenomegaly, with the spleen measuring 12.7 cm in AP dimension.  Nonobstructive right renal stone measuring 0.7 cm.  Postoperative changes from small bowel resection.  Scattered colonic diverticula.  Circumferential urinary bladder wall thickening, favored to relate to underdistention.  Mild degenerative changes in the spine.  Stable, mild anterior wedge compression deformity of T12.       Impression         No convincing  evidence of residual or recurrent disease.  Midline abdominal wall FDG avidity coincides with prior celiotomy incision               1/11/19 2D ECHO     · Normal left ventricular systolic function. The estimated ejection fraction is 63%  · Normal LV diastolic function.  · Moderate left atrial enlargement.  · Normal right ventricular systolic function.  · Mild right atrial enlargement.  · Normal central venous pressure (3 mm Hg).      2/7/19 Cerebrospinal Fluid    FINAL PATHOLOGIC DIAGNOSIS    Negative for malignant cells    Component      Latest Ref Rng & Units 2/25/2019   WBC      3.90 - 12.70 K/uL 11.16   RBC      4.60 - 6.20 M/uL 4.62   Hemoglobin      14.0 - 18.0 g/dL 11.8 (L)   Hematocrit      40.0 - 54.0 % 37.4 (L)   MCV      82 - 98 fL 81 (L)   MCH      27.0 - 31.0 pg 25.5 (L)   MCHC      32.0 - 36.0 g/dL 31.6 (L)   RDW      11.5 - 14.5 % 15.8 (H)   Platelets      150 - 350 K/uL 261   MPV      9.2 - 12.9 fL 10.1   Immature Granulocytes      0.0 - 0.5 % 1.9 (H)   Gran # (ANC)      1.8 - 7.7 K/uL 8.3 (H)   Immature Grans (Abs)      0.00 - 0.04 K/uL 0.21 (H)   Lymph #      1.0 - 4.8 K/uL 1.0   Mono #      0.3 - 1.0 K/uL 1.5 (H)   Eos #      0.0 - 0.5 K/uL 0.0   Baso #      0.00 - 0.20 K/uL 0.17   nRBC      0 /100 WBC 0   Gran%      38.0 - 73.0 % 74.7 (H)   Lymph%      18.0 - 48.0 % 8.6 (L)   Mono%      4.0 - 15.0 % 13.2   Eosinophil%      0.0 - 8.0 % 0.1   Basophil%      0.0 - 1.9 % 1.5   Differential Method       Automated   Sodium      136 - 145 mmol/L 143   Potassium      3.5 - 5.1 mmol/L 4.2   Chloride      95 - 110 mmol/L 106   CO2      23 - 29 mmol/L 28   Glucose      70 - 110 mg/dL 125 (H)   BUN, Bld      6 - 20 mg/dL 13   Creatinine      0.5 - 1.4 mg/dL 0.9   Calcium      8.7 - 10.5 mg/dL 9.8   Total Protein      6.0 - 8.4 g/dL 7.5   Albumin      3.5 - 5.2 g/dL 3.6   Total Bilirubin      0.1 - 1.0 mg/dL 0.2   Alkaline Phosphatase      55 - 135 U/L 67   AST      10 - 40 U/L 24   ALT      10 - 44 U/L 33    Anion Gap      8 - 16 mmol/L 9   eGFR if African American      >60 mL/min/1.73 m:2 >60.0   eGFR if non African American      >60 mL/min/1.73 m:2 >60.0   LD      110 - 260 U/L 267 (H)   Uric Acid      3.4 - 7.0 mg/dL 4.8       Assessment:     1. DLBCL of jejunum, GC sub type, high grade  2. Hypertension  3. obstructive sleep apnea  4. Morbid obesity  5. Anemia, hypochromic        Plan:     1. 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 IPI score 0. It does not fulfil the criteria for Burkitts( ki 67 high, but not high enough), double or triple hit ( no bcl2 or bcl6 translocation). No PET avid measurable disease. No ly phoma in bone marrow. I discussed the treatment of stage I GI high grade lymphoma. I explained that although there is no active/measurable disease, chemotherapy with RCHOP or dose adjusted R-EPOCH is recommended, as he had bulky (8cm), high grade ( ki 67 > 60% ) lymphoma with MYC/IgH fusion. He will also need diagnostic LP. He went to [facility] for 2nd opinion.   He has received cycle 1 DA  EPOCH -R with IT MTX without any complication. CSF cytology negative for malignant cells on 2/7/19.   ANC, platelet alvaro after C1 reviewed. Doses of Etoposide/Doxorubicin/Cytoxan will be escalated by 1 level for C2.        2.On Atenolol, Losartan HCTZ      5. Mild, asymptomatic.

## 2019-02-25 ENCOUNTER — OFFICE VISIT (OUTPATIENT)
Dept: HEMATOLOGY/ONCOLOGY | Facility: CLINIC | Age: 49
End: 2019-02-25
Payer: COMMERCIAL

## 2019-02-25 ENCOUNTER — INFUSION (OUTPATIENT)
Dept: INFUSION THERAPY | Facility: HOSPITAL | Age: 49
End: 2019-02-25
Attending: INTERNAL MEDICINE
Payer: COMMERCIAL

## 2019-02-25 VITALS
SYSTOLIC BLOOD PRESSURE: 137 MMHG | DIASTOLIC BLOOD PRESSURE: 81 MMHG | BODY MASS INDEX: 47.51 KG/M2 | WEIGHT: 302.69 LBS | HEART RATE: 75 BPM | TEMPERATURE: 99 F | HEIGHT: 67 IN | OXYGEN SATURATION: 98 % | RESPIRATION RATE: 18 BRPM

## 2019-02-25 VITALS
TEMPERATURE: 98 F | SYSTOLIC BLOOD PRESSURE: 132 MMHG | BODY MASS INDEX: 47.4 KG/M2 | RESPIRATION RATE: 18 BRPM | WEIGHT: 302 LBS | HEIGHT: 67 IN | HEART RATE: 72 BPM | DIASTOLIC BLOOD PRESSURE: 70 MMHG

## 2019-02-25 DIAGNOSIS — Z90.49 S/P SMALL BOWEL RESECTION: Primary | ICD-10-CM

## 2019-02-25 DIAGNOSIS — C83.39 DIFFUSE LARGE B-CELL LYMPHOMA OF SOLID ORGAN EXCLUDING SPLEEN: Primary | ICD-10-CM

## 2019-02-25 DIAGNOSIS — C83.33 DIFFUSE LARGE B-CELL LYMPHOMA, INTRA-ABDOMINAL LYMPH NODES: ICD-10-CM

## 2019-02-25 DIAGNOSIS — D63.0 ANEMIA IN NEOPLASTIC DISEASE: ICD-10-CM

## 2019-02-25 DIAGNOSIS — I10 ESSENTIAL HYPERTENSION: ICD-10-CM

## 2019-02-25 DIAGNOSIS — E66.01 MORBID OBESITY: ICD-10-CM

## 2019-02-25 PROCEDURE — 99999 PR PBB SHADOW E&M-EST. PATIENT-LVL IV: CPT | Mod: PBBFAC,,, | Performed by: INTERNAL MEDICINE

## 2019-02-25 PROCEDURE — 63600175 PHARM REV CODE 636 W HCPCS: Performed by: INTERNAL MEDICINE

## 2019-02-25 PROCEDURE — 99214 OFFICE O/P EST MOD 30 MIN: CPT | Mod: S$GLB,,, | Performed by: INTERNAL MEDICINE

## 2019-02-25 PROCEDURE — 99214 PR OFFICE/OUTPT VISIT, EST, LEVL IV, 30-39 MIN: ICD-10-PCS | Mod: S$GLB,,, | Performed by: INTERNAL MEDICINE

## 2019-02-25 PROCEDURE — 3008F BODY MASS INDEX DOCD: CPT | Mod: CPTII,S$GLB,, | Performed by: INTERNAL MEDICINE

## 2019-02-25 PROCEDURE — 3079F DIAST BP 80-89 MM HG: CPT | Mod: CPTII,S$GLB,, | Performed by: INTERNAL MEDICINE

## 2019-02-25 PROCEDURE — 96413 CHEMO IV INFUSION 1 HR: CPT

## 2019-02-25 PROCEDURE — 3075F PR MOST RECENT SYSTOLIC BLOOD PRESS GE 130-139MM HG: ICD-10-PCS | Mod: CPTII,S$GLB,, | Performed by: INTERNAL MEDICINE

## 2019-02-25 PROCEDURE — 25000003 PHARM REV CODE 250: Performed by: INTERNAL MEDICINE

## 2019-02-25 PROCEDURE — 96375 TX/PRO/DX INJ NEW DRUG ADDON: CPT

## 2019-02-25 PROCEDURE — 96416 CHEMO PROLONG INFUSE W/PUMP: CPT

## 2019-02-25 PROCEDURE — 3075F SYST BP GE 130 - 139MM HG: CPT | Mod: CPTII,S$GLB,, | Performed by: INTERNAL MEDICINE

## 2019-02-25 PROCEDURE — 3008F PR BODY MASS INDEX (BMI) DOCUMENTED: ICD-10-PCS | Mod: CPTII,S$GLB,, | Performed by: INTERNAL MEDICINE

## 2019-02-25 PROCEDURE — 3079F PR MOST RECENT DIASTOLIC BLOOD PRESSURE 80-89 MM HG: ICD-10-PCS | Mod: CPTII,S$GLB,, | Performed by: INTERNAL MEDICINE

## 2019-02-25 PROCEDURE — 96367 TX/PROPH/DG ADDL SEQ IV INF: CPT

## 2019-02-25 PROCEDURE — 96415 CHEMO IV INFUSION ADDL HR: CPT

## 2019-02-25 PROCEDURE — S0028 INJECTION, FAMOTIDINE, 20 MG: HCPCS | Performed by: INTERNAL MEDICINE

## 2019-02-25 PROCEDURE — 99999 PR PBB SHADOW E&M-EST. PATIENT-LVL IV: ICD-10-PCS | Mod: PBBFAC,,, | Performed by: INTERNAL MEDICINE

## 2019-02-25 RX ORDER — MEPERIDINE HYDROCHLORIDE 50 MG/ML
25 INJECTION INTRAMUSCULAR; INTRAVENOUS; SUBCUTANEOUS
Status: CANCELLED | OUTPATIENT
Start: 2019-02-25

## 2019-02-25 RX ORDER — SODIUM CHLORIDE 0.9 % (FLUSH) 0.9 %
10 SYRINGE (ML) INJECTION
Status: CANCELLED | OUTPATIENT
Start: 2019-02-28

## 2019-02-25 RX ORDER — SODIUM CHLORIDE 0.9 % (FLUSH) 0.9 %
10 SYRINGE (ML) INJECTION
Status: CANCELLED | OUTPATIENT
Start: 2019-02-25

## 2019-02-25 RX ORDER — SODIUM CHLORIDE 0.9 % (FLUSH) 0.9 %
10 SYRINGE (ML) INJECTION
Status: CANCELLED | OUTPATIENT
Start: 2019-03-01

## 2019-02-25 RX ORDER — HEPARIN 100 UNIT/ML
500 SYRINGE INTRAVENOUS
Status: CANCELLED | OUTPATIENT
Start: 2019-02-26

## 2019-02-25 RX ORDER — ACETAMINOPHEN 325 MG/1
650 TABLET ORAL
Status: COMPLETED | OUTPATIENT
Start: 2019-02-25 | End: 2019-02-25

## 2019-02-25 RX ORDER — FAMOTIDINE 10 MG/ML
20 INJECTION INTRAVENOUS
Status: COMPLETED | OUTPATIENT
Start: 2019-02-25 | End: 2019-02-25

## 2019-02-25 RX ORDER — MEPERIDINE HYDROCHLORIDE 50 MG/ML
25 INJECTION INTRAMUSCULAR; INTRAVENOUS; SUBCUTANEOUS
Status: DISCONTINUED | OUTPATIENT
Start: 2019-02-25 | End: 2019-02-25 | Stop reason: HOSPADM

## 2019-02-25 RX ORDER — HEPARIN 100 UNIT/ML
500 SYRINGE INTRAVENOUS
Status: CANCELLED | OUTPATIENT
Start: 2019-02-27

## 2019-02-25 RX ORDER — HEPARIN 100 UNIT/ML
500 SYRINGE INTRAVENOUS
Status: CANCELLED | OUTPATIENT
Start: 2019-02-25

## 2019-02-25 RX ORDER — SODIUM CHLORIDE 0.9 % (FLUSH) 0.9 %
10 SYRINGE (ML) INJECTION
Status: CANCELLED | OUTPATIENT
Start: 2019-02-26

## 2019-02-25 RX ORDER — HEPARIN 100 UNIT/ML
500 SYRINGE INTRAVENOUS
Status: CANCELLED | OUTPATIENT
Start: 2019-02-28

## 2019-02-25 RX ORDER — HEPARIN 100 UNIT/ML
500 SYRINGE INTRAVENOUS
Status: CANCELLED | OUTPATIENT
Start: 2019-03-01

## 2019-02-25 RX ORDER — SODIUM CHLORIDE 0.9 % (FLUSH) 0.9 %
10 SYRINGE (ML) INJECTION
Status: DISCONTINUED | OUTPATIENT
Start: 2019-02-25 | End: 2019-02-25 | Stop reason: HOSPADM

## 2019-02-25 RX ORDER — FAMOTIDINE 10 MG/ML
20 INJECTION INTRAVENOUS
Status: CANCELLED | OUTPATIENT
Start: 2019-02-25

## 2019-02-25 RX ORDER — HEPARIN 100 UNIT/ML
500 SYRINGE INTRAVENOUS
Status: DISCONTINUED | OUTPATIENT
Start: 2019-02-25 | End: 2019-02-25 | Stop reason: HOSPADM

## 2019-02-25 RX ORDER — ACETAMINOPHEN 325 MG/1
650 TABLET ORAL
Status: CANCELLED | OUTPATIENT
Start: 2019-02-25

## 2019-02-25 RX ORDER — SODIUM CHLORIDE 0.9 % (FLUSH) 0.9 %
10 SYRINGE (ML) INJECTION
Status: CANCELLED | OUTPATIENT
Start: 2019-02-27

## 2019-02-25 RX ADMIN — RITUXIMAB 975 MG: 10 INJECTION, SOLUTION INTRAVENOUS at 09:02

## 2019-02-25 RX ADMIN — ACETAMINOPHEN 650 MG: 325 TABLET ORAL at 09:02

## 2019-02-25 RX ADMIN — FAMOTIDINE 20 MG: 10 INJECTION, SOLUTION INTRAVENOUS at 09:02

## 2019-02-25 RX ADMIN — VINCRISTINE SULFATE 520 ML: 1 INJECTION, SOLUTION INTRAVENOUS at 04:02

## 2019-02-25 RX ADMIN — DIPHENHYDRAMINE HYDROCHLORIDE 50 MG: 50 INJECTION, SOLUTION INTRAMUSCULAR; INTRAVENOUS at 09:02

## 2019-02-25 RX ADMIN — DEXAMETHASONE SODIUM PHOSPHATE: 4 INJECTION, SOLUTION INTRA-ARTICULAR; INTRALESIONAL; INTRAMUSCULAR; INTRAVENOUS; SOFT TISSUE at 03:02

## 2019-02-25 NOTE — PLAN OF CARE
Problem: Adult Inpatient Plan of Care  Goal: Plan of Care Review  Outcome: Ongoing (interventions implemented as appropriate)  Pt to rtc tomorrow around 1600 for pump change, epoch pump infusing to right chest wall port without issue prior to d/c to home, no distress noted upon d/c to home

## 2019-02-25 NOTE — PLAN OF CARE
Problem: Adult Inpatient Plan of Care  Goal: Plan of Care Review  Outcome: Ongoing (interventions implemented as appropriate)  Pt here for rituxan, epoch pump, labs, hx, meds, allergies reviewed, pt with no compliants at this time, reclined in chair, continue to monitor

## 2019-02-25 NOTE — Clinical Note
--repoch day 1-5 this wk (daily pump exchange)--neulasta on sat--needs IT chemo with MTX this wk, with flow and cytology--cbc, cmp on 2/28--cbc, cmp 3/4, 3/7, 3/11, 3/14--cbc, cmp, ldh, urci acid, f/u for c3 chemo on 3/18

## 2019-02-26 ENCOUNTER — INFUSION (OUTPATIENT)
Dept: INFUSION THERAPY | Facility: HOSPITAL | Age: 49
End: 2019-02-26
Attending: INTERNAL MEDICINE
Payer: COMMERCIAL

## 2019-02-26 VITALS
HEART RATE: 91 BPM | TEMPERATURE: 98 F | DIASTOLIC BLOOD PRESSURE: 75 MMHG | RESPIRATION RATE: 20 BRPM | SYSTOLIC BLOOD PRESSURE: 160 MMHG

## 2019-02-26 DIAGNOSIS — C83.39 DIFFUSE LARGE B-CELL LYMPHOMA OF SOLID ORGAN EXCLUDING SPLEEN: Primary | ICD-10-CM

## 2019-02-26 PROCEDURE — 96521 REFILL/MAINT PORTABLE PUMP: CPT

## 2019-02-26 PROCEDURE — 25000003 PHARM REV CODE 250: Performed by: INTERNAL MEDICINE

## 2019-02-26 PROCEDURE — 63600175 PHARM REV CODE 636 W HCPCS: Performed by: INTERNAL MEDICINE

## 2019-02-26 RX ORDER — SODIUM CHLORIDE 0.9 % (FLUSH) 0.9 %
10 SYRINGE (ML) INJECTION
Status: DISCONTINUED | OUTPATIENT
Start: 2019-02-26 | End: 2019-02-26 | Stop reason: HOSPADM

## 2019-02-26 RX ADMIN — VINCRISTINE SULFATE 32 MG: 1 INJECTION, SOLUTION INTRAVENOUS at 04:02

## 2019-02-26 NOTE — PLAN OF CARE
Problem: Adult Inpatient Plan of Care  Goal: Plan of Care Review  Outcome: Ongoing (interventions implemented as appropriate)  Pt disconnected from EPOCH pump. Infusion completed with no complications. VSS. Pump refilled with no complications. Pt instructed to call MD with any problems and RTC tomorrow for next refill. NAD. Pt discharged home independently.

## 2019-02-27 ENCOUNTER — INFUSION (OUTPATIENT)
Dept: INFUSION THERAPY | Facility: HOSPITAL | Age: 49
End: 2019-02-27
Attending: INTERNAL MEDICINE
Payer: COMMERCIAL

## 2019-02-27 VITALS — SYSTOLIC BLOOD PRESSURE: 158 MMHG | RESPIRATION RATE: 18 BRPM | HEART RATE: 67 BPM | DIASTOLIC BLOOD PRESSURE: 77 MMHG

## 2019-02-27 DIAGNOSIS — C83.39 DIFFUSE LARGE B-CELL LYMPHOMA OF SOLID ORGAN EXCLUDING SPLEEN: Primary | ICD-10-CM

## 2019-02-27 DIAGNOSIS — C83.30 DIFFUSE LARGE B-CELL LYMPHOMA, UNSPECIFIED BODY REGION: Primary | ICD-10-CM

## 2019-02-27 PROCEDURE — 25000003 PHARM REV CODE 250: Performed by: INTERNAL MEDICINE

## 2019-02-27 PROCEDURE — 96521 REFILL/MAINT PORTABLE PUMP: CPT

## 2019-02-27 PROCEDURE — 63600175 PHARM REV CODE 636 W HCPCS: Performed by: INTERNAL MEDICINE

## 2019-02-27 RX ORDER — HEPARIN 100 UNIT/ML
500 SYRINGE INTRAVENOUS
Status: DISCONTINUED | OUTPATIENT
Start: 2019-02-27 | End: 2019-02-27 | Stop reason: HOSPADM

## 2019-02-27 RX ORDER — SODIUM CHLORIDE 0.9 % (FLUSH) 0.9 %
10 SYRINGE (ML) INJECTION
Status: DISCONTINUED | OUTPATIENT
Start: 2019-02-27 | End: 2019-02-27 | Stop reason: HOSPADM

## 2019-02-27 RX ADMIN — VINCRISTINE SULFATE 32 MG: 1 INJECTION, SOLUTION INTRAVENOUS at 04:02

## 2019-02-27 NOTE — PROGRESS NOTES
Ordered IT chemo in fluoroscopy.    Heather Arnold, FNP  Hematology/Oncology/Bone Marrow Transplant

## 2019-02-27 NOTE — PLAN OF CARE
Problem: Adult Inpatient Plan of Care  Goal: Plan of Care Review  Outcome: Ongoing (interventions implemented as appropriate)  Pt here for EPOCH bag change.  Line flushing well, brisk blood return noted.  Connected new bag.  Will return tomorrow around 3pm for next change.  VSS. NAD.

## 2019-02-28 ENCOUNTER — LAB VISIT (OUTPATIENT)
Dept: LAB | Facility: HOSPITAL | Age: 49
End: 2019-02-28
Attending: INTERNAL MEDICINE
Payer: COMMERCIAL

## 2019-02-28 ENCOUNTER — PATIENT MESSAGE (OUTPATIENT)
Dept: HEMATOLOGY/ONCOLOGY | Facility: CLINIC | Age: 49
End: 2019-02-28

## 2019-02-28 ENCOUNTER — INFUSION (OUTPATIENT)
Dept: INFUSION THERAPY | Facility: HOSPITAL | Age: 49
End: 2019-02-28
Attending: INTERNAL MEDICINE
Payer: COMMERCIAL

## 2019-02-28 VITALS — HEIGHT: 67 IN | WEIGHT: 310.88 LBS | BODY MASS INDEX: 48.79 KG/M2

## 2019-02-28 DIAGNOSIS — C83.39 DIFFUSE LARGE B-CELL LYMPHOMA OF SOLID ORGAN EXCLUDING SPLEEN: Primary | ICD-10-CM

## 2019-02-28 DIAGNOSIS — C83.33 DIFFUSE LARGE B-CELL LYMPHOMA, INTRA-ABDOMINAL LYMPH NODES: ICD-10-CM

## 2019-02-28 LAB
ALBUMIN SERPL BCP-MCNC: 3.2 G/DL
ALP SERPL-CCNC: 60 U/L
ALT SERPL W/O P-5'-P-CCNC: 41 U/L
ANION GAP SERPL CALC-SCNC: 9 MMOL/L
AST SERPL-CCNC: 30 U/L
BASOPHILS # BLD AUTO: 0.03 K/UL
BASOPHILS NFR BLD: 0.2 %
BILIRUB SERPL-MCNC: 0.3 MG/DL
BUN SERPL-MCNC: 19 MG/DL
CALCIUM SERPL-MCNC: 9.2 MG/DL
CHLORIDE SERPL-SCNC: 99 MMOL/L
CO2 SERPL-SCNC: 26 MMOL/L
CREAT SERPL-MCNC: 0.8 MG/DL
DIFFERENTIAL METHOD: ABNORMAL
EOSINOPHIL # BLD AUTO: 0 K/UL
EOSINOPHIL NFR BLD: 0 %
ERYTHROCYTE [DISTWIDTH] IN BLOOD BY AUTOMATED COUNT: 15.7 %
EST. GFR  (AFRICAN AMERICAN): >60 ML/MIN/1.73 M^2
EST. GFR  (NON AFRICAN AMERICAN): >60 ML/MIN/1.73 M^2
GLUCOSE SERPL-MCNC: 125 MG/DL
HCT VFR BLD AUTO: 34.6 %
HGB BLD-MCNC: 11.6 G/DL
IMM GRANULOCYTES # BLD AUTO: 0.16 K/UL
IMM GRANULOCYTES NFR BLD AUTO: 0.8 %
LYMPHOCYTES # BLD AUTO: 0.2 K/UL
LYMPHOCYTES NFR BLD: 1.2 %
MCH RBC QN AUTO: 26.3 PG
MCHC RBC AUTO-ENTMCNC: 33.5 G/DL
MCV RBC AUTO: 79 FL
MONOCYTES # BLD AUTO: 1.4 K/UL
MONOCYTES NFR BLD: 6.9 %
NEUTROPHILS # BLD AUTO: 17.8 K/UL
NEUTROPHILS NFR BLD: 90.9 %
NRBC BLD-RTO: 0 /100 WBC
PLATELET # BLD AUTO: 271 K/UL
PMV BLD AUTO: 10.2 FL
POTASSIUM SERPL-SCNC: 3.6 MMOL/L
PROT SERPL-MCNC: 6.6 G/DL
RBC # BLD AUTO: 4.41 M/UL
SODIUM SERPL-SCNC: 134 MMOL/L
WBC # BLD AUTO: 19.55 K/UL

## 2019-02-28 PROCEDURE — 36415 COLL VENOUS BLD VENIPUNCTURE: CPT

## 2019-02-28 PROCEDURE — 80053 COMPREHEN METABOLIC PANEL: CPT

## 2019-02-28 PROCEDURE — 96416 CHEMO PROLONG INFUSE W/PUMP: CPT

## 2019-02-28 PROCEDURE — 63600175 PHARM REV CODE 636 W HCPCS: Performed by: INTERNAL MEDICINE

## 2019-02-28 PROCEDURE — 85025 COMPLETE CBC W/AUTO DIFF WBC: CPT

## 2019-02-28 PROCEDURE — 25000003 PHARM REV CODE 250: Performed by: INTERNAL MEDICINE

## 2019-02-28 RX ORDER — FLUCONAZOLE 200 MG/1
400 TABLET ORAL DAILY
Qty: 60 TABLET | Refills: 0 | Status: SHIPPED | OUTPATIENT
Start: 2019-02-28 | End: 2019-02-28 | Stop reason: SDUPTHER

## 2019-02-28 RX ORDER — SODIUM CHLORIDE 0.9 % (FLUSH) 0.9 %
10 SYRINGE (ML) INJECTION
Status: DISCONTINUED | OUTPATIENT
Start: 2019-02-28 | End: 2019-02-28 | Stop reason: HOSPADM

## 2019-02-28 RX ORDER — HEPARIN 100 UNIT/ML
500 SYRINGE INTRAVENOUS
Status: DISCONTINUED | OUTPATIENT
Start: 2019-02-28 | End: 2019-02-28 | Stop reason: HOSPADM

## 2019-02-28 RX ORDER — FLUCONAZOLE 200 MG/1
400 TABLET ORAL DAILY
Qty: 60 TABLET | Refills: 0 | Status: SHIPPED | OUTPATIENT
Start: 2019-02-28 | End: 2019-04-01

## 2019-02-28 RX ORDER — FLUCONAZOLE 200 MG/1
400 TABLET ORAL DAILY
Qty: 60 TABLET | Refills: 0 | Status: CANCELLED | OUTPATIENT
Start: 2019-02-28 | End: 2019-03-30

## 2019-02-28 RX ADMIN — VINCRISTINE SULFATE 32 MG: 1 INJECTION, SOLUTION INTRAVENOUS at 03:02

## 2019-02-28 NOTE — PLAN OF CARE
Problem: Adult Inpatient Plan of Care  Goal: Plan of Care Review  Patient here for EPOCH bag change. Line flushes well. Good blood return noted. Connected new bag. RTC tomorrow 3/1 around 1530.

## 2019-03-01 ENCOUNTER — INFUSION (OUTPATIENT)
Dept: INFUSION THERAPY | Facility: HOSPITAL | Age: 49
End: 2019-03-01
Attending: INTERNAL MEDICINE
Payer: COMMERCIAL

## 2019-03-01 ENCOUNTER — HOSPITAL ENCOUNTER (OUTPATIENT)
Dept: RADIOLOGY | Facility: HOSPITAL | Age: 49
Discharge: HOME | End: 2019-03-01
Attending: NURSE PRACTITIONER
Payer: COMMERCIAL

## 2019-03-01 VITALS
OXYGEN SATURATION: 97 % | DIASTOLIC BLOOD PRESSURE: 67 MMHG | SYSTOLIC BLOOD PRESSURE: 143 MMHG | RESPIRATION RATE: 18 BRPM | HEART RATE: 72 BPM

## 2019-03-01 DIAGNOSIS — C83.30 DIFFUSE LARGE B-CELL LYMPHOMA, UNSPECIFIED BODY REGION: Primary | ICD-10-CM

## 2019-03-01 DIAGNOSIS — C83.39 DIFFUSE LARGE B-CELL LYMPHOMA OF SOLID ORGAN EXCLUDING SPLEEN: ICD-10-CM

## 2019-03-01 DIAGNOSIS — C83.39 DIFFUSE LARGE B-CELL LYMPHOMA OF SOLID ORGAN EXCLUDING SPLEEN: Primary | ICD-10-CM

## 2019-03-01 LAB
CLARITY CSF: CLEAR
COLOR CSF: COLORLESS
LYMPHOCYTES NFR CSF MANUAL: 33 %
MONOS+MACROS NFR CSF MANUAL: 40 %
NEUTROPHILS NFR CSF MANUAL: 27 %
RBC # CSF: 90 /CU MM
SPECIMEN VOL CSF: 2 ML
WBC # CSF: 1 /CU MM

## 2019-03-01 PROCEDURE — 88108 CYTOPATH CONCENTRATE TECH: CPT | Mod: 26,,, | Performed by: PATHOLOGY

## 2019-03-01 PROCEDURE — 25000003 PHARM REV CODE 250: Performed by: NURSE PRACTITIONER

## 2019-03-01 PROCEDURE — 25000003 PHARM REV CODE 250: Performed by: INTERNAL MEDICINE

## 2019-03-01 PROCEDURE — 96365 THER/PROPH/DIAG IV INF INIT: CPT

## 2019-03-01 PROCEDURE — A4216 STERILE WATER/SALINE, 10 ML: HCPCS | Performed by: INTERNAL MEDICINE

## 2019-03-01 PROCEDURE — 88108 CYTOPATH CONCENTRATE TECH: CPT | Performed by: PATHOLOGY

## 2019-03-01 PROCEDURE — 96377 APPLICATON ON-BODY INJECTOR: CPT

## 2019-03-01 PROCEDURE — 63600175 PHARM REV CODE 636 W HCPCS: Performed by: INTERNAL MEDICINE

## 2019-03-01 PROCEDURE — 88108 CYTOLOGY SPECIMEN- MEDICAL CYTOLOGY (FLUID/WASH/BRUSH): ICD-10-PCS | Mod: 26,,, | Performed by: PATHOLOGY

## 2019-03-01 PROCEDURE — 96413 CHEMO IV INFUSION 1 HR: CPT

## 2019-03-01 PROCEDURE — 96450 CHEMOTHERAPY INTO CNS: CPT | Mod: ,,, | Performed by: RADIOLOGY

## 2019-03-01 PROCEDURE — 96450 FL CHEMO ADMINISTRATION INTRATHECAL WITH LP: ICD-10-PCS | Mod: ,,, | Performed by: RADIOLOGY

## 2019-03-01 PROCEDURE — 96450 CHEMOTHERAPY INTO CNS: CPT

## 2019-03-01 PROCEDURE — 63600175 PHARM REV CODE 636 W HCPCS: Mod: JG | Performed by: INTERNAL MEDICINE

## 2019-03-01 PROCEDURE — 89051 BODY FLUID CELL COUNT: CPT

## 2019-03-01 RX ORDER — HEPARIN 100 UNIT/ML
500 SYRINGE INTRAVENOUS
Status: DISCONTINUED | OUTPATIENT
Start: 2019-03-01 | End: 2019-03-01 | Stop reason: HOSPADM

## 2019-03-01 RX ORDER — LIDOCAINE HYDROCHLORIDE 10 MG/ML
4 INJECTION INFILTRATION; PERINEURAL ONCE
Status: COMPLETED | OUTPATIENT
Start: 2019-03-01 | End: 2019-03-01

## 2019-03-01 RX ORDER — SODIUM CHLORIDE 0.9 % (FLUSH) 0.9 %
10 SYRINGE (ML) INJECTION
Status: DISCONTINUED | OUTPATIENT
Start: 2019-03-01 | End: 2019-03-01 | Stop reason: HOSPADM

## 2019-03-01 RX ADMIN — PEGFILGRASTIM 6 MG: KIT SUBCUTANEOUS at 05:03

## 2019-03-01 RX ADMIN — CYCLOPHOSPHAMIDE 2340 MG: 1 INJECTION, POWDER, FOR SOLUTION INTRAVENOUS; ORAL at 04:03

## 2019-03-01 RX ADMIN — HEPARIN SODIUM (PORCINE) LOCK FLUSH IV SOLN 100 UNIT/ML 500 UNITS: 100 SOLUTION at 05:03

## 2019-03-01 RX ADMIN — LIDOCAINE HYDROCHLORIDE 5 ML: 10 INJECTION, SOLUTION INFILTRATION; PERINEURAL at 02:03

## 2019-03-01 RX ADMIN — DEXAMETHASONE SODIUM PHOSPHATE: 4 INJECTION, SOLUTION INTRA-ARTICULAR; INTRALESIONAL; INTRAMUSCULAR; INTRAVENOUS; SOFT TISSUE at 04:03

## 2019-03-01 RX ADMIN — Medication 10 ML: at 05:03

## 2019-03-01 RX ADMIN — SODIUM CHLORIDE: 0.9 INJECTION, SOLUTION INTRAVENOUS at 01:03

## 2019-03-01 NOTE — PLAN OF CARE
Problem: Adult Inpatient Plan of Care  Goal: Plan of Care Review  Patient tolerated infusion well. Pump DC, Neulasta OBI placed. VS stable, AVS declined. Discharged to home with parents

## 2019-03-01 NOTE — PROGRESS NOTES
Patient seen at Fluoroscopy. LP done per radiology. CSF studies sent. Timeout done. Chemo checked with MD. Methotrexate 12 mg given intrathecally without difficulty.    Stephanie Hoyos NP  Hematology/BMT

## 2019-03-01 NOTE — H&P
Radiology History & Physical      SUBJECTIVE:     Chief Complaint: NHL    History of Present Illness:  Giorgio Streeter is a 48 y.o. male who presents for fluoro guided LP for intrathecal access for chemotherapy administration.     Past Medical History:   Diagnosis Date    Cancer     non hodgkins lymphoma    Hypertension     Sleep apnea     Vision abnormalities      Past Surgical History:   Procedure Laterality Date    BONE MARROW BIOPSY  01/11/2019    EXCISION, SMALL INTESTINE N/A 12/21/2018    Performed by [facility] at [facility] 2ND F[facility]    INSERTION, PORT-A-CATH N/A 1/28/2019    Performed by [facility] at [facility] C[facility] LAB    SMALL INTESTINE SURGERY  12/21/2018    Dr. Casanova, segmental small bowel resection        Home Meds:   Prior to Admission medications    Medication Sig Start Date End Date Taking? Authorizing Provider   acyclovir (ZOVIRAX) 400 MG tablet Take 1 tablet (400 mg total) by mouth 2 (two) times daily. 2/6/19 2/6/20  Ashlee Luna NP   allopurinol (ZYLOPRIM) 300 MG tablet Take 1 tablet (300 mg total) by mouth once daily. 2/6/19 4/7/19  Ashlee Luna NP   ALPRAZolam (XANAX) 0.25 MG tablet Take 1 tablet (0.25 mg total) by mouth 2 (two) times daily as needed for Insomnia or Anxiety. 2/6/19 3/23/19  Ashlee uLna NP   atenolol (TENORMIN) 50 MG tablet Take 1 tablet (50 mg total) by mouth once daily. 2/6/19 2/6/20  Ashlee Luna NP   famotidine (PEPCID) 20 MG tablet Take 1 tablet (20 mg total) by mouth 2 (two) times daily. 2/6/19 2/6/20  Ashlee Luna NP   fexofenadine (ALLEGRA) 60 MG tablet Take 60 mg by mouth once daily.    Historical Provider, MD   fluconazole (DIFLUCAN) 200 MG Tab Take 2 tablets (400 mg total) by mouth once daily. 2/28/19 3/31/19  Ector Hwang MD   fluticasone (FLONASE) 50 mcg/actuation nasal spray 1 spray by Each Nare route once daily.    Historical Provider, MD   ketoconazole (NIZORAL) 2 % cream  1/31/19   Historical  Provider, MD   levoFLOXacin (LEVAQUIN) 500 MG tablet Take 1 tablet (500 mg total) by mouth once daily. 2/6/19   Ashlee Luna NP   loratadine (CLARITIN) 10 mg tablet Take 10 mg by mouth once daily.    Historical Provider, MD   losartan-hydrochlorothiazide 100-12.5 mg (HYZAAR) 100-12.5 mg Tab Take 1 tablet by mouth once daily. 2/6/19   Ashlee Luna NP   nystatin (MYCOSTATIN) powder Apply topically 2 (two) times daily. 2/6/19   Ashlee Luna NP   predniSONE (DELTASONE) 10 MG tablet Take one tablet by mouth 2 times a day. On days 1-5 of chemotherapy cycles only. To be combined with 50mg tabs for a total of 160mg 2/6/19   Ashlee Luna NP   predniSONE (DELTASONE) 50 MG Tab Take 3 tablets (150 mg total) by mouth 2 (two) times daily. On days 1-5 of chemotherapy cycles. To be combined with 10mg tabs for a total of 160mg 2/6/19   Ashlee Luna NP     Anticoagulants/Antiplatelets: no anticoagulation    Allergies: Review of patient's allergies indicates:  No Known Allergies  Sedation History:  no adverse reactions    Review of Systems:   Hematological: no known coagulopathies  Respiratory: no shortness of breath  Cardiovascular: no chest pain  Gastrointestinal: no abdominal pain  Genito-Urinary: no dysuria  Musculoskeletal: negative  Neurological: no TIA or stroke symptoms         OBJECTIVE:     Vital Signs (Most Recent)       Physical Exam:  ASA: 2  Mallampati: 3    General: no acute distress  Mental Status: alert and oriented to person, place and time  HEENT: normocephalic, atraumatic  Chest: unlabored breathing  Heart: regular heart rate  Abdomen: nondistended  Extremity: moves all extremities    Laboratory  Lab Results   Component Value Date    INR 1.0 02/06/2019       Lab Results   Component Value Date    WBC 19.55 (H) 02/28/2019    HGB 11.6 (L) 02/28/2019    HCT 34.6 (L) 02/28/2019    MCV 79 (L) 02/28/2019     02/28/2019      Lab Results   Component Value Date     (H)  02/28/2019     (L) 02/28/2019    K 3.6 02/28/2019    CL 99 02/28/2019    CO2 26 02/28/2019    BUN 19 02/28/2019    CREATININE 0.8 02/28/2019    CALCIUM 9.2 02/28/2019    MG 2.2 02/06/2019    ALT 41 02/28/2019    AST 30 02/28/2019    ALBUMIN 3.2 (L) 02/28/2019    BILITOT 0.3 02/28/2019       ASSESSMENT/PLAN:     Sedation Plan: local  Patient will undergo LP for intrathecal chemotherapy adminstration.    Quinten Martínez MD  Resident  Department of Radiology  Pager: [phone]

## 2019-03-01 NOTE — PROCEDURES
Radiology Post-Procedure Note    Pre Op Diagnosis: NHL     Post Op Diagnosis: Same    Procedure: lumbar puncture    Procedure performed by: Vamsi Mistry MD; Quinten Martínez MD    Written Informed Consent Obtained: Yes    Specimen Removed: 8 mL CSF    Estimated Blood Loss: Minimal    Findings: Following written informed consent and sterile prep and drape, a 22 gauge spinal needle was inserted at L1 - L2 intralaminar space under fluoroscopic surveillance.  8 mL clear CSF removed and sent to the lab for further analysis. Subsequently, a member of the Hem/Onc team injected chemotherapy intrathecally. Needle was flushed with 1 cc of the patients CSF and then removed. Hemostasis was achieved.  There were no complications.    Patient tolerated procedure well.    Quinten Martínez MD  Resident  Department of Radiology  Pager: [phone]

## 2019-03-01 NOTE — PROCEDURES
Giorgio Streeter is a 48 y.o. male patient.      Patient seen at Fluoroscopy. LP done per radiology. CSF studies sent. Timeout done. Chemo checked with MD. Methotrexate 12 mg given intrathecally without difficulty.       Procedures    Stephanie Hoyos  3/1/2019

## 2019-03-04 ENCOUNTER — LAB VISIT (OUTPATIENT)
Dept: LAB | Facility: HOSPITAL | Age: 49
End: 2019-03-04
Attending: INTERNAL MEDICINE
Payer: COMMERCIAL

## 2019-03-04 DIAGNOSIS — C83.33 DIFFUSE LARGE B-CELL LYMPHOMA, INTRA-ABDOMINAL LYMPH NODES: ICD-10-CM

## 2019-03-04 LAB
ALBUMIN SERPL BCP-MCNC: 3 G/DL
ALP SERPL-CCNC: 66 U/L
ALT SERPL W/O P-5'-P-CCNC: 37 U/L
ANION GAP SERPL CALC-SCNC: 10 MMOL/L
AST SERPL-CCNC: 21 U/L
BASOPHILS NFR BLD: 0 %
BILIRUB SERPL-MCNC: 0.4 MG/DL
BUN SERPL-MCNC: 13 MG/DL
CALCIUM SERPL-MCNC: 8.7 MG/DL
CHLORIDE SERPL-SCNC: 100 MMOL/L
CO2 SERPL-SCNC: 30 MMOL/L
CREAT SERPL-MCNC: 0.8 MG/DL
DIFFERENTIAL METHOD: ABNORMAL
EOSINOPHIL NFR BLD: 0 %
ERYTHROCYTE [DISTWIDTH] IN BLOOD BY AUTOMATED COUNT: 16 %
EST. GFR  (AFRICAN AMERICAN): >60 ML/MIN/1.73 M^2
EST. GFR  (NON AFRICAN AMERICAN): >60 ML/MIN/1.73 M^2
GLUCOSE SERPL-MCNC: 105 MG/DL
HCT VFR BLD AUTO: 36.3 %
HGB BLD-MCNC: 11.8 G/DL
HYPOCHROMIA BLD QL SMEAR: ABNORMAL
IMM GRANULOCYTES # BLD AUTO: ABNORMAL K/UL
IMM GRANULOCYTES NFR BLD AUTO: ABNORMAL %
LYMPHOCYTES NFR BLD: 7 %
MCH RBC QN AUTO: 26.2 PG
MCHC RBC AUTO-ENTMCNC: 32.5 G/DL
MCV RBC AUTO: 81 FL
MONOCYTES NFR BLD: 0 %
NEUTROPHILS NFR BLD: 92 %
NEUTS BAND NFR BLD MANUAL: 1 %
NRBC BLD-RTO: 0 /100 WBC
PLATELET # BLD AUTO: 208 K/UL
PLATELET BLD QL SMEAR: ABNORMAL
PMV BLD AUTO: 10.2 FL
POTASSIUM SERPL-SCNC: 3.2 MMOL/L
PROT SERPL-MCNC: 6.2 G/DL
RBC # BLD AUTO: 4.5 M/UL
SODIUM SERPL-SCNC: 140 MMOL/L
WBC # BLD AUTO: 21.8 K/UL

## 2019-03-04 PROCEDURE — 80053 COMPREHEN METABOLIC PANEL: CPT

## 2019-03-04 PROCEDURE — 36415 COLL VENOUS BLD VENIPUNCTURE: CPT | Mod: PO

## 2019-03-04 PROCEDURE — 85007 BL SMEAR W/DIFF WBC COUNT: CPT

## 2019-03-04 PROCEDURE — 85027 COMPLETE CBC AUTOMATED: CPT

## 2019-03-07 ENCOUNTER — LAB VISIT (OUTPATIENT)
Dept: LAB | Facility: HOSPITAL | Age: 49
End: 2019-03-07
Attending: INTERNAL MEDICINE
Payer: COMMERCIAL

## 2019-03-07 DIAGNOSIS — C83.33 DIFFUSE LARGE B-CELL LYMPHOMA, INTRA-ABDOMINAL LYMPH NODES: ICD-10-CM

## 2019-03-07 LAB
ALBUMIN SERPL BCP-MCNC: 3.3 G/DL
ALP SERPL-CCNC: 56 U/L
ALT SERPL W/O P-5'-P-CCNC: 28 U/L
ANION GAP SERPL CALC-SCNC: 6 MMOL/L
ANISOCYTOSIS BLD QL SMEAR: SLIGHT
AST SERPL-CCNC: 15 U/L
BASOPHILS # BLD AUTO: ABNORMAL K/UL
BASOPHILS NFR BLD: 2.4 %
BILIRUB SERPL-MCNC: 0.5 MG/DL
BUN SERPL-MCNC: 9 MG/DL
CALCIUM SERPL-MCNC: 9.4 MG/DL
CHLORIDE SERPL-SCNC: 100 MMOL/L
CO2 SERPL-SCNC: 29 MMOL/L
CREAT SERPL-MCNC: 0.8 MG/DL
DIFFERENTIAL METHOD: ABNORMAL
EOSINOPHIL # BLD AUTO: ABNORMAL K/UL
EOSINOPHIL NFR BLD: 4.9 %
ERYTHROCYTE [DISTWIDTH] IN BLOOD BY AUTOMATED COUNT: 16.3 %
EST. GFR  (AFRICAN AMERICAN): >60 ML/MIN/1.73 M^2
EST. GFR  (NON AFRICAN AMERICAN): >60 ML/MIN/1.73 M^2
GLUCOSE SERPL-MCNC: 116 MG/DL
HCT VFR BLD AUTO: 36.3 %
HGB BLD-MCNC: 11.9 G/DL
HYPOCHROMIA BLD QL SMEAR: ABNORMAL
IMM GRANULOCYTES # BLD AUTO: ABNORMAL K/UL
IMM GRANULOCYTES NFR BLD AUTO: ABNORMAL %
LYMPHOCYTES # BLD AUTO: ABNORMAL K/UL
LYMPHOCYTES NFR BLD: 30.5 %
MCH RBC QN AUTO: 26 PG
MCHC RBC AUTO-ENTMCNC: 32.8 G/DL
MCV RBC AUTO: 79 FL
MONOCYTES # BLD AUTO: ABNORMAL K/UL
MONOCYTES NFR BLD: 17.1 %
NEUTROPHILS NFR BLD: 45.1 %
NRBC BLD-RTO: 0 /100 WBC
OVALOCYTES BLD QL SMEAR: ABNORMAL
PLATELET # BLD AUTO: 120 K/UL
PLATELET BLD QL SMEAR: ABNORMAL
PMV BLD AUTO: 11.1 FL
POIKILOCYTOSIS BLD QL SMEAR: SLIGHT
POTASSIUM SERPL-SCNC: 3.9 MMOL/L
PROT SERPL-MCNC: 6.9 G/DL
RBC # BLD AUTO: 4.57 M/UL
SODIUM SERPL-SCNC: 135 MMOL/L
WBC # BLD AUTO: 0.71 K/UL

## 2019-03-07 PROCEDURE — 85007 BL SMEAR W/DIFF WBC COUNT: CPT

## 2019-03-07 PROCEDURE — 36415 COLL VENOUS BLD VENIPUNCTURE: CPT | Mod: PO

## 2019-03-07 PROCEDURE — 80053 COMPREHEN METABOLIC PANEL: CPT

## 2019-03-07 PROCEDURE — 85027 COMPLETE CBC AUTOMATED: CPT

## 2019-03-11 ENCOUNTER — LAB VISIT (OUTPATIENT)
Dept: LAB | Facility: HOSPITAL | Age: 49
End: 2019-03-11
Attending: INTERNAL MEDICINE
Payer: COMMERCIAL

## 2019-03-11 DIAGNOSIS — C83.33 DIFFUSE LARGE B-CELL LYMPHOMA, INTRA-ABDOMINAL LYMPH NODES: ICD-10-CM

## 2019-03-11 LAB
ALBUMIN SERPL BCP-MCNC: 3.5 G/DL
ALP SERPL-CCNC: 75 U/L
ALT SERPL W/O P-5'-P-CCNC: 41 U/L
ANION GAP SERPL CALC-SCNC: 9 MMOL/L
ANISOCYTOSIS BLD QL SMEAR: SLIGHT
AST SERPL-CCNC: 23 U/L
BASOPHILS # BLD AUTO: ABNORMAL K/UL
BASOPHILS NFR BLD: 2 %
BILIRUB SERPL-MCNC: 0.2 MG/DL
BUN SERPL-MCNC: 10 MG/DL
CALCIUM SERPL-MCNC: 9.7 MG/DL
CHLORIDE SERPL-SCNC: 102 MMOL/L
CO2 SERPL-SCNC: 29 MMOL/L
CREAT SERPL-MCNC: 0.9 MG/DL
DIFFERENTIAL METHOD: ABNORMAL
EOSINOPHIL # BLD AUTO: ABNORMAL K/UL
EOSINOPHIL NFR BLD: 0 %
ERYTHROCYTE [DISTWIDTH] IN BLOOD BY AUTOMATED COUNT: 17.7 %
EST. GFR  (AFRICAN AMERICAN): >60 ML/MIN/1.73 M^2
EST. GFR  (NON AFRICAN AMERICAN): >60 ML/MIN/1.73 M^2
GLUCOSE SERPL-MCNC: 125 MG/DL
HCT VFR BLD AUTO: 36.3 %
HGB BLD-MCNC: 11.8 G/DL
HYPOCHROMIA BLD QL SMEAR: ABNORMAL
IMM GRANULOCYTES # BLD AUTO: ABNORMAL K/UL
IMM GRANULOCYTES NFR BLD AUTO: ABNORMAL %
LYMPHOCYTES # BLD AUTO: ABNORMAL K/UL
LYMPHOCYTES NFR BLD: 6 %
MCH RBC QN AUTO: 26.5 PG
MCHC RBC AUTO-ENTMCNC: 32.5 G/DL
MCV RBC AUTO: 81 FL
METAMYELOCYTES NFR BLD MANUAL: 3 %
MONOCYTES # BLD AUTO: ABNORMAL K/UL
MONOCYTES NFR BLD: 4 %
MYELOCYTES NFR BLD MANUAL: 2 %
NEUTROPHILS NFR BLD: 81 %
NEUTS BAND NFR BLD MANUAL: 2 %
NRBC BLD-RTO: 1 /100 WBC
OVALOCYTES BLD QL SMEAR: ABNORMAL
PLATELET # BLD AUTO: 156 K/UL
PLATELET BLD QL SMEAR: ABNORMAL
PMV BLD AUTO: 10.4 FL
POIKILOCYTOSIS BLD QL SMEAR: SLIGHT
POLYCHROMASIA BLD QL SMEAR: ABNORMAL
POTASSIUM SERPL-SCNC: 3.8 MMOL/L
PROT SERPL-MCNC: 7.1 G/DL
RBC # BLD AUTO: 4.46 M/UL
SODIUM SERPL-SCNC: 140 MMOL/L
TOXIC GRANULES BLD QL SMEAR: PRESENT
WBC # BLD AUTO: 10.64 K/UL

## 2019-03-11 PROCEDURE — 80053 COMPREHEN METABOLIC PANEL: CPT

## 2019-03-11 PROCEDURE — 85027 COMPLETE CBC AUTOMATED: CPT

## 2019-03-11 PROCEDURE — 36415 COLL VENOUS BLD VENIPUNCTURE: CPT | Mod: PO

## 2019-03-11 PROCEDURE — 85007 BL SMEAR W/DIFF WBC COUNT: CPT

## 2019-03-14 ENCOUNTER — LAB VISIT (OUTPATIENT)
Dept: LAB | Facility: HOSPITAL | Age: 49
End: 2019-03-14
Attending: INTERNAL MEDICINE
Payer: COMMERCIAL

## 2019-03-14 DIAGNOSIS — C83.33 DIFFUSE LARGE B-CELL LYMPHOMA, INTRA-ABDOMINAL LYMPH NODES: ICD-10-CM

## 2019-03-14 LAB
ALBUMIN SERPL BCP-MCNC: 3.5 G/DL
ALP SERPL-CCNC: 76 U/L
ALT SERPL W/O P-5'-P-CCNC: 32 U/L
ANION GAP SERPL CALC-SCNC: 7 MMOL/L
ANISOCYTOSIS BLD QL SMEAR: SLIGHT
AST SERPL-CCNC: 22 U/L
BASOPHILS # BLD AUTO: ABNORMAL K/UL
BASOPHILS NFR BLD: 0 %
BILIRUB SERPL-MCNC: 0.3 MG/DL
BUN SERPL-MCNC: 9 MG/DL
CALCIUM SERPL-MCNC: 9.8 MG/DL
CHLORIDE SERPL-SCNC: 103 MMOL/L
CO2 SERPL-SCNC: 29 MMOL/L
CREAT SERPL-MCNC: 0.8 MG/DL
DIFFERENTIAL METHOD: ABNORMAL
EOSINOPHIL # BLD AUTO: ABNORMAL K/UL
EOSINOPHIL NFR BLD: 0 %
ERYTHROCYTE [DISTWIDTH] IN BLOOD BY AUTOMATED COUNT: 18.6 %
EST. GFR  (AFRICAN AMERICAN): >60 ML/MIN/1.73 M^2
EST. GFR  (NON AFRICAN AMERICAN): >60 ML/MIN/1.73 M^2
GLUCOSE SERPL-MCNC: 115 MG/DL
HCT VFR BLD AUTO: 36.3 %
HGB BLD-MCNC: 11.4 G/DL
IMM GRANULOCYTES # BLD AUTO: ABNORMAL K/UL
IMM GRANULOCYTES NFR BLD AUTO: ABNORMAL %
LYMPHOCYTES # BLD AUTO: ABNORMAL K/UL
LYMPHOCYTES NFR BLD: 5 %
MCH RBC QN AUTO: 26.2 PG
MCHC RBC AUTO-ENTMCNC: 31.4 G/DL
MCV RBC AUTO: 83 FL
MONOCYTES # BLD AUTO: ABNORMAL K/UL
MONOCYTES NFR BLD: 6 %
MYELOCYTES NFR BLD MANUAL: 1 %
NEUTROPHILS NFR BLD: 87 %
NEUTS BAND NFR BLD MANUAL: 1 %
NRBC BLD-RTO: 1 /100 WBC
OVALOCYTES BLD QL SMEAR: ABNORMAL
PLATELET # BLD AUTO: 185 K/UL
PMV BLD AUTO: 10.4 FL
POIKILOCYTOSIS BLD QL SMEAR: SLIGHT
POLYCHROMASIA BLD QL SMEAR: ABNORMAL
POTASSIUM SERPL-SCNC: 4.1 MMOL/L
PROT SERPL-MCNC: 6.8 G/DL
RBC # BLD AUTO: 4.35 M/UL
SCHISTOCYTES BLD QL SMEAR: ABNORMAL
SODIUM SERPL-SCNC: 139 MMOL/L
WBC # BLD AUTO: 12.42 K/UL

## 2019-03-14 PROCEDURE — 85007 BL SMEAR W/DIFF WBC COUNT: CPT

## 2019-03-14 PROCEDURE — 36415 COLL VENOUS BLD VENIPUNCTURE: CPT | Mod: PO

## 2019-03-14 PROCEDURE — 80053 COMPREHEN METABOLIC PANEL: CPT

## 2019-03-14 PROCEDURE — 85027 COMPLETE CBC AUTOMATED: CPT

## 2019-03-15 ENCOUNTER — LAB VISIT (OUTPATIENT)
Dept: LAB | Facility: HOSPITAL | Age: 49
End: 2019-03-15
Attending: INTERNAL MEDICINE
Payer: COMMERCIAL

## 2019-03-15 ENCOUNTER — OFFICE VISIT (OUTPATIENT)
Dept: HEMATOLOGY/ONCOLOGY | Facility: CLINIC | Age: 49
End: 2019-03-15
Payer: COMMERCIAL

## 2019-03-15 VITALS
RESPIRATION RATE: 16 BRPM | WEIGHT: 298.06 LBS | HEART RATE: 90 BPM | OXYGEN SATURATION: 100 % | HEIGHT: 67 IN | DIASTOLIC BLOOD PRESSURE: 98 MMHG | TEMPERATURE: 99 F | BODY MASS INDEX: 46.78 KG/M2 | SYSTOLIC BLOOD PRESSURE: 148 MMHG

## 2019-03-15 DIAGNOSIS — I10 ESSENTIAL HYPERTENSION: ICD-10-CM

## 2019-03-15 DIAGNOSIS — C83.30 LARGE CELL (DIFFUSE) NON-HODGKIN'S LYMPHOMA: ICD-10-CM

## 2019-03-15 DIAGNOSIS — Z90.49 S/P SMALL BOWEL RESECTION: Primary | ICD-10-CM

## 2019-03-15 DIAGNOSIS — C17.9 SMALL BOWEL CANCER: ICD-10-CM

## 2019-03-15 DIAGNOSIS — D63.0 ANEMIA IN NEOPLASTIC DISEASE: ICD-10-CM

## 2019-03-15 DIAGNOSIS — C83.33 DIFFUSE LARGE B-CELL LYMPHOMA, INTRA-ABDOMINAL LYMPH NODES: ICD-10-CM

## 2019-03-15 DIAGNOSIS — E66.01 MORBID OBESITY: ICD-10-CM

## 2019-03-15 LAB
ALBUMIN SERPL BCP-MCNC: 3.6 G/DL
ALP SERPL-CCNC: 78 U/L
ALT SERPL W/O P-5'-P-CCNC: 33 U/L
ANION GAP SERPL CALC-SCNC: 9 MMOL/L
AST SERPL-CCNC: 26 U/L
BASOPHILS # BLD AUTO: ABNORMAL K/UL
BASOPHILS NFR BLD: 0 %
BILIRUB SERPL-MCNC: 0.2 MG/DL
BUN SERPL-MCNC: 10 MG/DL
CALCIUM SERPL-MCNC: 9.9 MG/DL
CHLORIDE SERPL-SCNC: 103 MMOL/L
CO2 SERPL-SCNC: 29 MMOL/L
CREAT SERPL-MCNC: 0.8 MG/DL
DIFFERENTIAL METHOD: ABNORMAL
EOSINOPHIL # BLD AUTO: ABNORMAL K/UL
EOSINOPHIL NFR BLD: 4 %
ERYTHROCYTE [DISTWIDTH] IN BLOOD BY AUTOMATED COUNT: 18.6 %
EST. GFR  (AFRICAN AMERICAN): >60 ML/MIN/1.73 M^2
EST. GFR  (NON AFRICAN AMERICAN): >60 ML/MIN/1.73 M^2
GLUCOSE SERPL-MCNC: 93 MG/DL
HCT VFR BLD AUTO: 36 %
HGB BLD-MCNC: 11.7 G/DL
IMM GRANULOCYTES # BLD AUTO: ABNORMAL K/UL
IMM GRANULOCYTES NFR BLD AUTO: ABNORMAL %
LDH SERPL L TO P-CCNC: 354 U/L
LYMPHOCYTES # BLD AUTO: ABNORMAL K/UL
LYMPHOCYTES NFR BLD: 9 %
MCH RBC QN AUTO: 25.9 PG
MCHC RBC AUTO-ENTMCNC: 32.5 G/DL
MCV RBC AUTO: 80 FL
METAMYELOCYTES NFR BLD MANUAL: 1 %
MONOCYTES # BLD AUTO: ABNORMAL K/UL
MONOCYTES NFR BLD: 7 %
MYELOCYTES NFR BLD MANUAL: 3 %
NEUTROPHILS NFR BLD: 76 %
NRBC BLD-RTO: 0 /100 WBC
PLATELET # BLD AUTO: 227 K/UL
PMV BLD AUTO: 9.9 FL
POTASSIUM SERPL-SCNC: 4 MMOL/L
PROT SERPL-MCNC: 7.5 G/DL
RBC # BLD AUTO: 4.51 M/UL
SODIUM SERPL-SCNC: 141 MMOL/L
URATE SERPL-MCNC: 4.3 MG/DL
WBC # BLD AUTO: 11.64 K/UL

## 2019-03-15 PROCEDURE — 99214 OFFICE O/P EST MOD 30 MIN: CPT | Mod: S$GLB,,, | Performed by: INTERNAL MEDICINE

## 2019-03-15 PROCEDURE — 3077F PR MOST RECENT SYSTOLIC BLOOD PRESSURE >= 140 MM HG: ICD-10-PCS | Mod: CPTII,S$GLB,, | Performed by: INTERNAL MEDICINE

## 2019-03-15 PROCEDURE — 99999 PR PBB SHADOW E&M-EST. PATIENT-LVL V: CPT | Mod: PBBFAC,,, | Performed by: INTERNAL MEDICINE

## 2019-03-15 PROCEDURE — 99214 PR OFFICE/OUTPT VISIT, EST, LEVL IV, 30-39 MIN: ICD-10-PCS | Mod: S$GLB,,, | Performed by: INTERNAL MEDICINE

## 2019-03-15 PROCEDURE — 84550 ASSAY OF BLOOD/URIC ACID: CPT

## 2019-03-15 PROCEDURE — 85027 COMPLETE CBC AUTOMATED: CPT

## 2019-03-15 PROCEDURE — 3008F PR BODY MASS INDEX (BMI) DOCUMENTED: ICD-10-PCS | Mod: CPTII,S$GLB,, | Performed by: INTERNAL MEDICINE

## 2019-03-15 PROCEDURE — 3080F PR MOST RECENT DIASTOLIC BLOOD PRESSURE >= 90 MM HG: ICD-10-PCS | Mod: CPTII,S$GLB,, | Performed by: INTERNAL MEDICINE

## 2019-03-15 PROCEDURE — 99999 PR PBB SHADOW E&M-EST. PATIENT-LVL V: ICD-10-PCS | Mod: PBBFAC,,, | Performed by: INTERNAL MEDICINE

## 2019-03-15 PROCEDURE — 83615 LACTATE (LD) (LDH) ENZYME: CPT

## 2019-03-15 PROCEDURE — 3080F DIAST BP >= 90 MM HG: CPT | Mod: CPTII,S$GLB,, | Performed by: INTERNAL MEDICINE

## 2019-03-15 PROCEDURE — 3077F SYST BP >= 140 MM HG: CPT | Mod: CPTII,S$GLB,, | Performed by: INTERNAL MEDICINE

## 2019-03-15 PROCEDURE — 36415 COLL VENOUS BLD VENIPUNCTURE: CPT

## 2019-03-15 PROCEDURE — 3008F BODY MASS INDEX DOCD: CPT | Mod: CPTII,S$GLB,, | Performed by: INTERNAL MEDICINE

## 2019-03-15 PROCEDURE — 85007 BL SMEAR W/DIFF WBC COUNT: CPT

## 2019-03-15 PROCEDURE — 80053 COMPREHEN METABOLIC PANEL: CPT

## 2019-03-15 NOTE — PROGRESS NOTES
CC: Lymphoma, follow up visit      HPI:  is a 48 y.o. male here for followup visit. He has hypertension, obstructive sleep apnea. He was hospitalized around Christmas 2018 for small bowel mass. Patient reports that he began having lower abdominal/pelvic pain toward the end of October 2018. This prompted a urologic evaluation and subsequent treatment for a presumed UTI. He states that his pain continued into November and evolved into more upper abdominal/epigastric pain that was worse with eating. He was subsequently seen by his PCP who ultimately ordered a CT abdomen/pelvis that revealed an abnormal thickening of an 8 cm segment of small bowel. He denied fever, chills, night sweats, or anorexia at that time. He had ~10 lb weight loss over the 1-2 months prior to his hospitalization in Dec 2018, but he was also actively taking part in a weight loss program.  On 12/21/18, he underwent Segmental small bowel resection.The mass was approximately 6 cm in length and was circumferential.  There was dilation of the small bowel above it suggesting a partial bowel obstruction.  There was no adenopathy in the adjacent   mesentery.  The tumor was clearly extending to the serosal surface of the bowel.Careful search was made for peritoneal implants and there were no lesions noted on the peritoneum or omentum.  The liver could not be inspected through the incision,   but it was palpated and there were no focal lesions within it. The remainder of the small bowel was run from the ligament of Treitz to the ileocecal valves and no other small bowel lesions were noted.    He had PET CT,. 2D ECHO, bone marrow biopsy. PET CT did not reveal any evidence of lymphoma. 2D ECHO was normal. Bone marrow was negative for involvement by lymphoma.       Interval History: He is here for a follow up visit.He received C1 EPOCH -R 2/1-2/5/19 without complications. C2 was on 2/25/19. Both cycles were uneventful.     Review of Systems    Constitutional: Negative for chills, fever and weight loss.   HENT: Negative for ear discharge, ear pain and nosebleeds.    Eyes: Negative for blurred vision, double vision and photophobia.   Respiratory: Negative for cough, hemoptysis and sputum production.    Cardiovascular: Negative for chest pain, palpitations and orthopnea.   Gastrointestinal: Negative for abdominal pain, constipation, diarrhea, heartburn, nausea and vomiting.   Genitourinary: Negative for dysuria and urgency.   Musculoskeletal: Negative for back pain and myalgias.   Skin: Negative for rash.   Neurological: Positive for tingling. Negative for dizziness, tremors, sensory change and headaches.   Endo/Heme/Allergies: Does not bruise/bleed easily.   Psychiatric/Behavioral: Negative for depression, substance abuse and suicidal ideas.         Current Outpatient Medications   Medication Sig    acyclovir (ZOVIRAX) 400 MG tablet Take 1 tablet (400 mg total) by mouth 2 (two) times daily.    allopurinol (ZYLOPRIM) 300 MG tablet Take 1 tablet (300 mg total) by mouth once daily.    ALPRAZolam (XANAX) 0.25 MG tablet Take 1 tablet (0.25 mg total) by mouth 2 (two) times daily as needed for Insomnia or Anxiety.    atenolol (TENORMIN) 50 MG tablet Take 1 tablet (50 mg total) by mouth once daily.    famotidine (PEPCID) 20 MG tablet Take 1 tablet (20 mg total) by mouth 2 (two) times daily.    fexofenadine (ALLEGRA) 60 MG tablet Take 60 mg by mouth once daily.    fluconazole (DIFLUCAN) 200 MG Tab Take 2 tablets (400 mg total) by mouth once daily.    fluticasone (FLONASE) 50 mcg/actuation nasal spray 1 spray by Each Nare route once daily.    ketoconazole (NIZORAL) 2 % cream     levoFLOXacin (LEVAQUIN) 500 MG tablet Take 1 tablet (500 mg total) by mouth once daily.    loratadine (CLARITIN) 10 mg tablet Take 10 mg by mouth once daily.    losartan-hydrochlorothiazide 100-12.5 mg (HYZAAR) 100-12.5 mg Tab Take 1 tablet by mouth once daily.    nystatin  (MYCOSTATIN) powder Apply topically 2 (two) times daily.    predniSONE (DELTASONE) 10 MG tablet Take one tablet by mouth 2 times a day. On days 1-5 of chemotherapy cycles only. To be combined with 50mg tabs for a total of 160mg    predniSONE (DELTASONE) 50 MG Tab Take 3 tablets (150 mg total) by mouth 2 (two) times daily. On days 1-5 of chemotherapy cycles. To be combined with 10mg tabs for a total of 160mg     No current facility-administered medications for this visit.      Facility-Administered Medications Ordered in Other Visits   Medication    pegfilgrastim injection 6 mg         Vitals:    03/15/19 1543   BP: (!) 148/98   Pulse: 90   Resp: 16   Temp: 99.1 °F (37.3 °C)     Physical Exam   Constitutional: He is oriented to person, place, and time. He appears well-developed.   HENT:   Head: Normocephalic and atraumatic.   Eyes: Pupils are equal, round, and reactive to light.   Neck: Normal range of motion.   Cardiovascular: Normal rate and regular rhythm.   No murmur heard.  Pulmonary/Chest: Effort normal. No respiratory distress. He has no wheezes. He has no rales.   Abdominal: Soft. Bowel sounds are normal. He exhibits no distension. There is no tenderness. There is no rebound.   Lymphadenopathy:     He has no cervical adenopathy.   Neurological: He is alert and oriented to person, place, and time.   Skin: Skin is warm.   Psychiatric: He has a normal mood and affect.       Component      Latest Ref Rng & Units 3/15/2019   Sodium      136 - 145 mmol/L 141   Potassium      3.5 - 5.1 mmol/L 4.0   Chloride      95 - 110 mmol/L 103   CO2      23 - 29 mmol/L 29   Glucose      70 - 110 mg/dL 93   BUN, Bld      6 - 20 mg/dL 10   Creatinine      0.5 - 1.4 mg/dL 0.8   Calcium      8.7 - 10.5 mg/dL 9.9   Total Protein      6.0 - 8.4 g/dL 7.5   Albumin      3.5 - 5.2 g/dL 3.6   Total Bilirubin      0.1 - 1.0 mg/dL 0.2   Alkaline Phosphatase      55 - 135 U/L 78   AST      10 - 40 U/L 26   ALT      10 - 44 U/L 33   Anion  Gap      8 - 16 mmol/L 9   eGFR if African American      >60 mL/min/1.73 m:2 >60.0   eGFR if non African American      >60 mL/min/1.73 m:2 >60.0   WBC      3.90 - 12.70 K/uL 11.64   RBC      4.60 - 6.20 M/uL 4.51 (L)   Hemoglobin      14.0 - 18.0 g/dL 11.7 (L)   Hematocrit      40.0 - 54.0 % 36.0 (L)   MCV      82 - 98 fL 80 (L)   MCH      27.0 - 31.0 pg 25.9 (L)   MCHC      32.0 - 36.0 g/dL 32.5   RDW      11.5 - 14.5 % 18.6 (H)   Platelets      150 - 350 K/uL 227   MPV      9.2 - 12.9 fL 9.9   LD      110 - 260 U/L 354 (H)   Uric Acid      3.4 - 7.0 mg/dL 4.3         12/21/18 flow cytometry of small bowel mass     Flow cytometric analysis of tissue detects a kappa restricted B lymphocyte population that expresses CD19, CD20, and CD10.  CD5 is negative.  The differential diagnosis includes diffuse large B cell lymphoma, follicular lymphoma, and Burkitt's lymphoma.     Flow differential:  Lymphocytes 88.2%, Monocytes 6.0%, Granulocytes  2.3%, Blast  2.6%, Debris/nRBC 0.5%,  Viability 90.7%.  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  Comment: Concurrent flow cytometric analysis confirms a Kappa restricted clonal B-cell population that expresses CD19, CD20, and CD10 without coexpression of CD5. The histologic sections demonstrate a submucosal mass comprised of a diffuse population of large CD20 positive B cells compatible with immunoblast intermixed with moderate numbers of CD5/CD3 positive T cells. The large lymphocytes are BCL6 and CD10 (weak) positive and BCL6, MUM1, and BCL-2 are negative. CD23 shows no follicular dendritic network. The Ki-67 proliferation index is high (>60%). In situ hybridization for David bar viral RNA is  "negative. The overall findings are compatible with diffuse large B-cell lymphoma, germinal center type. Molecular studies are pending to exclude the possibility of a  high-grade B-cell lymphoma with myc and BCL-2/ BCL6 rearrangements and will be reported. All histologic chemical stains have satisfactory positive and negative controls.     "B-cell lymphoma, FISH, tissue:  Interpretation: Positive. The result is abnormal and indicates MYC/IGH fusion and 27% of nuclei. No rearrangement of BCL-2 or BCL6 was observed.  No MYC rearrangement was observed, therefore this is unlikely to be "high-grade B-cell lymphoma, with MYC and  BCL2 and/or BCL6 translocations" (previously known as "genetic double-hit lymphoma", currently reclassified per  the 2016 World Health Organization Classification of Lymphoid Neoplasms).        1/11/19 bone marrow biopsy       FINAL PATHOLOGIC DIAGNOSIS  BONE MARROW, LEFT ILIAC CREST (ASPIRATE SMEAR, TOUCH PREPARATION, CLOT SECTION, AND CORE BIOPSY):  -- NORMOCELLULAR MARROW (60%) WITH TRILINEAGE HEMATOPOIESIS.  -- NO EVIDENCE OF MARROW INVOLVEMENT BY B-CELL NON-HODGKIN LYMPHOMA.  -- MARKEDLY DECREASED STORAGE IRON.     Recent CBC data (1/7/2019) showed normocytic anemia and thrombocytosis.  Flow cytometric analysis of bone marrow shows populations of polyclonal B lymphocytes and T lymphocytes that are immunophenotypically unremarkable. The blast gate is not increased.  Immunohistochemical stains are performed on the biopsy core and clot section for greater sensitivity and further architectural assessment with adequate controls. The interstitial lymphocytes and small lymphoid aggregates are  composed of mixed CD3-positive T cells and CD20-positive B cells. Correlation with other laboratory results is recommended.           1/15/19 PET CT             COMPARISON:  CT abdomen and pelvis 12/07/2018.    FINDINGS:  Quality of the study: Adequate.    Mildly increased FDG uptake is noted along the midline " anterior abdominal wall, likely related to prior celiotomy incision, SUV max 5.60.  Small, non lesion like focus of tracer avidity is seen involving the descending colon, without any corresponding CT abnormality, SUV max 6.48..  This likely represents normal colonic peristalsis or a small area of inflammation.  Diffusely increased marrow uptake likely represents red marrow reconversion. No suspicious abnormally increased uptake is seen to suggest residual/recurrent disease.    Physiologic uptake of the tracer is present within the brain, salivary glands, myocardium, GI and  tracts.    Incidental CT findings: Mild mosaic type ground-glass density in the lungs.  Splenomegaly, with the spleen measuring 12.7 cm in AP dimension.  Nonobstructive right renal stone measuring 0.7 cm.  Postoperative changes from small bowel resection.  Scattered colonic diverticula.  Circumferential urinary bladder wall thickening, favored to relate to underdistention.  Mild degenerative changes in the spine.  Stable, mild anterior wedge compression deformity of T12.       Impression         No convincing evidence of residual or recurrent disease.  Midline abdominal wall FDG avidity coincides with prior celiotomy incision               1/11/19 2D ECHO     · Normal left ventricular systolic function. The estimated ejection fraction is 63%  · Normal LV diastolic function.  · Moderate left atrial enlargement.  · Normal right ventricular systolic function.  · Mild right atrial enlargement.  · Normal central venous pressure (3 mm Hg).        2/7/19 Cerebrospinal Fluid     FINAL PATHOLOGIC DIAGNOSIS     Negative for malignant cells       3/1/19 CSF cytology FINAL PATHOLOGIC DIAGNOSIS    Cerebrospinal fluid (Cytology):Negative for malignant cells         Assessment:     1. DLBCL of jejunum, GC sub type, high grade  2. Hypertension  3. Obstructive sleep apnea  4. Morbid obesity  5. Anemia, hypochromic        Plan:     1. MYC/IgH fusion noted on  FISH , in 27% of nuclei. 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 IPI score 0. It does not fulfil the criteria for Burkitts( ki 67 high, but not high enough), double or triple hit ( no bcl2 or bcl6 translocation). No PET avid measurable disease. No ly phoma in bone marrow. I discussed the treatment of stage I GI high grade lymphoma. I explained that although there is no active/measurable disease, chemotherapy with RCHOP or dose adjusted R-EPOCH is recommended, as he had bulky (8cm), high grade ( ki 67 > 60% ) lymphoma with MYC/IgH fusion. He will also need diagnostic LP. He went to [facility] for 2[facility] opinion.   He has received cycle 1 and 2 DA  EPOCH -R with IT MTX without any complication. CSF cytology negative for malignant cells on 2/7/19 and on 3/1/19.   ANC, platelet alvaro after C1 reviewed. Doses of Etoposide/Doxorubicin/Cytoxan were escalated by 1 level for C2. Doses for cycle 3 will be the same as C2.   LDH high today. No B symptoms. Plan for PET CT after C3.         2.On Atenolol, Losartan HCTZ      5. Mild, asymptomatic.                Distress Screening Results: Psychosocial Distress screening score of Distress Score: 4 noted and reviewed. No intervention indicated.

## 2019-03-15 NOTE — Clinical Note
--chemo starting 3/18 --daily epoch mon-fri pump exhcange--neulasta Saturday--cbc, cmp twice weekly (mon-thu) starting 3/25 at [place]--cbc, cmp, ldh, uric acid, pet ct and f/u in 3 wks

## 2019-03-18 ENCOUNTER — INFUSION (OUTPATIENT)
Dept: INFUSION THERAPY | Facility: HOSPITAL | Age: 49
End: 2019-03-18
Attending: INTERNAL MEDICINE
Payer: COMMERCIAL

## 2019-03-18 VITALS
SYSTOLIC BLOOD PRESSURE: 141 MMHG | WEIGHT: 299.81 LBS | HEART RATE: 88 BPM | TEMPERATURE: 98 F | RESPIRATION RATE: 18 BRPM | DIASTOLIC BLOOD PRESSURE: 74 MMHG | BODY MASS INDEX: 47.06 KG/M2 | HEIGHT: 67 IN

## 2019-03-18 DIAGNOSIS — C83.39 DIFFUSE LARGE B-CELL LYMPHOMA OF SOLID ORGAN EXCLUDING SPLEEN: Primary | ICD-10-CM

## 2019-03-18 PROCEDURE — 96375 TX/PRO/DX INJ NEW DRUG ADDON: CPT

## 2019-03-18 PROCEDURE — 25000003 PHARM REV CODE 250: Performed by: INTERNAL MEDICINE

## 2019-03-18 PROCEDURE — 96367 TX/PROPH/DG ADDL SEQ IV INF: CPT

## 2019-03-18 PROCEDURE — 63600175 PHARM REV CODE 636 W HCPCS: Mod: JG | Performed by: INTERNAL MEDICINE

## 2019-03-18 PROCEDURE — 96413 CHEMO IV INFUSION 1 HR: CPT

## 2019-03-18 PROCEDURE — 96415 CHEMO IV INFUSION ADDL HR: CPT

## 2019-03-18 PROCEDURE — S0028 INJECTION, FAMOTIDINE, 20 MG: HCPCS | Performed by: INTERNAL MEDICINE

## 2019-03-18 PROCEDURE — 96416 CHEMO PROLONG INFUSE W/PUMP: CPT

## 2019-03-18 RX ORDER — ACETAMINOPHEN 325 MG/1
650 TABLET ORAL
Status: CANCELLED | OUTPATIENT
Start: 2019-03-18

## 2019-03-18 RX ORDER — SODIUM CHLORIDE 0.9 % (FLUSH) 0.9 %
10 SYRINGE (ML) INJECTION
Status: CANCELLED | OUTPATIENT
Start: 2019-03-20

## 2019-03-18 RX ORDER — HEPARIN 100 UNIT/ML
500 SYRINGE INTRAVENOUS
Status: CANCELLED | OUTPATIENT
Start: 2019-03-20

## 2019-03-18 RX ORDER — HEPARIN 100 UNIT/ML
500 SYRINGE INTRAVENOUS
Status: CANCELLED | OUTPATIENT
Start: 2019-03-22

## 2019-03-18 RX ORDER — MEPERIDINE HYDROCHLORIDE 50 MG/ML
25 INJECTION INTRAMUSCULAR; INTRAVENOUS; SUBCUTANEOUS
Status: CANCELLED | OUTPATIENT
Start: 2019-03-18

## 2019-03-18 RX ORDER — HEPARIN 100 UNIT/ML
500 SYRINGE INTRAVENOUS
Status: DISCONTINUED | OUTPATIENT
Start: 2019-03-18 | End: 2019-03-18 | Stop reason: HOSPADM

## 2019-03-18 RX ORDER — HEPARIN 100 UNIT/ML
500 SYRINGE INTRAVENOUS
Status: CANCELLED | OUTPATIENT
Start: 2019-03-19

## 2019-03-18 RX ORDER — FAMOTIDINE 10 MG/ML
20 INJECTION INTRAVENOUS
Status: COMPLETED | OUTPATIENT
Start: 2019-03-18 | End: 2019-03-18

## 2019-03-18 RX ORDER — SODIUM CHLORIDE 0.9 % (FLUSH) 0.9 %
10 SYRINGE (ML) INJECTION
Status: CANCELLED | OUTPATIENT
Start: 2019-03-19

## 2019-03-18 RX ORDER — SODIUM CHLORIDE 0.9 % (FLUSH) 0.9 %
10 SYRINGE (ML) INJECTION
Status: CANCELLED | OUTPATIENT
Start: 2019-03-21

## 2019-03-18 RX ORDER — SODIUM CHLORIDE 0.9 % (FLUSH) 0.9 %
10 SYRINGE (ML) INJECTION
Status: CANCELLED | OUTPATIENT
Start: 2019-03-22

## 2019-03-18 RX ORDER — MEPERIDINE HYDROCHLORIDE 50 MG/ML
25 INJECTION INTRAMUSCULAR; INTRAVENOUS; SUBCUTANEOUS
Status: DISCONTINUED | OUTPATIENT
Start: 2019-03-18 | End: 2019-03-18 | Stop reason: HOSPADM

## 2019-03-18 RX ORDER — HEPARIN 100 UNIT/ML
500 SYRINGE INTRAVENOUS
Status: CANCELLED | OUTPATIENT
Start: 2019-03-21

## 2019-03-18 RX ORDER — SODIUM CHLORIDE 0.9 % (FLUSH) 0.9 %
10 SYRINGE (ML) INJECTION
Status: CANCELLED | OUTPATIENT
Start: 2019-03-18

## 2019-03-18 RX ORDER — FAMOTIDINE 10 MG/ML
20 INJECTION INTRAVENOUS
Status: CANCELLED | OUTPATIENT
Start: 2019-03-18

## 2019-03-18 RX ORDER — SODIUM CHLORIDE 0.9 % (FLUSH) 0.9 %
10 SYRINGE (ML) INJECTION
Status: DISCONTINUED | OUTPATIENT
Start: 2019-03-18 | End: 2019-03-18 | Stop reason: HOSPADM

## 2019-03-18 RX ORDER — HEPARIN 100 UNIT/ML
500 SYRINGE INTRAVENOUS
Status: CANCELLED | OUTPATIENT
Start: 2019-03-18

## 2019-03-18 RX ORDER — ACETAMINOPHEN 325 MG/1
650 TABLET ORAL
Status: COMPLETED | OUTPATIENT
Start: 2019-03-18 | End: 2019-03-18

## 2019-03-18 RX ADMIN — DEXAMETHASONE SODIUM PHOSPHATE: 4 INJECTION, SOLUTION INTRA-ARTICULAR; INTRALESIONAL; INTRAMUSCULAR; INTRAVENOUS; SOFT TISSUE at 02:03

## 2019-03-18 RX ADMIN — FAMOTIDINE 20 MG: 10 INJECTION, SOLUTION INTRAVENOUS at 10:03

## 2019-03-18 RX ADMIN — RITUXIMAB 975 MG: 10 INJECTION, SOLUTION INTRAVENOUS at 10:03

## 2019-03-18 RX ADMIN — ALTEPLASE 2 MG: 2.2 INJECTION, POWDER, LYOPHILIZED, FOR SOLUTION INTRAVENOUS at 09:03

## 2019-03-18 RX ADMIN — VINCRISTINE SULFATE 20 ML: 1 INJECTION, SOLUTION INTRAVENOUS at 02:03

## 2019-03-18 RX ADMIN — DIPHENHYDRAMINE HYDROCHLORIDE 50 MG: 50 INJECTION, SOLUTION INTRAMUSCULAR; INTRAVENOUS at 09:03

## 2019-03-18 RX ADMIN — ACETAMINOPHEN 650 MG: 325 TABLET ORAL at 09:03

## 2019-03-18 NOTE — PLAN OF CARE
Problem: Neurotoxicity (Chemotherapy Effects)  Goal: Neurotoxicity Symptom Control  Outcome: Ongoing (interventions implemented as appropriate)  Labs , hx, and medications reviewed. Assessment completed. Discussed plan of care with patient. Patient in agreement. VSS.  Port not drawing back blood - cath zuly instilled & PIV started to begin rituxan with cath zuly in place.  Chair reclined and warm blanket and snack offered. Will cont to monitor

## 2019-03-18 NOTE — PLAN OF CARE
Problem: Adult Inpatient Plan of Care  Goal: Plan of Care Review  Outcome: Ongoing (interventions implemented as appropriate)  Pt tolerated C2D1 R-Epoch without adverse effects. VSS. Verbalized understanding of RTC date tomorrow at 1430. DC with father ambulating independently.

## 2019-03-19 ENCOUNTER — INFUSION (OUTPATIENT)
Dept: INFUSION THERAPY | Facility: HOSPITAL | Age: 49
End: 2019-03-19
Attending: INTERNAL MEDICINE
Payer: COMMERCIAL

## 2019-03-19 VITALS
DIASTOLIC BLOOD PRESSURE: 73 MMHG | RESPIRATION RATE: 18 BRPM | TEMPERATURE: 99 F | HEART RATE: 99 BPM | SYSTOLIC BLOOD PRESSURE: 148 MMHG

## 2019-03-19 DIAGNOSIS — C83.33 DIFFUSE LARGE B-CELL LYMPHOMA, INTRA-ABDOMINAL LYMPH NODES: Primary | ICD-10-CM

## 2019-03-19 DIAGNOSIS — C83.39 DIFFUSE LARGE B-CELL LYMPHOMA OF SOLID ORGAN EXCLUDING SPLEEN: Primary | ICD-10-CM

## 2019-03-19 PROCEDURE — 25000003 PHARM REV CODE 250: Performed by: INTERNAL MEDICINE

## 2019-03-19 PROCEDURE — A4216 STERILE WATER/SALINE, 10 ML: HCPCS | Performed by: INTERNAL MEDICINE

## 2019-03-19 PROCEDURE — 96521 REFILL/MAINT PORTABLE PUMP: CPT

## 2019-03-19 PROCEDURE — 63600175 PHARM REV CODE 636 W HCPCS: Performed by: INTERNAL MEDICINE

## 2019-03-19 RX ORDER — SODIUM CHLORIDE 0.9 % (FLUSH) 0.9 %
10 SYRINGE (ML) INJECTION
Status: DISCONTINUED | OUTPATIENT
Start: 2019-03-19 | End: 2019-03-19 | Stop reason: HOSPADM

## 2019-03-19 RX ADMIN — VINCRISTINE SULFATE 26 MG: 1 INJECTION, SOLUTION INTRAVENOUS at 02:03

## 2019-03-19 RX ADMIN — Medication 10 ML: at 02:03

## 2019-03-19 NOTE — PLAN OF CARE
Problem: Adult Inpatient Plan of Care  Goal: Plan of Care Review  Outcome: Ongoing (interventions implemented as appropriate)  Pt arrived for EPOCH pump exchange. Infusion completed with no complications. VSS. Pt connected to new EPOCH pump with no complications. Pt instructed to call MD with any problems and RTC tomorrow at 2p. NAD. Pt discharged home independently.

## 2019-03-20 ENCOUNTER — INFUSION (OUTPATIENT)
Dept: INFUSION THERAPY | Facility: HOSPITAL | Age: 49
End: 2019-03-20
Attending: INTERNAL MEDICINE
Payer: COMMERCIAL

## 2019-03-20 VITALS
WEIGHT: 299.81 LBS | BODY MASS INDEX: 47.06 KG/M2 | HEART RATE: 73 BPM | SYSTOLIC BLOOD PRESSURE: 138 MMHG | TEMPERATURE: 98 F | HEIGHT: 67 IN | RESPIRATION RATE: 18 BRPM | DIASTOLIC BLOOD PRESSURE: 55 MMHG

## 2019-03-20 DIAGNOSIS — C83.39 DIFFUSE LARGE B-CELL LYMPHOMA OF SOLID ORGAN EXCLUDING SPLEEN: Primary | ICD-10-CM

## 2019-03-20 PROCEDURE — 63600175 PHARM REV CODE 636 W HCPCS: Mod: JG | Performed by: INTERNAL MEDICINE

## 2019-03-20 PROCEDURE — 96521 REFILL/MAINT PORTABLE PUMP: CPT

## 2019-03-20 PROCEDURE — 25000003 PHARM REV CODE 250: Performed by: INTERNAL MEDICINE

## 2019-03-20 RX ORDER — SODIUM CHLORIDE 0.9 % (FLUSH) 0.9 %
10 SYRINGE (ML) INJECTION
Status: DISCONTINUED | OUTPATIENT
Start: 2019-03-20 | End: 2019-03-20 | Stop reason: HOSPADM

## 2019-03-20 RX ORDER — HEPARIN 100 UNIT/ML
500 SYRINGE INTRAVENOUS
Status: DISCONTINUED | OUTPATIENT
Start: 2019-03-20 | End: 2019-03-20 | Stop reason: HOSPADM

## 2019-03-20 RX ADMIN — VINCRISTINE SULFATE 26 MG: 1 INJECTION, SOLUTION INTRAVENOUS at 03:03

## 2019-03-20 RX ADMIN — SODIUM CHLORIDE: 0.9 INJECTION, SOLUTION INTRAVENOUS at 03:03

## 2019-03-20 RX ADMIN — ALTEPLASE 2 MG: 2.2 INJECTION, POWDER, LYOPHILIZED, FOR SOLUTION INTRAVENOUS at 03:03

## 2019-03-20 NOTE — NURSING
1500  Pt here for EPOCH pump exchange, accompanied by mother, no new complaints or concerns at present, reports tolerating treatment; discussed treatment plan for today, all questions answered and pt agrees to proceed

## 2019-03-20 NOTE — PLAN OF CARE
Problem: Adult Inpatient Plan of Care  Goal: Plan of Care Review  Outcome: Ongoing (interventions implemented as appropriate)  EPOCH pump connected to pt at 1600 (pt had no blood return, Cathflo instilled and blood return noted at 1556), infusing w/out difficulty; pt instructed to remain well hydrated; reviewed when to contact MD, when to report to ER; pt declined printed AVS, verbalized understanding of all discussed and to report at 1500, pump to be d/c at 1600

## 2019-03-21 ENCOUNTER — PATIENT MESSAGE (OUTPATIENT)
Dept: HEMATOLOGY/ONCOLOGY | Facility: CLINIC | Age: 49
End: 2019-03-21

## 2019-03-21 ENCOUNTER — TELEPHONE (OUTPATIENT)
Dept: HEMATOLOGY/ONCOLOGY | Facility: CLINIC | Age: 49
End: 2019-03-21

## 2019-03-21 ENCOUNTER — INFUSION (OUTPATIENT)
Dept: INFUSION THERAPY | Facility: HOSPITAL | Age: 49
End: 2019-03-21
Attending: INTERNAL MEDICINE
Payer: COMMERCIAL

## 2019-03-21 VITALS
DIASTOLIC BLOOD PRESSURE: 71 MMHG | SYSTOLIC BLOOD PRESSURE: 142 MMHG | RESPIRATION RATE: 18 BRPM | TEMPERATURE: 98 F | WEIGHT: 313.25 LBS | BODY MASS INDEX: 49.17 KG/M2 | HEIGHT: 67 IN | HEART RATE: 61 BPM

## 2019-03-21 DIAGNOSIS — C83.39 DIFFUSE LARGE B-CELL LYMPHOMA OF SOLID ORGAN EXCLUDING SPLEEN: Primary | ICD-10-CM

## 2019-03-21 PROCEDURE — 63600175 PHARM REV CODE 636 W HCPCS: Performed by: INTERNAL MEDICINE

## 2019-03-21 PROCEDURE — 25000003 PHARM REV CODE 250: Performed by: INTERNAL MEDICINE

## 2019-03-21 PROCEDURE — 96521 REFILL/MAINT PORTABLE PUMP: CPT

## 2019-03-21 RX ORDER — HEPARIN 100 UNIT/ML
500 SYRINGE INTRAVENOUS
Status: DISCONTINUED | OUTPATIENT
Start: 2019-03-21 | End: 2019-03-21 | Stop reason: HOSPADM

## 2019-03-21 RX ORDER — SODIUM CHLORIDE 0.9 % (FLUSH) 0.9 %
10 SYRINGE (ML) INJECTION
Status: DISCONTINUED | OUTPATIENT
Start: 2019-03-21 | End: 2019-03-21 | Stop reason: HOSPADM

## 2019-03-21 RX ADMIN — VINCRISTINE SULFATE 26 MG: 1 INJECTION, SOLUTION INTRAVENOUS at 04:03

## 2019-03-21 NOTE — NURSING
1520  Pt here for EPOCH pump exchange, accompanied by father, no new complaints or concerns at present; infusion in progress w/ 11.8 mL remaining to infuse; reports tolerating treatment; discussed treatment plan, all questions answered and pt agrees to proceed

## 2019-03-22 ENCOUNTER — HOSPITAL ENCOUNTER (OUTPATIENT)
Dept: RADIOLOGY | Facility: HOSPITAL | Age: 49
Discharge: HOME | End: 2019-03-22
Attending: INTERNAL MEDICINE
Payer: COMMERCIAL

## 2019-03-22 ENCOUNTER — INFUSION (OUTPATIENT)
Dept: INFUSION THERAPY | Facility: HOSPITAL | Age: 49
End: 2019-03-22
Attending: INTERNAL MEDICINE
Payer: COMMERCIAL

## 2019-03-22 VITALS
TEMPERATURE: 98 F | RESPIRATION RATE: 18 BRPM | HEART RATE: 87 BPM | DIASTOLIC BLOOD PRESSURE: 85 MMHG | SYSTOLIC BLOOD PRESSURE: 176 MMHG

## 2019-03-22 DIAGNOSIS — C83.39 DIFFUSE LARGE B-CELL LYMPHOMA OF SOLID ORGAN EXCLUDING SPLEEN: Primary | ICD-10-CM

## 2019-03-22 DIAGNOSIS — C83.39 DIFFUSE LARGE B-CELL LYMPHOMA OF SOLID ORGAN EXCLUDING SPLEEN: ICD-10-CM

## 2019-03-22 DIAGNOSIS — C83.33 DIFFUSE LARGE B-CELL LYMPHOMA, INTRA-ABDOMINAL LYMPH NODES: Primary | ICD-10-CM

## 2019-03-22 LAB
CLARITY CSF: CLEAR
COLOR CSF: COLORLESS
LYMPHOCYTES NFR CSF MANUAL: 45 %
MONOS+MACROS NFR CSF MANUAL: 43 %
NEUTROPHILS NFR CSF MANUAL: 12 %
RBC # CSF: 31 /CU MM
SPECIMEN VOL CSF: 1 ML
WBC # CSF: 2 /CU MM

## 2019-03-22 PROCEDURE — 63600175 PHARM REV CODE 636 W HCPCS: Mod: JG | Performed by: INTERNAL MEDICINE

## 2019-03-22 PROCEDURE — 88108 CYTOPATH CONCENTRATE TECH: CPT | Performed by: PATHOLOGY

## 2019-03-22 PROCEDURE — 88108 CYTOPATH CONCENTRATE TECH: CPT | Mod: 26,,, | Performed by: PATHOLOGY

## 2019-03-22 PROCEDURE — 96377 APPLICATON ON-BODY INJECTOR: CPT

## 2019-03-22 PROCEDURE — 96450 CHEMOTHERAPY INTO CNS: CPT

## 2019-03-22 PROCEDURE — 96413 CHEMO IV INFUSION 1 HR: CPT

## 2019-03-22 PROCEDURE — 96367 TX/PROPH/DG ADDL SEQ IV INF: CPT

## 2019-03-22 PROCEDURE — 63600175 PHARM REV CODE 636 W HCPCS: Performed by: INTERNAL MEDICINE

## 2019-03-22 PROCEDURE — 25000003 PHARM REV CODE 250: Performed by: INTERNAL MEDICINE

## 2019-03-22 PROCEDURE — 96450 FL CHEMO ADMINISTRATION INTRATHECAL WITH LP: ICD-10-PCS | Mod: ,,, | Performed by: RADIOLOGY

## 2019-03-22 PROCEDURE — 88108 CYTOLOGY SPECIMEN- MEDICAL CYTOLOGY (FLUID/WASH/BRUSH): ICD-10-PCS | Mod: 26,,, | Performed by: PATHOLOGY

## 2019-03-22 PROCEDURE — 89051 BODY FLUID CELL COUNT: CPT

## 2019-03-22 PROCEDURE — 96450 CHEMOTHERAPY INTO CNS: CPT | Mod: ,,, | Performed by: RADIOLOGY

## 2019-03-22 RX ORDER — SODIUM CHLORIDE 0.9 % (FLUSH) 0.9 %
10 SYRINGE (ML) INJECTION
Status: DISCONTINUED | OUTPATIENT
Start: 2019-03-22 | End: 2019-03-22 | Stop reason: HOSPADM

## 2019-03-22 RX ORDER — LIDOCAINE HYDROCHLORIDE 10 MG/ML
1 INJECTION INFILTRATION; PERINEURAL ONCE
Status: DISCONTINUED | OUTPATIENT
Start: 2019-03-22 | End: 2019-03-22

## 2019-03-22 RX ORDER — HEPARIN 100 UNIT/ML
500 SYRINGE INTRAVENOUS
Status: DISCONTINUED | OUTPATIENT
Start: 2019-03-22 | End: 2019-03-22 | Stop reason: HOSPADM

## 2019-03-22 RX ORDER — LIDOCAINE HYDROCHLORIDE 10 MG/ML
1 INJECTION, SOLUTION EPIDURAL; INFILTRATION; INTRACAUDAL; PERINEURAL ONCE
Status: COMPLETED | OUTPATIENT
Start: 2019-03-22 | End: 2019-03-22

## 2019-03-22 RX ORDER — LIDOCAINE HYDROCHLORIDE 10 MG/ML
1 INJECTION INFILTRATION; PERINEURAL ONCE
Status: DISCONTINUED | OUTPATIENT
Start: 2019-03-22 | End: 2019-03-23 | Stop reason: HOSPADM

## 2019-03-22 RX ADMIN — PALONOSETRON HYDROCHLORIDE: 0.25 INJECTION INTRAVENOUS at 04:03

## 2019-03-22 RX ADMIN — CYCLOPHOSPHAMIDE 1950 MG: 1 INJECTION, POWDER, FOR SOLUTION INTRAVENOUS; ORAL at 04:03

## 2019-03-22 RX ADMIN — PEGFILGRASTIM 6 MG: KIT SUBCUTANEOUS at 04:03

## 2019-03-22 RX ADMIN — LIDOCAINE HYDROCHLORIDE 2 MG: 10 INJECTION, SOLUTION EPIDURAL; INFILTRATION; INTRACAUDAL; PERINEURAL at 11:03

## 2019-03-22 RX ADMIN — SODIUM CHLORIDE: 9 INJECTION, SOLUTION INTRAVENOUS at 12:03

## 2019-03-22 NOTE — PROCEDURES
Radiology Post-Procedure Note    Pre Op Diagnosis: Lymphoma     Post Op Diagnosis: Same    Procedure: lumbar puncture    Procedure performed by: Dr. Bean, Dr. Morales     Written Informed Consent Obtained: Yes    Specimen Removed: 4 mL CSF    Estimated Blood Loss: Minimal    Findings: Following written informed consent and sterile prep and drape, a 22 gauge spinal needle was inserted at L3 - L4 intralaminar space under fluoroscopic surveillance. Opening pressure was 17 cm H2O. 4 mL clear CSF removed passively  and sent to the lab for further analysis.  Intrathecal chemotherapy was administered by a member of the oncology department.There were no complications. Full report to follow. Pt. Instructed on post procedure care including but not limited to signs of infection, bleeding, headaches, and follow up with ordering physician.        Patient tolerated procedure well.    Hitesh Bean MD  Department of Radiology   PGY III  [phone]

## 2019-03-22 NOTE — PROGRESS NOTES
Patient seen at Fluoroscopy. LP done per radiology. CSF studies sent. Timeout done. Chemo checked with MD. Methotrexate 12 mg in 3 cc NS given intrathecally without difficulty.    Stephanie Hoyos NP  Hematology/BMT

## 2019-03-22 NOTE — H&P
Radiology History & Physical      SUBJECTIVE:     Chief Complaint: Lymphoma     History of Present Illness:  Giorgio Streeter is a 48 y.o. male who presents for lumbar puncture and intrathecal chemotherapy.     Past Medical History:   Diagnosis Date    Cancer     non hodgkins lymphoma    Hypertension     Sleep apnea     Vision abnormalities      Past Surgical History:   Procedure Laterality Date    BONE MARROW BIOPSY  01/11/2019    EXCISION, SMALL INTESTINE N/A 12/21/2018    Performed by Cecilio [facility]anova MD at [facility] OR 2ND FLR    INSERTION, PORT-A-CATH N/A 1/28/2019    Performed by [facility] at [facility] C[facility] LAB    SMALL INTESTINE SURGERY  12/21/2018    Dr. Casanova, segmental small bowel resection        Home Meds:   Prior to Admission medications    Medication Sig Start Date End Date Taking? Authorizing Provider   acyclovir (ZOVIRAX) 400 MG tablet Take 1 tablet (400 mg total) by mouth 2 (two) times daily. 2/6/19 2/6/20  Ashlee Luna NP   allopurinol (ZYLOPRIM) 300 MG tablet Take 1 tablet (300 mg total) by mouth once daily. 2/6/19 4/7/19  Ashlee Luna NP   ALPRAZolam (XANAX) 0.25 MG tablet Take 1 tablet (0.25 mg total) by mouth 2 (two) times daily as needed for Insomnia or Anxiety. 2/6/19 3/23/19  Ashlee Luna NP   atenolol (TENORMIN) 50 MG tablet Take 1 tablet (50 mg total) by mouth once daily. 2/6/19 2/6/20  Ashlee Luna NP   famotidine (PEPCID) 20 MG tablet Take 1 tablet (20 mg total) by mouth 2 (two) times daily. 2/6/19 2/6/20  Ashlee Luna NP   fexofenadine (ALLEGRA) 60 MG tablet Take 60 mg by mouth once daily.    Historical Provider, MD   fluconazole (DIFLUCAN) 200 MG Tab Take 2 tablets (400 mg total) by mouth once daily. 2/28/19 3/31/19  Ector Hwang MD   fluticasone (FLONASE) 50 mcg/actuation nasal spray 1 spray by Each Nare route once daily.    Historical Provider, MD   ketoconazole (NIZORAL) 2 % cream  1/31/19   Historical Provider, MD    levoFLOXacin (LEVAQUIN) 500 MG tablet Take 1 tablet (500 mg total) by mouth once daily. 2/6/19   Ashlee Luna NP   loratadine (CLARITIN) 10 mg tablet Take 10 mg by mouth once daily.    Historical Provider, MD   losartan-hydrochlorothiazide 100-12.5 mg (HYZAAR) 100-12.5 mg Tab Take 1 tablet by mouth once daily. 2/6/19   Ashlee Luna NP   nystatin (MYCOSTATIN) powder Apply topically 2 (two) times daily. 2/6/19   Ashlee Luna NP   predniSONE (DELTASONE) 10 MG tablet Take one tablet by mouth 2 times a day. On days 1-5 of chemotherapy cycles only. To be combined with 50mg tabs for a total of 160mg 2/6/19   Ashlee Lnua NP   predniSONE (DELTASONE) 50 MG Tab Take 3 tablets (150 mg total) by mouth 2 (two) times daily. On days 1-5 of chemotherapy cycles. To be combined with 10mg tabs for a total of 160mg 2/6/19   Ashlee Luna NP     Anticoagulants/Antiplatelets: no anticoagulation    Allergies: Review of patient's allergies indicates:  No Known Allergies  Sedation History:  no adverse reactions    Review of Systems:   Hematological: lymphoma   Respiratory: no shortness of breath  Cardiovascular: no chest pain  Gastrointestinal: no abdominal pain  Genito-Urinary: no dysuria  Musculoskeletal: negative  Neurological: no TIA or stroke symptoms         OBJECTIVE:     Vital Signs (Most Recent)       Physical Exam:  ASA: 3  Mallampati: 3    General: no acute distress  Mental Status: alert and oriented to person, place and time  HEENT: normocephalic, atraumatic  Chest: unlabored breathing  Heart: regular heart rate  Abdomen: nondistended  Extremity: moves all extremities    Laboratory  Lab Results   Component Value Date    INR 1.0 02/06/2019       Lab Results   Component Value Date    WBC 11.64 03/15/2019    HGB 11.7 (L) 03/15/2019    HCT 36.0 (L) 03/15/2019    MCV 80 (L) 03/15/2019     03/15/2019      Lab Results   Component Value Date    GLU 93 03/15/2019     03/15/2019    K 4.0  03/15/2019     03/15/2019    CO2 29 03/15/2019    BUN 10 03/15/2019    CREATININE 0.8 03/15/2019    CALCIUM 9.9 03/15/2019    MG 2.2 02/06/2019    ALT 33 03/15/2019    AST 26 03/15/2019    ALBUMIN 3.6 03/15/2019    BILITOT 0.2 03/15/2019       ASSESSMENT/PLAN:     Sedation Plan: local   Patient will undergo lumbar puncture and intrathecal chemotherapy administration.    Hitesh Bean MD  Department of Radiology   PGY III  [phone]

## 2019-03-25 ENCOUNTER — PATIENT MESSAGE (OUTPATIENT)
Dept: HEMATOLOGY/ONCOLOGY | Facility: CLINIC | Age: 49
End: 2019-03-25

## 2019-03-25 ENCOUNTER — LAB VISIT (OUTPATIENT)
Dept: LAB | Facility: HOSPITAL | Age: 49
End: 2019-03-25
Attending: INTERNAL MEDICINE
Payer: COMMERCIAL

## 2019-03-25 DIAGNOSIS — D63.0 ANEMIA IN NEOPLASTIC DISEASE: ICD-10-CM

## 2019-03-25 DIAGNOSIS — C83.30 LARGE CELL (DIFFUSE) NON-HODGKIN'S LYMPHOMA: ICD-10-CM

## 2019-03-25 DIAGNOSIS — C83.39 DIFFUSE LARGE B-CELL LYMPHOMA OF SOLID ORGAN EXCLUDING SPLEEN: ICD-10-CM

## 2019-03-25 LAB
ALBUMIN SERPL BCP-MCNC: 3 G/DL (ref 3.5–5.2)
ALP SERPL-CCNC: 79 U/L (ref 55–135)
ALT SERPL W/O P-5'-P-CCNC: 50 U/L (ref 10–44)
ANION GAP SERPL CALC-SCNC: 13 MMOL/L (ref 8–16)
ANISOCYTOSIS BLD QL SMEAR: SLIGHT
AST SERPL-CCNC: 30 U/L (ref 10–40)
BASOPHILS # BLD AUTO: ABNORMAL K/UL (ref 0–0.2)
BASOPHILS NFR BLD: 0 % (ref 0–1.9)
BILIRUB SERPL-MCNC: 0.4 MG/DL (ref 0.1–1)
BUN SERPL-MCNC: 15 MG/DL (ref 6–20)
CALCIUM SERPL-MCNC: 8.7 MG/DL (ref 8.7–10.5)
CHLORIDE SERPL-SCNC: 100 MMOL/L (ref 95–110)
CO2 SERPL-SCNC: 29 MMOL/L (ref 23–29)
CREAT SERPL-MCNC: 0.8 MG/DL (ref 0.5–1.4)
DIFFERENTIAL METHOD: ABNORMAL
EOSINOPHIL # BLD AUTO: ABNORMAL K/UL (ref 0–0.5)
EOSINOPHIL NFR BLD: 0 % (ref 0–8)
ERYTHROCYTE [DISTWIDTH] IN BLOOD BY AUTOMATED COUNT: 18.8 % (ref 11.5–14.5)
EST. GFR  (AFRICAN AMERICAN): >60 ML/MIN/1.73 M^2
EST. GFR  (NON AFRICAN AMERICAN): >60 ML/MIN/1.73 M^2
GLUCOSE SERPL-MCNC: 125 MG/DL (ref 70–110)
HCT VFR BLD AUTO: 29.5 % (ref 40–54)
HGB BLD-MCNC: 10.1 G/DL (ref 14–18)
HYPOCHROMIA BLD QL SMEAR: ABNORMAL
IMM GRANULOCYTES # BLD AUTO: ABNORMAL K/UL (ref 0–0.04)
IMM GRANULOCYTES NFR BLD AUTO: ABNORMAL % (ref 0–0.5)
LYMPHOCYTES # BLD AUTO: ABNORMAL K/UL (ref 1–4.8)
LYMPHOCYTES NFR BLD: 2 % (ref 18–48)
MCH RBC QN AUTO: 26.4 PG (ref 27–31)
MCHC RBC AUTO-ENTMCNC: 34.2 G/DL (ref 32–36)
MCV RBC AUTO: 77 FL (ref 82–98)
MONOCYTES # BLD AUTO: ABNORMAL K/UL (ref 0.3–1)
MONOCYTES NFR BLD: 0 % (ref 4–15)
NEUTROPHILS NFR BLD: 96 % (ref 38–73)
NEUTS BAND NFR BLD MANUAL: 2 %
NRBC BLD-RTO: 0 /100 WBC
PLATELET # BLD AUTO: 172 K/UL (ref 150–350)
PLATELET BLD QL SMEAR: ABNORMAL
PMV BLD AUTO: 10 FL (ref 9.2–12.9)
POIKILOCYTOSIS BLD QL SMEAR: SLIGHT
POLYCHROMASIA BLD QL SMEAR: ABNORMAL
POTASSIUM SERPL-SCNC: 2.9 MMOL/L (ref 3.5–5.1)
PROT SERPL-MCNC: 5.8 G/DL (ref 6–8.4)
RBC # BLD AUTO: 3.82 M/UL (ref 4.6–6.2)
SODIUM SERPL-SCNC: 142 MMOL/L (ref 136–145)
WBC # BLD AUTO: 23.22 K/UL (ref 3.9–12.7)

## 2019-03-25 PROCEDURE — 85027 COMPLETE CBC AUTOMATED: CPT

## 2019-03-25 PROCEDURE — 85007 BL SMEAR W/DIFF WBC COUNT: CPT

## 2019-03-25 PROCEDURE — 36415 COLL VENOUS BLD VENIPUNCTURE: CPT | Mod: PO

## 2019-03-25 PROCEDURE — 80053 COMPREHEN METABOLIC PANEL: CPT

## 2019-03-28 ENCOUNTER — LAB VISIT (OUTPATIENT)
Dept: LAB | Facility: HOSPITAL | Age: 49
End: 2019-03-28
Attending: INTERNAL MEDICINE
Payer: COMMERCIAL

## 2019-03-28 DIAGNOSIS — C83.39 DIFFUSE LARGE B-CELL LYMPHOMA OF SOLID ORGAN EXCLUDING SPLEEN: ICD-10-CM

## 2019-03-28 DIAGNOSIS — D63.0 ANEMIA IN NEOPLASTIC DISEASE: ICD-10-CM

## 2019-03-28 DIAGNOSIS — C83.30 LARGE CELL (DIFFUSE) NON-HODGKIN'S LYMPHOMA: ICD-10-CM

## 2019-03-28 LAB
ALBUMIN SERPL BCP-MCNC: 3.4 G/DL (ref 3.5–5.2)
ALP SERPL-CCNC: 69 U/L (ref 55–135)
ALT SERPL W/O P-5'-P-CCNC: 40 U/L (ref 10–44)
ANION GAP SERPL CALC-SCNC: 11 MMOL/L (ref 8–16)
ANISOCYTOSIS BLD QL SMEAR: SLIGHT
AST SERPL-CCNC: 19 U/L (ref 10–40)
BASOPHILS NFR BLD: 0 % (ref 0–1.9)
BILIRUB SERPL-MCNC: 0.6 MG/DL (ref 0.1–1)
BUN SERPL-MCNC: 13 MG/DL (ref 6–20)
BURR CELLS BLD QL SMEAR: ABNORMAL
CALCIUM SERPL-MCNC: 9.8 MG/DL (ref 8.7–10.5)
CHLORIDE SERPL-SCNC: 99 MMOL/L (ref 95–110)
CO2 SERPL-SCNC: 29 MMOL/L (ref 23–29)
CREAT SERPL-MCNC: 0.8 MG/DL (ref 0.5–1.4)
DACRYOCYTES BLD QL SMEAR: ABNORMAL
DIFFERENTIAL METHOD: ABNORMAL
EOSINOPHIL NFR BLD: 0 % (ref 0–8)
ERYTHROCYTE [DISTWIDTH] IN BLOOD BY AUTOMATED COUNT: 19.3 % (ref 11.5–14.5)
EST. GFR  (AFRICAN AMERICAN): >60 ML/MIN/1.73 M^2
EST. GFR  (NON AFRICAN AMERICAN): >60 ML/MIN/1.73 M^2
GLUCOSE SERPL-MCNC: 160 MG/DL (ref 70–110)
HCT VFR BLD AUTO: 32.4 % (ref 40–54)
HGB BLD-MCNC: 11 G/DL (ref 14–18)
HYPOCHROMIA BLD QL SMEAR: ABNORMAL
IMM GRANULOCYTES # BLD AUTO: ABNORMAL K/UL (ref 0–0.04)
IMM GRANULOCYTES NFR BLD AUTO: ABNORMAL % (ref 0–0.5)
LYMPHOCYTES NFR BLD: 9 % (ref 18–48)
MCH RBC QN AUTO: 26.8 PG (ref 27–31)
MCHC RBC AUTO-ENTMCNC: 34 G/DL (ref 32–36)
MCV RBC AUTO: 79 FL (ref 82–98)
METAMYELOCYTES NFR BLD MANUAL: 1 %
MONOCYTES NFR BLD: 11 % (ref 4–15)
NEUTROPHILS NFR BLD: 79 % (ref 38–73)
NRBC BLD-RTO: 0 /100 WBC
OVALOCYTES BLD QL SMEAR: ABNORMAL
PLATELET # BLD AUTO: 83 K/UL (ref 150–350)
PLATELET BLD QL SMEAR: ABNORMAL
PMV BLD AUTO: 10.9 FL (ref 9.2–12.9)
POIKILOCYTOSIS BLD QL SMEAR: SLIGHT
POLYCHROMASIA BLD QL SMEAR: ABNORMAL
POTASSIUM SERPL-SCNC: 4 MMOL/L (ref 3.5–5.1)
PROT SERPL-MCNC: 7 G/DL (ref 6–8.4)
RBC # BLD AUTO: 4.1 M/UL (ref 4.6–6.2)
SODIUM SERPL-SCNC: 139 MMOL/L (ref 136–145)
WBC # BLD AUTO: 1.68 K/UL (ref 3.9–12.7)

## 2019-03-28 PROCEDURE — 36415 COLL VENOUS BLD VENIPUNCTURE: CPT | Mod: PO

## 2019-03-28 PROCEDURE — 80053 COMPREHEN METABOLIC PANEL: CPT

## 2019-03-28 PROCEDURE — 85007 BL SMEAR W/DIFF WBC COUNT: CPT

## 2019-03-28 PROCEDURE — 85027 COMPLETE CBC AUTOMATED: CPT

## 2019-04-01 ENCOUNTER — LAB VISIT (OUTPATIENT)
Dept: LAB | Facility: HOSPITAL | Age: 49
End: 2019-04-01
Attending: INTERNAL MEDICINE
Payer: COMMERCIAL

## 2019-04-01 DIAGNOSIS — D63.0 ANEMIA IN NEOPLASTIC DISEASE: ICD-10-CM

## 2019-04-01 DIAGNOSIS — C83.30 LARGE CELL (DIFFUSE) NON-HODGKIN'S LYMPHOMA: ICD-10-CM

## 2019-04-01 DIAGNOSIS — C83.39 DIFFUSE LARGE B-CELL LYMPHOMA OF SOLID ORGAN EXCLUDING SPLEEN: ICD-10-CM

## 2019-04-01 DIAGNOSIS — C83.30 DIFFUSE LARGE B-CELL LYMPHOMA, UNSPECIFIED BODY REGION: Primary | ICD-10-CM

## 2019-04-01 LAB
ALBUMIN SERPL BCP-MCNC: 3.4 G/DL (ref 3.5–5.2)
ALP SERPL-CCNC: 82 U/L (ref 55–135)
ALT SERPL W/O P-5'-P-CCNC: 38 U/L (ref 10–44)
ANION GAP SERPL CALC-SCNC: 11 MMOL/L (ref 8–16)
ANISOCYTOSIS BLD QL SMEAR: SLIGHT
AST SERPL-CCNC: 26 U/L (ref 10–40)
BASOPHILS # BLD AUTO: ABNORMAL K/UL (ref 0–0.2)
BASOPHILS NFR BLD: 0 % (ref 0–1.9)
BILIRUB SERPL-MCNC: 0.2 MG/DL (ref 0.1–1)
BUN SERPL-MCNC: 13 MG/DL (ref 6–20)
CALCIUM SERPL-MCNC: 9.4 MG/DL (ref 8.7–10.5)
CHLORIDE SERPL-SCNC: 105 MMOL/L (ref 95–110)
CO2 SERPL-SCNC: 26 MMOL/L (ref 23–29)
CREAT SERPL-MCNC: 0.8 MG/DL (ref 0.5–1.4)
DACRYOCYTES BLD QL SMEAR: ABNORMAL
DIFFERENTIAL METHOD: ABNORMAL
EOSINOPHIL # BLD AUTO: ABNORMAL K/UL (ref 0–0.5)
EOSINOPHIL NFR BLD: 0 % (ref 0–8)
ERYTHROCYTE [DISTWIDTH] IN BLOOD BY AUTOMATED COUNT: 22 % (ref 11.5–14.5)
EST. GFR  (AFRICAN AMERICAN): >60 ML/MIN/1.73 M^2
EST. GFR  (NON AFRICAN AMERICAN): >60 ML/MIN/1.73 M^2
GLUCOSE SERPL-MCNC: 135 MG/DL (ref 70–110)
HCT VFR BLD AUTO: 32 % (ref 40–54)
HGB BLD-MCNC: 10.1 G/DL (ref 14–18)
HYPOCHROMIA BLD QL SMEAR: ABNORMAL
IMM GRANULOCYTES # BLD AUTO: ABNORMAL K/UL (ref 0–0.04)
IMM GRANULOCYTES NFR BLD AUTO: ABNORMAL % (ref 0–0.5)
LYMPHOCYTES # BLD AUTO: ABNORMAL K/UL (ref 1–4.8)
LYMPHOCYTES NFR BLD: 6 % (ref 18–48)
MCH RBC QN AUTO: 26.2 PG (ref 27–31)
MCHC RBC AUTO-ENTMCNC: 31.6 G/DL (ref 32–36)
MCV RBC AUTO: 83 FL (ref 82–98)
METAMYELOCYTES NFR BLD MANUAL: 1 %
MONOCYTES # BLD AUTO: ABNORMAL K/UL (ref 0.3–1)
MONOCYTES NFR BLD: 4 % (ref 4–15)
NEUTROPHILS # BLD AUTO: ABNORMAL K/UL (ref 1.8–7.7)
NEUTROPHILS NFR BLD: 84 % (ref 38–73)
NEUTS BAND NFR BLD MANUAL: 5 %
NRBC BLD-RTO: 2 /100 WBC
OVALOCYTES BLD QL SMEAR: ABNORMAL
PLATELET # BLD AUTO: 162 K/UL (ref 150–350)
PLATELET BLD QL SMEAR: ABNORMAL
PMV BLD AUTO: 10.7 FL (ref 9.2–12.9)
POIKILOCYTOSIS BLD QL SMEAR: SLIGHT
POLYCHROMASIA BLD QL SMEAR: ABNORMAL
POTASSIUM SERPL-SCNC: 4.5 MMOL/L (ref 3.5–5.1)
PROT SERPL-MCNC: 6.7 G/DL (ref 6–8.4)
RBC # BLD AUTO: 3.86 M/UL (ref 4.6–6.2)
SODIUM SERPL-SCNC: 142 MMOL/L (ref 136–145)
WBC # BLD AUTO: 10.81 K/UL (ref 3.9–12.7)

## 2019-04-01 PROCEDURE — 85027 COMPLETE CBC AUTOMATED: CPT

## 2019-04-01 PROCEDURE — 85007 BL SMEAR W/DIFF WBC COUNT: CPT

## 2019-04-01 PROCEDURE — 80053 COMPREHEN METABOLIC PANEL: CPT

## 2019-04-01 PROCEDURE — 36415 COLL VENOUS BLD VENIPUNCTURE: CPT | Mod: PO

## 2019-04-01 RX ORDER — FLUCONAZOLE 200 MG/1
400 TABLET ORAL DAILY
Qty: 60 TABLET | Refills: 0 | Status: SHIPPED | OUTPATIENT
Start: 2019-04-01 | End: 2019-05-03

## 2019-04-01 RX ORDER — ACYCLOVIR 400 MG/1
400 TABLET ORAL 2 TIMES DAILY
Qty: 60 TABLET | Refills: 11 | Status: SHIPPED | OUTPATIENT
Start: 2019-04-01 | End: 2020-02-26

## 2019-04-01 RX ORDER — LEVOFLOXACIN 500 MG/1
500 TABLET, FILM COATED ORAL DAILY
Qty: 30 TABLET | Refills: 6 | Status: SHIPPED | OUTPATIENT
Start: 2019-04-01 | End: 2020-02-26

## 2019-04-01 RX ORDER — PREDNISONE 50 MG/1
150 TABLET ORAL 2 TIMES DAILY
Qty: 30 TABLET | Refills: 5 | Status: SHIPPED | OUTPATIENT
Start: 2019-04-01 | End: 2019-11-27

## 2019-04-01 RX ORDER — FAMOTIDINE 20 MG/1
20 TABLET, FILM COATED ORAL 2 TIMES DAILY
Qty: 60 TABLET | Refills: 11 | Status: SHIPPED | OUTPATIENT
Start: 2019-04-01 | End: 2020-02-26

## 2019-04-01 RX ORDER — PREDNISONE 10 MG/1
TABLET ORAL
Qty: 10 TABLET | Refills: 5 | Status: SHIPPED | OUTPATIENT
Start: 2019-04-01 | End: 2019-11-27

## 2019-04-01 RX ORDER — ALLOPURINOL 300 MG/1
300 TABLET ORAL DAILY
Qty: 30 TABLET | Refills: 1 | Status: SHIPPED | OUTPATIENT
Start: 2019-04-01 | End: 2019-05-01

## 2019-04-03 PROBLEM — C83.30 LARGE CELL (DIFFUSE) NON-HODGKIN'S LYMPHOMA: Status: RESOLVED | Noted: 2019-01-28 | Resolved: 2019-04-03

## 2019-04-03 PROBLEM — C83.33: Status: RESOLVED | Noted: 2019-02-01 | Resolved: 2019-04-03

## 2019-04-03 NOTE — PROGRESS NOTES
CC: Lymphoma, follow up visit      HPI:  is a 48 y.o. male here for followup visit. He has hypertension, obstructive sleep apnea. He was hospitalized around Christmas 2018 for small bowel mass. Patient reports that he began having lower abdominal/pelvic pain toward the end of October 2018. This prompted a urologic evaluation and subsequent treatment for a presumed UTI. He states that his pain continued into November and evolved into more upper abdominal/epigastric pain that was worse with eating. He was subsequently seen by his PCP who ultimately ordered a CT abdomen/pelvis that revealed an abnormal thickening of an 8 cm segment of small bowel. He denied fever, chills, night sweats, or anorexia at that time. He had ~10 lb weight loss over the 1-2 months prior to his hospitalization in Dec 2018, but he was also actively taking part in a weight loss program.  On 12/21/18, he underwent Segmental small bowel resection.The mass was approximately 6 cm in length and was circumferential.  There was dilation of the small bowel above it suggesting a partial bowel obstruction.  There was no adenopathy in the adjacent   mesentery.  The tumor was clearly extending to the serosal surface of the bowel.Careful search was made for peritoneal implants and there were no lesions noted on the peritoneum or omentum.  The liver could not be inspected through the incision,   but it was palpated and there were no focal lesions within it. The remainder of the small bowel was run from the ligament of Treitz to the ileocecal valves and no other small bowel lesions were noted.    He had PET CT,. 2D ECHO, bone marrow biopsy. PET CT did not reveal any evidence of lymphoma. 2D ECHO was normal. Bone marrow was negative for involvement by lymphoma.       C1 was on 2/1/19-2/5/19 without complications.   C2 was on 2/25/19-3/1/19 without complications.   C3 was on 3/18/19-3/22/19 without complications.  He receives IT MTX; CSF cytology  have been negative for malignant cells thus far.  PET CT 4/5/19: showed no DLBCL    Interval History: He is here for a follow up visit. He received 3 cycles of EPOCH-R which have been uneventful thus far. PET CT was performed today which shows no DLBCL. Notes some bone pain with neulasta injections. Also reports tenderness along upper portion of abd healed incision. Lastly, pt has intermittent numbness of fingers, around the time after neulasta he notes.  Denies fevers, chills, chest pain, sob, n/v/d/c.     Review of Systems   Constitutional: Negative for chills, fever and weight loss.   HENT: Negative for ear discharge, ear pain and nosebleeds.    Eyes: Negative for blurred vision, double vision and photophobia.   Respiratory: Negative for cough, hemoptysis and sputum production.    Cardiovascular: Negative for chest pain, palpitations and orthopnea.   Gastrointestinal: Positive for abdominal pain (along upper portion of abdominal healed wound). Negative for constipation, diarrhea, heartburn, nausea and vomiting.   Genitourinary: Negative for dysuria and urgency.   Musculoskeletal: Negative for back pain and myalgias.        Bone pain after neulasta to large bones   Skin: Negative for rash.   Neurological: Positive for tingling. Negative for dizziness, tremors, sensory change and headaches.   Endo/Heme/Allergies: Does not bruise/bleed easily.   Psychiatric/Behavioral: Negative for depression, substance abuse and suicidal ideas.     Current Outpatient Medications   Medication Sig    acyclovir (ZOVIRAX) 400 MG tablet Take 1 tablet (400 mg total) by mouth 2 (two) times daily.    allopurinol (ZYLOPRIM) 300 MG tablet Take 1 tablet (300 mg total) by mouth once daily.    ALPRAZolam (XANAX) 0.25 MG tablet Take 1 tablet (0.25 mg total) by mouth 2 (two) times daily as needed for Insomnia or Anxiety.    atenolol (TENORMIN) 50 MG tablet Take 1 tablet (50 mg total) by mouth once daily.    famotidine (PEPCID) 20 MG tablet  Take 1 tablet (20 mg total) by mouth 2 (two) times daily.    fluconazole (DIFLUCAN) 200 MG Tab Take 2 tablets (400 mg total) by mouth once daily.    levoFLOXacin (LEVAQUIN) 500 MG tablet Take 1 tablet (500 mg total) by mouth once daily.    losartan-hydrochlorothiazide 100-12.5 mg (HYZAAR) 100-12.5 mg Tab Take 1 tablet by mouth once daily.    nystatin (MYCOSTATIN) powder Apply topically 2 (two) times daily.    predniSONE (DELTASONE) 10 MG tablet Take one tablet by mouth 2 times a day. On days 1-5 of chemotherapy cycles only. To be combined with 50mg tabs for a total of 160mg    predniSONE (DELTASONE) 50 MG Tab Take 3 tablets (150 mg total) by mouth 2 (two) times daily. On days 1-5 of chemotherapy cycles. To be combined with 10mg tabs for a total of 160mg    fexofenadine (ALLEGRA) 60 MG tablet Take 60 mg by mouth once daily.    fluticasone (FLONASE) 50 mcg/actuation nasal spray 1 spray by Each Nare route once daily.    ketoconazole (NIZORAL) 2 % cream     loratadine (CLARITIN) 10 mg tablet Take 10 mg by mouth once daily.     No current facility-administered medications for this visit.      Facility-Administered Medications Ordered in Other Visits   Medication    pegfilgrastim injection 6 mg         Vitals:    04/05/19 1143   BP: 125/63   Pulse: 110   Temp: 98.4 °F (36.9 °C)     Physical Exam   Constitutional: He is oriented to person, place, and time. He appears well-developed.   HENT:   Head: Normocephalic and atraumatic.   Neck: Normal range of motion.   Cardiovascular: Normal rate and regular rhythm.   No murmur heard.  Pulmonary/Chest: Effort normal. No respiratory distress. He has no wheezes. He has no rales.   Abdominal: Soft. Bowel sounds are normal. He exhibits no distension. There is no tenderness. There is no rebound.   Neurological: He is alert and oriented to person, place, and time.   Skin: Skin is warm.   Healed abdominal wound with slight tenderness along upper portion. Appears to have scar  tissue under incision.    Psychiatric: He has a normal mood and affect.       Lab Results   Component Value Date    WBC 8.22 04/04/2019    HGB 10.2 (L) 04/04/2019    HCT 31.9 (L) 04/04/2019    MCV 84 04/04/2019     04/04/2019     CMP  Sodium   Date Value Ref Range Status   04/04/2019 141 136 - 145 mmol/L Final   04/04/2019 141 136 - 145 mmol/L Final     Potassium   Date Value Ref Range Status   04/04/2019 4.2 3.5 - 5.1 mmol/L Final   04/04/2019 4.2 3.5 - 5.1 mmol/L Final     Chloride   Date Value Ref Range Status   04/04/2019 105 95 - 110 mmol/L Final   04/04/2019 105 95 - 110 mmol/L Final     CO2   Date Value Ref Range Status   04/04/2019 27 23 - 29 mmol/L Final   04/04/2019 27 23 - 29 mmol/L Final     Glucose   Date Value Ref Range Status   04/04/2019 168 (H) 70 - 110 mg/dL Final   04/04/2019 168 (H) 70 - 110 mg/dL Final     BUN, Bld   Date Value Ref Range Status   04/04/2019 13 6 - 20 mg/dL Final   04/04/2019 13 6 - 20 mg/dL Final     Creatinine   Date Value Ref Range Status   04/04/2019 0.9 0.5 - 1.4 mg/dL Final   04/04/2019 0.9 0.5 - 1.4 mg/dL Final     Calcium   Date Value Ref Range Status   04/04/2019 9.3 8.7 - 10.5 mg/dL Final   04/04/2019 9.3 8.7 - 10.5 mg/dL Final     Total Protein   Date Value Ref Range Status   04/04/2019 6.8 6.0 - 8.4 g/dL Final   04/04/2019 6.8 6.0 - 8.4 g/dL Final     Albumin   Date Value Ref Range Status   04/04/2019 3.5 3.5 - 5.2 g/dL Final   04/04/2019 3.5 3.5 - 5.2 g/dL Final     Total Bilirubin   Date Value Ref Range Status   04/04/2019 0.3 0.1 - 1.0 mg/dL Final     Comment:     For infants and newborns, interpretation of results should be based  on gestational age, weight and in agreement with clinical  observations.  Premature Infant recommended reference ranges:  Up to 24 hours.............<8.0 mg/dL  Up to 48 hours............<12.0 mg/dL  3-5 days..................<15.0 mg/dL  6-29 days.................<15.0 mg/dL     04/04/2019 0.3 0.1 - 1.0 mg/dL Final     Comment:      For infants and newborns, interpretation of results should be based  on gestational age, weight and in agreement with clinical  observations.  Premature Infant recommended reference ranges:  Up to 24 hours.............<8.0 mg/dL  Up to 48 hours............<12.0 mg/dL  3-5 days..................<15.0 mg/dL  6-29 days.................<15.0 mg/dL       Alkaline Phosphatase   Date Value Ref Range Status   04/04/2019 73 55 - 135 U/L Final   04/04/2019 73 55 - 135 U/L Final     AST   Date Value Ref Range Status   04/04/2019 22 10 - 40 U/L Final   04/04/2019 22 10 - 40 U/L Final     ALT   Date Value Ref Range Status   04/04/2019 28 10 - 44 U/L Final   04/04/2019 28 10 - 44 U/L Final     Anion Gap   Date Value Ref Range Status   04/04/2019 9 8 - 16 mmol/L Final   04/04/2019 9 8 - 16 mmol/L Final     eGFR if    Date Value Ref Range Status   04/04/2019 >60.0 >60 mL/min/1.73 m^2 Final   04/04/2019 >60.0 >60 mL/min/1.73 m^2 Final     eGFR if non    Date Value Ref Range Status   04/04/2019 >60.0 >60 mL/min/1.73 m^2 Final     Comment:     Calculation used to obtain the estimated glomerular filtration  rate (eGFR) is the CKD-EPI equation.      04/04/2019 >60.0 >60 mL/min/1.73 m^2 Final     Comment:     Calculation used to obtain the estimated glomerular filtration  rate (eGFR) is the CKD-EPI equation.              12/21/18 flow cytometry of small bowel mass     Flow cytometric analysis of tissue detects a kappa restricted B lymphocyte population that expresses CD19, CD20, and CD10.  CD5 is negative.  The differential diagnosis includes diffuse large B cell lymphoma, follicular lymphoma, and Burkitt's lymphoma.     Flow differential:  Lymphocytes 88.2%, Monocytes 6.0%, Granulocytes  2.3%, Blast  2.6%, Debris/nRBC 0.5%,  Viability 90.7%.  Conclusion: B cell lymphoma of germinal center origin (CD10+); correlate with morphology and cytogenetic /molecular testing for further sub  "classification     12/21/18 pathology :Jejunum, small bowel resection:  -High-grade B-cell lymphoma compatible with Diffuse Large B-cell lymphoma, germinal center phenotype, see comment.  Comment: Concurrent flow cytometric analysis confirms a Kappa restricted clonal B-cell population that expresses CD19, CD20, and CD10 without coexpression of CD5. The histologic sections demonstrate a submucosal mass comprised of a diffuse population of large CD20 positive B cells compatible with immunoblast intermixed with moderate numbers of CD5/CD3 positive T cells. The large lymphocytes are BCL6 and CD10 (weak) positive and BCL6, MUM1, and BCL-2 are negative. CD23 shows no follicular dendritic network. The Ki-67 proliferation index is high (>60%). In situ hybridization for David bar viral RNA is negative. The overall findings are compatible with diffuse large B-cell lymphoma, germinal center type. Molecular studies are pending to exclude the possibility of a  high-grade B-cell lymphoma with myc and BCL-2/ BCL6 rearrangements and will be reported. All histologic chemical stains have satisfactory positive and negative controls.     "B-cell lymphoma, FISH, tissue:  Interpretation: Positive. The result is abnormal and indicates MYC/IGH fusion and 27% of nuclei. No rearrangement of BCL-2 or BCL6 was observed.  No MYC rearrangement was observed, therefore this is unlikely to be "high-grade B-cell lymphoma, with MYC and  BCL2 and/or BCL6 translocations" (previously known as "genetic double-hit lymphoma", currently reclassified per  the 2016 World Health Organization Classification of Lymphoid Neoplasms).        1/11/19 bone marrow biopsy       FINAL PATHOLOGIC DIAGNOSIS  BONE MARROW, LEFT ILIAC CREST (ASPIRATE SMEAR, TOUCH PREPARATION, CLOT SECTION, AND CORE BIOPSY):  -- NORMOCELLULAR MARROW (60%) WITH TRILINEAGE HEMATOPOIESIS.  -- NO EVIDENCE OF MARROW INVOLVEMENT BY B-CELL NON-HODGKIN LYMPHOMA.  -- MARKEDLY DECREASED STORAGE " IRON.     Recent CBC data (1/7/2019) showed normocytic anemia and thrombocytosis.  Flow cytometric analysis of bone marrow shows populations of polyclonal B lymphocytes and T lymphocytes that are immunophenotypically unremarkable. The blast gate is not increased.  Immunohistochemical stains are performed on the biopsy core and clot section for greater sensitivity and further architectural assessment with adequate controls. The interstitial lymphocytes and small lymphoid aggregates are  composed of mixed CD3-positive T cells and CD20-positive B cells. Correlation with other laboratory results is recommended.           1/15/19 PET CT             COMPARISON:  CT abdomen and pelvis 12/07/2018.    FINDINGS:  Quality of the study: Adequate.    Mildly increased FDG uptake is noted along the midline anterior abdominal wall, likely related to prior celiotomy incision, SUV max 5.60.  Small, non lesion like focus of tracer avidity is seen involving the descending colon, without any corresponding CT abnormality, SUV max 6.48..  This likely represents normal colonic peristalsis or a small area of inflammation.  Diffusely increased marrow uptake likely represents red marrow reconversion. No suspicious abnormally increased uptake is seen to suggest residual/recurrent disease.    Physiologic uptake of the tracer is present within the brain, salivary glands, myocardium, GI and  tracts.    Incidental CT findings: Mild mosaic type ground-glass density in the lungs.  Splenomegaly, with the spleen measuring 12.7 cm in AP dimension.  Nonobstructive right renal stone measuring 0.7 cm.  Postoperative changes from small bowel resection.  Scattered colonic diverticula.  Circumferential urinary bladder wall thickening, favored to relate to underdistention.  Mild degenerative changes in the spine.  Stable, mild anterior wedge compression deformity of T12.       Impression         No convincing evidence of residual or recurrent disease.   Midline abdominal wall FDG avidity coincides with prior celiotomy incision               1/11/19 2D ECHO     · Normal left ventricular systolic function. The estimated ejection fraction is 63%  · Normal LV diastolic function.  · Moderate left atrial enlargement.  · Normal right ventricular systolic function.  · Mild right atrial enlargement.  · Normal central venous pressure (3 mm Hg).        2/7/19 Cerebrospinal Fluid     FINAL PATHOLOGIC DIAGNOSIS     Negative for malignant cells       3/1/19 CSF cytology FINAL PATHOLOGIC DIAGNOSIS    Cerebrospinal fluid (Cytology):Negative for malignant cells     PET 4/5/19  1. No definite evidence of residual/relapsed extranodal diffuse large B-cell lymphoma.    2.  Focal non physiologic activity within the descending colon without discrete CT correlate that is more conspicuous on the current PET exam.  This may represent a polyp.  Recommend direct visualization.    3.  Bone marrow hyperplasia, presumably related to pegfilgrastim administration.    The Deauville Score is X, presuming the colonic hypermetabolic focus is unrelated to DLBCL.  Otherwise, the score would be 5.    Assessment:      1. Diffuse large B-cell lymphoma of solid organ excluding spleen  Fl Chemo Administration Intrathecal With LP   2. Anemia in neoplastic disease     3. Essential hypertension          Plan:     DLBCL  - MYC/IgH fusion noted on FISH , in 27% of nuclei. 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 IPI score 0. It does not fulfil the criteria for Burkitts( ki 67 high, but not high enough), double or triple hit ( no bcl2 or bcl6 translocation). No PET avid measurable disease. No lymphoma in bone marrow. Discussed the treatment of stage I GI high grade lymphoma. Explained that although there is no active/measurable disease, chemotherapy with RCHOP or dose adjusted R-EPOCH is recommended, as he had bulky (8cm), high grade ( ki 67 > 60% ) lymphoma with MYC/IgH fusion. Got diagnostic LP and is receiving  treatment with IT MTX. He went to [facility] for 2nd opinion.   - ANC, platelet alvaro after C1 reviewed. Doses of Etoposide/Doxorubicin/Cytoxan were escalated by 1 level for C2. Doses for cycle 3 will be the same as C2.   - C1 was on 2/1/19-2/5/19 without complications.   - C2 was on 2/25/19-3/1/19 without complications.   - C3 was on 3/18/19-3/22/19 without complications.   - C4 planned for 4/8/19-4/12/19 (up by 20%)  - He receives IT MTX; CSF cytology have been negative for malignant cells thus far. Next due 4/11/19  - Post C3 PET CT done 4/5/19 and showed no DLBCL. Per Dr. Hwang, will proceed with 3 more cycles of treatment with EPOCH-R and IT MTX.   - LDH improving. No B symptoms.     Anemia  - 2/2 meds and disease  - Mild, asymptomatic.  - Transfuse for hgb <7, not required today    HTN  - Continue Atenolol and Losartan HCTZ     Numbness/tingling   - to fingers  - continue to monitor  - no treatment yet as this is intermittent and not getting worse  - pt feels it may be related to neulasta as it occurs after injection    F/U:  - Schedule EPOCH-R cycle 4 Monday-Friday pump exchanges from 4/8/19-4/12/19 (Friday he will receive Neulasta OBI so no need to come Saturday)  - IT chemo on 4/11/19   - CBC and CMP twice weekly in [place] after 4/12/19  - CBC, CMP, LDH, uric acid and visit with Dr. Hwang in 3 weeks     Sachi Araya, BENEDICTO, NP  Hematology/Oncology    Distress Screening Results: Psychosocial Distress screening score of Distress Score: 0 noted and reviewed. No intervention indicated.

## 2019-04-04 ENCOUNTER — LAB VISIT (OUTPATIENT)
Dept: LAB | Facility: HOSPITAL | Age: 49
End: 2019-04-04
Attending: INTERNAL MEDICINE
Payer: COMMERCIAL

## 2019-04-04 DIAGNOSIS — C83.30 LARGE CELL (DIFFUSE) NON-HODGKIN'S LYMPHOMA: ICD-10-CM

## 2019-04-04 DIAGNOSIS — D63.0 ANEMIA IN NEOPLASTIC DISEASE: ICD-10-CM

## 2019-04-04 DIAGNOSIS — C83.39 DIFFUSE LARGE B-CELL LYMPHOMA OF SOLID ORGAN EXCLUDING SPLEEN: ICD-10-CM

## 2019-04-04 LAB
ALBUMIN SERPL BCP-MCNC: 3.5 G/DL (ref 3.5–5.2)
ALBUMIN SERPL BCP-MCNC: 3.5 G/DL (ref 3.5–5.2)
ALP SERPL-CCNC: 73 U/L (ref 55–135)
ALP SERPL-CCNC: 73 U/L (ref 55–135)
ALT SERPL W/O P-5'-P-CCNC: 28 U/L (ref 10–44)
ALT SERPL W/O P-5'-P-CCNC: 28 U/L (ref 10–44)
ANION GAP SERPL CALC-SCNC: 9 MMOL/L (ref 8–16)
ANION GAP SERPL CALC-SCNC: 9 MMOL/L (ref 8–16)
ANISOCYTOSIS BLD QL SMEAR: SLIGHT
AST SERPL-CCNC: 22 U/L (ref 10–40)
AST SERPL-CCNC: 22 U/L (ref 10–40)
BASOPHILS NFR BLD: 1 % (ref 0–1.9)
BILIRUB SERPL-MCNC: 0.3 MG/DL (ref 0.1–1)
BILIRUB SERPL-MCNC: 0.3 MG/DL (ref 0.1–1)
BUN SERPL-MCNC: 13 MG/DL (ref 6–20)
BUN SERPL-MCNC: 13 MG/DL (ref 6–20)
CALCIUM SERPL-MCNC: 9.3 MG/DL (ref 8.7–10.5)
CALCIUM SERPL-MCNC: 9.3 MG/DL (ref 8.7–10.5)
CHLORIDE SERPL-SCNC: 105 MMOL/L (ref 95–110)
CHLORIDE SERPL-SCNC: 105 MMOL/L (ref 95–110)
CO2 SERPL-SCNC: 27 MMOL/L (ref 23–29)
CO2 SERPL-SCNC: 27 MMOL/L (ref 23–29)
CREAT SERPL-MCNC: 0.9 MG/DL (ref 0.5–1.4)
CREAT SERPL-MCNC: 0.9 MG/DL (ref 0.5–1.4)
DIFFERENTIAL METHOD: ABNORMAL
EOSINOPHIL NFR BLD: 0 % (ref 0–8)
ERYTHROCYTE [DISTWIDTH] IN BLOOD BY AUTOMATED COUNT: 23.1 % (ref 11.5–14.5)
EST. GFR  (AFRICAN AMERICAN): >60 ML/MIN/1.73 M^2
EST. GFR  (AFRICAN AMERICAN): >60 ML/MIN/1.73 M^2
EST. GFR  (NON AFRICAN AMERICAN): >60 ML/MIN/1.73 M^2
EST. GFR  (NON AFRICAN AMERICAN): >60 ML/MIN/1.73 M^2
GLUCOSE SERPL-MCNC: 168 MG/DL (ref 70–110)
GLUCOSE SERPL-MCNC: 168 MG/DL (ref 70–110)
HCT VFR BLD AUTO: 31.9 % (ref 40–54)
HGB BLD-MCNC: 10.2 G/DL (ref 14–18)
IMM GRANULOCYTES # BLD AUTO: ABNORMAL K/UL (ref 0–0.04)
IMM GRANULOCYTES NFR BLD AUTO: ABNORMAL % (ref 0–0.5)
LDH SERPL L TO P-CCNC: 341 U/L (ref 110–260)
LYMPHOCYTES NFR BLD: 11 % (ref 18–48)
MCH RBC QN AUTO: 26.8 PG (ref 27–31)
MCHC RBC AUTO-ENTMCNC: 32 G/DL (ref 32–36)
MCV RBC AUTO: 84 FL (ref 82–98)
METAMYELOCYTES NFR BLD MANUAL: 2 %
MONOCYTES NFR BLD: 5 % (ref 4–15)
NEUTROPHILS NFR BLD: 77 % (ref 38–73)
NEUTS BAND NFR BLD MANUAL: 4 %
NRBC BLD-RTO: 0 /100 WBC
OVALOCYTES BLD QL SMEAR: ABNORMAL
PLATELET # BLD AUTO: 183 K/UL (ref 150–350)
PMV BLD AUTO: 10.3 FL (ref 9.2–12.9)
POIKILOCYTOSIS BLD QL SMEAR: SLIGHT
POLYCHROMASIA BLD QL SMEAR: ABNORMAL
POTASSIUM SERPL-SCNC: 4.2 MMOL/L (ref 3.5–5.1)
POTASSIUM SERPL-SCNC: 4.2 MMOL/L (ref 3.5–5.1)
PROT SERPL-MCNC: 6.8 G/DL (ref 6–8.4)
PROT SERPL-MCNC: 6.8 G/DL (ref 6–8.4)
RBC # BLD AUTO: 3.8 M/UL (ref 4.6–6.2)
SODIUM SERPL-SCNC: 141 MMOL/L (ref 136–145)
SODIUM SERPL-SCNC: 141 MMOL/L (ref 136–145)
URATE SERPL-MCNC: 5.2 MG/DL (ref 3.4–7)
WBC # BLD AUTO: 8.22 K/UL (ref 3.9–12.7)

## 2019-04-04 PROCEDURE — 85007 BL SMEAR W/DIFF WBC COUNT: CPT

## 2019-04-04 PROCEDURE — 36415 COLL VENOUS BLD VENIPUNCTURE: CPT | Mod: PO

## 2019-04-04 PROCEDURE — 83615 LACTATE (LD) (LDH) ENZYME: CPT

## 2019-04-04 PROCEDURE — 85027 COMPLETE CBC AUTOMATED: CPT

## 2019-04-04 PROCEDURE — 80053 COMPREHEN METABOLIC PANEL: CPT

## 2019-04-04 PROCEDURE — 84550 ASSAY OF BLOOD/URIC ACID: CPT

## 2019-04-05 ENCOUNTER — OFFICE VISIT (OUTPATIENT)
Dept: HEMATOLOGY/ONCOLOGY | Facility: CLINIC | Age: 49
End: 2019-04-05
Payer: COMMERCIAL

## 2019-04-05 ENCOUNTER — HOSPITAL ENCOUNTER (OUTPATIENT)
Dept: RADIOLOGY | Facility: HOSPITAL | Age: 49
Discharge: HOME | End: 2019-04-05
Attending: INTERNAL MEDICINE
Payer: COMMERCIAL

## 2019-04-05 VITALS
HEART RATE: 110 BPM | SYSTOLIC BLOOD PRESSURE: 125 MMHG | BODY MASS INDEX: 47.03 KG/M2 | DIASTOLIC BLOOD PRESSURE: 63 MMHG | OXYGEN SATURATION: 97 % | TEMPERATURE: 98 F | HEIGHT: 67 IN | WEIGHT: 299.63 LBS

## 2019-04-05 DIAGNOSIS — R20.2 NUMBNESS AND TINGLING: ICD-10-CM

## 2019-04-05 DIAGNOSIS — D63.0 ANEMIA IN NEOPLASTIC DISEASE: ICD-10-CM

## 2019-04-05 DIAGNOSIS — C83.39 DIFFUSE LARGE B-CELL LYMPHOMA OF SOLID ORGAN EXCLUDING SPLEEN: Primary | ICD-10-CM

## 2019-04-05 DIAGNOSIS — R20.0 NUMBNESS AND TINGLING: ICD-10-CM

## 2019-04-05 DIAGNOSIS — C83.30 LARGE CELL (DIFFUSE) NON-HODGKIN'S LYMPHOMA: ICD-10-CM

## 2019-04-05 DIAGNOSIS — I10 ESSENTIAL HYPERTENSION: ICD-10-CM

## 2019-04-05 LAB — POCT GLUCOSE: 115 MG/DL (ref 70–110)

## 2019-04-05 PROCEDURE — 3008F PR BODY MASS INDEX (BMI) DOCUMENTED: ICD-10-PCS | Mod: CPTII,S$GLB,, | Performed by: NURSE PRACTITIONER

## 2019-04-05 PROCEDURE — 3008F BODY MASS INDEX DOCD: CPT | Mod: CPTII,S$GLB,, | Performed by: NURSE PRACTITIONER

## 2019-04-05 PROCEDURE — 99215 PR OFFICE/OUTPT VISIT, EST, LEVL V, 40-54 MIN: ICD-10-PCS | Mod: S$GLB,,, | Performed by: NURSE PRACTITIONER

## 2019-04-05 PROCEDURE — 99999 PR PBB SHADOW E&M-EST. PATIENT-LVL III: ICD-10-PCS | Mod: PBBFAC,,, | Performed by: NURSE PRACTITIONER

## 2019-04-05 PROCEDURE — 3078F DIAST BP <80 MM HG: CPT | Mod: CPTII,S$GLB,, | Performed by: NURSE PRACTITIONER

## 2019-04-05 PROCEDURE — 78815 PET IMAGE W/CT SKULL-THIGH: CPT | Mod: 26,PS,, | Performed by: RADIOLOGY

## 2019-04-05 PROCEDURE — 99999 PR PBB SHADOW E&M-EST. PATIENT-LVL III: CPT | Mod: PBBFAC,,, | Performed by: NURSE PRACTITIONER

## 2019-04-05 PROCEDURE — 3074F PR MOST RECENT SYSTOLIC BLOOD PRESSURE < 130 MM HG: ICD-10-PCS | Mod: CPTII,S$GLB,, | Performed by: NURSE PRACTITIONER

## 2019-04-05 PROCEDURE — 78815 PET IMAGE W/CT SKULL-THIGH: CPT | Mod: TC

## 2019-04-05 PROCEDURE — 3078F PR MOST RECENT DIASTOLIC BLOOD PRESSURE < 80 MM HG: ICD-10-PCS | Mod: CPTII,S$GLB,, | Performed by: NURSE PRACTITIONER

## 2019-04-05 PROCEDURE — 78815 NM PET CT ROUTINE: ICD-10-PCS | Mod: 26,PS,, | Performed by: RADIOLOGY

## 2019-04-05 PROCEDURE — 3074F SYST BP LT 130 MM HG: CPT | Mod: CPTII,S$GLB,, | Performed by: NURSE PRACTITIONER

## 2019-04-05 PROCEDURE — A9552 F18 FDG: HCPCS

## 2019-04-05 PROCEDURE — 99215 OFFICE O/P EST HI 40 MIN: CPT | Mod: S$GLB,,, | Performed by: NURSE PRACTITIONER

## 2019-04-05 RX ORDER — HEPARIN 100 UNIT/ML
500 SYRINGE INTRAVENOUS
Status: CANCELLED | OUTPATIENT
Start: 2019-04-12

## 2019-04-05 RX ORDER — ACETAMINOPHEN 325 MG/1
650 TABLET ORAL
Status: CANCELLED | OUTPATIENT
Start: 2019-04-08

## 2019-04-05 RX ORDER — FAMOTIDINE 10 MG/ML
20 INJECTION INTRAVENOUS
Status: CANCELLED | OUTPATIENT
Start: 2019-04-08

## 2019-04-05 RX ORDER — HEPARIN 100 UNIT/ML
500 SYRINGE INTRAVENOUS
Status: CANCELLED | OUTPATIENT
Start: 2019-04-09

## 2019-04-05 RX ORDER — MEPERIDINE HYDROCHLORIDE 50 MG/ML
25 INJECTION INTRAMUSCULAR; INTRAVENOUS; SUBCUTANEOUS
Status: CANCELLED | OUTPATIENT
Start: 2019-04-08

## 2019-04-05 RX ORDER — HEPARIN 100 UNIT/ML
500 SYRINGE INTRAVENOUS
Status: CANCELLED | OUTPATIENT
Start: 2019-04-08

## 2019-04-05 RX ORDER — HEPARIN 100 UNIT/ML
500 SYRINGE INTRAVENOUS
Status: CANCELLED | OUTPATIENT
Start: 2019-04-11

## 2019-04-05 RX ORDER — SODIUM CHLORIDE 0.9 % (FLUSH) 0.9 %
10 SYRINGE (ML) INJECTION
Status: CANCELLED | OUTPATIENT
Start: 2019-04-12

## 2019-04-05 RX ORDER — HEPARIN 100 UNIT/ML
500 SYRINGE INTRAVENOUS
Status: CANCELLED | OUTPATIENT
Start: 2019-04-10

## 2019-04-05 RX ORDER — SODIUM CHLORIDE 0.9 % (FLUSH) 0.9 %
10 SYRINGE (ML) INJECTION
Status: CANCELLED | OUTPATIENT
Start: 2019-04-08

## 2019-04-05 RX ORDER — SODIUM CHLORIDE 0.9 % (FLUSH) 0.9 %
10 SYRINGE (ML) INJECTION
Status: CANCELLED | OUTPATIENT
Start: 2019-04-11

## 2019-04-05 RX ORDER — SODIUM CHLORIDE 0.9 % (FLUSH) 0.9 %
10 SYRINGE (ML) INJECTION
Status: CANCELLED | OUTPATIENT
Start: 2019-04-10

## 2019-04-05 RX ORDER — SODIUM CHLORIDE 0.9 % (FLUSH) 0.9 %
10 SYRINGE (ML) INJECTION
Status: CANCELLED | OUTPATIENT
Start: 2019-04-09

## 2019-04-08 ENCOUNTER — PATIENT MESSAGE (OUTPATIENT)
Dept: HEMATOLOGY/ONCOLOGY | Facility: CLINIC | Age: 49
End: 2019-04-08

## 2019-04-08 ENCOUNTER — INFUSION (OUTPATIENT)
Dept: INFUSION THERAPY | Facility: HOSPITAL | Age: 49
End: 2019-04-08
Attending: INTERNAL MEDICINE
Payer: COMMERCIAL

## 2019-04-08 VITALS
RESPIRATION RATE: 18 BRPM | BODY MASS INDEX: 47.03 KG/M2 | SYSTOLIC BLOOD PRESSURE: 132 MMHG | HEIGHT: 67 IN | TEMPERATURE: 99 F | HEART RATE: 94 BPM | WEIGHT: 299.63 LBS | DIASTOLIC BLOOD PRESSURE: 72 MMHG

## 2019-04-08 DIAGNOSIS — C83.39 DIFFUSE LARGE B-CELL LYMPHOMA OF SOLID ORGAN EXCLUDING SPLEEN: Primary | ICD-10-CM

## 2019-04-08 PROCEDURE — 96365 THER/PROPH/DIAG IV INF INIT: CPT | Mod: 59

## 2019-04-08 PROCEDURE — 96415 CHEMO IV INFUSION ADDL HR: CPT

## 2019-04-08 PROCEDURE — 96376 TX/PRO/DX INJ SAME DRUG ADON: CPT

## 2019-04-08 PROCEDURE — 96413 CHEMO IV INFUSION 1 HR: CPT

## 2019-04-08 PROCEDURE — 25000003 PHARM REV CODE 250: Performed by: INTERNAL MEDICINE

## 2019-04-08 PROCEDURE — S0028 INJECTION, FAMOTIDINE, 20 MG: HCPCS | Performed by: INTERNAL MEDICINE

## 2019-04-08 PROCEDURE — 63600175 PHARM REV CODE 636 W HCPCS: Mod: JG | Performed by: INTERNAL MEDICINE

## 2019-04-08 PROCEDURE — 96416 CHEMO PROLONG INFUSE W/PUMP: CPT

## 2019-04-08 RX ORDER — SODIUM CHLORIDE 0.9 % (FLUSH) 0.9 %
10 SYRINGE (ML) INJECTION
Status: DISCONTINUED | OUTPATIENT
Start: 2019-04-08 | End: 2019-04-08 | Stop reason: HOSPADM

## 2019-04-08 RX ORDER — ACETAMINOPHEN 325 MG/1
650 TABLET ORAL
Status: COMPLETED | OUTPATIENT
Start: 2019-04-08 | End: 2019-04-08

## 2019-04-08 RX ORDER — HEPARIN 100 UNIT/ML
500 SYRINGE INTRAVENOUS
Status: DISCONTINUED | OUTPATIENT
Start: 2019-04-08 | End: 2019-04-08 | Stop reason: HOSPADM

## 2019-04-08 RX ORDER — MEPERIDINE HYDROCHLORIDE 50 MG/ML
25 INJECTION INTRAMUSCULAR; INTRAVENOUS; SUBCUTANEOUS
Status: DISCONTINUED | OUTPATIENT
Start: 2019-04-08 | End: 2019-04-08 | Stop reason: HOSPADM

## 2019-04-08 RX ORDER — FAMOTIDINE 10 MG/ML
20 INJECTION INTRAVENOUS
Status: COMPLETED | OUTPATIENT
Start: 2019-04-08 | End: 2019-04-08

## 2019-04-08 RX ADMIN — DEXAMETHASONE SODIUM PHOSPHATE: 4 INJECTION, SOLUTION INTRA-ARTICULAR; INTRALESIONAL; INTRAMUSCULAR; INTRAVENOUS; SOFT TISSUE at 12:04

## 2019-04-08 RX ADMIN — RITUXIMAB 975 MG: 10 INJECTION, SOLUTION INTRAVENOUS at 09:04

## 2019-04-08 RX ADMIN — DIPHENHYDRAMINE HYDROCHLORIDE 50 MG: 50 INJECTION, SOLUTION INTRAMUSCULAR; INTRAVENOUS at 09:04

## 2019-04-08 RX ADMIN — ACETAMINOPHEN 650 MG: 325 TABLET ORAL at 09:04

## 2019-04-08 RX ADMIN — FAMOTIDINE 20 MG: 10 INJECTION, SOLUTION INTRAVENOUS at 09:04

## 2019-04-08 RX ADMIN — VINCRISTINE SULFATE 32 MG: 1 INJECTION, SOLUTION INTRAVENOUS at 01:04

## 2019-04-08 NOTE — PLAN OF CARE
Problem: Adult Inpatient Plan of Care  Goal: Plan of Care Review  Left with epoch pump Did well today with rituxan Will return tomarrow for day 2 pump at 1330.

## 2019-04-09 ENCOUNTER — INFUSION (OUTPATIENT)
Dept: INFUSION THERAPY | Facility: HOSPITAL | Age: 49
End: 2019-04-09
Attending: INTERNAL MEDICINE
Payer: COMMERCIAL

## 2019-04-09 VITALS
RESPIRATION RATE: 18 BRPM | HEIGHT: 67 IN | BODY MASS INDEX: 46.93 KG/M2 | HEART RATE: 96 BPM | WEIGHT: 299 LBS | SYSTOLIC BLOOD PRESSURE: 154 MMHG | TEMPERATURE: 98 F | DIASTOLIC BLOOD PRESSURE: 79 MMHG

## 2019-04-09 DIAGNOSIS — C83.39 DIFFUSE LARGE B-CELL LYMPHOMA OF SOLID ORGAN EXCLUDING SPLEEN: Primary | ICD-10-CM

## 2019-04-09 PROCEDURE — 25000003 PHARM REV CODE 250: Performed by: INTERNAL MEDICINE

## 2019-04-09 PROCEDURE — 63600175 PHARM REV CODE 636 W HCPCS: Performed by: INTERNAL MEDICINE

## 2019-04-09 PROCEDURE — 96416 CHEMO PROLONG INFUSE W/PUMP: CPT

## 2019-04-09 RX ORDER — HEPARIN 100 UNIT/ML
500 SYRINGE INTRAVENOUS
Status: DISCONTINUED | OUTPATIENT
Start: 2019-04-09 | End: 2019-04-09 | Stop reason: HOSPADM

## 2019-04-09 RX ORDER — SODIUM CHLORIDE 0.9 % (FLUSH) 0.9 %
10 SYRINGE (ML) INJECTION
Status: DISCONTINUED | OUTPATIENT
Start: 2019-04-09 | End: 2019-04-09 | Stop reason: HOSPADM

## 2019-04-09 RX ADMIN — VINCRISTINE SULFATE 32 MG: 1 INJECTION, SOLUTION INTRAVENOUS at 02:04

## 2019-04-09 NOTE — PLAN OF CARE
Problem: Adult Inpatient Plan of Care  Goal: Plan of Care Review  Outcome: Ongoing (interventions implemented as appropriate)  Pt here for EPOCH pump change, pt with infusion bag from yesterday finishing with 6 ml left, pt reclined in chair, continue to monitor

## 2019-04-09 NOTE — PLAN OF CARE
Problem: Adult Inpatient Plan of Care  Goal: Plan of Care Review  Outcome: Ongoing (interventions implemented as appropriate)  Pt with cadd pump infusing at 20 ml/hr without issue prior to d/c to h ome pt to rtc tomorrow around 1430 for pump change, no distress noted upon d/c to home

## 2019-04-10 ENCOUNTER — INFUSION (OUTPATIENT)
Dept: INFUSION THERAPY | Facility: HOSPITAL | Age: 49
End: 2019-04-10
Attending: INTERNAL MEDICINE
Payer: COMMERCIAL

## 2019-04-10 VITALS
SYSTOLIC BLOOD PRESSURE: 138 MMHG | TEMPERATURE: 98 F | BODY MASS INDEX: 46.93 KG/M2 | WEIGHT: 299 LBS | HEART RATE: 95 BPM | RESPIRATION RATE: 18 BRPM | DIASTOLIC BLOOD PRESSURE: 65 MMHG | HEIGHT: 67 IN

## 2019-04-10 DIAGNOSIS — C83.39 DIFFUSE LARGE B-CELL LYMPHOMA OF SOLID ORGAN EXCLUDING SPLEEN: Primary | ICD-10-CM

## 2019-04-10 PROCEDURE — 25000003 PHARM REV CODE 250: Performed by: INTERNAL MEDICINE

## 2019-04-10 PROCEDURE — 63600175 PHARM REV CODE 636 W HCPCS: Performed by: INTERNAL MEDICINE

## 2019-04-10 PROCEDURE — 96521 REFILL/MAINT PORTABLE PUMP: CPT

## 2019-04-10 RX ORDER — SODIUM CHLORIDE 0.9 % (FLUSH) 0.9 %
10 SYRINGE (ML) INJECTION
Status: DISCONTINUED | OUTPATIENT
Start: 2019-04-10 | End: 2019-04-10 | Stop reason: HOSPADM

## 2019-04-10 RX ORDER — HEPARIN 100 UNIT/ML
500 SYRINGE INTRAVENOUS
Status: DISCONTINUED | OUTPATIENT
Start: 2019-04-10 | End: 2019-04-10 | Stop reason: HOSPADM

## 2019-04-10 RX ADMIN — VINCRISTINE SULFATE 480 ML: 1 INJECTION, SOLUTION INTRAVENOUS at 02:04

## 2019-04-10 NOTE — NURSING
Pt here for EPOCH pump change. Infusion completed. No new complaints. VSS. CADD pump connected to patient and infusing without problems. Settings verified by 2nd RN. RTC at 1430 4/11.

## 2019-04-11 ENCOUNTER — INFUSION (OUTPATIENT)
Dept: INFUSION THERAPY | Facility: HOSPITAL | Age: 49
End: 2019-04-11
Attending: INTERNAL MEDICINE
Payer: COMMERCIAL

## 2019-04-11 ENCOUNTER — HOSPITAL ENCOUNTER (OUTPATIENT)
Dept: RADIOLOGY | Facility: HOSPITAL | Age: 49
Discharge: HOME | End: 2019-04-11
Attending: NURSE PRACTITIONER
Payer: COMMERCIAL

## 2019-04-11 VITALS
SYSTOLIC BLOOD PRESSURE: 158 MMHG | RESPIRATION RATE: 18 BRPM | DIASTOLIC BLOOD PRESSURE: 79 MMHG | BODY MASS INDEX: 46.92 KG/M2 | HEART RATE: 66 BPM | WEIGHT: 298.94 LBS | HEIGHT: 67 IN | TEMPERATURE: 98 F

## 2019-04-11 DIAGNOSIS — C83.39 DIFFUSE LARGE B-CELL LYMPHOMA OF SOLID ORGAN EXCLUDING SPLEEN: Primary | ICD-10-CM

## 2019-04-11 LAB
CLARITY CSF: CLEAR
COLOR CSF: COLORLESS
LYMPHOCYTES NFR CSF MANUAL: 43 % (ref 40–80)
MONOS+MACROS NFR CSF MANUAL: 37 % (ref 15–45)
NEUTROPHILS NFR CSF MANUAL: 20 % (ref 0–6)
RBC # CSF: 4 /CU MM
SPECIMEN VOL CSF: 2 ML
WBC # CSF: 1 /CU MM (ref 0–5)

## 2019-04-11 PROCEDURE — 25000003 PHARM REV CODE 250: Performed by: INTERNAL MEDICINE

## 2019-04-11 PROCEDURE — 63600175 PHARM REV CODE 636 W HCPCS: Performed by: INTERNAL MEDICINE

## 2019-04-11 PROCEDURE — A4216 STERILE WATER/SALINE, 10 ML: HCPCS | Performed by: INTERNAL MEDICINE

## 2019-04-11 PROCEDURE — 88108 CYTOPATH CONCENTRATE TECH: CPT | Performed by: PATHOLOGY

## 2019-04-11 PROCEDURE — 96450 CHEMOTHERAPY INTO CNS: CPT | Mod: ,,, | Performed by: RADIOLOGY

## 2019-04-11 PROCEDURE — 96450 CHEMOTHERAPY INTO CNS: CPT

## 2019-04-11 PROCEDURE — 89051 BODY FLUID CELL COUNT: CPT

## 2019-04-11 PROCEDURE — 88108 CYTOPATH CONCENTRATE TECH: CPT | Mod: 26,,, | Performed by: PATHOLOGY

## 2019-04-11 PROCEDURE — 88108 CYTOLOGY SPECIMEN- MEDICAL CYTOLOGY (FLUID/WASH/BRUSH): ICD-10-PCS | Mod: 26,,, | Performed by: PATHOLOGY

## 2019-04-11 PROCEDURE — 96521 REFILL/MAINT PORTABLE PUMP: CPT

## 2019-04-11 PROCEDURE — 96450 FL CHEMO ADMINISTRATION INTRATHECAL WITH LP: ICD-10-PCS | Mod: ,,, | Performed by: RADIOLOGY

## 2019-04-11 RX ORDER — SODIUM CHLORIDE 0.9 % (FLUSH) 0.9 %
10 SYRINGE (ML) INJECTION
Status: DISCONTINUED | OUTPATIENT
Start: 2019-04-11 | End: 2019-04-11 | Stop reason: HOSPADM

## 2019-04-11 RX ORDER — HEPARIN 100 UNIT/ML
500 SYRINGE INTRAVENOUS
Status: DISCONTINUED | OUTPATIENT
Start: 2019-04-11 | End: 2019-04-11 | Stop reason: HOSPADM

## 2019-04-11 RX ADMIN — METHOTREXATE: 25 INJECTION, SOLUTION INTRA-ARTERIAL; INTRAMUSCULAR; INTRATHECAL; INTRAVENOUS at 10:04

## 2019-04-11 RX ADMIN — VINCRISTINE SULFATE 20 ML: 1 INJECTION, SOLUTION INTRAVENOUS at 03:04

## 2019-04-11 NOTE — PROCEDURES
Radiology Post-Procedure Note    Pre Op Diagnosis: Lymphoma    Post Op Diagnosis: Same    Procedure: lumbar puncture    Procedure performed by: MD King    Radiology staff: DO Andrew    Written Informed Consent Obtained: Yes    Specimen Removed: 6 mL CSF    Estimated Blood Loss: Minimal    Findings: Following written informed consent and sterile prep and drape, a 22 gauge spinal needle was inserted at L2 - L3 intralaminar space under fluoroscopic surveillance.  6 mL clear CSF removed passively and sent to the lab for further analysis. Intrathecal chemo was administered by oncology NP.   There were no complications. Full report to follow. Pt. Instructed on post procedure care including but not limited to signs of infection, bleeding, headaches, and follow up with ordering physician.        Patient tolerated procedure well.    Mikel Malik  Diagnostic and interventional radiology  PGY- III  [phone]

## 2019-04-11 NOTE — H&P
Radiology History & Physical      SUBJECTIVE:     Chief Complaint: Lymphoma    History of Present Illness:  Giorgio Streeter is a 48 y.o. male who presents for LP and intrathecal chemotherapy.     Past Medical History:   Diagnosis Date    Cancer     non hodgkins lymphoma    Hypertension     Sleep apnea     Vision abnormalities      Past Surgical History:   Procedure Laterality Date    BONE MARROW BIOPSY  01/11/2019    EXCISION, SMALL INTESTINE N/A 12/21/2018    Performed by Cecilio Casanova MD at [facility] OR 2ND FLR    INSERTION, PORT-A-CATH N/A 1/28/2019    Performed by [facility] Provider at [facility] CATH LAB    SMALL INTESTINE SURGERY  12/21/2018    Dr. Casanova, segmental small bowel resection        Home Meds:   Prior to Admission medications    Medication Sig Start Date End Date Taking? Authorizing Provider   acyclovir (ZOVIRAX) 400 MG tablet Take 1 tablet (400 mg total) by mouth 2 (two) times daily. 4/1/19 3/31/20  Sachi Araya NP   allopurinol (ZYLOPRIM) 300 MG tablet Take 1 tablet (300 mg total) by mouth once daily. 4/1/19 5/2/19  Sachi Araya NP   ALPRAZolam (XANAX) 0.25 MG tablet Take 1 tablet (0.25 mg total) by mouth 2 (two) times daily as needed for Insomnia or Anxiety. 2/6/19 4/5/19  Ashlee Luna NP   atenolol (TENORMIN) 50 MG tablet Take 1 tablet (50 mg total) by mouth once daily. 2/6/19 2/6/20  Ashlee Luna NP   famotidine (PEPCID) 20 MG tablet Take 1 tablet (20 mg total) by mouth 2 (two) times daily. 4/1/19 3/31/20  Sachi Araya NP   fexofenadine (ALLEGRA) 60 MG tablet Take 60 mg by mouth once daily.    Historical Provider, MD   fluconazole (DIFLUCAN) 200 MG Tab Take 2 tablets (400 mg total) by mouth once daily. 4/1/19 5/2/19  Ector Hwang MD   fluticasone (FLONASE) 50 mcg/actuation nasal spray 1 spray by Each Nare route once daily.    Historical Provider, MD   ketoconazole (NIZORAL) 2 % cream  1/31/19   Historical Provider, MD   levoFLOXacin (LEVAQUIN) 500 MG  tablet Take 1 tablet (500 mg total) by mouth once daily. 4/1/19   Sachi Araya NP   loratadine (CLARITIN) 10 mg tablet Take 10 mg by mouth once daily.    Historical Provider, MD   losartan-hydrochlorothiazide 100-12.5 mg (HYZAAR) 100-12.5 mg Tab Take 1 tablet by mouth once daily. 2/6/19   Ashlee Luna NP   nystatin (MYCOSTATIN) powder Apply topically 2 (two) times daily. 2/6/19   Ashlee Luna NP   predniSONE (DELTASONE) 10 MG tablet Take one tablet by mouth 2 times a day. On days 1-5 of chemotherapy cycles only. To be combined with 50mg tabs for a total of 160mg 4/1/19   Sachi Araya NP   predniSONE (DELTASONE) 50 MG Tab Take 3 tablets (150 mg total) by mouth 2 (two) times daily. On days 1-5 of chemotherapy cycles. To be combined with 10mg tabs for a total of 160mg 4/1/19   Sachi Araya NP     Anticoagulants/Antiplatelets: no anticoagulation    Allergies: Review of patient's allergies indicates:  No Known Allergies  Sedation History:  no adverse reactions    Review of Systems:   Hematological: no known coagulopathies  Respiratory: no shortness of breath  Cardiovascular: no chest pain  Gastrointestinal: no abdominal pain  Genito-Urinary: no dysuria  Musculoskeletal: negative  Neurological: no TIA or stroke symptoms         OBJECTIVE:     Vital Signs (Most Recent)       Physical Exam:  ASA: 1  Mallampati: 2    General: no acute distress  Mental Status: alert and oriented to person, place and time  HEENT: normocephalic, atraumatic  Chest: unlabored breathing  Heart: regular heart rate  Abdomen: nondistended  Extremity: moves all extremities    Laboratory  Lab Results   Component Value Date    INR 1.0 02/06/2019       Lab Results   Component Value Date    WBC 8.22 04/04/2019    HGB 10.2 (L) 04/04/2019    HCT 31.9 (L) 04/04/2019    MCV 84 04/04/2019     04/04/2019      Lab Results   Component Value Date     (H) 04/04/2019     (H) 04/04/2019     04/04/2019      04/04/2019    K 4.2 04/04/2019    K 4.2 04/04/2019     04/04/2019     04/04/2019    CO2 27 04/04/2019    CO2 27 04/04/2019    BUN 13 04/04/2019    BUN 13 04/04/2019    CREATININE 0.9 04/04/2019    CREATININE 0.9 04/04/2019    CALCIUM 9.3 04/04/2019    CALCIUM 9.3 04/04/2019    MG 2.2 02/06/2019    ALT 28 04/04/2019    ALT 28 04/04/2019    AST 22 04/04/2019    AST 22 04/04/2019    ALBUMIN 3.5 04/04/2019    ALBUMIN 3.5 04/04/2019    BILITOT 0.3 04/04/2019    BILITOT 0.3 04/04/2019       ASSESSMENT/PLAN:     Sedation Plan: local only  Patient will undergo LP.    Mikel Malik  Diagnostic and interventional radiology  PGY- II  [phone]

## 2019-04-11 NOTE — PROCEDURES
Procedures   Patient seen at Fluoroscopy. LP done per radiology. CSF studies sent. Timeout done. Chemo checked with MD. Methotrexate 12 mg in 3 cc of NS given intrathecally without difficulty.    Radha Mora  4/11/2019

## 2019-04-11 NOTE — PLAN OF CARE
Problem: Adult Inpatient Plan of Care  Goal: Plan of Care Review  Outcome: Ongoing (interventions implemented as appropriate)  Pt tolerated treatment without adverse effects. VSS. Verbalized understanding of RTC date at 3:00 on friday. DC with family ambulating independently.

## 2019-04-12 ENCOUNTER — INFUSION (OUTPATIENT)
Dept: INFUSION THERAPY | Facility: HOSPITAL | Age: 49
End: 2019-04-12
Attending: INTERNAL MEDICINE
Payer: COMMERCIAL

## 2019-04-12 VITALS
SYSTOLIC BLOOD PRESSURE: 157 MMHG | HEART RATE: 76 BPM | RESPIRATION RATE: 18 BRPM | TEMPERATURE: 98 F | DIASTOLIC BLOOD PRESSURE: 71 MMHG

## 2019-04-12 DIAGNOSIS — C83.39 DIFFUSE LARGE B-CELL LYMPHOMA OF SOLID ORGAN EXCLUDING SPLEEN: Primary | ICD-10-CM

## 2019-04-12 PROCEDURE — 63600175 PHARM REV CODE 636 W HCPCS: Mod: JG | Performed by: INTERNAL MEDICINE

## 2019-04-12 PROCEDURE — 25000003 PHARM REV CODE 250: Performed by: INTERNAL MEDICINE

## 2019-04-12 PROCEDURE — 96413 CHEMO IV INFUSION 1 HR: CPT

## 2019-04-12 PROCEDURE — 96377 APPLICATON ON-BODY INJECTOR: CPT

## 2019-04-12 PROCEDURE — 96367 TX/PROPH/DG ADDL SEQ IV INF: CPT

## 2019-04-12 RX ORDER — HEPARIN 100 UNIT/ML
500 SYRINGE INTRAVENOUS
Status: DISCONTINUED | OUTPATIENT
Start: 2019-04-12 | End: 2019-04-12 | Stop reason: HOSPADM

## 2019-04-12 RX ORDER — SODIUM CHLORIDE 0.9 % (FLUSH) 0.9 %
10 SYRINGE (ML) INJECTION
Status: DISCONTINUED | OUTPATIENT
Start: 2019-04-12 | End: 2019-04-12 | Stop reason: HOSPADM

## 2019-04-12 RX ADMIN — CYCLOPHOSPHAMIDE 2340 MG: 1 INJECTION, POWDER, FOR SOLUTION INTRAVENOUS; ORAL at 04:04

## 2019-04-12 RX ADMIN — DEXAMETHASONE SODIUM PHOSPHATE: 4 INJECTION, SOLUTION INTRA-ARTICULAR; INTRALESIONAL; INTRAMUSCULAR; INTRAVENOUS; SOFT TISSUE at 04:04

## 2019-04-12 RX ADMIN — PEGFILGRASTIM 6 MG: KIT SUBCUTANEOUS at 05:04

## 2019-04-12 RX ADMIN — HEPARIN 500 UNITS: 100 SYRINGE at 05:04

## 2019-04-12 NOTE — PLAN OF CARE
Problem: Anemia (Chemotherapy Effects)  Goal: Anemia Symptom Improvement    Intervention: Monitor and Manage Anemia  Tolerated cytoxan infusion well PAC flushed hep locked de accessed site covered, avs given future appt reviewed pt instructed to contact MD office with questions or concerns. Neulasta obi applied to [facility] educated on proper disposal of device instructed to remove at 930 pm on 4/13/19. [facility] ambulatory accompanied by father in stable condition.

## 2019-04-15 ENCOUNTER — LAB VISIT (OUTPATIENT)
Dept: LAB | Facility: HOSPITAL | Age: 49
End: 2019-04-15
Attending: INTERNAL MEDICINE
Payer: COMMERCIAL

## 2019-04-15 DIAGNOSIS — C83.30 LARGE CELL (DIFFUSE) NON-HODGKIN'S LYMPHOMA: ICD-10-CM

## 2019-04-15 DIAGNOSIS — D63.0 ANEMIA IN NEOPLASTIC DISEASE: ICD-10-CM

## 2019-04-15 DIAGNOSIS — C83.39 DIFFUSE LARGE B-CELL LYMPHOMA OF SOLID ORGAN EXCLUDING SPLEEN: ICD-10-CM

## 2019-04-15 LAB
ALBUMIN SERPL BCP-MCNC: 3.2 G/DL (ref 3.5–5.2)
ALP SERPL-CCNC: 58 U/L (ref 55–135)
ALT SERPL W/O P-5'-P-CCNC: 40 U/L (ref 10–44)
ANION GAP SERPL CALC-SCNC: 12 MMOL/L (ref 8–16)
ANISOCYTOSIS BLD QL SMEAR: ABNORMAL
AST SERPL-CCNC: 23 U/L (ref 10–40)
BASOPHILS # BLD AUTO: ABNORMAL K/UL (ref 0–0.2)
BASOPHILS NFR BLD: 0 % (ref 0–1.9)
BILIRUB SERPL-MCNC: 0.4 MG/DL (ref 0.1–1)
BUN SERPL-MCNC: 15 MG/DL (ref 6–20)
CALCIUM SERPL-MCNC: 8.8 MG/DL (ref 8.7–10.5)
CHLORIDE SERPL-SCNC: 101 MMOL/L (ref 95–110)
CO2 SERPL-SCNC: 28 MMOL/L (ref 23–29)
CREAT SERPL-MCNC: 0.7 MG/DL (ref 0.5–1.4)
DACRYOCYTES BLD QL SMEAR: ABNORMAL
DIFFERENTIAL METHOD: ABNORMAL
EOSINOPHIL # BLD AUTO: ABNORMAL K/UL (ref 0–0.5)
EOSINOPHIL NFR BLD: 0 % (ref 0–8)
ERYTHROCYTE [DISTWIDTH] IN BLOOD BY AUTOMATED COUNT: 22.7 % (ref 11.5–14.5)
EST. GFR  (AFRICAN AMERICAN): >60 ML/MIN/1.73 M^2
EST. GFR  (NON AFRICAN AMERICAN): >60 ML/MIN/1.73 M^2
GLUCOSE SERPL-MCNC: 118 MG/DL (ref 70–110)
HCT VFR BLD AUTO: 27.7 % (ref 40–54)
HGB BLD-MCNC: 9.4 G/DL (ref 14–18)
HYPOCHROMIA BLD QL SMEAR: ABNORMAL
LDH SERPL L TO P-CCNC: 326 U/L (ref 110–260)
LYMPHOCYTES # BLD AUTO: ABNORMAL K/UL (ref 1–4.8)
LYMPHOCYTES NFR BLD: 1 % (ref 18–48)
MCH RBC QN AUTO: 27.5 PG (ref 27–31)
MCHC RBC AUTO-ENTMCNC: 33.9 G/DL (ref 32–36)
MCV RBC AUTO: 81 FL (ref 82–98)
MONOCYTES # BLD AUTO: ABNORMAL K/UL (ref 0.3–1)
MONOCYTES NFR BLD: 0 % (ref 4–15)
NEUTROPHILS NFR BLD: 94 % (ref 38–73)
NEUTS BAND NFR BLD MANUAL: 5 %
PLATELET # BLD AUTO: 165 K/UL (ref 150–350)
PLATELET BLD QL SMEAR: ABNORMAL
PMV BLD AUTO: 8.4 FL (ref 9.2–12.9)
POIKILOCYTOSIS BLD QL SMEAR: SLIGHT
POLYCHROMASIA BLD QL SMEAR: ABNORMAL
POTASSIUM SERPL-SCNC: 3.1 MMOL/L (ref 3.5–5.1)
PROT SERPL-MCNC: 5.8 G/DL (ref 6–8.4)
RBC # BLD AUTO: 3.42 M/UL (ref 4.6–6.2)
SODIUM SERPL-SCNC: 141 MMOL/L (ref 136–145)
URATE SERPL-MCNC: 3.9 MG/DL (ref 3.4–7)
WBC # BLD AUTO: 20.38 K/UL (ref 3.9–12.7)

## 2019-04-15 PROCEDURE — 83615 LACTATE (LD) (LDH) ENZYME: CPT | Mod: PO

## 2019-04-15 PROCEDURE — 84550 ASSAY OF BLOOD/URIC ACID: CPT | Mod: PO

## 2019-04-15 PROCEDURE — 85027 COMPLETE CBC AUTOMATED: CPT | Mod: PO

## 2019-04-15 PROCEDURE — 36415 COLL VENOUS BLD VENIPUNCTURE: CPT | Mod: PO

## 2019-04-15 PROCEDURE — 85007 BL SMEAR W/DIFF WBC COUNT: CPT | Mod: PO

## 2019-04-15 PROCEDURE — 80053 COMPREHEN METABOLIC PANEL: CPT | Mod: PO

## 2019-04-18 ENCOUNTER — LAB VISIT (OUTPATIENT)
Dept: LAB | Facility: HOSPITAL | Age: 49
End: 2019-04-18
Attending: INTERNAL MEDICINE
Payer: COMMERCIAL

## 2019-04-18 DIAGNOSIS — C83.30 LARGE CELL (DIFFUSE) NON-HODGKIN'S LYMPHOMA: ICD-10-CM

## 2019-04-18 DIAGNOSIS — D63.0 ANEMIA IN NEOPLASTIC DISEASE: ICD-10-CM

## 2019-04-18 DIAGNOSIS — C83.39 DIFFUSE LARGE B-CELL LYMPHOMA OF SOLID ORGAN EXCLUDING SPLEEN: ICD-10-CM

## 2019-04-18 LAB
ALBUMIN SERPL BCP-MCNC: 3.6 G/DL (ref 3.5–5.2)
ALP SERPL-CCNC: 57 U/L (ref 55–135)
ALT SERPL W/O P-5'-P-CCNC: 35 U/L (ref 10–44)
ANION GAP SERPL CALC-SCNC: 12 MMOL/L (ref 8–16)
ANISOCYTOSIS BLD QL SMEAR: SLIGHT
AST SERPL-CCNC: 18 U/L (ref 10–40)
BASOPHILS # BLD AUTO: ABNORMAL K/UL (ref 0–0.2)
BASOPHILS NFR BLD: 1 % (ref 0–1.9)
BILIRUB SERPL-MCNC: 0.6 MG/DL (ref 0.1–1)
BUN SERPL-MCNC: 15 MG/DL (ref 6–20)
CALCIUM SERPL-MCNC: 9.9 MG/DL (ref 8.7–10.5)
CHLORIDE SERPL-SCNC: 100 MMOL/L (ref 95–110)
CO2 SERPL-SCNC: 26 MMOL/L (ref 23–29)
CREAT SERPL-MCNC: 0.8 MG/DL (ref 0.5–1.4)
DIFFERENTIAL METHOD: ABNORMAL
DOHLE BOD BLD QL SMEAR: PRESENT
EOSINOPHIL # BLD AUTO: ABNORMAL K/UL (ref 0–0.5)
EOSINOPHIL NFR BLD: 0 % (ref 0–8)
ERYTHROCYTE [DISTWIDTH] IN BLOOD BY AUTOMATED COUNT: 22.7 % (ref 11.5–14.5)
EST. GFR  (AFRICAN AMERICAN): >60 ML/MIN/1.73 M^2
EST. GFR  (NON AFRICAN AMERICAN): >60 ML/MIN/1.73 M^2
GLUCOSE SERPL-MCNC: 149 MG/DL (ref 70–110)
HCT VFR BLD AUTO: 29.1 % (ref 40–54)
HGB BLD-MCNC: 9.8 G/DL (ref 14–18)
HYPOCHROMIA BLD QL SMEAR: ABNORMAL
LDH SERPL L TO P-CCNC: 245 U/L (ref 110–260)
LYMPHOCYTES # BLD AUTO: ABNORMAL K/UL (ref 1–4.8)
LYMPHOCYTES NFR BLD: 22 % (ref 18–48)
MCH RBC QN AUTO: 27.8 PG (ref 27–31)
MCHC RBC AUTO-ENTMCNC: 33.7 G/DL (ref 32–36)
MCV RBC AUTO: 83 FL (ref 82–98)
MONOCYTES # BLD AUTO: ABNORMAL K/UL (ref 0.3–1)
MONOCYTES NFR BLD: 6 % (ref 4–15)
NEUTROPHILS # BLD AUTO: ABNORMAL K/UL (ref 1.8–7.7)
NEUTROPHILS NFR BLD: 63 % (ref 38–73)
NEUTS BAND NFR BLD MANUAL: 8 %
OVALOCYTES BLD QL SMEAR: ABNORMAL
PLATELET # BLD AUTO: 78 K/UL (ref 150–350)
PLATELET BLD QL SMEAR: ABNORMAL
PMV BLD AUTO: 10.8 FL (ref 9.2–12.9)
POIKILOCYTOSIS BLD QL SMEAR: SLIGHT
POTASSIUM SERPL-SCNC: 4.4 MMOL/L (ref 3.5–5.1)
PROT SERPL-MCNC: 7.1 G/DL (ref 6–8.4)
RBC # BLD AUTO: 3.52 M/UL (ref 4.6–6.2)
SODIUM SERPL-SCNC: 138 MMOL/L (ref 136–145)
URATE SERPL-MCNC: 4.2 MG/DL (ref 3.4–7)
WBC # BLD AUTO: 0.74 K/UL (ref 3.9–12.7)

## 2019-04-18 PROCEDURE — 84550 ASSAY OF BLOOD/URIC ACID: CPT | Mod: PO

## 2019-04-18 PROCEDURE — 83615 LACTATE (LD) (LDH) ENZYME: CPT | Mod: PO

## 2019-04-18 PROCEDURE — 85007 BL SMEAR W/DIFF WBC COUNT: CPT | Mod: PO

## 2019-04-18 PROCEDURE — 36415 COLL VENOUS BLD VENIPUNCTURE: CPT | Mod: PO

## 2019-04-18 PROCEDURE — 80053 COMPREHEN METABOLIC PANEL: CPT | Mod: PO

## 2019-04-18 PROCEDURE — 85027 COMPLETE CBC AUTOMATED: CPT | Mod: PO

## 2019-04-22 ENCOUNTER — LAB VISIT (OUTPATIENT)
Dept: LAB | Facility: HOSPITAL | Age: 49
End: 2019-04-22
Attending: INTERNAL MEDICINE
Payer: COMMERCIAL

## 2019-04-22 DIAGNOSIS — D63.0 ANEMIA IN NEOPLASTIC DISEASE: ICD-10-CM

## 2019-04-22 DIAGNOSIS — C83.30 LARGE CELL (DIFFUSE) NON-HODGKIN'S LYMPHOMA: ICD-10-CM

## 2019-04-22 DIAGNOSIS — C83.39 DIFFUSE LARGE B-CELL LYMPHOMA OF SOLID ORGAN EXCLUDING SPLEEN: ICD-10-CM

## 2019-04-22 LAB
ALBUMIN SERPL BCP-MCNC: 3.7 G/DL (ref 3.5–5.2)
ALP SERPL-CCNC: 82 U/L (ref 55–135)
ALT SERPL W/O P-5'-P-CCNC: 39 U/L (ref 10–44)
ANION GAP SERPL CALC-SCNC: 11 MMOL/L (ref 8–16)
ANISOCYTOSIS BLD QL SMEAR: ABNORMAL
AST SERPL-CCNC: 28 U/L (ref 10–40)
BASO STIPL BLD QL SMEAR: ABNORMAL
BASOPHILS # BLD AUTO: ABNORMAL K/UL (ref 0–0.2)
BASOPHILS NFR BLD: 0 % (ref 0–1.9)
BILIRUB SERPL-MCNC: 0.2 MG/DL (ref 0.1–1)
BUN SERPL-MCNC: 9 MG/DL (ref 6–20)
CALCIUM SERPL-MCNC: 9.4 MG/DL (ref 8.7–10.5)
CHLORIDE SERPL-SCNC: 105 MMOL/L (ref 95–110)
CO2 SERPL-SCNC: 27 MMOL/L (ref 23–29)
CREAT SERPL-MCNC: 0.8 MG/DL (ref 0.5–1.4)
DIFFERENTIAL METHOD: ABNORMAL
EOSINOPHIL # BLD AUTO: ABNORMAL K/UL (ref 0–0.5)
EOSINOPHIL NFR BLD: 0 % (ref 0–8)
ERYTHROCYTE [DISTWIDTH] IN BLOOD BY AUTOMATED COUNT: 24.9 % (ref 11.5–14.5)
EST. GFR  (AFRICAN AMERICAN): >60 ML/MIN/1.73 M^2
EST. GFR  (NON AFRICAN AMERICAN): >60 ML/MIN/1.73 M^2
GLUCOSE SERPL-MCNC: 101 MG/DL (ref 70–110)
HCT VFR BLD AUTO: 27.9 % (ref 40–54)
HGB BLD-MCNC: 9.2 G/DL (ref 14–18)
HYPOCHROMIA BLD QL SMEAR: ABNORMAL
LDH SERPL L TO P-CCNC: 422 U/L (ref 110–260)
LYMPHOCYTES # BLD AUTO: ABNORMAL K/UL (ref 1–4.8)
LYMPHOCYTES NFR BLD: 5 % (ref 18–48)
MCH RBC QN AUTO: 28.8 PG (ref 27–31)
MCHC RBC AUTO-ENTMCNC: 33 G/DL (ref 32–36)
MCV RBC AUTO: 88 FL (ref 82–98)
METAMYELOCYTES NFR BLD MANUAL: 1 %
MONOCYTES # BLD AUTO: ABNORMAL K/UL (ref 0.3–1)
MONOCYTES NFR BLD: 10 % (ref 4–15)
NEUTROPHILS # BLD AUTO: ABNORMAL K/UL (ref 1.8–7.7)
NEUTROPHILS NFR BLD: 78 % (ref 38–73)
NEUTS BAND NFR BLD MANUAL: 6 %
NRBC BLD-RTO: ABNORMAL /100 WBC
PLATELET # BLD AUTO: 175 K/UL (ref 150–350)
PLATELET BLD QL SMEAR: ABNORMAL
PMV BLD AUTO: 10.1 FL (ref 9.2–12.9)
POLYCHROMASIA BLD QL SMEAR: ABNORMAL
POTASSIUM SERPL-SCNC: 3.6 MMOL/L (ref 3.5–5.1)
PROT SERPL-MCNC: 7 G/DL (ref 6–8.4)
RBC # BLD AUTO: 3.19 M/UL (ref 4.6–6.2)
SODIUM SERPL-SCNC: 143 MMOL/L (ref 136–145)
URATE SERPL-MCNC: 5.5 MG/DL (ref 3.4–7)
WBC # BLD AUTO: 11.46 K/UL (ref 3.9–12.7)

## 2019-04-22 PROCEDURE — 36415 COLL VENOUS BLD VENIPUNCTURE: CPT | Mod: PO

## 2019-04-22 PROCEDURE — 83615 LACTATE (LD) (LDH) ENZYME: CPT | Mod: PO

## 2019-04-22 PROCEDURE — 84550 ASSAY OF BLOOD/URIC ACID: CPT | Mod: PO

## 2019-04-22 PROCEDURE — 85007 BL SMEAR W/DIFF WBC COUNT: CPT | Mod: PO

## 2019-04-22 PROCEDURE — 80053 COMPREHEN METABOLIC PANEL: CPT | Mod: PO

## 2019-04-22 PROCEDURE — 85027 COMPLETE CBC AUTOMATED: CPT | Mod: PO

## 2019-04-25 ENCOUNTER — LAB VISIT (OUTPATIENT)
Dept: LAB | Facility: HOSPITAL | Age: 49
End: 2019-04-25
Attending: INTERNAL MEDICINE
Payer: COMMERCIAL

## 2019-04-25 DIAGNOSIS — C83.39 DIFFUSE LARGE B-CELL LYMPHOMA OF SOLID ORGAN EXCLUDING SPLEEN: ICD-10-CM

## 2019-04-25 DIAGNOSIS — C83.30 LARGE CELL (DIFFUSE) NON-HODGKIN'S LYMPHOMA: ICD-10-CM

## 2019-04-25 DIAGNOSIS — D63.0 ANEMIA IN NEOPLASTIC DISEASE: ICD-10-CM

## 2019-04-25 LAB
ALBUMIN SERPL BCP-MCNC: 3.6 G/DL (ref 3.5–5.2)
ALP SERPL-CCNC: 77 U/L (ref 55–135)
ALT SERPL W/O P-5'-P-CCNC: 30 U/L (ref 10–44)
ANION GAP SERPL CALC-SCNC: 11 MMOL/L (ref 8–16)
ANISOCYTOSIS BLD QL SMEAR: ABNORMAL
AST SERPL-CCNC: 22 U/L (ref 10–40)
BASOPHILS # BLD AUTO: ABNORMAL K/UL (ref 0–0.2)
BASOPHILS NFR BLD: 0 % (ref 0–1.9)
BILIRUB SERPL-MCNC: 0.3 MG/DL (ref 0.1–1)
BUN SERPL-MCNC: 11 MG/DL (ref 6–20)
CALCIUM SERPL-MCNC: 9.7 MG/DL (ref 8.7–10.5)
CHLORIDE SERPL-SCNC: 104 MMOL/L (ref 95–110)
CO2 SERPL-SCNC: 26 MMOL/L (ref 23–29)
CREAT SERPL-MCNC: 0.8 MG/DL (ref 0.5–1.4)
DACRYOCYTES BLD QL SMEAR: ABNORMAL
DIFFERENTIAL METHOD: ABNORMAL
EOSINOPHIL # BLD AUTO: ABNORMAL K/UL (ref 0–0.5)
EOSINOPHIL NFR BLD: 0 % (ref 0–8)
ERYTHROCYTE [DISTWIDTH] IN BLOOD BY AUTOMATED COUNT: 25.1 % (ref 11.5–14.5)
EST. GFR  (AFRICAN AMERICAN): >60 ML/MIN/1.73 M^2
EST. GFR  (NON AFRICAN AMERICAN): >60 ML/MIN/1.73 M^2
GLUCOSE SERPL-MCNC: 149 MG/DL (ref 70–110)
HCT VFR BLD AUTO: 28.2 % (ref 40–54)
HGB BLD-MCNC: 9 G/DL (ref 14–18)
HYPOCHROMIA BLD QL SMEAR: ABNORMAL
LDH SERPL L TO P-CCNC: 392 U/L (ref 110–260)
LYMPHOCYTES # BLD AUTO: ABNORMAL K/UL (ref 1–4.8)
LYMPHOCYTES NFR BLD: 4 % (ref 18–48)
MCH RBC QN AUTO: 28.6 PG (ref 27–31)
MCHC RBC AUTO-ENTMCNC: 31.9 G/DL (ref 32–36)
MCV RBC AUTO: 90 FL (ref 82–98)
MONOCYTES # BLD AUTO: ABNORMAL K/UL (ref 0.3–1)
MONOCYTES NFR BLD: 10 % (ref 4–15)
NEUTROPHILS # BLD AUTO: ABNORMAL K/UL (ref 1.8–7.7)
NEUTROPHILS NFR BLD: 81 % (ref 38–73)
NEUTS BAND NFR BLD MANUAL: 5 %
NRBC BLD-RTO: ABNORMAL /100 WBC
OVALOCYTES BLD QL SMEAR: ABNORMAL
PLATELET # BLD AUTO: 204 K/UL (ref 150–350)
PLATELET BLD QL SMEAR: ABNORMAL
PMV BLD AUTO: 9.4 FL (ref 9.2–12.9)
POIKILOCYTOSIS BLD QL SMEAR: SLIGHT
POLYCHROMASIA BLD QL SMEAR: ABNORMAL
POTASSIUM SERPL-SCNC: 4.1 MMOL/L (ref 3.5–5.1)
PROT SERPL-MCNC: 6.7 G/DL (ref 6–8.4)
RBC # BLD AUTO: 3.15 M/UL (ref 4.6–6.2)
SODIUM SERPL-SCNC: 141 MMOL/L (ref 136–145)
URATE SERPL-MCNC: 5.6 MG/DL (ref 3.4–7)
WBC # BLD AUTO: 10.79 K/UL (ref 3.9–12.7)

## 2019-04-25 PROCEDURE — 85027 COMPLETE CBC AUTOMATED: CPT | Mod: PO

## 2019-04-25 PROCEDURE — 83615 LACTATE (LD) (LDH) ENZYME: CPT | Mod: PO

## 2019-04-25 PROCEDURE — 36415 COLL VENOUS BLD VENIPUNCTURE: CPT | Mod: PO

## 2019-04-25 PROCEDURE — 80053 COMPREHEN METABOLIC PANEL: CPT | Mod: PO

## 2019-04-25 PROCEDURE — 85007 BL SMEAR W/DIFF WBC COUNT: CPT | Mod: PO

## 2019-04-25 PROCEDURE — 84550 ASSAY OF BLOOD/URIC ACID: CPT | Mod: PO

## 2019-04-26 ENCOUNTER — OFFICE VISIT (OUTPATIENT)
Dept: HEMATOLOGY/ONCOLOGY | Facility: CLINIC | Age: 49
End: 2019-04-26
Payer: COMMERCIAL

## 2019-04-26 ENCOUNTER — PATIENT MESSAGE (OUTPATIENT)
Dept: HEMATOLOGY/ONCOLOGY | Facility: CLINIC | Age: 49
End: 2019-04-26

## 2019-04-26 VITALS
BODY MASS INDEX: 47.69 KG/M2 | RESPIRATION RATE: 20 BRPM | HEART RATE: 117 BPM | DIASTOLIC BLOOD PRESSURE: 89 MMHG | SYSTOLIC BLOOD PRESSURE: 131 MMHG | HEIGHT: 67 IN | OXYGEN SATURATION: 97 % | WEIGHT: 303.81 LBS

## 2019-04-26 DIAGNOSIS — D63.0 ANEMIA IN NEOPLASTIC DISEASE: ICD-10-CM

## 2019-04-26 DIAGNOSIS — C83.39 DIFFUSE LARGE B-CELL LYMPHOMA OF SOLID ORGAN EXCLUDING SPLEEN: ICD-10-CM

## 2019-04-26 DIAGNOSIS — I10 ESSENTIAL HYPERTENSION: ICD-10-CM

## 2019-04-26 DIAGNOSIS — E66.01 MORBID OBESITY: Primary | ICD-10-CM

## 2019-04-26 PROCEDURE — 99214 OFFICE O/P EST MOD 30 MIN: CPT | Mod: S$GLB,,, | Performed by: INTERNAL MEDICINE

## 2019-04-26 PROCEDURE — 99214 PR OFFICE/OUTPT VISIT, EST, LEVL IV, 30-39 MIN: ICD-10-PCS | Mod: S$GLB,,, | Performed by: INTERNAL MEDICINE

## 2019-04-26 PROCEDURE — 3079F DIAST BP 80-89 MM HG: CPT | Mod: CPTII,S$GLB,, | Performed by: INTERNAL MEDICINE

## 2019-04-26 PROCEDURE — 99999 PR PBB SHADOW E&M-EST. PATIENT-LVL III: CPT | Mod: PBBFAC,,, | Performed by: INTERNAL MEDICINE

## 2019-04-26 PROCEDURE — 3075F SYST BP GE 130 - 139MM HG: CPT | Mod: CPTII,S$GLB,, | Performed by: INTERNAL MEDICINE

## 2019-04-26 PROCEDURE — 3079F PR MOST RECENT DIASTOLIC BLOOD PRESSURE 80-89 MM HG: ICD-10-PCS | Mod: CPTII,S$GLB,, | Performed by: INTERNAL MEDICINE

## 2019-04-26 PROCEDURE — 3008F PR BODY MASS INDEX (BMI) DOCUMENTED: ICD-10-PCS | Mod: CPTII,S$GLB,, | Performed by: INTERNAL MEDICINE

## 2019-04-26 PROCEDURE — 99999 PR PBB SHADOW E&M-EST. PATIENT-LVL III: ICD-10-PCS | Mod: PBBFAC,,, | Performed by: INTERNAL MEDICINE

## 2019-04-26 PROCEDURE — 3008F BODY MASS INDEX DOCD: CPT | Mod: CPTII,S$GLB,, | Performed by: INTERNAL MEDICINE

## 2019-04-26 PROCEDURE — 3075F PR MOST RECENT SYSTOLIC BLOOD PRESS GE 130-139MM HG: ICD-10-PCS | Mod: CPTII,S$GLB,, | Performed by: INTERNAL MEDICINE

## 2019-04-26 NOTE — PROGRESS NOTES
CC: Lymphoma, follow up visit      HPI:  is a 48 y.o. male here for followup visit. He has hypertension, obstructive sleep apnea. He was hospitalized around Christmas 2018 for small bowel mass. Patient reports that he began having lower abdominal/pelvic pain toward the end of October 2018. This prompted a urologic evaluation and subsequent treatment for a presumed UTI. He states that his pain continued into November and evolved into more upper abdominal/epigastric pain that was worse with eating. He was subsequently seen by his PCP who ultimately ordered a CT abdomen/pelvis that revealed an abnormal thickening of an 8 cm segment of small bowel. He denied fever, chills, night sweats, or anorexia at that time. He had ~10 lb weight loss over the 1-2 months prior to his hospitalization in Dec 2018, but he was also actively taking part in a weight loss program.  On 12/21/18, he underwent Segmental small bowel resection.The mass was approximately 6 cm in length and was circumferential.  There was dilation of the small bowel above it suggesting a partial bowel obstruction.  There was no adenopathy in the adjacent   mesentery.  The tumor was clearly extending to the serosal surface of the bowel.Careful search was made for peritoneal implants and there were no lesions noted on the peritoneum or omentum.  The liver could not be inspected through the incision,   but it was palpated and there were no focal lesions within it. The remainder of the small bowel was run from the ligament of Treitz to the ileocecal valves and no other small bowel lesions were noted.    He had PET CT,. 2D ECHO, bone marrow biopsy. PET CT did not reveal any evidence of lymphoma. 2D ECHO was normal. Bone marrow was negative for involvement by lymphoma.       Interval History: He is here for a follow up visit.He received C1 EPOCH -R 2/1-2/5/19 without complications. C2 day 1 was on 2/25/19. C3 day 1 was on 3/18, C4 day 1 on 4/8.  No PET avid  disease noted after C3 except for a hypermetabolic focus in colon.       Review of Systems   Constitutional: Negative for chills, fever and weight loss.   HENT: Negative for ear pain, hearing loss, nosebleeds and tinnitus.    Eyes: Negative for blurred vision, double vision and pain.   Respiratory: Positive for shortness of breath. Negative for cough.    Cardiovascular: Negative for chest pain, orthopnea and claudication.   Gastrointestinal: Negative for abdominal pain, diarrhea, heartburn and nausea.   Genitourinary: Negative for frequency.   Musculoskeletal: Negative for back pain.   Skin: Negative for rash.   Neurological: Negative for tremors, sensory change and headaches.   Endo/Heme/Allergies: Does not bruise/bleed easily.   Psychiatric/Behavioral: Negative for depression and suicidal ideas. The patient is not nervous/anxious.        Current Outpatient Medications   Medication Sig    acyclovir (ZOVIRAX) 400 MG tablet Take 1 tablet (400 mg total) by mouth 2 (two) times daily.    allopurinol (ZYLOPRIM) 300 MG tablet Take 1 tablet (300 mg total) by mouth once daily.    ALPRAZolam (XANAX) 0.25 MG tablet Take 1 tablet (0.25 mg total) by mouth 2 (two) times daily as needed for Insomnia or Anxiety.    atenolol (TENORMIN) 50 MG tablet Take 1 tablet (50 mg total) by mouth once daily.    famotidine (PEPCID) 20 MG tablet Take 1 tablet (20 mg total) by mouth 2 (two) times daily.    fexofenadine (ALLEGRA) 60 MG tablet Take 60 mg by mouth once daily.    fluconazole (DIFLUCAN) 200 MG Tab Take 2 tablets (400 mg total) by mouth once daily.    fluticasone (FLONASE) 50 mcg/actuation nasal spray 1 spray by Each Nare route once daily.    ketoconazole (NIZORAL) 2 % cream     levoFLOXacin (LEVAQUIN) 500 MG tablet Take 1 tablet (500 mg total) by mouth once daily.    loratadine (CLARITIN) 10 mg tablet Take 10 mg by mouth once daily.    losartan-hydrochlorothiazide 100-12.5 mg (HYZAAR) 100-12.5 mg Tab Take 1 tablet by mouth  once daily.    nystatin (MYCOSTATIN) powder Apply topically 2 (two) times daily.    predniSONE (DELTASONE) 10 MG tablet Take one tablet by mouth 2 times a day. On days 1-5 of chemotherapy cycles only. To be combined with 50mg tabs for a total of 160mg    predniSONE (DELTASONE) 50 MG Tab Take 3 tablets (150 mg total) by mouth 2 (two) times daily. On days 1-5 of chemotherapy cycles. To be combined with 10mg tabs for a total of 160mg     No current facility-administered medications for this visit.      Facility-Administered Medications Ordered in Other Visits   Medication    pegfilgrastim injection 6 mg        Vitals:    04/26/19 1443   BP: 131/89   Pulse: (!) 117   Resp: 20       Physical Exam   Constitutional: He is oriented to person, place, and time. He appears well-developed and well-nourished.   HENT:   Head: Normocephalic.   Mouth/Throat: No oropharyngeal exudate.   Eyes: Pupils are equal, round, and reactive to light. No scleral icterus.   Neck: Normal range of motion.   Cardiovascular: Normal rate and regular rhythm.   No murmur heard.  Pulmonary/Chest: Effort normal and breath sounds normal. No respiratory distress. He has no wheezes. He has no rales.   Abdominal: Soft. Bowel sounds are normal. He exhibits no distension. There is no tenderness.   Musculoskeletal: He exhibits no edema.   Lymphadenopathy:     He has no cervical adenopathy.   Neurological: He is alert and oriented to person, place, and time. No cranial nerve deficit.   Skin: Skin is warm.   Psychiatric: He has a normal mood and affect.        12/21/18 flow cytometry of small bowel mass     Flow cytometric analysis of tissue detects a kappa restricted B lymphocyte population that expresses CD19, CD20, and CD10.  CD5 is negative.  The differential diagnosis includes diffuse large B cell lymphoma, follicular lymphoma, and Burkitt's lymphoma.     Flow differential:  Lymphocytes 88.2%, Monocytes 6.0%, Granulocytes  2.3%, Blast  2.6%, Debris/nRBC  "0.5%,  Viability 90.7%.  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  Comment: Concurrent flow cytometric analysis confirms a Kappa restricted clonal B-cell population that expresses CD19, CD20, and CD10 without coexpression of CD5. The histologic sections demonstrate a submucosal mass comprised of a diffuse population of large CD20 positive B cells compatible with immunoblast intermixed with moderate numbers of CD5/CD3 positive T cells. The large lymphocytes are BCL6 and CD10 (weak) positive and BCL6, MUM1, and BCL-2 are negative. CD23 shows no follicular dendritic network. The Ki-67 proliferation index is high (>60%). In situ hybridization for David bar viral RNA is negative. The overall findings are compatible with diffuse large B-cell lymphoma, germinal center type. Molecular studies are pending to exclude the possibility of a  high-grade B-cell lymphoma with myc and BCL-2/ BCL6 rearrangements and will be reported. All histologic chemical stains have satisfactory positive and negative controls.     "B-cell lymphoma, FISH, tissue:  Interpretation: Positive. The result is abnormal and indicates MYC/IGH fusion and 27% of nuclei. No rearrangement of BCL-2 or BCL6 was observed.  No MYC rearrangement was observed, therefore this is unlikely to be "high-grade B-cell lymphoma, with MYC and  BCL2 and/or BCL6 translocations" (previously known as "genetic double-hit lymphoma", currently reclassified per  the 2016 World Health Organization Classification of Lymphoid Neoplasms).        1/11/19 bone marrow biopsy       FINAL PATHOLOGIC DIAGNOSIS  BONE MARROW, LEFT ILIAC CREST (ASPIRATE SMEAR, TOUCH PREPARATION, CLOT SECTION, AND CORE BIOPSY):  -- " NORMOCELLULAR MARROW (60%) WITH TRILINEAGE HEMATOPOIESIS.  -- NO EVIDENCE OF MARROW INVOLVEMENT BY B-CELL NON-HODGKIN LYMPHOMA.  -- MARKEDLY DECREASED STORAGE IRON.     Recent CBC data (1/7/2019) showed normocytic anemia and thrombocytosis.  Flow cytometric analysis of bone marrow shows populations of polyclonal B lymphocytes and T lymphocytes that are immunophenotypically unremarkable. The blast gate is not increased.  Immunohistochemical stains are performed on the biopsy core and clot section for greater sensitivity and further architectural assessment with adequate controls. The interstitial lymphocytes and small lymphoid aggregates are  composed of mixed CD3-positive T cells and CD20-positive B cells. Correlation with other laboratory results is recommended.           1/15/19 PET CT             COMPARISON:  CT abdomen and pelvis 12/07/2018.    FINDINGS:  Quality of the study: Adequate.    Mildly increased FDG uptake is noted along the midline anterior abdominal wall, likely related to prior celiotomy incision, SUV max 5.60.  Small, non lesion like focus of tracer avidity is seen involving the descending colon, without any corresponding CT abnormality, SUV max 6.48..  This likely represents normal colonic peristalsis or a small area of inflammation.  Diffusely increased marrow uptake likely represents red marrow reconversion. No suspicious abnormally increased uptake is seen to suggest residual/recurrent disease.    Physiologic uptake of the tracer is present within the brain, salivary glands, myocardium, GI and  tracts.    Incidental CT findings: Mild mosaic type ground-glass density in the lungs.  Splenomegaly, with the spleen measuring 12.7 cm in AP dimension.  Nonobstructive right renal stone measuring 0.7 cm.  Postoperative changes from small bowel resection.  Scattered colonic diverticula.  Circumferential urinary bladder wall thickening, favored to relate to underdistention.  Mild degenerative changes  in the spine.  Stable, mild anterior wedge compression deformity of T12.       Impression         No convincing evidence of residual or recurrent disease.  Midline abdominal wall FDG avidity coincides with prior celiotomy incision               1/11/19 2D ECHO     · Normal left ventricular systolic function. The estimated ejection fraction is 63%  · Normal LV diastolic function.  · Moderate left atrial enlargement.  · Normal right ventricular systolic function.  · Mild right atrial enlargement.  · Normal central venous pressure (3 mm Hg).        2/7/19 Cerebrospinal Fluid     FINAL PATHOLOGIC DIAGNOSIS     Negative for malignant cells        3/1/19 CSF cytology FINAL PATHOLOGIC DIAGNOSIS     Cerebrospinal fluid (Cytology):Negative for malignant cells       4/5/19 PET CT       COMPARISON:  Prior FDG PET-CT scan 01/15/2019    FINDINGS:  Quality of the study: Adequate.    There is more prominent non physiologic focal activity within the descending colon with a maximum SUV of 11.4, increased from 6.5 previously.  There are no hypermetabolic foci within the small bowel to suggest relapse.  There are no pathologically enlarged or hypermetabolic lymph nodes.  The spleen is normal in uptake and size at 11.2 cm in craniocaudal dimension.    There is diffusely enhanced uptake in the axial and appendicular skeleton.    Physiologic uptake of the tracer is present within the brain, salivary glands, myocardium, GI and  tracts.  There is persistent postsurgical uptake within the anterior abdominal wall.    Incidental CT findings: N/A    Internal reference SUVs are 2.2 and 4.9 for the mediastinal blood pool and normal liver background, respectively.      Impression       1. No definite evidence of residual/relapsed extranodal diffuse large B-cell lymphoma.    2.  Focal non physiologic activity within the descending colon without discrete CT correlate that is more conspicuous on the current PET exam.  This may represent a polyp.   Recommend direct visualization.    3.  Bone marrow hyperplasia, presumably related to pegfilgrastim administration.    The Deauville Score is X, presuming the colonic hypermetabolic focus is unrelated to DLBCL.  Otherwise, the score would be 5.     Component      Latest Ref Rng & Units 4/25/2019   WBC      3.90 - 12.70 K/uL 10.79   RBC      4.60 - 6.20 M/uL 3.15 (L)   Hemoglobin      14.0 - 18.0 g/dL 9.0 (L)   Hematocrit      40.0 - 54.0 % 28.2 (L)   MCV      82 - 98 fL 90   MCH      27.0 - 31.0 pg 28.6   MCHC      32.0 - 36.0 g/dL 31.9 (L)   RDW      11.5 - 14.5 % 25.1 (H)   Platelets      150 - 350 K/uL 204   MPV      9.2 - 12.9 fL 9.4   Gran # (ANC)      1.8 - 7.7 K/uL CANCELED   Lymph #      1.0 - 4.8 K/uL CANCELED   Mono #      0.3 - 1.0 K/uL CANCELED   Eos #      0.0 - 0.5 K/uL CANCELED   Baso #      0.00 - 0.20 K/uL CANCELED   nRBC      0 /100 WBC 1@L=100 (A)   Gran%      38.0 - 73.0 % 81.0 (H)   Lymph%      18.0 - 48.0 % 4.0 (L)   Mono%      4.0 - 15.0 % 10.0   Eosinophil%      0.0 - 8.0 % 0.0   Basophil%      0.0 - 1.9 % 0.0   BANDS      % 5.0   Platelet Estimate       Appears normal   Aniso       Moderate   Poik       Slight   Poly       Moderate   Hypo       Occasional   Ovalocytes       Occasional   Tear Drop Cells       Occasional   Differential Method       Manual   Sodium      136 - 145 mmol/L 141   Potassium      3.5 - 5.1 mmol/L 4.1   Chloride      95 - 110 mmol/L 104   CO2      23 - 29 mmol/L 26   Glucose      70 - 110 mg/dL 149 (H)   BUN, Bld      6 - 20 mg/dL 11   Creatinine      0.5 - 1.4 mg/dL 0.8   Calcium      8.7 - 10.5 mg/dL 9.7   PROTEIN TOTAL      6.0 - 8.4 g/dL 6.7   Albumin      3.5 - 5.2 g/dL 3.6   BILIRUBIN TOTAL      0.1 - 1.0 mg/dL 0.3   Alkaline Phosphatase      55 - 135 U/L 77   AST      10 - 40 U/L 22   ALT      10 - 44 U/L 30   Anion Gap      8 - 16 mmol/L 11   eGFR if African American      >60 mL/min/1.73 m:2 >60   eGFR if non African American      >60 mL/min/1.73 m:2 >60    LD      110 - 260 U/L 392 (H)   Uric Acid      3.4 - 7.0 mg/dL 5.6       4/5/19 PET CT    FINDINGS:  Quality of the study: Adequate.    There is more prominent non physiologic focal activity within the descending colon with a maximum SUV of 11.4, increased from 6.5 previously.  There are no hypermetabolic foci within the small bowel to suggest relapse.  There are no pathologically enlarged or hypermetabolic lymph nodes.  The spleen is normal in uptake and size at 11.2 cm in craniocaudal dimension.    There is diffusely enhanced uptake in the axial and appendicular skeleton.    Physiologic uptake of the tracer is present within the brain, salivary glands, myocardium, GI and  tracts.  There is persistent postsurgical uptake within the anterior abdominal wall.    Incidental CT findings: N/A    Internal reference SUVs are 2.2 and 4.9 for the mediastinal blood pool and normal liver background, respectively.      Impression       1. No definite evidence of residual/relapsed extranodal diffuse large B-cell lymphoma.    2.  Focal non physiologic activity within the descending colon without discrete CT correlate that is more conspicuous on the current PET exam.  This may represent a polyp.  Recommend direct visualization.    3.  Bone marrow hyperplasia, presumably related to pegfilgrastim administration.    The Deauville Score is X, presuming the colonic hypermetabolic focus is unrelated to DLBCL.  Otherwise, the score would be 5.       Assessment:     1. DLBCL of jejunum, GC sub type, high grade  2. Hypertension  3. Obstructive sleep apnea  4. Morbid obesity  5. Anemia, hypochromic        Plan:     1. 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 IPI score 0. It does not fulfil the criteria for Burkitts( ki 67 high, but not high enough), double or triple hit ( no bcl2 or bcl6 translocation). No PET avid measurable disease. No lymphoma in bone marrow. I discussed the treatment of  stage I GI high grade lymphoma. I explained that although there is no active/measurable disease, chemotherapy with RCHOP or dose adjusted R-EPOCH is recommended, as he had bulky (8cm), high grade ( ki 67 > 60% ) lymphoma with MYC/IgH fusion. He will also need diagnostic LP. He went to [facility] for 2nd opinion.   He has received cycle 1 and 2 DA  EPOCH -R with IT MTX without any complication. CSF cytology negative for malignant cells on 2/7/19 and on 3/1/19.   ANC, platelet alvaro after C1 reviewed. Doses of Etoposide/Doxorubicin/Cytoxan were escalated by 1 level for C2. Doses for cycle 3 were the same as C2. C4 doses were escalated.   PET CT after 3 cycles showed no definite evidence of residual/relapsed extranodal diffuse large B-cell lymphoma. There was focal non physiologic activity within the descending colon without discrete CT correlate.  He will have colonoscopy. The risks versus benefits of contin using upto 6 cycles were discussed today . Risks of 2nd malignancies, particularly MDS/t-aml, verus further benefit for his lymphoma discussed in detail.Colonoscopy will, be done, and if negative for lymphoma, he will be watched closely.        2.On Atenolol, Losartan HCTZ      5. Mild, asymptomatic.                  Answers for HPI/ROS submitted by the patient on 4/23/2019   appetite change : No  unexpected weight change: No  visual disturbance: No  adenopathy: No

## 2019-04-28 ENCOUNTER — PATIENT MESSAGE (OUTPATIENT)
Dept: HEMATOLOGY/ONCOLOGY | Facility: CLINIC | Age: 49
End: 2019-04-28

## 2019-04-29 ENCOUNTER — TELEPHONE (OUTPATIENT)
Dept: HEMATOLOGY/ONCOLOGY | Facility: CLINIC | Age: 49
End: 2019-04-29

## 2019-04-29 NOTE — TELEPHONE ENCOUNTER
Spoke to father and he was wondering why his son only is getting 4 cycles of REPOCH and does this follow the [facility] guide Lines.  Patient will have appointment with Dr. Hwang in the AM.

## 2019-04-30 ENCOUNTER — PATIENT MESSAGE (OUTPATIENT)
Dept: HEMATOLOGY/ONCOLOGY | Facility: CLINIC | Age: 49
End: 2019-04-30

## 2019-04-30 ENCOUNTER — OFFICE VISIT (OUTPATIENT)
Dept: HEMATOLOGY/ONCOLOGY | Facility: CLINIC | Age: 49
End: 2019-04-30
Payer: COMMERCIAL

## 2019-04-30 VITALS
HEART RATE: 95 BPM | WEIGHT: 305.75 LBS | RESPIRATION RATE: 20 BRPM | OXYGEN SATURATION: 99 % | TEMPERATURE: 99 F | DIASTOLIC BLOOD PRESSURE: 80 MMHG | HEIGHT: 67 IN | BODY MASS INDEX: 47.99 KG/M2 | SYSTOLIC BLOOD PRESSURE: 147 MMHG

## 2019-04-30 DIAGNOSIS — I10 ESSENTIAL HYPERTENSION: ICD-10-CM

## 2019-04-30 DIAGNOSIS — C83.39 DIFFUSE LARGE B-CELL LYMPHOMA OF SOLID ORGAN EXCLUDING SPLEEN: ICD-10-CM

## 2019-04-30 DIAGNOSIS — E66.01 MORBID OBESITY: Primary | ICD-10-CM

## 2019-04-30 PROCEDURE — 3008F PR BODY MASS INDEX (BMI) DOCUMENTED: ICD-10-PCS | Mod: CPTII,S$GLB,, | Performed by: INTERNAL MEDICINE

## 2019-04-30 PROCEDURE — 3077F SYST BP >= 140 MM HG: CPT | Mod: CPTII,S$GLB,, | Performed by: INTERNAL MEDICINE

## 2019-04-30 PROCEDURE — 3077F PR MOST RECENT SYSTOLIC BLOOD PRESSURE >= 140 MM HG: ICD-10-PCS | Mod: CPTII,S$GLB,, | Performed by: INTERNAL MEDICINE

## 2019-04-30 PROCEDURE — 99999 PR PBB SHADOW E&M-EST. PATIENT-LVL IV: ICD-10-PCS | Mod: PBBFAC,,, | Performed by: INTERNAL MEDICINE

## 2019-04-30 PROCEDURE — 99214 PR OFFICE/OUTPT VISIT, EST, LEVL IV, 30-39 MIN: ICD-10-PCS | Mod: S$GLB,,, | Performed by: INTERNAL MEDICINE

## 2019-04-30 PROCEDURE — 3008F BODY MASS INDEX DOCD: CPT | Mod: CPTII,S$GLB,, | Performed by: INTERNAL MEDICINE

## 2019-04-30 PROCEDURE — 3079F PR MOST RECENT DIASTOLIC BLOOD PRESSURE 80-89 MM HG: ICD-10-PCS | Mod: CPTII,S$GLB,, | Performed by: INTERNAL MEDICINE

## 2019-04-30 PROCEDURE — 3079F DIAST BP 80-89 MM HG: CPT | Mod: CPTII,S$GLB,, | Performed by: INTERNAL MEDICINE

## 2019-04-30 PROCEDURE — 99999 PR PBB SHADOW E&M-EST. PATIENT-LVL IV: CPT | Mod: PBBFAC,,, | Performed by: INTERNAL MEDICINE

## 2019-04-30 PROCEDURE — 99214 OFFICE O/P EST MOD 30 MIN: CPT | Mod: S$GLB,,, | Performed by: INTERNAL MEDICINE

## 2019-04-30 NOTE — PROGRESS NOTES
CC: Lymphoma, follow up visit      HPI:  is a 48 y.o. male here for followup visit. He has hypertension, obstructive sleep apnea. He was hospitalized around Christmas 2018 for small bowel mass. Patient reports that he began having lower abdominal/pelvic pain toward the end of October 2018. This prompted a urologic evaluation and subsequent treatment for a presumed UTI. He states that his pain continued into November and evolved into more upper abdominal/epigastric pain that was worse with eating. He was subsequently seen by his PCP who ultimately ordered a CT abdomen/pelvis that revealed an abnormal thickening of an 8 cm segment of small bowel. He denied fever, chills, night sweats, or anorexia at that time. He had ~10 lb weight loss over the 1-2 months prior to his hospitalization in Dec 2018, but he was also actively taking part in a weight loss program.  On 12/21/18, he underwent Segmental small bowel resection.The mass was approximately 6 cm in length and was circumferential.  There was dilation of the small bowel above it suggesting a partial bowel obstruction.  There was no adenopathy in the adjacent   mesentery.  The tumor was clearly extending to the serosal surface of the bowel.Careful search was made for peritoneal implants and there were no lesions noted on the peritoneum or omentum.  The liver could not be inspected through the incision,   but it was palpated and there were no focal lesions within it. The remainder of the small bowel was run from the ligament of Treitz to the ileocecal valves and no other small bowel lesions were noted.    He had PET CT,. 2D ECHO, bone marrow biopsy. PET CT did not reveal any evidence of lymphoma. 2D ECHO was normal. Bone marrow was negative for involvement by lymphoma.       Interval History: He is here for a follow up visit.He received C1 EPOCH -R 2/1-2/5/19 without complications. C2 day 1 was on 2/25/19. C3 day 1 was on 3/18, C4 day 1 on 4/8.  No PET avid  disease noted after C3 except for a hypermetabolic focus in colon.     Review of Systems   Constitutional: Positive for malaise/fatigue. Negative for chills, fever and weight loss.   HENT: Negative for congestion, ear discharge, nosebleeds and sinus pain.    Eyes: Negative for blurred vision, double vision, photophobia and pain.   Respiratory: Negative for cough and shortness of breath.    Cardiovascular: Negative for chest pain, orthopnea and claudication.   Gastrointestinal: Negative for abdominal pain, diarrhea, heartburn and nausea.   Genitourinary: Negative for frequency.   Musculoskeletal: Negative for back pain.   Skin: Negative for rash.   Neurological: Negative for sensory change, focal weakness and headaches.   Psychiatric/Behavioral: The patient is nervous/anxious.        Current Outpatient Medications   Medication Sig    acyclovir (ZOVIRAX) 400 MG tablet Take 1 tablet (400 mg total) by mouth 2 (two) times daily.    allopurinol (ZYLOPRIM) 300 MG tablet Take 1 tablet (300 mg total) by mouth once daily.    atenolol (TENORMIN) 50 MG tablet Take 1 tablet (50 mg total) by mouth once daily.    famotidine (PEPCID) 20 MG tablet Take 1 tablet (20 mg total) by mouth 2 (two) times daily.    fexofenadine (ALLEGRA) 60 MG tablet Take 60 mg by mouth once daily.    fluconazole (DIFLUCAN) 200 MG Tab Take 2 tablets (400 mg total) by mouth once daily.    fluticasone (FLONASE) 50 mcg/actuation nasal spray 1 spray by Each Nare route once daily.    ketoconazole (NIZORAL) 2 % cream     loratadine (CLARITIN) 10 mg tablet Take 10 mg by mouth once daily.    losartan-hydrochlorothiazide 100-12.5 mg (HYZAAR) 100-12.5 mg Tab Take 1 tablet by mouth once daily.    nystatin (MYCOSTATIN) powder Apply topically 2 (two) times daily.    ALPRAZolam (XANAX) 0.25 MG tablet Take 1 tablet (0.25 mg total) by mouth 2 (two) times daily as needed for Insomnia or Anxiety.    levoFLOXacin (LEVAQUIN) 500 MG tablet Take 1 tablet (500 mg  total) by mouth once daily.    predniSONE (DELTASONE) 10 MG tablet Take one tablet by mouth 2 times a day. On days 1-5 of chemotherapy cycles only. To be combined with 50mg tabs for a total of 160mg    predniSONE (DELTASONE) 50 MG Tab Take 3 tablets (150 mg total) by mouth 2 (two) times daily. On days 1-5 of chemotherapy cycles. To be combined with 10mg tabs for a total of 160mg     No current facility-administered medications for this visit.      Facility-Administered Medications Ordered in Other Visits   Medication    pegfilgrastim injection 6 mg          1/11/19 2D ECHO     · Normal left ventricular systolic function. The estimated ejection fraction is 63%  · Normal LV diastolic function.  · Moderate left atrial enlargement.  · Normal right ventricular systolic function.  · Mild right atrial enlargement.  · Normal central venous pressure (3 mm Hg).        2/7/19 Cerebrospinal Fluid     FINAL PATHOLOGIC DIAGNOSIS     Negative for malignant cells        3/1/19 CSF cytology FINAL PATHOLOGIC DIAGNOSIS     Cerebrospinal fluid (Cytology):Negative for malignant cells       4/5/19 PET CT             COMPARISON:  Prior FDG PET-CT scan 01/15/2019    FINDINGS:  Quality of the study: Adequate.    There is more prominent non physiologic focal activity within the descending colon with a maximum SUV of 11.4, increased from 6.5 previously.  There are no hypermetabolic foci within the small bowel to suggest relapse.  There are no pathologically enlarged or hypermetabolic lymph nodes.  The spleen is normal in uptake and size at 11.2 cm in craniocaudal dimension.    There is diffusely enhanced uptake in the axial and appendicular skeleton.    Physiologic uptake of the tracer is present within the brain, salivary glands, myocardium, GI and  tracts.  There is persistent postsurgical uptake within the anterior abdominal wall.    Incidental CT findings: N/A    Internal reference SUVs are 2.2 and 4.9 for the mediastinal blood pool  and normal liver background, respectively.       Impression         1. No definite evidence of residual/relapsed extranodal diffuse large B-cell lymphoma.    2.  Focal non physiologic activity within the descending colon without discrete CT correlate that is more conspicuous on the current PET exam.  This may represent a polyp.  Recommend direct visualization.    3.  Bone marrow hyperplasia, presumably related to pegfilgrastim administration.    The Deauville Score is X, presuming the colonic hypermetabolic focus is unrelated to DLBCL.  Otherwise, the score would be 5.       4/5/19 PET CT          FINDINGS:  Quality of the study: Adequate.    There is more prominent non physiologic focal activity within the descending colon with a maximum SUV of 11.4, increased from 6.5 previously.  There are no hypermetabolic foci within the small bowel to suggest relapse.  There are no pathologically enlarged or hypermetabolic lymph nodes.  The spleen is normal in uptake and size at 11.2 cm in craniocaudal dimension.    There is diffusely enhanced uptake in the axial and appendicular skeleton.    Physiologic uptake of the tracer is present within the brain, salivary glands, myocardium, GI and  tracts.  There is persistent postsurgical uptake within the anterior abdominal wall.    Incidental CT findings: N/A    Internal reference SUVs are 2.2 and 4.9 for the mediastinal blood pool and normal liver background, respectively.       Impression         1. No definite evidence of residual/relapsed extranodal diffuse large B-cell lymphoma.    2.  Focal non physiologic activity within the descending colon without discrete CT correlate that is more conspicuous on the current PET exam.  This may represent a polyp.  Recommend direct visualization.    3.  Bone marrow hyperplasia, presumably related to pegfilgrastim administration.    The Deauville Score is X, presuming the colonic hypermetabolic focus is unrelated to DLBCL.  Otherwise, the  score would be 5.           Vitals:    04/30/19 0805   BP: (!) 147/80   Pulse: 95   Resp: 20   Temp: 98.6 °F (37 °C)     Physical Exam   Constitutional: He is oriented to person, place, and time. He appears well-developed.   HENT:   Head: Normocephalic and atraumatic.   Mouth/Throat: No oropharyngeal exudate.   Eyes: No scleral icterus.   Neck: Normal range of motion.   Cardiovascular: Regular rhythm.   No murmur heard.  Pulmonary/Chest: No respiratory distress. He has no wheezes. He has no rales.   Abdominal: Bowel sounds are normal. He exhibits no distension. There is no tenderness.   Musculoskeletal: He exhibits no edema.   Neurological: He is alert and oriented to person, place, and time.   Psychiatric: He has a normal mood and affect.     Assessment:     1. DLBCL of jejunum, GC sub type, high grade  2. Hypertension  3. Obstructive sleep apnea  4. Morbid obesity  5. Anemia, hypochromic        Plan:     1. 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 IPI score 0. It does not fulfil the criteria for Burkitts( ki 67 high, but not high enough), double or triple hit ( no bcl2 or bcl6 translocation). No PET avid measurable disease. No lymphoma in bone marrow. I discussed the treatment of stage I GI high grade lymphoma. I explained that although there is no active/measurable disease, chemotherapy with RCHOP or dose adjusted R-EPOCH is recommended, as he had bulky (8cm), high grade ( ki 67 > 60% ) lymphoma with MYC/IgH fusion. He will also need diagnostic LP. He went to [facility] for 2nd opinion.   He has received cycle 1 and 2 DA  EPOCH -R with IT MTX without any complication. CSF cytology negative for malignant cells on 2/7/19 and on 3/1/19.   ANC, platelet alvaro after C1 reviewed. Doses of Etoposide/Doxorubicin/Cytoxan were escalated by 1 level for C2. Doses for cycle 3 were the same as C2. C4 doses were escalated.   PET CT after 3 cycles showed no definite evidence of  residual/relapsed extranodal diffuse large B-cell lymphoma. There was focal non physiologic activity within the descending colon without discrete CT correlate.  He will have colonoscopy. The risks versus benefits of contin using upto 6 cycles were discussed today .He wants to proceed with all 6 cycles, although the data for 4 versus 6 cycles for stage I E lymphoma is limited, even with c-myc translocation.     2.On Atenolol, Losartan HCTZ      5. Mild, asymptomatic.                        Answers for HPI/ROS submitted by the patient on 4/29/2019   appetite change : No  unexpected weight change: No  visual disturbance: No  adenopathy: No    Distress Screening Results: Psychosocial Distress screening score of Distress Score: 2 noted and reviewed. No intervention indicated.

## 2019-04-30 NOTE — Clinical Note
--cbc, cmp on 5/3--r epoch starting 5/6 thru 5/10, neulasta--cbc, cmp 5/13.5/16,5/20, cbc, cmp, ldh, uric acid 5/23--f/u 5/24

## 2019-05-03 ENCOUNTER — LAB VISIT (OUTPATIENT)
Dept: LAB | Facility: HOSPITAL | Age: 49
End: 2019-05-03
Attending: INTERNAL MEDICINE
Payer: COMMERCIAL

## 2019-05-03 ENCOUNTER — PATIENT MESSAGE (OUTPATIENT)
Dept: HEMATOLOGY/ONCOLOGY | Facility: CLINIC | Age: 49
End: 2019-05-03

## 2019-05-03 DIAGNOSIS — C83.39 DIFFUSE LARGE B-CELL LYMPHOMA OF SOLID ORGAN EXCLUDING SPLEEN: ICD-10-CM

## 2019-05-03 LAB
ALBUMIN SERPL BCP-MCNC: 3.7 G/DL (ref 3.5–5.2)
ALP SERPL-CCNC: 61 U/L (ref 55–135)
ALT SERPL W/O P-5'-P-CCNC: 27 U/L (ref 10–44)
ANION GAP SERPL CALC-SCNC: 10 MMOL/L (ref 8–16)
AST SERPL-CCNC: 22 U/L (ref 10–40)
BASOPHILS # BLD AUTO: 0.09 K/UL (ref 0–0.2)
BASOPHILS NFR BLD: 1 % (ref 0–1.9)
BILIRUB SERPL-MCNC: 0.3 MG/DL (ref 0.1–1)
BUN SERPL-MCNC: 14 MG/DL (ref 6–20)
CALCIUM SERPL-MCNC: 9.7 MG/DL (ref 8.7–10.5)
CHLORIDE SERPL-SCNC: 105 MMOL/L (ref 95–110)
CO2 SERPL-SCNC: 24 MMOL/L (ref 23–29)
CREAT SERPL-MCNC: 0.8 MG/DL (ref 0.5–1.4)
DIFFERENTIAL METHOD: ABNORMAL
EOSINOPHIL # BLD AUTO: 0 K/UL (ref 0–0.5)
EOSINOPHIL NFR BLD: 0.1 % (ref 0–8)
ERYTHROCYTE [DISTWIDTH] IN BLOOD BY AUTOMATED COUNT: 25.9 % (ref 11.5–14.5)
EST. GFR  (AFRICAN AMERICAN): >60 ML/MIN/1.73 M^2
EST. GFR  (NON AFRICAN AMERICAN): >60 ML/MIN/1.73 M^2
GLUCOSE SERPL-MCNC: 114 MG/DL (ref 70–110)
HCT VFR BLD AUTO: 31.5 % (ref 40–54)
HGB BLD-MCNC: 9.5 G/DL (ref 14–18)
IMM GRANULOCYTES # BLD AUTO: 0.06 K/UL (ref 0–0.04)
IMM GRANULOCYTES NFR BLD AUTO: 0.7 % (ref 0–0.5)
LYMPHOCYTES # BLD AUTO: 0.6 K/UL (ref 1–4.8)
LYMPHOCYTES NFR BLD: 6.9 % (ref 18–48)
MCH RBC QN AUTO: 29.9 PG (ref 27–31)
MCHC RBC AUTO-ENTMCNC: 30.2 G/DL (ref 32–36)
MCV RBC AUTO: 99 FL (ref 82–98)
MONOCYTES # BLD AUTO: 1.6 K/UL (ref 0.3–1)
MONOCYTES NFR BLD: 17.6 % (ref 4–15)
NEUTROPHILS # BLD AUTO: 6.5 K/UL (ref 1.8–7.7)
NEUTROPHILS NFR BLD: 73.7 % (ref 38–73)
NRBC BLD-RTO: 0 /100 WBC
PLATELET # BLD AUTO: 295 K/UL (ref 150–350)
PMV BLD AUTO: 9.8 FL (ref 9.2–12.9)
POTASSIUM SERPL-SCNC: 4.2 MMOL/L (ref 3.5–5.1)
PROT SERPL-MCNC: 6.9 G/DL (ref 6–8.4)
RBC # BLD AUTO: 3.18 M/UL (ref 4.6–6.2)
SODIUM SERPL-SCNC: 139 MMOL/L (ref 136–145)
WBC # BLD AUTO: 8.8 K/UL (ref 3.9–12.7)

## 2019-05-03 PROCEDURE — 85025 COMPLETE CBC W/AUTO DIFF WBC: CPT

## 2019-05-03 PROCEDURE — 36415 COLL VENOUS BLD VENIPUNCTURE: CPT | Mod: PO

## 2019-05-03 PROCEDURE — 80053 COMPREHEN METABOLIC PANEL: CPT

## 2019-05-03 RX ORDER — FLUCONAZOLE 200 MG/1
400 TABLET ORAL DAILY
Qty: 60 TABLET | Refills: 0 | Status: SHIPPED | OUTPATIENT
Start: 2019-05-03 | End: 2019-06-02

## 2019-05-06 ENCOUNTER — INFUSION (OUTPATIENT)
Dept: INFUSION THERAPY | Facility: HOSPITAL | Age: 49
End: 2019-05-06
Payer: COMMERCIAL

## 2019-05-06 ENCOUNTER — PATIENT MESSAGE (OUTPATIENT)
Dept: HEMATOLOGY/ONCOLOGY | Facility: CLINIC | Age: 49
End: 2019-05-06

## 2019-05-06 VITALS
RESPIRATION RATE: 18 BRPM | HEART RATE: 93 BPM | SYSTOLIC BLOOD PRESSURE: 139 MMHG | TEMPERATURE: 98 F | DIASTOLIC BLOOD PRESSURE: 67 MMHG

## 2019-05-06 DIAGNOSIS — C83.39 DIFFUSE LARGE B-CELL LYMPHOMA OF SOLID ORGAN EXCLUDING SPLEEN: Primary | ICD-10-CM

## 2019-05-06 PROCEDURE — 96417 CHEMO IV INFUS EACH ADDL SEQ: CPT

## 2019-05-06 PROCEDURE — 96367 TX/PROPH/DG ADDL SEQ IV INF: CPT

## 2019-05-06 PROCEDURE — 96413 CHEMO IV INFUSION 1 HR: CPT

## 2019-05-06 PROCEDURE — 25000003 PHARM REV CODE 250: Performed by: INTERNAL MEDICINE

## 2019-05-06 PROCEDURE — 63600175 PHARM REV CODE 636 W HCPCS: Performed by: INTERNAL MEDICINE

## 2019-05-06 PROCEDURE — 96416 CHEMO PROLONG INFUSE W/PUMP: CPT

## 2019-05-06 PROCEDURE — 96375 TX/PRO/DX INJ NEW DRUG ADDON: CPT

## 2019-05-06 PROCEDURE — 96415 CHEMO IV INFUSION ADDL HR: CPT

## 2019-05-06 PROCEDURE — S0028 INJECTION, FAMOTIDINE, 20 MG: HCPCS | Performed by: INTERNAL MEDICINE

## 2019-05-06 RX ORDER — ACETAMINOPHEN 325 MG/1
650 TABLET ORAL
Status: CANCELLED | OUTPATIENT
Start: 2019-05-06

## 2019-05-06 RX ORDER — FAMOTIDINE 10 MG/ML
20 INJECTION INTRAVENOUS
Status: CANCELLED | OUTPATIENT
Start: 2019-05-06

## 2019-05-06 RX ORDER — FAMOTIDINE 10 MG/ML
20 INJECTION INTRAVENOUS
Status: COMPLETED | OUTPATIENT
Start: 2019-05-06 | End: 2019-05-06

## 2019-05-06 RX ORDER — HEPARIN 100 UNIT/ML
500 SYRINGE INTRAVENOUS
Status: CANCELLED | OUTPATIENT
Start: 2019-05-07

## 2019-05-06 RX ORDER — HEPARIN 100 UNIT/ML
500 SYRINGE INTRAVENOUS
Status: CANCELLED | OUTPATIENT
Start: 2019-05-10

## 2019-05-06 RX ORDER — SODIUM CHLORIDE 0.9 % (FLUSH) 0.9 %
10 SYRINGE (ML) INJECTION
Status: CANCELLED | OUTPATIENT
Start: 2019-05-08

## 2019-05-06 RX ORDER — HEPARIN 100 UNIT/ML
500 SYRINGE INTRAVENOUS
Status: CANCELLED | OUTPATIENT
Start: 2019-05-06

## 2019-05-06 RX ORDER — HEPARIN 100 UNIT/ML
500 SYRINGE INTRAVENOUS
Status: CANCELLED | OUTPATIENT
Start: 2019-05-09

## 2019-05-06 RX ORDER — SODIUM CHLORIDE 0.9 % (FLUSH) 0.9 %
10 SYRINGE (ML) INJECTION
Status: CANCELLED | OUTPATIENT
Start: 2019-05-06

## 2019-05-06 RX ORDER — ACETAMINOPHEN 325 MG/1
650 TABLET ORAL
Status: COMPLETED | OUTPATIENT
Start: 2019-05-06 | End: 2019-05-06

## 2019-05-06 RX ORDER — SODIUM CHLORIDE 0.9 % (FLUSH) 0.9 %
10 SYRINGE (ML) INJECTION
Status: CANCELLED | OUTPATIENT
Start: 2019-05-09

## 2019-05-06 RX ORDER — HEPARIN 100 UNIT/ML
500 SYRINGE INTRAVENOUS
Status: CANCELLED | OUTPATIENT
Start: 2019-05-08

## 2019-05-06 RX ORDER — MEPERIDINE HYDROCHLORIDE 50 MG/ML
25 INJECTION INTRAMUSCULAR; INTRAVENOUS; SUBCUTANEOUS
Status: DISCONTINUED | OUTPATIENT
Start: 2019-05-06 | End: 2019-05-06 | Stop reason: HOSPADM

## 2019-05-06 RX ORDER — MEPERIDINE HYDROCHLORIDE 50 MG/ML
25 INJECTION INTRAMUSCULAR; INTRAVENOUS; SUBCUTANEOUS
Status: CANCELLED | OUTPATIENT
Start: 2019-05-06

## 2019-05-06 RX ORDER — SODIUM CHLORIDE 0.9 % (FLUSH) 0.9 %
10 SYRINGE (ML) INJECTION
Status: CANCELLED | OUTPATIENT
Start: 2019-05-10

## 2019-05-06 RX ORDER — SODIUM CHLORIDE 0.9 % (FLUSH) 0.9 %
10 SYRINGE (ML) INJECTION
Status: CANCELLED | OUTPATIENT
Start: 2019-05-07

## 2019-05-06 RX ADMIN — DIPHENHYDRAMINE HYDROCHLORIDE 50 MG: 50 INJECTION, SOLUTION INTRAMUSCULAR; INTRAVENOUS at 08:05

## 2019-05-06 RX ADMIN — DEXAMETHASONE SODIUM PHOSPHATE: 4 INJECTION, SOLUTION INTRA-ARTICULAR; INTRALESIONAL; INTRAMUSCULAR; INTRAVENOUS; SOFT TISSUE at 01:05

## 2019-05-06 RX ADMIN — ACETAMINOPHEN 650 MG: 325 TABLET ORAL at 08:05

## 2019-05-06 RX ADMIN — FAMOTIDINE 20 MG: 10 INJECTION, SOLUTION INTRAVENOUS at 08:05

## 2019-05-06 RX ADMIN — VINCRISTINE SULFATE 32 MG: 1 INJECTION, SOLUTION INTRAVENOUS at 01:05

## 2019-05-06 RX ADMIN — RITUXIMAB 975 MG: 10 INJECTION, SOLUTION INTRAVENOUS at 09:05

## 2019-05-06 RX ADMIN — SODIUM CHLORIDE: 0.9 INJECTION, SOLUTION INTRAVENOUS at 08:05

## 2019-05-06 NOTE — PLAN OF CARE
Problem: Adult Inpatient Plan of Care  Goal: Plan of Care Review  Outcome: Ongoing (interventions implemented as appropriate)  1355- Portable pump infusing on discharge all clamps open and connections secured. No kinks in tubing noted. Settings double checked by ANJELICA Martinez RN. Educated patient to be sure pump should say run and volume infused number should increase daily as reservoir volume decreases. Instructed to call with any issues with pump. Phone numbers given. AVS given.  Instructed to call provider for any questions or concerns.

## 2019-05-07 ENCOUNTER — INFUSION (OUTPATIENT)
Dept: INFUSION THERAPY | Facility: HOSPITAL | Age: 49
End: 2019-05-07
Attending: INTERNAL MEDICINE
Payer: COMMERCIAL

## 2019-05-07 VITALS
DIASTOLIC BLOOD PRESSURE: 81 MMHG | RESPIRATION RATE: 18 BRPM | TEMPERATURE: 98 F | SYSTOLIC BLOOD PRESSURE: 144 MMHG | HEART RATE: 101 BPM

## 2019-05-07 DIAGNOSIS — C83.39 DIFFUSE LARGE B-CELL LYMPHOMA OF SOLID ORGAN EXCLUDING SPLEEN: Primary | ICD-10-CM

## 2019-05-07 PROCEDURE — 96521 REFILL/MAINT PORTABLE PUMP: CPT

## 2019-05-07 PROCEDURE — 96416 CHEMO PROLONG INFUSE W/PUMP: CPT

## 2019-05-07 PROCEDURE — 25000003 PHARM REV CODE 250: Performed by: INTERNAL MEDICINE

## 2019-05-07 PROCEDURE — 63600175 PHARM REV CODE 636 W HCPCS: Performed by: INTERNAL MEDICINE

## 2019-05-07 RX ORDER — SODIUM CHLORIDE 0.9 % (FLUSH) 0.9 %
10 SYRINGE (ML) INJECTION
Status: DISCONTINUED | OUTPATIENT
Start: 2019-05-07 | End: 2019-05-07 | Stop reason: HOSPADM

## 2019-05-07 RX ORDER — HEPARIN 100 UNIT/ML
500 SYRINGE INTRAVENOUS
Status: DISCONTINUED | OUTPATIENT
Start: 2019-05-07 | End: 2019-05-07 | Stop reason: HOSPADM

## 2019-05-07 RX ADMIN — VINCRISTINE SULFATE 20 ML: 1 INJECTION, SOLUTION INTRAVENOUS at 02:05

## 2019-05-07 NOTE — PLAN OF CARE
Problem: Adult Inpatient Plan of Care  Goal: Patient-Specific Goal (Individualization)  Outcome: Ongoing (interventions implemented as appropriate)  1350-Labs , hx, and medications reviewed, patient is here for epoch exchange, no new issues overnight reported. Assessment completed. Discussed plan of care with patient. Patient in agreement. Chair reclined and warm blanket and snack offered.

## 2019-05-08 ENCOUNTER — INFUSION (OUTPATIENT)
Dept: INFUSION THERAPY | Facility: HOSPITAL | Age: 49
End: 2019-05-08
Attending: INTERNAL MEDICINE
Payer: COMMERCIAL

## 2019-05-08 ENCOUNTER — TELEPHONE (OUTPATIENT)
Dept: HEMATOLOGY/ONCOLOGY | Facility: CLINIC | Age: 49
End: 2019-05-08

## 2019-05-08 ENCOUNTER — HOSPITAL ENCOUNTER (OUTPATIENT)
Dept: RADIOLOGY | Facility: HOSPITAL | Age: 49
Discharge: HOME | End: 2019-05-08
Attending: NURSE PRACTITIONER
Payer: COMMERCIAL

## 2019-05-08 VITALS
HEART RATE: 75 BPM | OXYGEN SATURATION: 98 % | RESPIRATION RATE: 20 BRPM | SYSTOLIC BLOOD PRESSURE: 134 MMHG | DIASTOLIC BLOOD PRESSURE: 68 MMHG | TEMPERATURE: 98 F

## 2019-05-08 DIAGNOSIS — C83.39 DIFFUSE LARGE B-CELL LYMPHOMA OF SOLID ORGAN EXCLUDING SPLEEN: Primary | ICD-10-CM

## 2019-05-08 LAB
CLARITY CSF: CLEAR
COLOR CSF: COLORLESS
LYMPHOCYTES NFR CSF MANUAL: 13 % (ref 40–80)
MONOS+MACROS NFR CSF MANUAL: 87 % (ref 15–45)
RBC # CSF: 0 /CU MM
SPECIMEN VOL CSF: 3.5 ML
WBC # CSF: 4 /CU MM (ref 0–5)

## 2019-05-08 PROCEDURE — 96450 CHEMOTHERAPY INTO CNS: CPT | Mod: ,,, | Performed by: RADIOLOGY

## 2019-05-08 PROCEDURE — 96450 CHEMOTHERAPY INTO CNS: CPT

## 2019-05-08 PROCEDURE — A4216 STERILE WATER/SALINE, 10 ML: HCPCS | Performed by: INTERNAL MEDICINE

## 2019-05-08 PROCEDURE — 25000003 PHARM REV CODE 250: Performed by: NURSE PRACTITIONER

## 2019-05-08 PROCEDURE — 96450 FL CHEMO ADMINISTRATION INTRATHECAL WITH LP: ICD-10-PCS | Mod: ,,, | Performed by: RADIOLOGY

## 2019-05-08 PROCEDURE — 63600175 PHARM REV CODE 636 W HCPCS: Performed by: INTERNAL MEDICINE

## 2019-05-08 PROCEDURE — 89051 BODY FLUID CELL COUNT: CPT

## 2019-05-08 PROCEDURE — 25000003 PHARM REV CODE 250: Performed by: INTERNAL MEDICINE

## 2019-05-08 PROCEDURE — 96521 REFILL/MAINT PORTABLE PUMP: CPT

## 2019-05-08 RX ORDER — LIDOCAINE HYDROCHLORIDE 10 MG/ML
3 INJECTION INFILTRATION; PERINEURAL ONCE
Status: COMPLETED | OUTPATIENT
Start: 2019-05-08 | End: 2019-05-08

## 2019-05-08 RX ORDER — HEPARIN 100 UNIT/ML
500 SYRINGE INTRAVENOUS
Status: DISCONTINUED | OUTPATIENT
Start: 2019-05-08 | End: 2019-05-08 | Stop reason: HOSPADM

## 2019-05-08 RX ORDER — SODIUM CHLORIDE 0.9 % (FLUSH) 0.9 %
10 SYRINGE (ML) INJECTION
Status: DISCONTINUED | OUTPATIENT
Start: 2019-05-08 | End: 2019-05-08 | Stop reason: HOSPADM

## 2019-05-08 RX ADMIN — LIDOCAINE HYDROCHLORIDE 3 ML: 10 INJECTION, SOLUTION INFILTRATION; PERINEURAL at 10:05

## 2019-05-08 RX ADMIN — VINCRISTINE SULFATE 480 ML: 1 INJECTION, SOLUTION INTRAVENOUS at 02:05

## 2019-05-08 RX ADMIN — Medication: at 11:05

## 2019-05-08 NOTE — PLAN OF CARE
Problem: Anemia (Chemotherapy Effects)  Goal: Anemia Symptom Improvement    Intervention: Monitor and Manage Anemia  Arrives to unit for EPOCH pump home infusion refill no adverse reactions reported, vitals stable, PAC flushed positive blood rtn noted settings checked and confirmed xed 2 nurses, lines secure pt will rtn to clinic on 5/8/19 at aprox 1430 for pump ex. Leaves clinic ambulatory with family member batteries changed, insturcted to contact clinic or infusion services after hours for trouble shooting. Contact MD office with questions or concerns.

## 2019-05-08 NOTE — TELEPHONE ENCOUNTER
Spoke with father and he is wanting to talk with Dr. Hwang about doing a EKG before doing a 6th cycle.

## 2019-05-08 NOTE — PROCEDURES
Radiology Post-Procedure Note    Pre Op Diagnosis: Lymphoma.  Post Op Diagnosis: Same    Procedure: Lumbar Puncture/Chemotherapy Adminstration.    Procedure performed by: Lasha Turpin MD. Demond Morales DO.    Written Informed Consent Obtained: Yes  Specimen Removed: NO  Estimated Blood Loss: Minimal    Findings:   Successful lumbar puncture with 16 cc clear CSF removed for analysis.    Patient tolerated procedure well.    Lasha Turpin MD  PGY-II Radiology Resident  [address] [facility]  Pager: [phone]

## 2019-05-08 NOTE — PROGRESS NOTES
Radiology History & Physical      SUBJECTIVE:     Chief Complaint: Lymphoma.    History of Present Illness:  Giorgio Streeter is a 48 y.o. male with past medical history of lymphoma who presents for lumbar puncture/chemotherapy adminstration.    Past Medical History:   Diagnosis Date    Cancer     non hodgkins lymphoma    Hypertension     Sleep apnea     Vision abnormalities      Past Surgical History:   Procedure Laterality Date    BONE MARROW BIOPSY  01/11/2019    EXCISION, SMALL INTESTINE N/A 12/21/2018    Performed by Cecilio Casanova MD at [facility] OR 2ND FLR    INSERTION, PORT-A-CATH N/A 1/28/2019    Performed by [facility] at [facility]    SMALL INTESTINE SURGERY  12/21/2018    Dr. Casanova, segmental small bowel resection        Home Meds:   Prior to Admission medications    Medication Sig Start Date End Date Taking? Authorizing Provider   acyclovir (ZOVIRAX) 400 MG tablet Take 1 tablet (400 mg total) by mouth 2 (two) times daily. 4/1/19 3/31/20  Sachi Araya NP   allopurinol (ZYLOPRIM) 300 MG tablet Take 1 tablet (300 mg total) by mouth once daily. 4/1/19 6/2/19  Sachi Araya NP   ALPRAZolam (XANAX) 0.25 MG tablet Take 1 tablet (0.25 mg total) by mouth 2 (two) times daily as needed for Insomnia or Anxiety. 2/6/19 4/5/19  Ashlee Luna NP   atenolol (TENORMIN) 50 MG tablet Take 1 tablet (50 mg total) by mouth once daily. 2/6/19 2/6/20  Ashlee Luna NP   atenolol (TENORMIN) 50 MG tablet Take 1 tablet (50 mg total) by mouth once daily. 5/3/19   Ashlee Luna NP   famotidine (PEPCID) 20 MG tablet Take 1 tablet (20 mg total) by mouth 2 (two) times daily. 4/1/19 3/31/20  Sachi Araya NP   fexofenadine (ALLEGRA) 60 MG tablet Take 60 mg by mouth once daily.    Historical Provider, MD   fluconazole (DIFLUCAN) 200 MG Tab Take 2 tablets (400 mg total) by mouth once daily. 5/3/19 6/2/19  Ector Hwang MD   fluticasone (FLONASE) 50 mcg/actuation nasal spray 1  spray by Each Nare route once daily.    Historical Provider, MD   ketoconazole (NIZORAL) 2 % cream  1/31/19   Historical Provider, MD   levoFLOXacin (LEVAQUIN) 500 MG tablet Take 1 tablet (500 mg total) by mouth once daily. 4/1/19   Sachi Araya NP   loratadine (CLARITIN) 10 mg tablet Take 10 mg by mouth once daily.    Historical Provider, MD   losartan-hydrochlorothiazide 100-12.5 mg (HYZAAR) 100-12.5 mg Tab Take 1 tablet by mouth once daily. 2/6/19   Ashlee Luna NP   nystatin (MYCOSTATIN) powder Apply topically 2 (two) times daily. 2/6/19   Ashlee Luna NP   predniSONE (DELTASONE) 10 MG tablet Take one tablet by mouth 2 times a day. On days 1-5 of chemotherapy cycles only. To be combined with 50mg tabs for a total of 160mg 4/1/19   Sachi Araya NP   predniSONE (DELTASONE) 50 MG Tab Take 3 tablets (150 mg total) by mouth 2 (two) times daily. On days 1-5 of chemotherapy cycles. To be combined with 10mg tabs for a total of 160mg 4/1/19   Sachi Araya NP     Anticoagulants/Antiplatelets: no anticoagulation    Allergies: Review of patient's allergies indicates:  No Known Allergies  Sedation History:  no adverse reactions    Review of Systems:   Hematological: no known coagulopathies  Respiratory: no shortness of breath  Cardiovascular: no chest pain  Gastrointestinal: no abdominal pain  Genito-Urinary: no dysuria  Musculoskeletal: negative  Neurological: no TIA or stroke symptoms         OBJECTIVE:     Vital Signs (Most Recent)       Physical Exam:  ASA: 3  Mallampati: 2    General: no acute distress  Mental Status: alert and oriented to person, place and time  HEENT: normocephalic, atraumatic  Chest: unlabored breathing  Heart: regular heart rate  Abdomen: nondistended  Extremity: moves all extremities    Laboratory  Lab Results   Component Value Date    INR 1.0 02/06/2019       Lab Results   Component Value Date    WBC 8.80 05/03/2019    HGB 9.5 (L) 05/03/2019    HCT 31.5 (L) 05/03/2019     MCV 99 (H) 05/03/2019     05/03/2019      Lab Results   Component Value Date     (H) 05/03/2019     05/03/2019    K 4.2 05/03/2019     05/03/2019    CO2 24 05/03/2019    BUN 14 05/03/2019    CREATININE 0.8 05/03/2019    CALCIUM 9.7 05/03/2019    MG 2.2 02/06/2019    ALT 27 05/03/2019    AST 22 05/03/2019    ALBUMIN 3.7 05/03/2019    BILITOT 0.3 05/03/2019       ASSESSMENT/PLAN:     Sedation Plan: Local.  Patient will undergo Lumbar Puncture/Chemotherapy Adminstration.    Lasha Turpin MD  PGY-II Radiology Resident  [address] [facility]  Pager: [phone]

## 2019-05-09 ENCOUNTER — INFUSION (OUTPATIENT)
Dept: INFUSION THERAPY | Facility: HOSPITAL | Age: 49
End: 2019-05-09
Attending: INTERNAL MEDICINE
Payer: COMMERCIAL

## 2019-05-09 VITALS
SYSTOLIC BLOOD PRESSURE: 128 MMHG | HEART RATE: 76 BPM | RESPIRATION RATE: 18 BRPM | HEIGHT: 67 IN | BODY MASS INDEX: 47.87 KG/M2 | WEIGHT: 305 LBS | TEMPERATURE: 98 F | DIASTOLIC BLOOD PRESSURE: 72 MMHG

## 2019-05-09 DIAGNOSIS — C83.39 DIFFUSE LARGE B-CELL LYMPHOMA OF SOLID ORGAN EXCLUDING SPLEEN: Primary | ICD-10-CM

## 2019-05-09 PROCEDURE — 25000003 PHARM REV CODE 250: Performed by: INTERNAL MEDICINE

## 2019-05-09 PROCEDURE — 63600175 PHARM REV CODE 636 W HCPCS: Performed by: INTERNAL MEDICINE

## 2019-05-09 PROCEDURE — 96416 CHEMO PROLONG INFUSE W/PUMP: CPT

## 2019-05-09 RX ORDER — HEPARIN 100 UNIT/ML
500 SYRINGE INTRAVENOUS
Status: DISCONTINUED | OUTPATIENT
Start: 2019-05-09 | End: 2019-05-09 | Stop reason: HOSPADM

## 2019-05-09 RX ORDER — SODIUM CHLORIDE 0.9 % (FLUSH) 0.9 %
10 SYRINGE (ML) INJECTION
Status: DISCONTINUED | OUTPATIENT
Start: 2019-05-09 | End: 2019-05-09 | Stop reason: HOSPADM

## 2019-05-09 RX ADMIN — VINCRISTINE SULFATE 32 MG: 1 INJECTION, SOLUTION INTRAVENOUS at 03:05

## 2019-05-09 NOTE — PLAN OF CARE
Problem: Adult Inpatient Plan of Care  Goal: Plan of Care Review  Outcome: Ongoing (interventions implemented as appropriate)  Pt here for EPOCH pump change, port dsg coming off, cleaned site and placed new dsg, port flushes easily and with good blood return, with pt lying flat in recliner, (positional), EPOCH bag changed infusing to port at 20 ml/hr, pt will rtc tomorrow, no distress noted upon d/c to home

## 2019-05-10 ENCOUNTER — INFUSION (OUTPATIENT)
Dept: INFUSION THERAPY | Facility: HOSPITAL | Age: 49
End: 2019-05-10
Attending: INTERNAL MEDICINE
Payer: COMMERCIAL

## 2019-05-10 VITALS
BODY MASS INDEX: 47.87 KG/M2 | HEART RATE: 68 BPM | WEIGHT: 305 LBS | RESPIRATION RATE: 18 BRPM | TEMPERATURE: 98 F | HEIGHT: 67 IN | SYSTOLIC BLOOD PRESSURE: 150 MMHG | OXYGEN SATURATION: 99 % | DIASTOLIC BLOOD PRESSURE: 78 MMHG

## 2019-05-10 DIAGNOSIS — C83.39 DIFFUSE LARGE B-CELL LYMPHOMA OF SOLID ORGAN EXCLUDING SPLEEN: Primary | ICD-10-CM

## 2019-05-10 PROCEDURE — 96367 TX/PROPH/DG ADDL SEQ IV INF: CPT

## 2019-05-10 PROCEDURE — 63600175 PHARM REV CODE 636 W HCPCS: Mod: JG | Performed by: INTERNAL MEDICINE

## 2019-05-10 PROCEDURE — 96413 CHEMO IV INFUSION 1 HR: CPT

## 2019-05-10 PROCEDURE — 25000003 PHARM REV CODE 250: Performed by: INTERNAL MEDICINE

## 2019-05-10 PROCEDURE — 96377 APPLICATON ON-BODY INJECTOR: CPT

## 2019-05-10 RX ORDER — SODIUM CHLORIDE 0.9 % (FLUSH) 0.9 %
10 SYRINGE (ML) INJECTION
Status: DISCONTINUED | OUTPATIENT
Start: 2019-05-10 | End: 2019-05-10 | Stop reason: HOSPADM

## 2019-05-10 RX ORDER — HEPARIN 100 UNIT/ML
500 SYRINGE INTRAVENOUS
Status: DISCONTINUED | OUTPATIENT
Start: 2019-05-10 | End: 2019-05-10 | Stop reason: HOSPADM

## 2019-05-10 RX ADMIN — DEXAMETHASONE SODIUM PHOSPHATE: 4 INJECTION, SOLUTION INTRA-ARTICULAR; INTRALESIONAL; INTRAMUSCULAR; INTRAVENOUS; SOFT TISSUE at 03:05

## 2019-05-10 RX ADMIN — HEPARIN 500 UNITS: 100 SYRINGE at 04:05

## 2019-05-10 RX ADMIN — CYCLOPHOSPHAMIDE 2340 MG: 1 INJECTION, POWDER, FOR SOLUTION INTRAVENOUS; ORAL at 04:05

## 2019-05-10 RX ADMIN — PEGFILGRASTIM 6 MG: KIT SUBCUTANEOUS at 04:05

## 2019-05-10 NOTE — PLAN OF CARE
Problem: Adult Inpatient Plan of Care  Goal: Plan of Care Review  Outcome: Ongoing (interventions implemented as appropriate)  Pt admitted for D5C5, Cytoxan infusion. Side effects and self-care tips discussed and labs reviewed. Pt's EPOC pump was DC. Pt received Cytoxan infusion, tolerated well.Plan of care reviewed and Pt instructed to contact MD with any further concerns or questions, he may;afshin understanding. Neulasta OBI was placed on RT arm.  Pt was discharged @ 16:55

## 2019-05-13 ENCOUNTER — LAB VISIT (OUTPATIENT)
Dept: LAB | Facility: HOSPITAL | Age: 49
End: 2019-05-13
Attending: INTERNAL MEDICINE
Payer: COMMERCIAL

## 2019-05-13 DIAGNOSIS — C83.39 DIFFUSE LARGE B-CELL LYMPHOMA OF SOLID ORGAN EXCLUDING SPLEEN: ICD-10-CM

## 2019-05-13 LAB
ALBUMIN SERPL BCP-MCNC: 3.2 G/DL (ref 3.5–5.2)
ALP SERPL-CCNC: 56 U/L (ref 55–135)
ALT SERPL W/O P-5'-P-CCNC: 40 U/L (ref 10–44)
ANION GAP SERPL CALC-SCNC: 10 MMOL/L (ref 8–16)
ANISOCYTOSIS BLD QL SMEAR: SLIGHT
AST SERPL-CCNC: 21 U/L (ref 10–40)
BASOPHILS # BLD AUTO: ABNORMAL K/UL (ref 0–0.2)
BASOPHILS NFR BLD: 0 % (ref 0–1.9)
BILIRUB SERPL-MCNC: 0.4 MG/DL (ref 0.1–1)
BUN SERPL-MCNC: 16 MG/DL (ref 6–20)
CALCIUM SERPL-MCNC: 8.5 MG/DL (ref 8.7–10.5)
CHLORIDE SERPL-SCNC: 100 MMOL/L (ref 95–110)
CO2 SERPL-SCNC: 30 MMOL/L (ref 23–29)
CREAT SERPL-MCNC: 0.7 MG/DL (ref 0.5–1.4)
DIFFERENTIAL METHOD: ABNORMAL
EOSINOPHIL # BLD AUTO: ABNORMAL K/UL (ref 0–0.5)
EOSINOPHIL NFR BLD: 0 % (ref 0–8)
ERYTHROCYTE [DISTWIDTH] IN BLOOD BY AUTOMATED COUNT: 19.9 % (ref 11.5–14.5)
EST. GFR  (AFRICAN AMERICAN): >60 ML/MIN/1.73 M^2
EST. GFR  (NON AFRICAN AMERICAN): >60 ML/MIN/1.73 M^2
GLUCOSE SERPL-MCNC: 135 MG/DL (ref 70–110)
HCT VFR BLD AUTO: 28.2 % (ref 40–54)
HGB BLD-MCNC: 9.4 G/DL (ref 14–18)
LYMPHOCYTES # BLD AUTO: ABNORMAL K/UL (ref 1–4.8)
LYMPHOCYTES NFR BLD: 2 % (ref 18–48)
MCH RBC QN AUTO: 30.5 PG (ref 27–31)
MCHC RBC AUTO-ENTMCNC: 33.3 G/DL (ref 32–36)
MCV RBC AUTO: 92 FL (ref 82–98)
MONOCYTES # BLD AUTO: ABNORMAL K/UL (ref 0.3–1)
MONOCYTES NFR BLD: 0 % (ref 4–15)
NEUTROPHILS NFR BLD: 93 % (ref 38–73)
NEUTS BAND NFR BLD MANUAL: 5 %
PLATELET # BLD AUTO: 134 K/UL (ref 150–350)
PLATELET BLD QL SMEAR: ABNORMAL
PMV BLD AUTO: 9.3 FL (ref 9.2–12.9)
POTASSIUM SERPL-SCNC: 3.4 MMOL/L (ref 3.5–5.1)
PROT SERPL-MCNC: 5.7 G/DL (ref 6–8.4)
RBC # BLD AUTO: 3.08 M/UL (ref 4.6–6.2)
SODIUM SERPL-SCNC: 140 MMOL/L (ref 136–145)
WBC # BLD AUTO: 20.37 K/UL (ref 3.9–12.7)

## 2019-05-13 PROCEDURE — 85027 COMPLETE CBC AUTOMATED: CPT | Mod: PO

## 2019-05-13 PROCEDURE — 85007 BL SMEAR W/DIFF WBC COUNT: CPT | Mod: PO

## 2019-05-13 PROCEDURE — 36415 COLL VENOUS BLD VENIPUNCTURE: CPT | Mod: PO

## 2019-05-13 PROCEDURE — 80053 COMPREHEN METABOLIC PANEL: CPT | Mod: PO

## 2019-05-16 ENCOUNTER — LAB VISIT (OUTPATIENT)
Dept: LAB | Facility: HOSPITAL | Age: 49
End: 2019-05-16
Attending: INTERNAL MEDICINE
Payer: COMMERCIAL

## 2019-05-16 ENCOUNTER — DOCUMENTATION ONLY (OUTPATIENT)
Dept: HEMATOLOGY/ONCOLOGY | Facility: CLINIC | Age: 49
End: 2019-05-16

## 2019-05-16 DIAGNOSIS — C83.39 DIFFUSE LARGE B-CELL LYMPHOMA OF SOLID ORGAN EXCLUDING SPLEEN: ICD-10-CM

## 2019-05-16 LAB
ALBUMIN SERPL BCP-MCNC: 3.4 G/DL (ref 3.5–5.2)
ALP SERPL-CCNC: 54 U/L (ref 55–135)
ALT SERPL W/O P-5'-P-CCNC: 33 U/L (ref 10–44)
ANION GAP SERPL CALC-SCNC: 9 MMOL/L (ref 8–16)
ANISOCYTOSIS BLD QL SMEAR: SLIGHT
AST SERPL-CCNC: 16 U/L (ref 10–40)
BASOPHILS # BLD AUTO: ABNORMAL K/UL (ref 0–0.2)
BASOPHILS NFR BLD: 0 % (ref 0–1.9)
BILIRUB SERPL-MCNC: 0.5 MG/DL (ref 0.1–1)
BUN SERPL-MCNC: 13 MG/DL (ref 6–20)
CALCIUM SERPL-MCNC: 9.3 MG/DL (ref 8.7–10.5)
CHLORIDE SERPL-SCNC: 100 MMOL/L (ref 95–110)
CO2 SERPL-SCNC: 28 MMOL/L (ref 23–29)
CREAT SERPL-MCNC: 0.7 MG/DL (ref 0.5–1.4)
DACRYOCYTES BLD QL SMEAR: ABNORMAL
DIFFERENTIAL METHOD: ABNORMAL
EOSINOPHIL # BLD AUTO: ABNORMAL K/UL (ref 0–0.5)
EOSINOPHIL NFR BLD: 0 % (ref 0–8)
ERYTHROCYTE [DISTWIDTH] IN BLOOD BY AUTOMATED COUNT: 17.6 % (ref 11.5–14.5)
EST. GFR  (AFRICAN AMERICAN): >60 ML/MIN/1.73 M^2
EST. GFR  (NON AFRICAN AMERICAN): >60 ML/MIN/1.73 M^2
GLUCOSE SERPL-MCNC: 148 MG/DL (ref 70–110)
HCT VFR BLD AUTO: 27.6 % (ref 40–54)
HGB BLD-MCNC: 9.2 G/DL (ref 14–18)
HYPOCHROMIA BLD QL SMEAR: ABNORMAL
LYMPHOCYTES # BLD AUTO: ABNORMAL K/UL (ref 1–4.8)
LYMPHOCYTES NFR BLD: 20 % (ref 18–48)
MCH RBC QN AUTO: 31.1 PG (ref 27–31)
MCHC RBC AUTO-ENTMCNC: 33.3 G/DL (ref 32–36)
MCV RBC AUTO: 93 FL (ref 82–98)
MONOCYTES # BLD AUTO: ABNORMAL K/UL (ref 0.3–1)
MONOCYTES NFR BLD: 10 % (ref 4–15)
NEUTROPHILS NFR BLD: 70 % (ref 38–73)
PLATELET # BLD AUTO: 63 K/UL (ref 150–350)
PLATELET BLD QL SMEAR: ABNORMAL
PMV BLD AUTO: 11.1 FL (ref 9.2–12.9)
POIKILOCYTOSIS BLD QL SMEAR: SLIGHT
POTASSIUM SERPL-SCNC: 4.5 MMOL/L (ref 3.5–5.1)
PROT SERPL-MCNC: 6.4 G/DL (ref 6–8.4)
RBC # BLD AUTO: 2.96 M/UL (ref 4.6–6.2)
SODIUM SERPL-SCNC: 137 MMOL/L (ref 136–145)
WBC # BLD AUTO: 0.48 K/UL (ref 3.9–12.7)

## 2019-05-16 PROCEDURE — 85027 COMPLETE CBC AUTOMATED: CPT | Mod: PO

## 2019-05-16 PROCEDURE — 85007 BL SMEAR W/DIFF WBC COUNT: CPT | Mod: PO

## 2019-05-16 PROCEDURE — 80053 COMPREHEN METABOLIC PANEL: CPT | Mod: PO

## 2019-05-16 PROCEDURE — 36415 COLL VENOUS BLD VENIPUNCTURE: CPT | Mod: PO

## 2019-05-20 ENCOUNTER — LAB VISIT (OUTPATIENT)
Dept: LAB | Facility: HOSPITAL | Age: 49
End: 2019-05-20
Attending: INTERNAL MEDICINE
Payer: COMMERCIAL

## 2019-05-20 DIAGNOSIS — C83.39 DIFFUSE LARGE B-CELL LYMPHOMA OF SOLID ORGAN EXCLUDING SPLEEN: ICD-10-CM

## 2019-05-20 LAB
ALBUMIN SERPL BCP-MCNC: 3.5 G/DL (ref 3.5–5.2)
ALP SERPL-CCNC: 58 U/L (ref 55–135)
ALT SERPL W/O P-5'-P-CCNC: 41 U/L (ref 10–44)
ANION GAP SERPL CALC-SCNC: 11 MMOL/L (ref 8–16)
ANISOCYTOSIS BLD QL SMEAR: SLIGHT
AST SERPL-CCNC: 22 U/L (ref 10–40)
BASOPHILS # BLD AUTO: ABNORMAL K/UL (ref 0–0.2)
BASOPHILS NFR BLD: 0 % (ref 0–1.9)
BILIRUB SERPL-MCNC: 0.2 MG/DL (ref 0.1–1)
BUN SERPL-MCNC: 8 MG/DL (ref 6–20)
CALCIUM SERPL-MCNC: 9.3 MG/DL (ref 8.7–10.5)
CHLORIDE SERPL-SCNC: 103 MMOL/L (ref 95–110)
CO2 SERPL-SCNC: 27 MMOL/L (ref 23–29)
CREAT SERPL-MCNC: 0.8 MG/DL (ref 0.5–1.4)
DIFFERENTIAL METHOD: ABNORMAL
EOSINOPHIL # BLD AUTO: ABNORMAL K/UL (ref 0–0.5)
EOSINOPHIL NFR BLD: 0 % (ref 0–8)
ERYTHROCYTE [DISTWIDTH] IN BLOOD BY AUTOMATED COUNT: 17.6 % (ref 11.5–14.5)
EST. GFR  (AFRICAN AMERICAN): >60 ML/MIN/1.73 M^2
EST. GFR  (NON AFRICAN AMERICAN): >60 ML/MIN/1.73 M^2
GIANT PLATELETS BLD QL SMEAR: PRESENT
GLUCOSE SERPL-MCNC: 118 MG/DL (ref 70–110)
HCT VFR BLD AUTO: 26.1 % (ref 40–54)
HGB BLD-MCNC: 8.5 G/DL (ref 14–18)
HYPOCHROMIA BLD QL SMEAR: ABNORMAL
LYMPHOCYTES # BLD AUTO: ABNORMAL K/UL (ref 1–4.8)
LYMPHOCYTES NFR BLD: 4 % (ref 18–48)
MCH RBC QN AUTO: 31.5 PG (ref 27–31)
MCHC RBC AUTO-ENTMCNC: 32.6 G/DL (ref 32–36)
MCV RBC AUTO: 97 FL (ref 82–98)
METAMYELOCYTES NFR BLD MANUAL: 2 %
MONOCYTES # BLD AUTO: ABNORMAL K/UL (ref 0.3–1)
MONOCYTES NFR BLD: 9 % (ref 4–15)
NEUTROPHILS NFR BLD: 67 % (ref 38–73)
NEUTS BAND NFR BLD MANUAL: 18 %
PLATELET # BLD AUTO: 162 K/UL (ref 150–350)
PLATELET BLD QL SMEAR: ABNORMAL
PMV BLD AUTO: 10.6 FL (ref 9.2–12.9)
POIKILOCYTOSIS BLD QL SMEAR: SLIGHT
POLYCHROMASIA BLD QL SMEAR: ABNORMAL
POTASSIUM SERPL-SCNC: 3.9 MMOL/L (ref 3.5–5.1)
PROT SERPL-MCNC: 6.4 G/DL (ref 6–8.4)
RBC # BLD AUTO: 2.7 M/UL (ref 4.6–6.2)
SODIUM SERPL-SCNC: 141 MMOL/L (ref 136–145)
TARGETS BLD QL SMEAR: ABNORMAL
WBC # BLD AUTO: 5.09 K/UL (ref 3.9–12.7)

## 2019-05-20 PROCEDURE — 85027 COMPLETE CBC AUTOMATED: CPT | Mod: PO

## 2019-05-20 PROCEDURE — 85007 BL SMEAR W/DIFF WBC COUNT: CPT | Mod: PO

## 2019-05-20 PROCEDURE — 36415 COLL VENOUS BLD VENIPUNCTURE: CPT | Mod: PO

## 2019-05-20 PROCEDURE — 80053 COMPREHEN METABOLIC PANEL: CPT | Mod: PO

## 2019-05-23 ENCOUNTER — LAB VISIT (OUTPATIENT)
Dept: LAB | Facility: HOSPITAL | Age: 49
End: 2019-05-23
Attending: INTERNAL MEDICINE
Payer: COMMERCIAL

## 2019-05-23 DIAGNOSIS — C83.39 DIFFUSE LARGE B-CELL LYMPHOMA OF SOLID ORGAN EXCLUDING SPLEEN: ICD-10-CM

## 2019-05-23 LAB
ALBUMIN SERPL BCP-MCNC: 3.6 G/DL (ref 3.5–5.2)
ALP SERPL-CCNC: 67 U/L (ref 55–135)
ALT SERPL W/O P-5'-P-CCNC: 29 U/L (ref 10–44)
ANION GAP SERPL CALC-SCNC: 11 MMOL/L (ref 8–16)
ANISOCYTOSIS BLD QL SMEAR: ABNORMAL
AST SERPL-CCNC: 21 U/L (ref 10–40)
BASOPHILS NFR BLD: 0 % (ref 0–1.9)
BILIRUB SERPL-MCNC: 0.2 MG/DL (ref 0.1–1)
BUN SERPL-MCNC: 14 MG/DL (ref 6–20)
CALCIUM SERPL-MCNC: 9.6 MG/DL (ref 8.7–10.5)
CHLORIDE SERPL-SCNC: 105 MMOL/L (ref 95–110)
CO2 SERPL-SCNC: 25 MMOL/L (ref 23–29)
CREAT SERPL-MCNC: 0.8 MG/DL (ref 0.5–1.4)
DIFFERENTIAL METHOD: ABNORMAL
EOSINOPHIL NFR BLD: 0 % (ref 0–8)
ERYTHROCYTE [DISTWIDTH] IN BLOOD BY AUTOMATED COUNT: 18.5 % (ref 11.5–14.5)
EST. GFR  (AFRICAN AMERICAN): >60 ML/MIN/1.73 M^2
EST. GFR  (NON AFRICAN AMERICAN): >60 ML/MIN/1.73 M^2
GLUCOSE SERPL-MCNC: 155 MG/DL (ref 70–110)
HCT VFR BLD AUTO: 28.6 % (ref 40–54)
HGB BLD-MCNC: 9.1 G/DL (ref 14–18)
HYPOCHROMIA BLD QL SMEAR: ABNORMAL
LDH SERPL L TO P-CCNC: 249 U/L (ref 110–260)
LYMPHOCYTES NFR BLD: 4 % (ref 18–48)
MCH RBC QN AUTO: 31.4 PG (ref 27–31)
MCHC RBC AUTO-ENTMCNC: 31.8 G/DL (ref 32–36)
MCV RBC AUTO: 99 FL (ref 82–98)
MONOCYTES NFR BLD: 8 % (ref 4–15)
NEUTROPHILS NFR BLD: 82 % (ref 38–73)
NEUTS BAND NFR BLD MANUAL: 6 %
OVALOCYTES BLD QL SMEAR: ABNORMAL
PLATELET # BLD AUTO: 239 K/UL (ref 150–350)
PLATELET BLD QL SMEAR: ABNORMAL
PMV BLD AUTO: 10.2 FL (ref 9.2–12.9)
POIKILOCYTOSIS BLD QL SMEAR: SLIGHT
POTASSIUM SERPL-SCNC: 4.4 MMOL/L (ref 3.5–5.1)
PROT SERPL-MCNC: 6.6 G/DL (ref 6–8.4)
RBC # BLD AUTO: 2.9 M/UL (ref 4.6–6.2)
SODIUM SERPL-SCNC: 141 MMOL/L (ref 136–145)
TARGETS BLD QL SMEAR: ABNORMAL
URATE SERPL-MCNC: 4.9 MG/DL (ref 3.4–7)
WBC # BLD AUTO: 6.84 K/UL (ref 3.9–12.7)

## 2019-05-23 PROCEDURE — 85007 BL SMEAR W/DIFF WBC COUNT: CPT | Mod: PO

## 2019-05-23 PROCEDURE — 85027 COMPLETE CBC AUTOMATED: CPT | Mod: PO

## 2019-05-23 PROCEDURE — 83615 LACTATE (LD) (LDH) ENZYME: CPT | Mod: PO

## 2019-05-23 PROCEDURE — 36415 COLL VENOUS BLD VENIPUNCTURE: CPT | Mod: PO

## 2019-05-23 PROCEDURE — 84550 ASSAY OF BLOOD/URIC ACID: CPT | Mod: PO

## 2019-05-23 PROCEDURE — 80053 COMPREHEN METABOLIC PANEL: CPT | Mod: PO

## 2019-05-24 ENCOUNTER — OFFICE VISIT (OUTPATIENT)
Dept: HEMATOLOGY/ONCOLOGY | Facility: CLINIC | Age: 49
End: 2019-05-24
Payer: COMMERCIAL

## 2019-05-24 VITALS
TEMPERATURE: 99 F | BODY MASS INDEX: 47.78 KG/M2 | WEIGHT: 304.44 LBS | OXYGEN SATURATION: 97 % | DIASTOLIC BLOOD PRESSURE: 71 MMHG | HEART RATE: 121 BPM | HEIGHT: 67 IN | SYSTOLIC BLOOD PRESSURE: 130 MMHG | RESPIRATION RATE: 20 BRPM

## 2019-05-24 DIAGNOSIS — D63.0 ANEMIA IN NEOPLASTIC DISEASE: ICD-10-CM

## 2019-05-24 DIAGNOSIS — C83.39 DIFFUSE LARGE B-CELL LYMPHOMA OF SOLID ORGAN EXCLUDING SPLEEN: ICD-10-CM

## 2019-05-24 DIAGNOSIS — E66.01 MORBID OBESITY: ICD-10-CM

## 2019-05-24 DIAGNOSIS — I10 ESSENTIAL HYPERTENSION: ICD-10-CM

## 2019-05-24 DIAGNOSIS — Z90.49 S/P SMALL BOWEL RESECTION: Primary | ICD-10-CM

## 2019-05-24 PROCEDURE — 99999 PR PBB SHADOW E&M-EST. PATIENT-LVL V: CPT | Mod: PBBFAC,,, | Performed by: INTERNAL MEDICINE

## 2019-05-24 PROCEDURE — 99999 PR PBB SHADOW E&M-EST. PATIENT-LVL V: ICD-10-PCS | Mod: PBBFAC,,, | Performed by: INTERNAL MEDICINE

## 2019-05-24 PROCEDURE — 3008F BODY MASS INDEX DOCD: CPT | Mod: CPTII,S$GLB,, | Performed by: INTERNAL MEDICINE

## 2019-05-24 PROCEDURE — 3078F PR MOST RECENT DIASTOLIC BLOOD PRESSURE < 80 MM HG: ICD-10-PCS | Mod: CPTII,S$GLB,, | Performed by: INTERNAL MEDICINE

## 2019-05-24 PROCEDURE — 3078F DIAST BP <80 MM HG: CPT | Mod: CPTII,S$GLB,, | Performed by: INTERNAL MEDICINE

## 2019-05-24 PROCEDURE — 3075F SYST BP GE 130 - 139MM HG: CPT | Mod: CPTII,S$GLB,, | Performed by: INTERNAL MEDICINE

## 2019-05-24 PROCEDURE — 3008F PR BODY MASS INDEX (BMI) DOCUMENTED: ICD-10-PCS | Mod: CPTII,S$GLB,, | Performed by: INTERNAL MEDICINE

## 2019-05-24 PROCEDURE — 3075F PR MOST RECENT SYSTOLIC BLOOD PRESS GE 130-139MM HG: ICD-10-PCS | Mod: CPTII,S$GLB,, | Performed by: INTERNAL MEDICINE

## 2019-05-24 PROCEDURE — 99214 PR OFFICE/OUTPT VISIT, EST, LEVL IV, 30-39 MIN: ICD-10-PCS | Mod: S$GLB,,, | Performed by: INTERNAL MEDICINE

## 2019-05-24 PROCEDURE — 99214 OFFICE O/P EST MOD 30 MIN: CPT | Mod: S$GLB,,, | Performed by: INTERNAL MEDICINE

## 2019-05-24 RX ORDER — HEPARIN 100 UNIT/ML
500 SYRINGE INTRAVENOUS
Status: CANCELLED | OUTPATIENT
Start: 2019-05-28

## 2019-05-24 RX ORDER — SODIUM CHLORIDE 0.9 % (FLUSH) 0.9 %
10 SYRINGE (ML) INJECTION
Status: CANCELLED | OUTPATIENT
Start: 2019-05-28

## 2019-05-24 RX ORDER — SODIUM CHLORIDE 0.9 % (FLUSH) 0.9 %
10 SYRINGE (ML) INJECTION
Status: CANCELLED | OUTPATIENT
Start: 2019-05-30

## 2019-05-24 RX ORDER — FAMOTIDINE 10 MG/ML
20 INJECTION INTRAVENOUS
Status: CANCELLED | OUTPATIENT
Start: 2019-05-27

## 2019-05-24 RX ORDER — SODIUM CHLORIDE 0.9 % (FLUSH) 0.9 %
10 SYRINGE (ML) INJECTION
Status: CANCELLED | OUTPATIENT
Start: 2019-05-31

## 2019-05-24 RX ORDER — ACETAMINOPHEN 325 MG/1
650 TABLET ORAL
Status: CANCELLED | OUTPATIENT
Start: 2019-05-27

## 2019-05-24 RX ORDER — SODIUM CHLORIDE 0.9 % (FLUSH) 0.9 %
10 SYRINGE (ML) INJECTION
Status: CANCELLED | OUTPATIENT
Start: 2019-05-29

## 2019-05-24 RX ORDER — SODIUM CHLORIDE 0.9 % (FLUSH) 0.9 %
10 SYRINGE (ML) INJECTION
Status: CANCELLED | OUTPATIENT
Start: 2019-05-27

## 2019-05-24 RX ORDER — HEPARIN 100 UNIT/ML
500 SYRINGE INTRAVENOUS
Status: CANCELLED | OUTPATIENT
Start: 2019-05-30

## 2019-05-24 RX ORDER — HEPARIN 100 UNIT/ML
500 SYRINGE INTRAVENOUS
Status: CANCELLED | OUTPATIENT
Start: 2019-05-29

## 2019-05-24 RX ORDER — MEPERIDINE HYDROCHLORIDE 50 MG/ML
25 INJECTION INTRAMUSCULAR; INTRAVENOUS; SUBCUTANEOUS
Status: CANCELLED | OUTPATIENT
Start: 2019-05-27

## 2019-05-24 RX ORDER — HEPARIN 100 UNIT/ML
500 SYRINGE INTRAVENOUS
Status: CANCELLED | OUTPATIENT
Start: 2019-05-27

## 2019-05-24 RX ORDER — HEPARIN 100 UNIT/ML
500 SYRINGE INTRAVENOUS
Status: CANCELLED | OUTPATIENT
Start: 2019-05-31

## 2019-05-24 NOTE — PROGRESS NOTES
CC: Lymphoma, follow up visit      HPI:  is a 48 y.o. male here for followup visit. He has hypertension, obstructive sleep apnea. He was hospitalized around Christmas 2018 for small bowel mass. Patient reports that he began having lower abdominal/pelvic pain toward the end of October 2018. This prompted a urologic evaluation and subsequent treatment for a presumed UTI. He states that his pain continued into November and evolved into more upper abdominal/epigastric pain that was worse with eating. He was subsequently seen by his PCP who ultimately ordered a CT abdomen/pelvis that revealed an abnormal thickening of an 8 cm segment of small bowel. He denied fever, chills, night sweats, or anorexia at that time. He had ~10 lb weight loss over the 1-2 months prior to his hospitalization in Dec 2018, but he was also actively taking part in a weight loss program.  On 12/21/18, he underwent Segmental small bowel resection.The mass was approximately 6 cm in length and was circumferential.  There was dilation of the small bowel above it suggesting a partial bowel obstruction.  There was no adenopathy in the adjacent   mesentery.  The tumor was clearly extending to the serosal surface of the bowel.Careful search was made for peritoneal implants and there were no lesions noted on the peritoneum or omentum.  The liver could not be inspected through the incision,   but it was palpated and there were no focal lesions within it. The remainder of the small bowel was run from the ligament of Treitz to the ileocecal valves and no other small bowel lesions were noted.    He had PET CT, 2D ECHO, bone marrow biopsy. PET CT did not reveal any evidence of lymphoma. 2D ECHO was normal. Bone marrow was negative for involvement by lymphoma.       Interval History: He is here for a follow up visit.He received C1 EPOCH -R 2/1-2/5/19 without complications. C2 day 1 was on 2/25/19. C3 day 1 was on 3/18, C4 day 1 on 4/8, C5 day 1 on  5/6/19.  No PET avid disease noted after C3 except for a hypermetabolic focus in colon.     Review of Systems   Constitutional: Positive for malaise/fatigue. Negative for chills, fever and weight loss.   HENT: Negative for ear discharge, ear pain and hearing loss.    Eyes: Negative for blurred vision, double vision, photophobia, pain and discharge.   Respiratory: Positive for shortness of breath. Negative for cough.    Cardiovascular: Negative for chest pain, palpitations, orthopnea and claudication.   Gastrointestinal: Negative for abdominal pain, blood in stool, constipation, diarrhea, heartburn, melena and vomiting.   Genitourinary: Negative for frequency.   Musculoskeletal: Negative for back pain.   Skin: Negative for rash.   Neurological: Negative for tremors, sensory change, focal weakness, weakness and headaches.   Endo/Heme/Allergies: Does not bruise/bleed easily.   Psychiatric/Behavioral: Negative for depression, substance abuse and suicidal ideas. The patient is not nervous/anxious.          Current Outpatient Medications   Medication Sig    acyclovir (ZOVIRAX) 400 MG tablet Take 1 tablet (400 mg total) by mouth 2 (two) times daily.    allopurinol (ZYLOPRIM) 300 MG tablet Take 1 tablet (300 mg total) by mouth once daily.    ALPRAZolam (XANAX) 0.25 MG tablet Take 1 tablet (0.25 mg total) by mouth 2 (two) times daily as needed for Insomnia or Anxiety.    atenolol (TENORMIN) 50 MG tablet Take 1 tablet (50 mg total) by mouth once daily.    atenolol (TENORMIN) 50 MG tablet Take 1 tablet (50 mg total) by mouth once daily.    famotidine (PEPCID) 20 MG tablet Take 1 tablet (20 mg total) by mouth 2 (two) times daily.    fexofenadine (ALLEGRA) 60 MG tablet Take 60 mg by mouth once daily.    fluconazole (DIFLUCAN) 200 MG Tab Take 2 tablets (400 mg total) by mouth once daily.    fluticasone (FLONASE) 50 mcg/actuation nasal spray 1 spray by Each Nare route once daily.    ketoconazole (NIZORAL) 2 % cream      levoFLOXacin (LEVAQUIN) 500 MG tablet Take 1 tablet (500 mg total) by mouth once daily.    loratadine (CLARITIN) 10 mg tablet Take 10 mg by mouth once daily.    losartan-hydrochlorothiazide 100-12.5 mg (HYZAAR) 100-12.5 mg Tab Take 1 tablet by mouth once daily.    nystatin (MYCOSTATIN) powder Apply topically 2 (two) times daily.    predniSONE (DELTASONE) 10 MG tablet Take one tablet by mouth 2 times a day. On days 1-5 of chemotherapy cycles only. To be combined with 50mg tabs for a total of 160mg    predniSONE (DELTASONE) 50 MG Tab Take 3 tablets (150 mg total) by mouth 2 (two) times daily. On days 1-5 of chemotherapy cycles. To be combined with 10mg tabs for a total of 160mg     No current facility-administered medications for this visit.      Facility-Administered Medications Ordered in Other Visits   Medication    pegfilgrastim injection 6 mg       Vitals:    05/24/19 1522   BP: 130/71   Pulse: (!) 121   Resp: 20   Temp: 98.5 °F (36.9 °C)     PS: ECOG1      Physical Exam   Constitutional: He is oriented to person, place, and time. He appears well-developed. No distress.   HENT:   Head: Normocephalic and atraumatic.   Mouth/Throat: No oropharyngeal exudate.   Eyes: No scleral icterus.   Neck: Normal range of motion.   Cardiovascular: Regular rhythm.   No murmur heard.  Tachycardic   Pulmonary/Chest: Effort normal and breath sounds normal. No respiratory distress. He has no wheezes. He has no rales.   Abdominal: Soft. He exhibits no distension. There is no tenderness. There is no rebound.   Lymphadenopathy:     He has no cervical adenopathy.   Neurological: He is alert and oriented to person, place, and time. No cranial nerve deficit.       2/7/19 Cerebrospinal Fluid     FINAL PATHOLOGIC DIAGNOSIS     Negative for malignant cells        3/1/19 CSF cytology FINAL PATHOLOGIC DIAGNOSIS     Cerebrospinal fluid (Cytology):Negative for malignant cells       4/5/19 PET CT                COMPARISON:  Prior FDG PET-CT  scan 01/15/2019    FINDINGS:  Quality of the study: Adequate.    There is more prominent non physiologic focal activity within the descending colon with a maximum SUV of 11.4, increased from 6.5 previously.  There are no hypermetabolic foci within the small bowel to suggest relapse.  There are no pathologically enlarged or hypermetabolic lymph nodes.  The spleen is normal in uptake and size at 11.2 cm in craniocaudal dimension.    There is diffusely enhanced uptake in the axial and appendicular skeleton.    Physiologic uptake of the tracer is present within the brain, salivary glands, myocardium, GI and  tracts.  There is persistent postsurgical uptake within the anterior abdominal wall.    Incidental CT findings: N/A    Internal reference SUVs are 2.2 and 4.9 for the mediastinal blood pool and normal liver background, respectively.       Impression         1. No definite evidence of residual/relapsed extranodal diffuse large B-cell lymphoma.    2.  Focal non physiologic activity within the descending colon without discrete CT correlate that is more conspicuous on the current PET exam.  This may represent a polyp.  Recommend direct visualization.    3.  Bone marrow hyperplasia, presumably related to pegfilgrastim administration.    The Deauville Score is X, presuming the colonic hypermetabolic focus is unrelated to DLBCL.  Otherwise, the score would be 5.         4/5/19 PET CT             FINDINGS:  Quality of the study: Adequate.    There is more prominent non physiologic focal activity within the descending colon with a maximum SUV of 11.4, increased from 6.5 previously.  There are no hypermetabolic foci within the small bowel to suggest relapse.  There are no pathologically enlarged or hypermetabolic lymph nodes.  The spleen is normal in uptake and size at 11.2 cm in craniocaudal dimension.    There is diffusely enhanced uptake in the axial and appendicular skeleton.    Physiologic uptake of the tracer is  present within the brain, salivary glands, myocardium, GI and  tracts.  There is persistent postsurgical uptake within the anterior abdominal wall.    Incidental CT findings: N/A    Internal reference SUVs are 2.2 and 4.9 for the mediastinal blood pool and normal liver background, respectively.       Impression         1. No definite evidence of residual/relapsed extranodal diffuse large B-cell lymphoma.    2.  Focal non physiologic activity within the descending colon without discrete CT correlate that is more conspicuous on the current PET exam.  This may represent a polyp.  Recommend direct visualization.    3.  Bone marrow hyperplasia, presumably related to pegfilgrastim administration.    The Deauville Score is X, presuming the colonic hypermetabolic focus is unrelated to DLBCL.  Otherwise, the score would be 5.         4/11/19 CSF cytology     FINAL PATHOLOGIC DIAGNOSIS  CEREBROSPINAL FLUID CYTOLOGY:  NEGATIVE EXAM-NO NEOPLASIA IDENTIFIED     Component      Latest Ref Rng & Units 5/23/2019   WBC      3.90 - 12.70 K/uL 6.84   RBC      4.60 - 6.20 M/uL 2.90 (L)   Hemoglobin      14.0 - 18.0 g/dL 9.1 (L)   Hematocrit      40.0 - 54.0 % 28.6 (L)   MCV      82 - 98 fL 99 (H)   MCH      27.0 - 31.0 pg 31.4 (H)   MCHC      32.0 - 36.0 g/dL 31.8 (L)   RDW      11.5 - 14.5 % 18.5 (H)   Platelets      150 - 350 K/uL 239   MPV      9.2 - 12.9 fL 10.2   Gran%      38.0 - 73.0 % 82.0 (H)   Lymph%      18.0 - 48.0 % 4.0 (L)   Mono%      4.0 - 15.0 % 8.0   Eosinophil%      0.0 - 8.0 % 0.0   Basophil%      0.0 - 1.9 % 0.0   BANDS      % 6.0   Platelet Estimate       Appears normal   Aniso       Moderate   Poik       Slight   Hypo       Occasional   Ovalocytes       Occasional   Target Cells       CANCELED   Differential Method       Manual   Sodium      136 - 145 mmol/L 141   Potassium      3.5 - 5.1 mmol/L 4.4   Chloride      95 - 110 mmol/L 105   CO2      23 - 29 mmol/L 25   Glucose      70 - 110 mg/dL 155 (H)   BUN,  Bld      6 - 20 mg/dL 14   Creatinine      0.5 - 1.4 mg/dL 0.8   Calcium      8.7 - 10.5 mg/dL 9.6   PROTEIN TOTAL      6.0 - 8.4 g/dL 6.6   Albumin      3.5 - 5.2 g/dL 3.6   BILIRUBIN TOTAL      0.1 - 1.0 mg/dL 0.2   Alkaline Phosphatase      55 - 135 U/L 67   AST      10 - 40 U/L 21   ALT      10 - 44 U/L 29   Anion Gap      8 - 16 mmol/L 11   eGFR if African American      >60 mL/min/1.73 m:2 >60   eGFR if non African American      >60 mL/min/1.73 m:2 >60   LD      110 - 260 U/L 249   Uric Acid      3.4 - 7.0 mg/dL 4.9       Assessment:     1. DLBCL of jejunum, GC sub type, high grade  2. Hypertension  3. Obstructive sleep apnea  4. Morbid obesity  5. Anemia, hypochromic        Plan:     1. 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 IPI score 0. It does not fulfil the criteria for Burkitts( ki 67 high, but not high enough), double or triple hit ( no bcl2 or bcl6 translocation). No PET avid measurable disease. No lymphoma in bone marrow. I discussed the treatment of stage I GI high grade lymphoma. I explained that although there is no active/measurable disease, chemotherapy with RCHOP or dose adjusted R-EPOCH is recommended, as he had bulky (8cm), high grade ( ki 67 > 60% ) lymphoma with MYC/IgH fusion. He will also need diagnostic LP. He went to [facility] for 2[facility] opinion.   He has received cycle 1 and 2 DA  EPOCH -R with IT MTX without any complication. CSF cytology negative for malignant cells on 2/7/19 and on 3/1/19.   ANC, platelet alvaro after C1 reviewed. Doses of Etoposide/Doxorubicin/Cytoxan were escalated by 1 level for C2. Doses for cycle 3 were the same as C2. C4 doses were escalated.   PET CT after 3 cycles showed no definite evidence of residual/relapsed extranodal diffuse large B-cell lymphoma. There was focal non physiologic activity within the descending colon without discrete CT correlate.  He wanted to proceed with all 6 cycles, although the data for 4 versus 6  cycles for stage I E lymphoma is limited, even with c-myc translocation.   He has completed 5 cycles. Doses for C6 will be the same as for cycle 5.  Role of Rt to the initial disease site ( due to bulk) discussed. He is not inclined to undergo RT.      2.On Atenolol, Losartan HCTZ      5. Mild, asymptomatic.               Answers for HPI/ROS submitted by the patient on 5/20/2019   appetite change : No  unexpected weight change: No  visual disturbance: No  adenopathy: No    Distress Screening Results: Psychosocial Distress screening score of Distress Score: 0 noted and reviewed. No intervention indicated.

## 2019-05-24 NOTE — Clinical Note
-Chemo Monday thru Friday -Pt wants to start chemo early on Monday morning-8AM if possible-Intra Thecal chemo next wk(order in)--cbc, cmp, ldh, uric acid, pet ct, f/u in 4 wks

## 2019-05-27 ENCOUNTER — PATIENT MESSAGE (OUTPATIENT)
Dept: HEMATOLOGY/ONCOLOGY | Facility: CLINIC | Age: 49
End: 2019-05-27

## 2019-05-27 ENCOUNTER — INFUSION (OUTPATIENT)
Dept: INFUSION THERAPY | Facility: HOSPITAL | Age: 49
End: 2019-05-27
Attending: INTERNAL MEDICINE
Payer: COMMERCIAL

## 2019-05-27 VITALS
TEMPERATURE: 98 F | HEIGHT: 67 IN | HEART RATE: 77 BPM | RESPIRATION RATE: 18 BRPM | SYSTOLIC BLOOD PRESSURE: 147 MMHG | BODY MASS INDEX: 47.78 KG/M2 | DIASTOLIC BLOOD PRESSURE: 73 MMHG | WEIGHT: 304.44 LBS | OXYGEN SATURATION: 98 %

## 2019-05-27 DIAGNOSIS — C83.39 DIFFUSE LARGE B-CELL LYMPHOMA OF SOLID ORGAN EXCLUDING SPLEEN: Primary | ICD-10-CM

## 2019-05-27 PROCEDURE — 25000003 PHARM REV CODE 250: Performed by: INTERNAL MEDICINE

## 2019-05-27 PROCEDURE — 96375 TX/PRO/DX INJ NEW DRUG ADDON: CPT

## 2019-05-27 PROCEDURE — 96367 TX/PROPH/DG ADDL SEQ IV INF: CPT

## 2019-05-27 PROCEDURE — 96413 CHEMO IV INFUSION 1 HR: CPT

## 2019-05-27 PROCEDURE — 96416 CHEMO PROLONG INFUSE W/PUMP: CPT

## 2019-05-27 PROCEDURE — S0028 INJECTION, FAMOTIDINE, 20 MG: HCPCS | Performed by: INTERNAL MEDICINE

## 2019-05-27 PROCEDURE — 63600175 PHARM REV CODE 636 W HCPCS: Performed by: INTERNAL MEDICINE

## 2019-05-27 PROCEDURE — 96415 CHEMO IV INFUSION ADDL HR: CPT

## 2019-05-27 RX ORDER — SODIUM CHLORIDE 0.9 % (FLUSH) 0.9 %
10 SYRINGE (ML) INJECTION
Status: DISCONTINUED | OUTPATIENT
Start: 2019-05-27 | End: 2019-05-27 | Stop reason: HOSPADM

## 2019-05-27 RX ORDER — MEPERIDINE HYDROCHLORIDE 50 MG/ML
25 INJECTION INTRAMUSCULAR; INTRAVENOUS; SUBCUTANEOUS
Status: DISCONTINUED | OUTPATIENT
Start: 2019-05-27 | End: 2019-05-27 | Stop reason: HOSPADM

## 2019-05-27 RX ORDER — ACETAMINOPHEN 325 MG/1
650 TABLET ORAL
Status: COMPLETED | OUTPATIENT
Start: 2019-05-27 | End: 2019-05-27

## 2019-05-27 RX ORDER — HEPARIN 100 UNIT/ML
500 SYRINGE INTRAVENOUS
Status: DISCONTINUED | OUTPATIENT
Start: 2019-05-27 | End: 2019-05-27 | Stop reason: HOSPADM

## 2019-05-27 RX ORDER — FAMOTIDINE 10 MG/ML
20 INJECTION INTRAVENOUS
Status: COMPLETED | OUTPATIENT
Start: 2019-05-27 | End: 2019-05-27

## 2019-05-27 RX ADMIN — DIPHENHYDRAMINE HYDROCHLORIDE 50 MG: 50 INJECTION, SOLUTION INTRAMUSCULAR; INTRAVENOUS at 08:05

## 2019-05-27 RX ADMIN — VINCRISTINE SULFATE 32 MG: 1 INJECTION, SOLUTION INTRAVENOUS at 12:05

## 2019-05-27 RX ADMIN — ACETAMINOPHEN 650 MG: 325 TABLET ORAL at 08:05

## 2019-05-27 RX ADMIN — SODIUM CHLORIDE: 9 INJECTION, SOLUTION INTRAVENOUS at 08:05

## 2019-05-27 RX ADMIN — RITUXIMAB 975 MG: 10 INJECTION, SOLUTION INTRAVENOUS at 08:05

## 2019-05-27 RX ADMIN — FAMOTIDINE 20 MG: 10 INJECTION, SOLUTION INTRAVENOUS at 08:05

## 2019-05-27 RX ADMIN — DEXAMETHASONE SODIUM PHOSPHATE: 4 INJECTION, SOLUTION INTRA-ARTICULAR; INTRALESIONAL; INTRAMUSCULAR; INTRAVENOUS; SOFT TISSUE at 11:05

## 2019-05-27 NOTE — PLAN OF CARE
Problem: Adult Inpatient Plan of Care  Goal: Plan of Care Review  Outcome: Ongoing (interventions implemented as appropriate)  Pt tolerated Rituxan infusion well, with no complications or s/s of adverse reaction. VS stable throughout infusion. Right chest port positive for blood return, EPOCH pump connected and infusing without difficulty, port site secured. Pt reminded to check pump twice/day to make sure running properly. Will return to clinic tomorrow 5/28/19 at 11 am for pump exchange, pt and wife verbalized understanding. Pt discharged with no distress noted and ambulated indepedently out of infusion center accompanied by wife. AVS printed and given to pt.

## 2019-05-28 ENCOUNTER — INFUSION (OUTPATIENT)
Dept: INFUSION THERAPY | Facility: HOSPITAL | Age: 49
End: 2019-05-28
Attending: INTERNAL MEDICINE
Payer: COMMERCIAL

## 2019-05-28 VITALS
DIASTOLIC BLOOD PRESSURE: 71 MMHG | RESPIRATION RATE: 18 BRPM | SYSTOLIC BLOOD PRESSURE: 153 MMHG | TEMPERATURE: 98 F | HEART RATE: 85 BPM

## 2019-05-28 DIAGNOSIS — C83.39 DIFFUSE LARGE B-CELL LYMPHOMA OF SOLID ORGAN EXCLUDING SPLEEN: Primary | ICD-10-CM

## 2019-05-28 DIAGNOSIS — G62.0 DRUG-INDUCED POLYNEUROPATHY: Primary | ICD-10-CM

## 2019-05-28 PROCEDURE — 96521 REFILL/MAINT PORTABLE PUMP: CPT

## 2019-05-28 PROCEDURE — 36593 DECLOT VASCULAR DEVICE: CPT

## 2019-05-28 PROCEDURE — 25000003 PHARM REV CODE 250: Performed by: INTERNAL MEDICINE

## 2019-05-28 PROCEDURE — 63600175 PHARM REV CODE 636 W HCPCS: Performed by: INTERNAL MEDICINE

## 2019-05-28 RX ORDER — HEPARIN 100 UNIT/ML
500 SYRINGE INTRAVENOUS
Status: DISCONTINUED | OUTPATIENT
Start: 2019-05-28 | End: 2019-05-28 | Stop reason: HOSPADM

## 2019-05-28 RX ORDER — GABAPENTIN 300 MG/1
300 CAPSULE ORAL 3 TIMES DAILY
Qty: 90 CAPSULE | Refills: 2 | Status: SHIPPED | OUTPATIENT
Start: 2019-05-28 | End: 2020-02-26

## 2019-05-28 RX ORDER — SODIUM CHLORIDE 0.9 % (FLUSH) 0.9 %
10 SYRINGE (ML) INJECTION
Status: DISCONTINUED | OUTPATIENT
Start: 2019-05-28 | End: 2019-05-28 | Stop reason: HOSPADM

## 2019-05-28 RX ADMIN — ALTEPLASE 2 MG: 2.2 INJECTION, POWDER, LYOPHILIZED, FOR SOLUTION INTRAVENOUS at 01:05

## 2019-05-28 RX ADMIN — VINCRISTINE SULFATE 32 MG: 1 INJECTION, SOLUTION INTRAVENOUS at 12:05

## 2019-05-28 NOTE — PLAN OF CARE
"Problem: Adult Inpatient Plan of Care  Goal: Plan of Care Review  Outcome: Ongoing (interventions implemented as appropriate)  Patient arrived for pump exchange. No blood return present. Patient states had great blood return day prior but not when placed on pump and this happens frequently. Patient stated he "tasted" saline. No swelling or resistence met when flushing port. Patient refused cath zuly. Port re-accessed. Notified Dr. Hwang. Ok to proceed. Patient then appeared very nervous blood was not obtained. Patient then verbalized ok to instill cath zuly. Cath zuly instill x 30 min then blood flow from port obtained. Connected to CADD pump around 1345. Settings verified by 2 RNs. Verbalized understanding to call MD for any questions/concerns. Denied avs. Discharged home, ambulated independently with wife by side.      "

## 2019-05-29 ENCOUNTER — INFUSION (OUTPATIENT)
Dept: INFUSION THERAPY | Facility: HOSPITAL | Age: 49
End: 2019-05-29
Attending: INTERNAL MEDICINE
Payer: COMMERCIAL

## 2019-05-29 VITALS
DIASTOLIC BLOOD PRESSURE: 71 MMHG | TEMPERATURE: 98 F | SYSTOLIC BLOOD PRESSURE: 145 MMHG | RESPIRATION RATE: 18 BRPM | HEART RATE: 90 BPM

## 2019-05-29 DIAGNOSIS — C83.39 DIFFUSE LARGE B-CELL LYMPHOMA OF SOLID ORGAN EXCLUDING SPLEEN: Primary | ICD-10-CM

## 2019-05-29 PROCEDURE — 25000003 PHARM REV CODE 250: Performed by: INTERNAL MEDICINE

## 2019-05-29 PROCEDURE — 96416 CHEMO PROLONG INFUSE W/PUMP: CPT

## 2019-05-29 PROCEDURE — 63600175 PHARM REV CODE 636 W HCPCS: Performed by: INTERNAL MEDICINE

## 2019-05-29 RX ADMIN — VINCRISTINE SULFATE 32 MG: 1 INJECTION, SOLUTION INTRAVENOUS at 01:05

## 2019-05-29 NOTE — PLAN OF CARE
Problem: Nausea and Vomiting (Chemotherapy Effects)  Goal: Fluid and Electrolyte Balance  Outcome: Ongoing (interventions implemented as appropriate)  Patient here for EPOCH bag exchange.  Tolerating well.  No reactions noted.  No questions or concerns at this time.  Ambulated off unit in NAD.

## 2019-05-30 ENCOUNTER — INFUSION (OUTPATIENT)
Dept: INFUSION THERAPY | Facility: HOSPITAL | Age: 49
End: 2019-05-30
Attending: NURSE PRACTITIONER
Payer: COMMERCIAL

## 2019-05-30 VITALS
WEIGHT: 304 LBS | SYSTOLIC BLOOD PRESSURE: 140 MMHG | DIASTOLIC BLOOD PRESSURE: 70 MMHG | BODY MASS INDEX: 47.71 KG/M2 | HEART RATE: 86 BPM | HEIGHT: 67 IN | TEMPERATURE: 98 F | RESPIRATION RATE: 17 BRPM

## 2019-05-30 DIAGNOSIS — C83.39 DIFFUSE LARGE B-CELL LYMPHOMA OF SOLID ORGAN EXCLUDING SPLEEN: Primary | ICD-10-CM

## 2019-05-30 PROCEDURE — 63600175 PHARM REV CODE 636 W HCPCS: Performed by: INTERNAL MEDICINE

## 2019-05-30 PROCEDURE — 96416 CHEMO PROLONG INFUSE W/PUMP: CPT

## 2019-05-30 PROCEDURE — 25000003 PHARM REV CODE 250: Performed by: INTERNAL MEDICINE

## 2019-05-30 RX ORDER — SODIUM CHLORIDE 0.9 % (FLUSH) 0.9 %
10 SYRINGE (ML) INJECTION
Status: DISCONTINUED | OUTPATIENT
Start: 2019-05-30 | End: 2019-05-30 | Stop reason: HOSPADM

## 2019-05-30 RX ORDER — HEPARIN 100 UNIT/ML
500 SYRINGE INTRAVENOUS
Status: DISCONTINUED | OUTPATIENT
Start: 2019-05-30 | End: 2019-05-30 | Stop reason: HOSPADM

## 2019-05-30 RX ADMIN — VINCRISTINE SULFATE 32 MG: 1 INJECTION, SOLUTION INTRAVENOUS at 01:05

## 2019-05-30 NOTE — PLAN OF CARE
Problem: Adult Inpatient Plan of Care  Goal: Plan of Care Review  Outcome: Ongoing (interventions implemented as appropriate)  Pt with Cadd pump infusing to right chest wall port at 20 ml/hr without issue prior to d/c, pt to rtc around 1400 tomorrow for pump d/c, but may be a little late cause has lumbar puncture at 1300 and was not able to get rescheduled, no distress noted upon d/c to home

## 2019-05-30 NOTE — PLAN OF CARE
Problem: Adult Inpatient Plan of Care  Goal: Plan of Care Review  Outcome: Ongoing (interventions implemented as appropriate)  Pt here for EPOCH pump change, labs, hx, meds, allergies reviewed, pt with no complaints at this time, continue to monitor

## 2019-05-31 ENCOUNTER — HOSPITAL ENCOUNTER (OUTPATIENT)
Dept: RADIOLOGY | Facility: HOSPITAL | Age: 49
Discharge: HOME | End: 2019-05-31
Attending: INTERNAL MEDICINE
Payer: COMMERCIAL

## 2019-05-31 ENCOUNTER — INFUSION (OUTPATIENT)
Dept: INFUSION THERAPY | Facility: HOSPITAL | Age: 49
End: 2019-05-31
Attending: INTERNAL MEDICINE
Payer: COMMERCIAL

## 2019-05-31 VITALS
DIASTOLIC BLOOD PRESSURE: 80 MMHG | SYSTOLIC BLOOD PRESSURE: 162 MMHG | RESPIRATION RATE: 20 BRPM | TEMPERATURE: 98 F | HEART RATE: 85 BPM

## 2019-05-31 DIAGNOSIS — C83.39 DIFFUSE LARGE B-CELL LYMPHOMA OF SOLID ORGAN EXCLUDING SPLEEN: Primary | ICD-10-CM

## 2019-05-31 PROCEDURE — 96450 CHEMOTHERAPY INTO CNS: CPT | Mod: ,,, | Performed by: RADIOLOGY

## 2019-05-31 PROCEDURE — 25000003 PHARM REV CODE 250: Performed by: INTERNAL MEDICINE

## 2019-05-31 PROCEDURE — 96450 CHEMOTHERAPY INTO CNS: CPT

## 2019-05-31 PROCEDURE — 96367 TX/PROPH/DG ADDL SEQ IV INF: CPT

## 2019-05-31 PROCEDURE — 63600175 PHARM REV CODE 636 W HCPCS: Performed by: INTERNAL MEDICINE

## 2019-05-31 PROCEDURE — 63600175 PHARM REV CODE 636 W HCPCS: Mod: JG | Performed by: INTERNAL MEDICINE

## 2019-05-31 PROCEDURE — 96413 CHEMO IV INFUSION 1 HR: CPT

## 2019-05-31 PROCEDURE — 96450 FL CHEMO ADMINISTRATION INTRATHECAL WITH LP: ICD-10-PCS | Mod: ,,, | Performed by: RADIOLOGY

## 2019-05-31 PROCEDURE — A4216 STERILE WATER/SALINE, 10 ML: HCPCS | Performed by: INTERNAL MEDICINE

## 2019-05-31 PROCEDURE — 96377 APPLICATON ON-BODY INJECTOR: CPT

## 2019-05-31 RX ORDER — LIDOCAINE HYDROCHLORIDE 10 MG/ML
3 INJECTION INFILTRATION; PERINEURAL ONCE
Status: COMPLETED | OUTPATIENT
Start: 2019-05-31 | End: 2019-05-31

## 2019-05-31 RX ORDER — LIDOCAINE HYDROCHLORIDE 10 MG/ML
1 INJECTION INFILTRATION; PERINEURAL ONCE
Status: DISCONTINUED | OUTPATIENT
Start: 2019-05-31 | End: 2019-05-31

## 2019-05-31 RX ORDER — SODIUM CHLORIDE 0.9 % (FLUSH) 0.9 %
10 SYRINGE (ML) INJECTION
Status: DISCONTINUED | OUTPATIENT
Start: 2019-05-31 | End: 2019-05-31 | Stop reason: HOSPADM

## 2019-05-31 RX ORDER — HEPARIN 100 UNIT/ML
500 SYRINGE INTRAVENOUS
Status: DISCONTINUED | OUTPATIENT
Start: 2019-05-31 | End: 2019-05-31 | Stop reason: HOSPADM

## 2019-05-31 RX ADMIN — LIDOCAINE HYDROCHLORIDE 3 ML: 10 INJECTION, SOLUTION INFILTRATION; PERINEURAL at 01:05

## 2019-05-31 RX ADMIN — Medication 10 ML: at 03:05

## 2019-05-31 RX ADMIN — Medication: at 01:05

## 2019-05-31 RX ADMIN — HEPARIN 500 UNITS: 100 SYRINGE at 03:05

## 2019-05-31 RX ADMIN — DEXAMETHASONE SODIUM PHOSPHATE: 4 INJECTION, SOLUTION INTRA-ARTICULAR; INTRALESIONAL; INTRAMUSCULAR; INTRAVENOUS; SOFT TISSUE at 02:05

## 2019-05-31 RX ADMIN — PEGFILGRASTIM 6 MG: KIT SUBCUTANEOUS at 03:05

## 2019-05-31 RX ADMIN — CYCLOPHOSPHAMIDE 2340 MG: 1 INJECTION, POWDER, FOR SOLUTION INTRAVENOUS; ORAL at 03:05

## 2019-05-31 NOTE — PROCEDURES
Radiology Post-Procedure Note    Pre Op Diagnosis: DLBCL    Post Op Diagnosis: Same    Procedure: lumbar puncture with intrathecal chemotherapy administration    Procedure performed by: Edwin Gardner MD    Written Informed Consent Obtained: Yes    Specimen Removed: None    Estimated Blood Loss: Minimal    Findings: Following written informed consent and sterile prep and drape, a 22 gauge spinal needle was inserted at L3 - L4 intralaminar space under fluoroscopic surveillance. 2 cc clear CSF was removed for flush. Intrathecal chemotherapy was administered by the oncology nurse practitioner and the needle was flushed with CSF. There were no complications.    Patient tolerated procedure well.    Edwin Gardner MD  Radiology PGY-3  [phone]

## 2019-05-31 NOTE — PLAN OF CARE
Problem: Adult Inpatient Plan of Care  Goal: Plan of Care Review  Outcome: Ongoing (interventions implemented as appropriate)  1610:  EPOCH infusion complete, upon arrival to Infusion Center, pump disconnected, pre-med and Cytoxan administered as ordered.  Port flushed per protocol and de-accessed, Neulasta OBI intact to MARIBEL and functioning properly.  Patient released in stable condition, ambulated off unit, accompanied by several family members.

## 2019-05-31 NOTE — H&P
Radiology History & Physical      SUBJECTIVE:     Chief Complaint: DLBCL    History of Present Illness:  Giorgio Streeter is a 48 y.o. male who presents for intrathecal chemotherapy administration.   Past Medical History:   Diagnosis Date    Cancer     non hodgkins lymphoma    Hypertension     Sleep apnea     Vision abnormalities      Past Surgical History:   Procedure Laterality Date    BONE MARROW BIOPSY  01/11/2019    EXCISION, SMALL INTESTINE N/A 12/21/2018    Performed by [facility]ilio [facility] at [facility] OR 2ND FLR    INSERTION, PORT-A-CATH N/A 1/28/2019    Performed by [facility] at [facility] CATH LAB    SMALL INTESTINE SURGERY  12/21/2018    Dr. Casanova, segmental small bowel resection        Home Meds:   Prior to Admission medications    Medication Sig Start Date End Date Taking? Authorizing Provider   acyclovir (ZOVIRAX) 400 MG tablet Take 1 tablet (400 mg total) by mouth 2 (two) times daily. 4/1/19 3/31/20  Sachi Araya NP   allopurinol (ZYLOPRIM) 300 MG tablet Take 1 tablet (300 mg total) by mouth once daily. 4/1/19 6/2/19  Sachi Araya NP   ALPRAZolam (XANAX) 0.25 MG tablet Take 1 tablet (0.25 mg total) by mouth 2 (two) times daily as needed for Insomnia or Anxiety. 2/6/19 4/5/19  Ashlee Luna NP   atenolol (TENORMIN) 50 MG tablet Take 1 tablet (50 mg total) by mouth once daily. 2/6/19 2/6/20  Ashlee Luna NP   atenolol (TENORMIN) 50 MG tablet Take 1 tablet (50 mg total) by mouth once daily. 5/3/19   Ashlee Luna NP   famotidine (PEPCID) 20 MG tablet Take 1 tablet (20 mg total) by mouth 2 (two) times daily. 4/1/19 3/31/20  Sachi Araya NP   fexofenadine (ALLEGRA) 60 MG tablet Take 60 mg by mouth once daily.    Historical Provider, MD   fluconazole (DIFLUCAN) 200 MG Tab Take 2 tablets (400 mg total) by mouth once daily. 5/3/19 6/2/19  Ector Hwang MD   fluticasone (FLONASE) 50 mcg/actuation nasal spray 1 spray by Each Nare route once daily.    Historical  Provider, MD   gabapentin (NEURONTIN) 300 MG capsule Take 1 capsule (300 mg total) by mouth 3 (three) times daily. 5/28/19 5/27/20  Ector Hwang MD   ketoconazole (NIZORAL) 2 % cream  1/31/19   Historical Provider, MD   levoFLOXacin (LEVAQUIN) 500 MG tablet Take 1 tablet (500 mg total) by mouth once daily. 4/1/19   Sachi Araya NP   loratadine (CLARITIN) 10 mg tablet Take 10 mg by mouth once daily.    Historical Provider, MD   losartan-hydrochlorothiazide 100-12.5 mg (HYZAAR) 100-12.5 mg Tab Take 1 tablet by mouth once daily. 2/6/19   Ashlee Luna NP   nystatin (MYCOSTATIN) powder Apply topically 2 (two) times daily. 2/6/19   Ashlee Luna NP   predniSONE (DELTASONE) 10 MG tablet Take one tablet by mouth 2 times a day. On days 1-5 of chemotherapy cycles only. To be combined with 50mg tabs for a total of 160mg 4/1/19   Sachi Araya NP   predniSONE (DELTASONE) 50 MG Tab Take 3 tablets (150 mg total) by mouth 2 (two) times daily. On days 1-5 of chemotherapy cycles. To be combined with 10mg tabs for a total of 160mg 4/1/19   Sachi Araya NP     Anticoagulants/Antiplatelets: no anticoagulation    Allergies: Review of patient's allergies indicates:  No Known Allergies  Sedation History:  no adverse reactions    Review of Systems:   Hematological: no known coagulopathies  Respiratory: no shortness of breath  Cardiovascular: no chest pain  Gastrointestinal: no abdominal pain  Genito-Urinary: no dysuria  Musculoskeletal: negative  Neurological: no TIA or stroke symptoms         OBJECTIVE:     Vital Signs (Most Recent)       Physical Exam:  ASA: 2  Mallampati: 2    General: no acute distress  Mental Status: alert and oriented to person, place and time  HEENT: normocephalic, atraumatic  Chest: unlabored breathing  Heart: regular heart rate  Abdomen: nondistended  Extremity: moves all extremities    Laboratory  Lab Results   Component Value Date    INR 1.0 02/06/2019       Lab Results   Component  Value Date    WBC 6.84 05/23/2019    HGB 9.1 (L) 05/23/2019    HCT 28.6 (L) 05/23/2019    MCV 99 (H) 05/23/2019     05/23/2019      Lab Results   Component Value Date     (H) 05/23/2019     05/23/2019    K 4.4 05/23/2019     05/23/2019    CO2 25 05/23/2019    BUN 14 05/23/2019    CREATININE 0.8 05/23/2019    CALCIUM 9.6 05/23/2019    MG 2.2 02/06/2019    ALT 29 05/23/2019    AST 21 05/23/2019    ALBUMIN 3.6 05/23/2019    BILITOT 0.2 05/23/2019       ASSESSMENT/PLAN:     Sedation Plan: local  Patient will undergo lumbar puncture with intrathecal chemotherapy administration.     Edwin Gardner MD  Radiology PGY-3  [phone]

## 2019-06-03 DIAGNOSIS — C83.39 DIFFUSE LARGE B-CELL LYMPHOMA OF SOLID ORGAN EXCLUDING SPLEEN: ICD-10-CM

## 2019-06-03 DIAGNOSIS — C83.30 DIFFUSE LARGE B-CELL LYMPHOMA, UNSPECIFIED BODY REGION: ICD-10-CM

## 2019-06-03 RX ORDER — ALLOPURINOL 300 MG/1
300 TABLET ORAL DAILY
Qty: 30 TABLET | Refills: 1 | Status: CANCELLED | OUTPATIENT
Start: 2019-06-03 | End: 2019-07-03

## 2019-06-03 RX ORDER — FLUCONAZOLE 200 MG/1
400 TABLET ORAL DAILY
Qty: 60 TABLET | Refills: 0 | Status: CANCELLED | OUTPATIENT
Start: 2019-06-03 | End: 2019-07-03

## 2019-06-05 ENCOUNTER — PATIENT MESSAGE (OUTPATIENT)
Dept: HEMATOLOGY/ONCOLOGY | Facility: CLINIC | Age: 49
End: 2019-06-05

## 2019-06-10 ENCOUNTER — PATIENT MESSAGE (OUTPATIENT)
Dept: HEMATOLOGY/ONCOLOGY | Facility: CLINIC | Age: 49
End: 2019-06-10

## 2019-06-13 ENCOUNTER — PATIENT MESSAGE (OUTPATIENT)
Dept: HEMATOLOGY/ONCOLOGY | Facility: CLINIC | Age: 49
End: 2019-06-13

## 2019-06-13 ENCOUNTER — PATIENT MESSAGE (OUTPATIENT)
Dept: GASTROENTEROLOGY | Facility: CLINIC | Age: 49
End: 2019-06-13

## 2019-06-13 ENCOUNTER — TELEPHONE (OUTPATIENT)
Dept: GASTROENTEROLOGY | Facility: CLINIC | Age: 49
End: 2019-06-13

## 2019-06-13 NOTE — TELEPHONE ENCOUNTER
----- Message from Angela Reynoso sent at 6/13/2019 10:24 AM CDT -----  Type: Needs Medical Advice    Who Called:  Patient   Best Call Back Number: [phone]  Additional Information: patient referred by Dr. Hwang with [facility] for a colonoscopy, please contact to schedule    Thank you

## 2019-06-17 ENCOUNTER — ANESTHESIA EVENT (OUTPATIENT)
Dept: ENDOSCOPY | Facility: HOSPITAL | Age: 49
End: 2019-06-17
Payer: COMMERCIAL

## 2019-06-19 ENCOUNTER — HOSPITAL ENCOUNTER (OUTPATIENT)
Facility: HOSPITAL | Age: 49
Discharge: HOME | End: 2019-06-19
Attending: INTERNAL MEDICINE | Admitting: INTERNAL MEDICINE
Payer: COMMERCIAL

## 2019-06-19 ENCOUNTER — ANESTHESIA (OUTPATIENT)
Dept: ENDOSCOPY | Facility: HOSPITAL | Age: 49
End: 2019-06-19
Payer: COMMERCIAL

## 2019-06-19 VITALS
WEIGHT: 310 LBS | SYSTOLIC BLOOD PRESSURE: 131 MMHG | BODY MASS INDEX: 48.65 KG/M2 | OXYGEN SATURATION: 99 % | DIASTOLIC BLOOD PRESSURE: 64 MMHG | RESPIRATION RATE: 16 BRPM | HEIGHT: 67 IN | HEART RATE: 73 BPM | TEMPERATURE: 98 F

## 2019-06-19 DIAGNOSIS — Z12.11 SCREEN FOR COLON CANCER: ICD-10-CM

## 2019-06-19 PROCEDURE — 63600175 PHARM REV CODE 636 W HCPCS: Mod: PO | Performed by: NURSE ANESTHETIST, CERTIFIED REGISTERED

## 2019-06-19 PROCEDURE — 88305 TISSUE SPECIMEN TO PATHOLOGY - SURGERY: ICD-10-PCS | Mod: 26,,, | Performed by: PATHOLOGY

## 2019-06-19 PROCEDURE — 45385 COLONOSCOPY W/LESION REMOVAL: CPT | Mod: PO | Performed by: INTERNAL MEDICINE

## 2019-06-19 PROCEDURE — 37000008 HC ANESTHESIA 1ST 15 MINUTES: Mod: PO | Performed by: INTERNAL MEDICINE

## 2019-06-19 PROCEDURE — D9220A PRA ANESTHESIA: ICD-10-PCS | Mod: 33,CRNA,, | Performed by: NURSE ANESTHETIST, CERTIFIED REGISTERED

## 2019-06-19 PROCEDURE — D9220A PRA ANESTHESIA: ICD-10-PCS | Mod: 33,ANES,, | Performed by: ANESTHESIOLOGY

## 2019-06-19 PROCEDURE — D9220A PRA ANESTHESIA: Mod: 33,CRNA,, | Performed by: NURSE ANESTHETIST, CERTIFIED REGISTERED

## 2019-06-19 PROCEDURE — D9220A PRA ANESTHESIA: Mod: 33,ANES,, | Performed by: ANESTHESIOLOGY

## 2019-06-19 PROCEDURE — 45385 PR COLONOSCOPY,REMV LESN,SNARE: ICD-10-PCS | Mod: ,,, | Performed by: INTERNAL MEDICINE

## 2019-06-19 PROCEDURE — 45380 COLONOSCOPY AND BIOPSY: CPT | Mod: 59,,, | Performed by: INTERNAL MEDICINE

## 2019-06-19 PROCEDURE — 45380 COLONOSCOPY AND BIOPSY: CPT | Mod: PO | Performed by: INTERNAL MEDICINE

## 2019-06-19 PROCEDURE — 27201012 HC FORCEPS, HOT/COLD, DISP: Mod: PO | Performed by: INTERNAL MEDICINE

## 2019-06-19 PROCEDURE — 88305 TISSUE EXAM BY PATHOLOGIST: CPT | Mod: 26,,, | Performed by: PATHOLOGY

## 2019-06-19 PROCEDURE — 25000003 PHARM REV CODE 250: Mod: PO | Performed by: INTERNAL MEDICINE

## 2019-06-19 PROCEDURE — 88305 TISSUE EXAM BY PATHOLOGIST: CPT | Performed by: PATHOLOGY

## 2019-06-19 PROCEDURE — 37000009 HC ANESTHESIA EA ADD 15 MINS: Mod: PO | Performed by: INTERNAL MEDICINE

## 2019-06-19 PROCEDURE — 27201089 HC SNARE, DISP (ANY): Mod: PO | Performed by: INTERNAL MEDICINE

## 2019-06-19 PROCEDURE — 45385 COLONOSCOPY W/LESION REMOVAL: CPT | Mod: ,,, | Performed by: INTERNAL MEDICINE

## 2019-06-19 PROCEDURE — 45380 PR COLONOSCOPY,BIOPSY: ICD-10-PCS | Mod: 59,,, | Performed by: INTERNAL MEDICINE

## 2019-06-19 RX ORDER — LIDOCAINE HCL/PF 100 MG/5ML
SYRINGE (ML) INTRAVENOUS
Status: DISCONTINUED | OUTPATIENT
Start: 2019-06-19 | End: 2019-06-19

## 2019-06-19 RX ORDER — LIDOCAINE HYDROCHLORIDE 10 MG/ML
1 INJECTION INFILTRATION; PERINEURAL ONCE
Status: COMPLETED | OUTPATIENT
Start: 2019-06-19 | End: 2019-06-19

## 2019-06-19 RX ORDER — PROPOFOL 10 MG/ML
VIAL (ML) INTRAVENOUS
Status: DISCONTINUED | OUTPATIENT
Start: 2019-06-19 | End: 2019-06-19

## 2019-06-19 RX ORDER — SODIUM CHLORIDE, SODIUM LACTATE, POTASSIUM CHLORIDE, CALCIUM CHLORIDE 600; 310; 30; 20 MG/100ML; MG/100ML; MG/100ML; MG/100ML
INJECTION, SOLUTION INTRAVENOUS CONTINUOUS
Status: DISCONTINUED | OUTPATIENT
Start: 2019-06-19 | End: 2019-06-19 | Stop reason: HOSPADM

## 2019-06-19 RX ORDER — SODIUM CHLORIDE 0.9 % (FLUSH) 0.9 %
10 SYRINGE (ML) INJECTION
Status: DISCONTINUED | OUTPATIENT
Start: 2019-06-19 | End: 2019-06-19 | Stop reason: HOSPADM

## 2019-06-19 RX ADMIN — SODIUM CHLORIDE, SODIUM LACTATE, POTASSIUM CHLORIDE, AND CALCIUM CHLORIDE: .6; .31; .03; .02 INJECTION, SOLUTION INTRAVENOUS at 07:06

## 2019-06-19 RX ADMIN — PROPOFOL 30 MG: 10 INJECTION, EMULSION INTRAVENOUS at 07:06

## 2019-06-19 RX ADMIN — LIDOCAINE HYDROCHLORIDE: 10 INJECTION, SOLUTION EPIDURAL; INFILTRATION; INTRACAUDAL; PERINEURAL at 07:06

## 2019-06-19 RX ADMIN — PROPOFOL 50 MG: 10 INJECTION, EMULSION INTRAVENOUS at 07:06

## 2019-06-19 RX ADMIN — LIDOCAINE HYDROCHLORIDE 100 MG: 20 INJECTION, SOLUTION INTRAVENOUS at 07:06

## 2019-06-19 RX ADMIN — PROPOFOL 130 MG: 10 INJECTION, EMULSION INTRAVENOUS at 07:06

## 2019-06-19 NOTE — TRANSFER OF CARE
"Anesthesia Transfer of Care Note    Patient: Giorgio Streeter    Procedure(s) Performed: Procedure(s) (LRB):  COLONOSCOPY (N/A)    Patient location: PACU    Anesthesia Type: general    Transport from OR: Transported from OR on room air with adequate spontaneous ventilation    Post pain: adequate analgesia    Post assessment: no apparent anesthetic complications and tolerated procedure well    Post vital signs: stable    Level of consciousness: awake and sedated    Nausea/Vomiting: no nausea/vomiting    Complications: none    Transfer of care protocol was followed      Last vitals:   Visit Vitals  BP (!) 119/55 (BP Location: Right arm, Patient Position: Lying)   Pulse 79   Temp 36.7 °C (98.1 °F) (Skin)   Resp 18   Ht 5' 7" (1.702 m)   Wt (!) 140.6 kg (310 lb)   SpO2 98%   BMI 48.55 kg/m²     "

## 2019-06-19 NOTE — [employer] PATIENT INSTRUCTIONS
Discharge Summary/Instructions after an Endoscopic Procedure  Patient Name: Giorgio Streeter  Patient MRN: [id number]  Patient YOB: 1970 Wednesday, June 19, 2019  Mustapha Harrington MD  RESTRICTIONS:  During your procedure today, you received medications for sedation.  These   medications may affect your judgment, balance and coordination.  Therefore,   for 24 hours, you have the following restrictions:   - DO NOT drive a car, operate machinery, make legal/financial decisions,   sign important papers or drink alcohol.    ACTIVITY:  Today: no heavy lifting, straining or running due to procedural   sedation/anesthesia.  The following day: return to full activity including work.  DIET:  Eat and drink normally unless instructed otherwise.     TREATMENT FOR COMMON SIDE EFFECTS:  - Mild abdominal pain, nausea, belching, bloating or excessive gas:  rest,   eat lightly and use a heating pad.  - Sore Throat: treat with throat lozenges and/or gargle with warm salt   water.  - Because air was used during the procedure, expelling large amounts of air   from your rectum or belching is normal.  - If a bowel prep was taken, you may not have a bowel movement for 1-3 days.    This is normal.  SYMPTOMS TO WATCH FOR AND REPORT TO YOUR PHYSICIAN:  1. Abdominal pain or bloating, other than gas cramps.  2. Chest pain.  3. Back pain.  4. Signs of infection such as: chills or fever occurring within 24 hours   after the procedure.  5. Rectal bleeding, which would show as bright red, maroon, or black stools.   (A tablespoon of blood from the rectum is not serious, especially if   hemorrhoids are present.)  6. Vomiting.  7. Weakness or dizziness.  GO DIRECTLY TO THE NEAREST EMERGENCY ROOM IF YOU HAVE ANY OF THE FOLLOWING:      Difficulty breathing              Chills and/or fever over 101 F   Persistent vomiting and/or vomiting blood   Severe abdominal pain   Severe chest pain   Black, tarry stools   Bleeding- more than one  tablespoon   Any other symptom or condition that you feel may need urgent attention  Your doctor recommends these additional instructions:  If any biopsies were taken, your doctors clinic will contact you in 1 to 2   weeks with any results.  We are waiting for your pathology results.   Your physician has recommended a repeat colonoscopy in five years for   surveillance based on pathology results.   You are being discharged to home.  For questions, problems or results please call your physician - Mustapha Harrington MD at Work:  [phone].  EMERGENCY PHONE NUMBER: [phone], LAB RESULTS: [phone]  IF A COMPLICATION OR EMERGENCY SITUATION ARISES AND YOU ARE UNABLE TO REACH   YOUR PHYSICIAN - GO DIRECTLY TO THE EMERGENCY ROOM.  ___________________________________________  Nurse Signature  ___________________________________________  Patient/Designated Responsible Party Signature  Mustapha Harrington MD  6/19/2019 7:58:05 AM  This report has been verified and signed electronically.  [employer]

## 2019-06-19 NOTE — H&P
History & Physical - Short Stay  Gastroenterology      SUBJECTIVE:     Procedure: Colonoscopy    Chief Complaint/Indication for Procedure: Screening    PTA Medications   Medication Sig    acyclovir (ZOVIRAX) 400 MG tablet Take 1 tablet (400 mg total) by mouth 2 (two) times daily.    ALPRAZolam (XANAX) 0.25 MG tablet Take 1 tablet (0.25 mg total) by mouth 2 (two) times daily as needed for Insomnia or Anxiety.    atenolol (TENORMIN) 50 MG tablet Take 1 tablet (50 mg total) by mouth once daily.    famotidine (PEPCID) 20 MG tablet Take 1 tablet (20 mg total) by mouth 2 (two) times daily.    levoFLOXacin (LEVAQUIN) 500 MG tablet Take 1 tablet (500 mg total) by mouth once daily.    loratadine (CLARITIN) 10 mg tablet Take 10 mg by mouth once daily.    losartan-hydrochlorothiazide 100-12.5 mg (HYZAAR) 100-12.5 mg Tab Take 1 tablet by mouth once daily.    nystatin (MYCOSTATIN) powder Apply topically 2 (two) times daily.    atenolol (TENORMIN) 50 MG tablet Take 1 tablet (50 mg total) by mouth once daily.    fexofenadine (ALLEGRA) 60 MG tablet Take 60 mg by mouth once daily.    fluticasone (FLONASE) 50 mcg/actuation nasal spray 1 spray by Each Nare route once daily.    gabapentin (NEURONTIN) 300 MG capsule Take 1 capsule (300 mg total) by mouth 3 (three) times daily.    ketoconazole (NIZORAL) 2 % cream     predniSONE (DELTASONE) 10 MG tablet Take one tablet by mouth 2 times a day. On days 1-5 of chemotherapy cycles only. To be combined with 50mg tabs for a total of 160mg    predniSONE (DELTASONE) 50 MG Tab Take 3 tablets (150 mg total) by mouth 2 (two) times daily. On days 1-5 of chemotherapy cycles. To be combined with 10mg tabs for a total of 160mg       Review of patient's allergies indicates:  No Known Allergies     Past Medical History:   Diagnosis Date    Cancer     non hodgkins lymphoma    Hypertension     Sleep apnea     Vision abnormalities      Past Surgical History:   Procedure Laterality Date     BONE MARROW BIOPSY  01/11/2019    EXCISION, SMALL INTESTINE N/A 12/21/2018    Performed by [facility] at [facility] 2ND F[facility]    INSERTION, PORT-A-CATH N/A 1/28/2019    Performed by [facility] at [facility] C[facility] LAB    SMALL INTESTINE SURGERY  12/21/2018    Dr. Casanova, segmental small bowel resection      History reviewed. No pertinent family history.  Social History     Tobacco Use    Smoking status: Never Smoker    Smokeless tobacco: Never Used   Substance Use Topics    Alcohol use: Not Currently     Frequency: Monthly or less    Drug use: No         OBJECTIVE:     Vital Signs (Most Recent)       Physical Exam                                                        GENERAL:  Comfortable, in no acute distress.                                 HEENT EXAM:  Nonicteric.  No adenopathy.  Oropharynx is clear.               NECK:  Supple.                                                               LUNGS:  Clear.                                                               CARDIAC:  Regular rate and rhythm.  S1, S2.  No murmur.                      ABDOMEN:  Soft, positive bowel sounds, nontender.  No hepatosplenomegaly or masses.  No rebound or guarding.                                             EXTREMITIES:  No edema.     MENTAL STATUS:  Normal, alert and oriented.      ASSESSMENT/PLAN:     Assessment: Colorectal cancer screening    Plan: Colonoscopy    Anesthesia Plan: General    ASA Grade: ASA 2 - Patient with mild systemic disease with no functional limitations    MALLAMPATI SCORE:  I (soft palate, uvula, fauces, and tonsillar pillars visible)

## 2019-06-19 NOTE — DISCHARGE SUMMARY
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  Qty: 60 tablet, Refills: 11    Associated Diagnoses: Diffuse large B-cell lymphoma, unspecified body region      ALPRAZolam (XANAX) 0.25 MG tablet Take 1 tablet (0.25 mg total) by mouth 2 (two) times daily as needed for Insomnia or Anxiety.  Qty: 90 tablet, Refills: 0      !! atenolol (TENORMIN) 50 MG tablet Take 1 tablet (50 mg total) by mouth once daily.  Qty: 30 tablet, Refills: 11      famotidine (PEPCID) 20 MG tablet Take 1 tablet (20 mg total) by mouth 2 (two) times daily.  Qty: 60 tablet, Refills: 11    Associated Diagnoses: Diffuse large B-cell lymphoma, unspecified body region      levoFLOXacin (LEVAQUIN) 500 MG tablet Take 1 tablet (500 mg total) by mouth once daily.  Qty: 30 tablet, Refills: 6    Associated Diagnoses: Diffuse large B-cell lymphoma, unspecified body region      loratadine (CLARITIN) 10 mg tablet Take 10 mg by mouth once daily.      losartan-hydrochlorothiazide 100-12.5 mg (HYZAAR) 100-12.5 mg Tab Take 1 tablet by mouth once daily.  Qty: 30 tablet, Refills: 5      nystatin (MYCOSTATIN) powder Apply topically 2 (two) times daily.  Qty: 60 g, Refills: 1      !! predniSONE (DELTASONE) 10 MG tablet Take one tablet by mouth 2 times a day. On days 1-5 of chemotherapy cycles only. To be combined with 50mg tabs for a total of 160mg  Qty: 10 tablet, Refills: 5    Associated Diagnoses: Diffuse large B-cell lymphoma, unspecified body region      !! predniSONE (DELTASONE) 50  MG Tab Take 3 tablets (150 mg total) by mouth 2 (two) times daily. On days 1-5 of chemotherapy cycles. To be combined with 10mg tabs for a total of 160mg  Qty: 30 tablet, Refills: 5    Associated Diagnoses: Diffuse large B-cell lymphoma, unspecified body region      !! atenolol (TENORMIN) 50 MG tablet Take 1 tablet (50 mg total) by mouth once daily.  Qty: 30 tablet, Refills: 11      fexofenadine (ALLEGRA) 60 MG tablet Take 60 mg by mouth once daily.      fluticasone (FLONASE) 50 mcg/actuation nasal spray 1 spray by Each Nare route once daily.      gabapentin (NEURONTIN) 300 MG capsule Take 1 capsule (300 mg total) by mouth 3 (three) times daily.  Qty: 90 capsule, Refills: 2    Associated Diagnoses: Drug-induced polyneuropathy      ketoconazole (NIZORAL) 2 % cream        !! - Potential duplicate medications found. Please discuss with provider.          Discharge Procedure Orders (must include Diet, Follow-up, Activity)    Follow Up:  Follow up with PCP as previously scheduled  Resume routine diet.  Activity as tolerated.    No driving day of procedure.

## 2019-06-19 NOTE — ANESTHESIA POSTPROCEDURE EVALUATION
Anesthesia Post Evaluation    Patient: Giorgio Streeter    Procedure(s) Performed: Procedure(s) (LRB):  COLONOSCOPY (N/A)    Final Anesthesia Type: general  Patient location during evaluation: PACU  Patient participation: Yes- Able to Participate  Level of consciousness: awake and alert, oriented and awake  Post-procedure vital signs: reviewed and stable  Pain management: adequate  Airway patency: patent  PONV status at discharge: No PONV  Anesthetic complications: no      Cardiovascular status: blood pressure returned to baseline and hemodynamically stable  Respiratory status: unassisted, spontaneous ventilation and room air  Hydration status: euvolemic  Follow-up not needed.          Vitals Value Taken Time   /64 6/19/2019  8:17 AM   Temp 36.4 °C (97.6 °F) 6/19/2019  7:57 AM   Pulse 73 6/19/2019  8:17 AM   Resp 16 6/19/2019  8:17 AM   SpO2 99 % 6/19/2019  8:17 AM         Event Time     Out of Recovery 08:28:07          Pain/Demetrius Score: Demetrius Score: 10 (6/19/2019  7:56 AM)

## 2019-06-21 ENCOUNTER — HOSPITAL ENCOUNTER (OUTPATIENT)
Dept: RADIOLOGY | Facility: HOSPITAL | Age: 49
Discharge: HOME | End: 2019-06-21
Attending: INTERNAL MEDICINE
Payer: COMMERCIAL

## 2019-06-21 ENCOUNTER — LAB VISIT (OUTPATIENT)
Dept: LAB | Facility: HOSPITAL | Age: 49
End: 2019-06-21
Attending: INTERNAL MEDICINE
Payer: COMMERCIAL

## 2019-06-21 DIAGNOSIS — C83.39 DIFFUSE LARGE B-CELL LYMPHOMA OF SOLID ORGAN EXCLUDING SPLEEN: ICD-10-CM

## 2019-06-21 LAB
ALBUMIN SERPL BCP-MCNC: 3.5 G/DL (ref 3.5–5.2)
ALP SERPL-CCNC: 59 U/L (ref 55–135)
ALT SERPL W/O P-5'-P-CCNC: 29 U/L (ref 10–44)
ANION GAP SERPL CALC-SCNC: 10 MMOL/L (ref 8–16)
AST SERPL-CCNC: 20 U/L (ref 10–40)
BASOPHILS # BLD AUTO: 0.06 K/UL (ref 0–0.2)
BASOPHILS NFR BLD: 0.9 % (ref 0–1.9)
BILIRUB SERPL-MCNC: 0.2 MG/DL (ref 0.1–1)
BUN SERPL-MCNC: 9 MG/DL (ref 6–20)
CALCIUM SERPL-MCNC: 8.9 MG/DL (ref 8.7–10.5)
CHLORIDE SERPL-SCNC: 106 MMOL/L (ref 95–110)
CO2 SERPL-SCNC: 27 MMOL/L (ref 23–29)
CREAT SERPL-MCNC: 0.7 MG/DL (ref 0.5–1.4)
DIFFERENTIAL METHOD: ABNORMAL
EOSINOPHIL # BLD AUTO: 0 K/UL (ref 0–0.5)
EOSINOPHIL NFR BLD: 0 % (ref 0–8)
ERYTHROCYTE [DISTWIDTH] IN BLOOD BY AUTOMATED COUNT: 17 % (ref 11.5–14.5)
EST. GFR  (AFRICAN AMERICAN): >60 ML/MIN/1.73 M^2
EST. GFR  (NON AFRICAN AMERICAN): >60 ML/MIN/1.73 M^2
GLUCOSE SERPL-MCNC: 92 MG/DL (ref 70–110)
HCT VFR BLD AUTO: 29.7 % (ref 40–54)
HGB BLD-MCNC: 9.1 G/DL (ref 14–18)
IMM GRANULOCYTES # BLD AUTO: 0.06 K/UL (ref 0–0.04)
IMM GRANULOCYTES NFR BLD AUTO: 0.9 % (ref 0–0.5)
LDH SERPL L TO P-CCNC: 267 U/L (ref 110–260)
LYMPHOCYTES # BLD AUTO: 0.6 K/UL (ref 1–4.8)
LYMPHOCYTES NFR BLD: 9.7 % (ref 18–48)
MCH RBC QN AUTO: 31.4 PG (ref 27–31)
MCHC RBC AUTO-ENTMCNC: 30.6 G/DL (ref 32–36)
MCV RBC AUTO: 102 FL (ref 82–98)
MONOCYTES # BLD AUTO: 1.4 K/UL (ref 0.3–1)
MONOCYTES NFR BLD: 21.3 % (ref 4–15)
NEUTROPHILS # BLD AUTO: 4.4 K/UL (ref 1.8–7.7)
NEUTROPHILS NFR BLD: 67.2 % (ref 38–73)
NRBC BLD-RTO: 0 /100 WBC
PLATELET # BLD AUTO: 251 K/UL (ref 150–350)
PMV BLD AUTO: 9.5 FL (ref 9.2–12.9)
POCT GLUCOSE: 103 MG/DL (ref 70–110)
POTASSIUM SERPL-SCNC: 3.8 MMOL/L (ref 3.5–5.1)
PROT SERPL-MCNC: 6.3 G/DL (ref 6–8.4)
RBC # BLD AUTO: 2.9 M/UL (ref 4.6–6.2)
SODIUM SERPL-SCNC: 143 MMOL/L (ref 136–145)
URATE SERPL-MCNC: 6.8 MG/DL (ref 3.4–7)
WBC # BLD AUTO: 6.61 K/UL (ref 3.9–12.7)

## 2019-06-21 PROCEDURE — 36415 COLL VENOUS BLD VENIPUNCTURE: CPT

## 2019-06-21 PROCEDURE — A9552 F18 FDG: HCPCS

## 2019-06-21 PROCEDURE — 83615 LACTATE (LD) (LDH) ENZYME: CPT

## 2019-06-21 PROCEDURE — 84550 ASSAY OF BLOOD/URIC ACID: CPT

## 2019-06-21 PROCEDURE — 80053 COMPREHEN METABOLIC PANEL: CPT

## 2019-06-21 PROCEDURE — 78815 NM PET CT ROUTINE: ICD-10-PCS | Mod: 26,PS,, | Performed by: RADIOLOGY

## 2019-06-21 PROCEDURE — 85025 COMPLETE CBC W/AUTO DIFF WBC: CPT

## 2019-06-21 PROCEDURE — 78815 PET IMAGE W/CT SKULL-THIGH: CPT | Mod: 26,PS,, | Performed by: RADIOLOGY

## 2019-06-21 PROCEDURE — 78815 PET IMAGE W/CT SKULL-THIGH: CPT | Mod: TC

## 2019-06-21 NOTE — PROGRESS NOTES
CC: Lymphoma, follow up visit      HPI:  is a 49 y.o. male here for followup visit. He has hypertension, obstructive sleep apnea. He was hospitalized around Christmas 2018 for small bowel mass. Patient reports that he began having lower abdominal/pelvic pain toward the end of October 2018. This prompted a urologic evaluation and subsequent treatment for a presumed UTI. He states that his pain continued into November and evolved into more upper abdominal/epigastric pain that was worse with eating. He was subsequently seen by his PCP who ultimately ordered a CT abdomen/pelvis that revealed an abnormal thickening of an 8 cm segment of small bowel. He denied fever, chills, night sweats, or anorexia at that time. He had ~10 lb weight loss over the 1-2 months prior to his hospitalization in Dec 2018, but he was also actively taking part in a weight loss program.  On 12/21/18, he underwent Segmental small bowel resection.The mass was approximately 6 cm in length and was circumferential.  There was dilation of the small bowel above it suggesting a partial bowel obstruction.  There was no adenopathy in the adjacent   mesentery.  The tumor was clearly extending to the serosal surface of the bowel.Careful search was made for peritoneal implants and there were no lesions noted on the peritoneum or omentum.  The liver could not be inspected through the incision,   but it was palpated and there were no focal lesions within it. The remainder of the small bowel was run from the ligament of Treitz to the ileocecal valves and no other small bowel lesions were noted.    He had PET CT, 2D ECHO, bone marrow biopsy. PET CT did not reveal any evidence of lymphoma. 2D ECHO was normal. Bone marrow was negative for involvement by lymphoma.       Interval History: He is here for a follow up visit.He received C1 EPOCH -R 2/1-2/5/19 without complications. C2 day 1 was on 2/25/19. C3 day 1 was on 3/18, C4 day 1 on 4/8, C5 day 1 on  5/6/19, C 6 on .5/27/19.  No PET avid disease noted after C3 except for a hypermetabolic focus in colon. He is here after a repeat PET CT.      Review of Systems   Constitutional: Negative for chills, fever, malaise/fatigue and weight loss.   HENT: Negative for congestion, ear discharge, ear pain, hearing loss, nosebleeds and tinnitus.    Eyes: Negative for blurred vision, double vision, photophobia and pain.   Respiratory: Negative for cough and shortness of breath.    Cardiovascular: Negative for chest pain, orthopnea, claudication and leg swelling.   Gastrointestinal: Negative for abdominal pain, diarrhea and nausea.   Genitourinary: Negative for frequency and urgency.   Musculoskeletal: Negative for back pain and myalgias.   Skin: Negative for rash.   Neurological: Positive for tingling. Negative for tremors, sensory change, weakness and headaches.   Endo/Heme/Allergies: Does not bruise/bleed easily.   Psychiatric/Behavioral: Negative for depression, substance abuse and suicidal ideas. The patient is not nervous/anxious.      Current Outpatient Medications   Medication Sig    acyclovir (ZOVIRAX) 400 MG tablet Take 1 tablet (400 mg total) by mouth 2 (two) times daily.    ALPRAZolam (XANAX) 0.25 MG tablet Take 1 tablet (0.25 mg total) by mouth 2 (two) times daily as needed for Insomnia or Anxiety.    atenolol (TENORMIN) 50 MG tablet Take 1 tablet (50 mg total) by mouth once daily.    atenolol (TENORMIN) 50 MG tablet Take 1 tablet (50 mg total) by mouth once daily.    famotidine (PEPCID) 20 MG tablet Take 1 tablet (20 mg total) by mouth 2 (two) times daily.    fexofenadine (ALLEGRA) 60 MG tablet Take 60 mg by mouth once daily.    fluticasone (FLONASE) 50 mcg/actuation nasal spray 1 spray by Each Nare route once daily.    gabapentin (NEURONTIN) 300 MG capsule Take 1 capsule (300 mg total) by mouth 3 (three) times daily.    ketoconazole (NIZORAL) 2 % cream     levoFLOXacin (LEVAQUIN) 500 MG tablet Take 1  tablet (500 mg total) by mouth once daily.    loratadine (CLARITIN) 10 mg tablet Take 10 mg by mouth once daily.    losartan-hydrochlorothiazide 100-12.5 mg (HYZAAR) 100-12.5 mg Tab Take 1 tablet by mouth once daily.    nystatin (MYCOSTATIN) powder Apply topically 2 (two) times daily.    predniSONE (DELTASONE) 10 MG tablet Take one tablet by mouth 2 times a day. On days 1-5 of chemotherapy cycles only. To be combined with 50mg tabs for a total of 160mg    predniSONE (DELTASONE) 50 MG Tab Take 3 tablets (150 mg total) by mouth 2 (two) times daily. On days 1-5 of chemotherapy cycles. To be combined with 10mg tabs for a total of 160mg     No current facility-administered medications for this visit.      Facility-Administered Medications Ordered in Other Visits   Medication    pegfilgrastim injection 6 mg         Vitals:    06/24/19 0817   BP: 128/80   Pulse: 82   Resp: 18   Temp: 98.2 °F (36.8 °C)       PS: ECOG  1    Physical Exam   Constitutional: He is oriented to person, place, and time. He appears well-developed.   HENT:   Head: Normocephalic and atraumatic.   Mouth/Throat: No oropharyngeal exudate.   Eyes: No scleral icterus.   Neck: Normal range of motion.   Cardiovascular: Normal rate and regular rhythm.   Pulmonary/Chest: Breath sounds normal. No respiratory distress. He has no wheezes. He has no rales.   Abdominal: He exhibits no distension. There is no tenderness. There is no rebound.   Musculoskeletal: He exhibits no edema.   Lymphadenopathy:     He has no cervical adenopathy.   Neurological: He is alert and oriented to person, place, and time.   Skin: Skin is warm.   2/7/19 Cerebrospinal Fluid     FINAL PATHOLOGIC DIAGNOSIS     Negative for malignant cells        3/1/19 CSF cytology FINAL PATHOLOGIC DIAGNOSIS     Cerebrospinal fluid (Cytology):Negative for malignant cells       4/5/19 PET CT                COMPARISON:  Prior FDG PET-CT scan 01/15/2019    FINDINGS:  Quality of the study:  Adequate.    There is more prominent non physiologic focal activity within the descending colon with a maximum SUV of 11.4, increased from 6.5 previously.  There are no hypermetabolic foci within the small bowel to suggest relapse.  There are no pathologically enlarged or hypermetabolic lymph nodes.  The spleen is normal in uptake and size at 11.2 cm in craniocaudal dimension.    There is diffusely enhanced uptake in the axial and appendicular skeleton.    Physiologic uptake of the tracer is present within the brain, salivary glands, myocardium, GI and  tracts.  There is persistent postsurgical uptake within the anterior abdominal wall.    Incidental CT findings: N/A    Internal reference SUVs are 2.2 and 4.9 for the mediastinal blood pool and normal liver background, respectively.       Impression         1. No definite evidence of residual/relapsed extranodal diffuse large B-cell lymphoma.    2.  Focal non physiologic activity within the descending colon without discrete CT correlate that is more conspicuous on the current PET exam.  This may represent a polyp.  Recommend direct visualization.    3.  Bone marrow hyperplasia, presumably related to pegfilgrastim administration.    The Deauville Score is X, presuming the colonic hypermetabolic focus is unrelated to DLBCL.  Otherwise, the score would be 5.         4/5/19 PET CT             FINDINGS:  Quality of the study: Adequate.    There is more prominent non physiologic focal activity within the descending colon with a maximum SUV of 11.4, increased from 6.5 previously.  There are no hypermetabolic foci within the small bowel to suggest relapse.  There are no pathologically enlarged or hypermetabolic lymph nodes.  The spleen is normal in uptake and size at 11.2 cm in craniocaudal dimension.    There is diffusely enhanced uptake in the axial and appendicular skeleton.    Physiologic uptake of the tracer is present within the brain, salivary glands, myocardium, GI  and  tracts.  There is persistent postsurgical uptake within the anterior abdominal wall.    Incidental CT findings: N/A    Internal reference SUVs are 2.2 and 4.9 for the mediastinal blood pool and normal liver background, respectively.       Impression         1. No definite evidence of residual/relapsed extranodal diffuse large B-cell lymphoma.    2.  Focal non physiologic activity within the descending colon without discrete CT correlate that is more conspicuous on the current PET exam.  This may represent a polyp.  Recommend direct visualization.    3.  Bone marrow hyperplasia, presumably related to pegfilgrastim administration.    The Deauville Score is X, presuming the colonic hypermetabolic focus is unrelated to DLBCL.  Otherwise, the score would be 5.         4/11/19 CSF cytology      FINAL PATHOLOGIC DIAGNOSIS  CEREBROSPINAL FLUID CYTOLOGY:  NEGATIVE EXAM-NO NEOPLASIA IDENTIFIED    6/19/19 FINAL PATHOLOGIC DIAGNOSIS    1. BIOPSIES OF COLON:  NO SIGNIFICANT HISTOLOGIC ALTERATION  NO COLITIS IDENTIFIED    BIOPSIES OF 2. DESCENDING COLON AND 3. SIGMOID COLON:  ADENOMATOUS POLYPS    6/21/19 PET CT        COMPARISON:  PET-CT 04/05/2019    FINDINGS:  Quality of the study: Adequate.    No abnormal foci of increased tracer uptake are present to suggest recurrence or metastatic disease.  Previously seen focal non physiologic uptake in the descending colon is absent on today's exam.  Patient has intervally undergone colonoscopy with removal of multiple polyps and most likely this lesion was removed.  Pathology is pending per chart review.  Thin FDG uptake superficial to the skin surface of which the vast majority is located about the lower pelvis below the laparotomy scar likely represents urinary excretion of radiotracer onto the skin surface.  Persistent mild diffuse hypermetabolism throughout the axial skeleton most likely represents red marrow reconversion.    Physiologic uptake of the tracer is present within  the brain, salivary glands, myocardium, GI and  tracts.    Incidental CT findings: Postsurgical changes of prior midline laparotomy scar and partial small-bowel resection in the midline lower abdomen.  Nonobstructing 4 mm right renal stone.  Mosaic attenuation throughout both lungs is similar to prior and may represent small airways disease or artifact from respiratory motion.      Impression       No FDG PET/CT findings to suggest recurrent or metastatic disease.       Component      Latest Ref Rng & Units 6/21/2019   WBC      3.90 - 12.70 K/uL 6.61   RBC      4.60 - 6.20 M/uL 2.90 (L)   Hemoglobin      14.0 - 18.0 g/dL 9.1 (L)   Hematocrit      40.0 - 54.0 % 29.7 (L)   MCV      82 - 98 fL 102 (H)   MCH      27.0 - 31.0 pg 31.4 (H)   MCHC      32.0 - 36.0 g/dL 30.6 (L)   RDW      11.5 - 14.5 % 17.0 (H)   Platelets      150 - 350 K/uL 251   MPV      9.2 - 12.9 fL 9.5   Immature Granulocytes      0.0 - 0.5 % 0.9 (H)   Gran # (ANC)      1.8 - 7.7 K/uL 4.4   Immature Grans (Abs)      0.00 - 0.04 K/uL 0.06 (H)   Lymph #      1.0 - 4.8 K/uL 0.6 (L)   Mono #      0.3 - 1.0 K/uL 1.4 (H)   Eos #      0.0 - 0.5 K/uL 0.0   Baso #      0.00 - 0.20 K/uL 0.06   nRBC      0 /100 WBC 0   Gran%      38.0 - 73.0 % 67.2   Lymph%      18.0 - 48.0 % 9.7 (L)   Mono%      4.0 - 15.0 % 21.3 (H)   Eosinophil%      0.0 - 8.0 % 0.0   Basophil%      0.0 - 1.9 % 0.9   Differential Method       Automated   Sodium      136 - 145 mmol/L 143   Potassium      3.5 - 5.1 mmol/L 3.8   Chloride      95 - 110 mmol/L 106   CO2      23 - 29 mmol/L 27   Glucose      70 - 110 mg/dL 92   BUN, Bld      6 - 20 mg/dL 9   Creatinine      0.5 - 1.4 mg/dL 0.7   Calcium      8.7 - 10.5 mg/dL 8.9   PROTEIN TOTAL      6.0 - 8.4 g/dL 6.3   Albumin      3.5 - 5.2 g/dL 3.5   BILIRUBIN TOTAL      0.1 - 1.0 mg/dL 0.2   Alkaline Phosphatase      55 - 135 U/L 59   AST      10 - 40 U/L 20   ALT      10 - 44 U/L 29   Anion Gap      8 - 16 mmol/L 10   eGFR if African  American      >60 mL/min/1.73 m:2 >60.0   eGFR if non African American      >60 mL/min/1.73 m:2 >60.0   LD      110 - 260 U/L 267 (H)   Uric Acid      3.4 - 7.0 mg/dL 6.8       Assessment:     1. DLBCL of jejunum, GC sub type, high grade  2. Hypertension  3. Obstructive sleep apnea  4. Morbid obesity  5. Anemia, hypochromic  6. Peripheral neuropathy secondary to chemotherapy        Plan:     1. 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 IPI score 0. It does not fulfil the criteria for Burkitts( ki 67 high, but not high enough), double or triple hit ( no bcl2 or bcl6 translocation). No PET avid measurable disease. No lymphoma in bone marrow. I discussed the treatment of stage I GI high grade lymphoma. I explained that although there is no active/measurable disease, chemotherapy with RCHOP or dose adjusted R-EPOCH is recommended, as he had bulky (8cm), high grade ( ki 67 > 60% ) lymphoma with MYC/IgH fusion. He will also need diagnostic LP. He went to [facility] for 2nd opinion.   He has received cycle 1 and 2 DA  EPOCH -R with IT MTX without any complication. CSF cytology negative for malignant cells on 2/7/19 and on 3/1/19.   ANC, platelet alvaro after C1 reviewed. Doses of Etoposide/Doxorubicin/Cytoxan were escalated by 1 level for C2. Doses for cycle 3 were the same as C2. C4 doses were escalated.   PET CT after 3 cycles showed no definite evidence of residual/relapsed extranodal diffuse large B-cell lymphoma. There was focal non physiologic activity within the descending colon without discrete CT correlate.  He wanted to proceed with all 6 cycles, although the data for 4 versus 6 cycles for stage I E lymphoma is limited, even with c-myc translocation.   He has completed 6 cycles.  Role of Rt to the initial disease site ( due to bulk) discussed. He is not inclined to undergo RT.   PET CT after C6 showed CR. Colonoscopy on 6/19/19 showed adenomatous polyps in descending and  sigmoid colon.       2.On Atenolol, Losartan HCTZ      5. Mild, asymptomatic.     6. Grade 1-2. Recommend gabapentin 300 mg q HS      Distress Screening Results: Psychosocial Distress screening score of Distress Score: 6 noted and reviewed. No intervention indicated.

## 2019-06-24 ENCOUNTER — OFFICE VISIT (OUTPATIENT)
Dept: HEMATOLOGY/ONCOLOGY | Facility: CLINIC | Age: 49
End: 2019-06-24
Payer: COMMERCIAL

## 2019-06-24 VITALS
TEMPERATURE: 98 F | RESPIRATION RATE: 18 BRPM | BODY MASS INDEX: 49.13 KG/M2 | HEART RATE: 82 BPM | OXYGEN SATURATION: 100 % | WEIGHT: 313.06 LBS | DIASTOLIC BLOOD PRESSURE: 80 MMHG | HEIGHT: 67 IN | SYSTOLIC BLOOD PRESSURE: 128 MMHG

## 2019-06-24 DIAGNOSIS — C83.39 DIFFUSE LARGE B-CELL LYMPHOMA OF SOLID ORGAN EXCLUDING SPLEEN: Primary | ICD-10-CM

## 2019-06-24 DIAGNOSIS — C17.9 SMALL BOWEL CANCER: ICD-10-CM

## 2019-06-24 DIAGNOSIS — I10 ESSENTIAL HYPERTENSION: ICD-10-CM

## 2019-06-24 DIAGNOSIS — D63.0 ANEMIA IN NEOPLASTIC DISEASE: ICD-10-CM

## 2019-06-24 DIAGNOSIS — E66.01 MORBID OBESITY: ICD-10-CM

## 2019-06-24 PROCEDURE — 99999 PR PBB SHADOW E&M-EST. PATIENT-LVL III: CPT | Mod: PBBFAC,,, | Performed by: INTERNAL MEDICINE

## 2019-06-24 PROCEDURE — 3008F PR BODY MASS INDEX (BMI) DOCUMENTED: ICD-10-PCS | Mod: CPTII,S$GLB,, | Performed by: INTERNAL MEDICINE

## 2019-06-24 PROCEDURE — 99999 PR PBB SHADOW E&M-EST. PATIENT-LVL III: ICD-10-PCS | Mod: PBBFAC,,, | Performed by: INTERNAL MEDICINE

## 2019-06-24 PROCEDURE — 3008F BODY MASS INDEX DOCD: CPT | Mod: CPTII,S$GLB,, | Performed by: INTERNAL MEDICINE

## 2019-06-24 PROCEDURE — 3074F SYST BP LT 130 MM HG: CPT | Mod: CPTII,S$GLB,, | Performed by: INTERNAL MEDICINE

## 2019-06-24 PROCEDURE — 3079F DIAST BP 80-89 MM HG: CPT | Mod: CPTII,S$GLB,, | Performed by: INTERNAL MEDICINE

## 2019-06-24 PROCEDURE — 3079F PR MOST RECENT DIASTOLIC BLOOD PRESSURE 80-89 MM HG: ICD-10-PCS | Mod: CPTII,S$GLB,, | Performed by: INTERNAL MEDICINE

## 2019-06-24 PROCEDURE — 99214 OFFICE O/P EST MOD 30 MIN: CPT | Mod: S$GLB,,, | Performed by: INTERNAL MEDICINE

## 2019-06-24 PROCEDURE — 99214 PR OFFICE/OUTPT VISIT, EST, LEVL IV, 30-39 MIN: ICD-10-PCS | Mod: S$GLB,,, | Performed by: INTERNAL MEDICINE

## 2019-06-24 PROCEDURE — 3074F PR MOST RECENT SYSTOLIC BLOOD PRESSURE < 130 MM HG: ICD-10-PCS | Mod: CPTII,S$GLB,, | Performed by: INTERNAL MEDICINE

## 2019-07-10 ENCOUNTER — PATIENT MESSAGE (OUTPATIENT)
Dept: GASTROENTEROLOGY | Facility: CLINIC | Age: 49
End: 2019-07-10

## 2019-07-22 ENCOUNTER — TELEPHONE (OUTPATIENT)
Dept: HEMATOLOGY/ONCOLOGY | Facility: CLINIC | Age: 49
End: 2019-07-22

## 2019-07-23 ENCOUNTER — PATIENT MESSAGE (OUTPATIENT)
Dept: HEMATOLOGY/ONCOLOGY | Facility: CLINIC | Age: 49
End: 2019-07-23

## 2019-07-24 ENCOUNTER — INFUSION (OUTPATIENT)
Dept: INFUSION THERAPY | Facility: HOSPITAL | Age: 49
End: 2019-07-24
Attending: INTERNAL MEDICINE
Payer: COMMERCIAL

## 2019-07-24 VITALS — HEIGHT: 67 IN | BODY MASS INDEX: 49.12 KG/M2 | WEIGHT: 313 LBS

## 2019-07-24 DIAGNOSIS — C83.39 DIFFUSE LARGE B-CELL LYMPHOMA OF SOLID ORGAN EXCLUDING SPLEEN: Primary | ICD-10-CM

## 2019-07-24 PROCEDURE — 25000003 PHARM REV CODE 250: Performed by: INTERNAL MEDICINE

## 2019-07-24 PROCEDURE — 96523 IRRIG DRUG DELIVERY DEVICE: CPT

## 2019-07-24 PROCEDURE — A4216 STERILE WATER/SALINE, 10 ML: HCPCS | Performed by: INTERNAL MEDICINE

## 2019-07-24 PROCEDURE — 63600175 PHARM REV CODE 636 W HCPCS: Performed by: INTERNAL MEDICINE

## 2019-07-24 RX ORDER — SODIUM CHLORIDE 0.9 % (FLUSH) 0.9 %
10 SYRINGE (ML) INJECTION
Status: COMPLETED | OUTPATIENT
Start: 2019-07-24 | End: 2019-07-24

## 2019-07-24 RX ORDER — HEPARIN 100 UNIT/ML
500 SYRINGE INTRAVENOUS
Status: COMPLETED | OUTPATIENT
Start: 2019-07-24 | End: 2019-07-24

## 2019-07-24 RX ORDER — SODIUM CHLORIDE 0.9 % (FLUSH) 0.9 %
10 SYRINGE (ML) INJECTION
Status: CANCELLED | OUTPATIENT
Start: 2019-07-24

## 2019-07-24 RX ORDER — HEPARIN 100 UNIT/ML
500 SYRINGE INTRAVENOUS
Status: CANCELLED | OUTPATIENT
Start: 2019-07-24

## 2019-07-24 RX ADMIN — Medication 10 ML: at 01:07

## 2019-07-24 RX ADMIN — HEPARIN 500 UNITS: 100 SYRINGE at 01:07

## 2019-07-24 NOTE — NURSING
1340 pt here for port flush, site cleaned per policy , accessed without issue , good blood return, flushed and heparinized, deaccessed without issue, no distress noted upon d/c to home

## 2019-07-29 ENCOUNTER — HISTORICAL (OUTPATIENT)
Dept: RADIATION THERAPY | Facility: HOSPITAL | Age: 49
End: 2019-07-29

## 2019-07-31 ENCOUNTER — PATIENT MESSAGE (OUTPATIENT)
Dept: HEMATOLOGY/ONCOLOGY | Facility: CLINIC | Age: 49
End: 2019-07-31

## 2019-08-01 ENCOUNTER — PATIENT MESSAGE (OUTPATIENT)
Dept: HEMATOLOGY/ONCOLOGY | Facility: CLINIC | Age: 49
End: 2019-08-01

## 2019-08-07 ENCOUNTER — HISTORICAL (OUTPATIENT)
Dept: RADIATION THERAPY | Facility: HOSPITAL | Age: 49
End: 2019-08-07

## 2019-09-12 ENCOUNTER — PATIENT MESSAGE (OUTPATIENT)
Dept: HEMATOLOGY/ONCOLOGY | Facility: CLINIC | Age: 49
End: 2019-09-12

## 2019-09-12 DIAGNOSIS — C83.39 DIFFUSE LARGE B-CELL LYMPHOMA OF SOLID ORGAN EXCLUDING SPLEEN: Primary | ICD-10-CM

## 2019-09-20 ENCOUNTER — TELEPHONE (OUTPATIENT)
Dept: HEMATOLOGY/ONCOLOGY | Facility: CLINIC | Age: 49
End: 2019-09-20

## 2019-09-23 ENCOUNTER — OFFICE VISIT (OUTPATIENT)
Dept: HEMATOLOGY/ONCOLOGY | Facility: CLINIC | Age: 49
End: 2019-09-23
Payer: COMMERCIAL

## 2019-09-23 ENCOUNTER — INFUSION (OUTPATIENT)
Dept: INFUSION THERAPY | Facility: HOSPITAL | Age: 49
End: 2019-09-23
Attending: INTERNAL MEDICINE
Payer: COMMERCIAL

## 2019-09-23 VITALS
TEMPERATURE: 99 F | DIASTOLIC BLOOD PRESSURE: 80 MMHG | OXYGEN SATURATION: 98 % | HEIGHT: 67 IN | WEIGHT: 315 LBS | RESPIRATION RATE: 18 BRPM | BODY MASS INDEX: 49.44 KG/M2 | SYSTOLIC BLOOD PRESSURE: 142 MMHG | HEART RATE: 71 BPM

## 2019-09-23 DIAGNOSIS — Z90.49 S/P SMALL BOWEL RESECTION: ICD-10-CM

## 2019-09-23 DIAGNOSIS — C83.39 DIFFUSE LARGE B-CELL LYMPHOMA OF SOLID ORGAN EXCLUDING SPLEEN: ICD-10-CM

## 2019-09-23 DIAGNOSIS — R20.0 NUMBNESS AND TINGLING: Primary | ICD-10-CM

## 2019-09-23 DIAGNOSIS — B37.89 CANDIDA RASH OF GROIN: Primary | ICD-10-CM

## 2019-09-23 DIAGNOSIS — D63.0 ANEMIA IN NEOPLASTIC DISEASE: ICD-10-CM

## 2019-09-23 DIAGNOSIS — E66.01 MORBID OBESITY: ICD-10-CM

## 2019-09-23 DIAGNOSIS — R20.2 NUMBNESS AND TINGLING: Primary | ICD-10-CM

## 2019-09-23 DIAGNOSIS — C83.39 DIFFUSE LARGE B-CELL LYMPHOMA OF SOLID ORGAN EXCLUDING SPLEEN: Primary | ICD-10-CM

## 2019-09-23 LAB
ALBUMIN SERPL BCP-MCNC: 3.8 G/DL (ref 3.5–5.2)
ALP SERPL-CCNC: 80 U/L (ref 55–135)
ALT SERPL W/O P-5'-P-CCNC: 25 U/L (ref 10–44)
ANION GAP SERPL CALC-SCNC: 8 MMOL/L (ref 8–16)
AST SERPL-CCNC: 19 U/L (ref 10–40)
BASOPHILS # BLD AUTO: 0.05 K/UL (ref 0–0.2)
BASOPHILS NFR BLD: 0.9 % (ref 0–1.9)
BILIRUB SERPL-MCNC: 0.2 MG/DL (ref 0.1–1)
BUN SERPL-MCNC: 15 MG/DL (ref 6–20)
CALCIUM SERPL-MCNC: 9.1 MG/DL (ref 8.7–10.5)
CHLORIDE SERPL-SCNC: 104 MMOL/L (ref 95–110)
CO2 SERPL-SCNC: 29 MMOL/L (ref 23–29)
CREAT SERPL-MCNC: 0.8 MG/DL (ref 0.5–1.4)
DIFFERENTIAL METHOD: ABNORMAL
EOSINOPHIL # BLD AUTO: 0.2 K/UL (ref 0–0.5)
EOSINOPHIL NFR BLD: 3.4 % (ref 0–8)
ERYTHROCYTE [DISTWIDTH] IN BLOOD BY AUTOMATED COUNT: 14.4 % (ref 11.5–14.5)
EST. GFR  (AFRICAN AMERICAN): >60 ML/MIN/1.73 M^2
EST. GFR  (NON AFRICAN AMERICAN): >60 ML/MIN/1.73 M^2
GLUCOSE SERPL-MCNC: 123 MG/DL (ref 70–110)
HCT VFR BLD AUTO: 37.5 % (ref 40–54)
HGB BLD-MCNC: 12.5 G/DL (ref 14–18)
IMM GRANULOCYTES # BLD AUTO: 0.02 K/UL (ref 0–0.04)
IMM GRANULOCYTES NFR BLD AUTO: 0.4 % (ref 0–0.5)
LDH SERPL L TO P-CCNC: 183 U/L (ref 110–260)
LYMPHOCYTES # BLD AUTO: 0.5 K/UL (ref 1–4.8)
LYMPHOCYTES NFR BLD: 8.8 % (ref 18–48)
MCH RBC QN AUTO: 28.3 PG (ref 27–31)
MCHC RBC AUTO-ENTMCNC: 33.3 G/DL (ref 32–36)
MCV RBC AUTO: 85 FL (ref 82–98)
MONOCYTES # BLD AUTO: 0.6 K/UL (ref 0.3–1)
MONOCYTES NFR BLD: 11.1 % (ref 4–15)
NEUTROPHILS # BLD AUTO: 4 K/UL (ref 1.8–7.7)
NEUTROPHILS NFR BLD: 75.4 % (ref 38–73)
NRBC BLD-RTO: 0 /100 WBC
PLATELET # BLD AUTO: 253 K/UL (ref 150–350)
PMV BLD AUTO: 9.2 FL (ref 9.2–12.9)
POTASSIUM SERPL-SCNC: 3.7 MMOL/L (ref 3.5–5.1)
PROT SERPL-MCNC: 6.8 G/DL (ref 6–8.4)
RBC # BLD AUTO: 4.41 M/UL (ref 4.6–6.2)
SODIUM SERPL-SCNC: 141 MMOL/L (ref 136–145)
URATE SERPL-MCNC: 7 MG/DL (ref 3.4–7)
WBC # BLD AUTO: 5.33 K/UL (ref 3.9–12.7)

## 2019-09-23 PROCEDURE — 3077F PR MOST RECENT SYSTOLIC BLOOD PRESSURE >= 140 MM HG: ICD-10-PCS | Mod: CPTII,S$GLB,, | Performed by: INTERNAL MEDICINE

## 2019-09-23 PROCEDURE — 3079F DIAST BP 80-89 MM HG: CPT | Mod: CPTII,S$GLB,, | Performed by: INTERNAL MEDICINE

## 2019-09-23 PROCEDURE — 99999 PR PBB SHADOW E&M-EST. PATIENT-LVL IV: ICD-10-PCS | Mod: PBBFAC,,, | Performed by: INTERNAL MEDICINE

## 2019-09-23 PROCEDURE — 3079F PR MOST RECENT DIASTOLIC BLOOD PRESSURE 80-89 MM HG: ICD-10-PCS | Mod: CPTII,S$GLB,, | Performed by: INTERNAL MEDICINE

## 2019-09-23 PROCEDURE — 99999 PR PBB SHADOW E&M-EST. PATIENT-LVL IV: CPT | Mod: PBBFAC,,, | Performed by: INTERNAL MEDICINE

## 2019-09-23 PROCEDURE — A4216 STERILE WATER/SALINE, 10 ML: HCPCS | Performed by: INTERNAL MEDICINE

## 2019-09-23 PROCEDURE — 63600175 PHARM REV CODE 636 W HCPCS: Performed by: INTERNAL MEDICINE

## 2019-09-23 PROCEDURE — 85025 COMPLETE CBC W/AUTO DIFF WBC: CPT

## 2019-09-23 PROCEDURE — 80053 COMPREHEN METABOLIC PANEL: CPT

## 2019-09-23 PROCEDURE — 99214 OFFICE O/P EST MOD 30 MIN: CPT | Mod: S$GLB,,, | Performed by: INTERNAL MEDICINE

## 2019-09-23 PROCEDURE — 83615 LACTATE (LD) (LDH) ENZYME: CPT

## 2019-09-23 PROCEDURE — 84550 ASSAY OF BLOOD/URIC ACID: CPT

## 2019-09-23 PROCEDURE — 96523 IRRIG DRUG DELIVERY DEVICE: CPT

## 2019-09-23 PROCEDURE — 3008F PR BODY MASS INDEX (BMI) DOCUMENTED: ICD-10-PCS | Mod: CPTII,S$GLB,, | Performed by: INTERNAL MEDICINE

## 2019-09-23 PROCEDURE — 3077F SYST BP >= 140 MM HG: CPT | Mod: CPTII,S$GLB,, | Performed by: INTERNAL MEDICINE

## 2019-09-23 PROCEDURE — 3008F BODY MASS INDEX DOCD: CPT | Mod: CPTII,S$GLB,, | Performed by: INTERNAL MEDICINE

## 2019-09-23 PROCEDURE — 25000003 PHARM REV CODE 250: Performed by: INTERNAL MEDICINE

## 2019-09-23 PROCEDURE — 36415 COLL VENOUS BLD VENIPUNCTURE: CPT

## 2019-09-23 PROCEDURE — 99214 PR OFFICE/OUTPT VISIT, EST, LEVL IV, 30-39 MIN: ICD-10-PCS | Mod: S$GLB,,, | Performed by: INTERNAL MEDICINE

## 2019-09-23 RX ORDER — HEPARIN 100 UNIT/ML
500 SYRINGE INTRAVENOUS
Status: CANCELLED | OUTPATIENT
Start: 2019-09-23

## 2019-09-23 RX ORDER — SODIUM CHLORIDE 0.9 % (FLUSH) 0.9 %
10 SYRINGE (ML) INJECTION
Status: CANCELLED | OUTPATIENT
Start: 2019-09-23

## 2019-09-23 RX ORDER — HEPARIN 100 UNIT/ML
500 SYRINGE INTRAVENOUS
Status: COMPLETED | OUTPATIENT
Start: 2019-09-23 | End: 2019-09-23

## 2019-09-23 RX ORDER — NYSTATIN 100000 [USP'U]/G
POWDER TOPICAL 2 TIMES DAILY
Qty: 60 G | Refills: 1 | Status: SHIPPED | OUTPATIENT
Start: 2019-09-23 | End: 2020-02-26 | Stop reason: SDUPTHER

## 2019-09-23 RX ORDER — SODIUM CHLORIDE 0.9 % (FLUSH) 0.9 %
10 SYRINGE (ML) INJECTION
Status: COMPLETED | OUTPATIENT
Start: 2019-09-23 | End: 2019-09-23

## 2019-09-23 RX ADMIN — HEPARIN SODIUM (PORCINE) LOCK FLUSH IV SOLN 100 UNIT/ML 500 UNITS: 100 SOLUTION at 08:09

## 2019-09-23 RX ADMIN — Medication 10 ML: at 08:09

## 2019-09-23 NOTE — NURSING
Pt arrived for labs from port.  Port flushes easily with no blood return.  Port flushed and de accessed.  Labs drawn from Left AC and sent.  Pt now going to MD appt.

## 2019-09-23 NOTE — PROGRESS NOTES
CC: Lymphoma, follow up visit       Oncology History:    Diagnosis: DLBCL   Sub type: Germinal center, with myc translocation by FISH ; negative for bcl2/ bcl6 translocations; CSF negative  Stage     : I E , bulky ( jejunum)  Treatment : DE R-EPOCH x 6 with IT MTX, (2/1/19 - 5/27/19)       HPI:  is a 49 y.o. male here for followup visit. He has hypertension, obstructive sleep apnea. He was hospitalized around Christmas 2018 for small bowel mass. Patient reports that he began having lower abdominal/pelvic pain toward the end of October 2018. This prompted a urologic evaluation and subsequent treatment for a presumed UTI. He states that his pain continued into November and evolved into more upper abdominal/epigastric pain that was worse with eating. He was subsequently seen by his PCP who ultimately ordered a CT abdomen/pelvis that revealed an abnormal thickening of an 8 cm segment of small bowel. He denied fever, chills, night sweats, or anorexia at that time. He had ~10 lb weight loss over the 1-2 months prior to his hospitalization in Dec 2018, but he was also actively taking part in a weight loss program.  On 12/21/18, he underwent Segmental small bowel resection.The mass was approximately 6 cm in length and was circumferential.  There was dilation of the small bowel above it suggesting a partial bowel obstruction.  There was no adenopathy in the adjacent   mesentery.  The tumor was clearly extending to the serosal surface of the bowel.Careful search was made for peritoneal implants and there were no lesions noted on the peritoneum or omentum.  The liver could not be inspected through the incision,   but it was palpated and there were no focal lesions within it. The remainder of the small bowel was run from the ligament of Treitz to the ileocecal valves and no other small bowel lesions were noted.    He had PET CT, 2D ECHO, bone marrow biopsy. PET CT did not reveal any evidence of lymphoma. 2D ECHO was  normal. Bone marrow was negative for involvement by lymphoma.       Interval History: He is here for a follow up visit. He received C1 EPOCH - R between 2/1-2/5/19 without complications. C2 day 1 was on 2/25/19. C3 day 1 was on 3/18, C4 day 1 on 4/8, C5 day 1 on 5/6/19, C 6 on .5/27/19.  No PET avid disease noted after C3 except for a hypermetabolic focus in colon. Repeat PET CT after 6 cycles was negative. Colonoscopy done after 6 cycles showed adenomatous polyps in colon.       Review of Systems   Constitutional: Positive for malaise/fatigue. Negative for chills, fever and weight loss.   HENT: Negative for ear discharge, ear pain and tinnitus.    Eyes: Negative for blurred vision, photophobia and pain.   Respiratory: Negative for cough, hemoptysis and sputum production.    Cardiovascular: Negative for chest pain, palpitations, orthopnea and claudication.   Gastrointestinal: Negative for abdominal pain, diarrhea, heartburn and vomiting.   Genitourinary: Negative for dysuria, frequency and hematuria.   Musculoskeletal: Negative for back pain, myalgias and neck pain.   Neurological: Negative for dizziness, tingling, tremors, sensory change and headaches.   Endo/Heme/Allergies: Negative for environmental allergies. Does not bruise/bleed easily.   Psychiatric/Behavioral: Negative for depression, hallucinations, substance abuse and suicidal ideas. The patient is not nervous/anxious.        Current Outpatient Medications   Medication Sig    acyclovir (ZOVIRAX) 400 MG tablet Take 1 tablet (400 mg total) by mouth 2 (two) times daily.    ALPRAZolam (XANAX) 0.25 MG tablet Take 1 tablet (0.25 mg total) by mouth 2 (two) times daily as needed for Insomnia or Anxiety.    atenolol (TENORMIN) 50 MG tablet Take 1 tablet (50 mg total) by mouth once daily.    atenolol (TENORMIN) 50 MG tablet Take 1 tablet (50 mg total) by mouth once daily.    famotidine (PEPCID) 20 MG tablet Take 1 tablet (20 mg total) by mouth 2 (two) times  daily.    fexofenadine (ALLEGRA) 60 MG tablet Take 60 mg by mouth once daily.    fluticasone (FLONASE) 50 mcg/actuation nasal spray 1 spray by Each Nare route once daily.    gabapentin (NEURONTIN) 300 MG capsule Take 1 capsule (300 mg total) by mouth 3 (three) times daily.    ketoconazole (NIZORAL) 2 % cream     levoFLOXacin (LEVAQUIN) 500 MG tablet Take 1 tablet (500 mg total) by mouth once daily.    loratadine (CLARITIN) 10 mg tablet Take 10 mg by mouth once daily.    losartan-hydrochlorothiazide 100-12.5 mg (HYZAAR) 100-12.5 mg Tab Take 1 tablet by mouth once daily.    nystatin (MYCOSTATIN) powder Apply topically 2 (two) times daily.    predniSONE (DELTASONE) 10 MG tablet Take one tablet by mouth 2 times a day. On days 1-5 of chemotherapy cycles only. To be combined with 50mg tabs for a total of 160mg    predniSONE (DELTASONE) 50 MG Tab Take 3 tablets (150 mg total) by mouth 2 (two) times daily. On days 1-5 of chemotherapy cycles. To be combined with 10mg tabs for a total of 160mg     No current facility-administered medications for this visit.      Facility-Administered Medications Ordered in Other Visits   Medication    heparin, porcine (PF) 100 unit/mL injection flush 500 Units    pegfilgrastim injection 6 mg    sodium chloride 0.9% flush 10 mL       Vitals:    09/23/19 0934   BP: (!) 142/80   Pulse: 71   Resp: 18   Temp: 98.6 °F (37 °C)       PS: ECOG 1        Physical Exam   Constitutional: He is oriented to person, place, and time. He appears well-developed.   HENT:   Head: Normocephalic and atraumatic.   Mouth/Throat: No oropharyngeal exudate.   Eyes: Pupils are equal, round, and reactive to light. No scleral icterus.   Neck: Normal range of motion.   Cardiovascular: Normal rate.   No murmur heard.  Pulmonary/Chest: Effort normal. No respiratory distress. He has no wheezes. He has no rales.   Abdominal: Soft. He exhibits no distension. There is no tenderness. There is no rebound.    Musculoskeletal: He exhibits no edema.   Neurological: He is alert and oriented to person, place, and time. No cranial nerve deficit.     Component      Latest Ref Rng & Units 9/23/2019   WBC      3.90 - 12.70 K/uL 5.33   RBC      4.60 - 6.20 M/uL 4.41 (L)   Hemoglobin      14.0 - 18.0 g/dL 12.5 (L)   Hematocrit      40.0 - 54.0 % 37.5 (L)   MCV      82 - 98 fL 85   MCH      27.0 - 31.0 pg 28.3   MCHC      32.0 - 36.0 g/dL 33.3   RDW      11.5 - 14.5 % 14.4   Platelets      150 - 350 K/uL 253   MPV      9.2 - 12.9 fL 9.2   Immature Granulocytes      0.0 - 0.5 % 0.4   Gran # (ANC)      1.8 - 7.7 K/uL 4.0   Immature Grans (Abs)      0.00 - 0.04 K/uL 0.02   Lymph #      1.0 - 4.8 K/uL 0.5 (L)   Mono #      0.3 - 1.0 K/uL 0.6   Eos #      0.0 - 0.5 K/uL 0.2   Baso #      0.00 - 0.20 K/uL 0.05   nRBC      0 /100 WBC 0   Gran%      38.0 - 73.0 % 75.4 (H)   Lymph%      18.0 - 48.0 % 8.8 (L)   Mono%      4.0 - 15.0 % 11.1   Eosinophil%      0.0 - 8.0 % 3.4   Basophil%      0.0 - 1.9 % 0.9   Differential Method       Automated   Sodium      136 - 145 mmol/L 141   Potassium      3.5 - 5.1 mmol/L 3.7   Chloride      95 - 110 mmol/L 104   CO2      23 - 29 mmol/L 29   Glucose      70 - 110 mg/dL 123 (H)   BUN, Bld      6 - 20 mg/dL 15   Creatinine      0.5 - 1.4 mg/dL 0.8   Calcium      8.7 - 10.5 mg/dL 9.1   PROTEIN TOTAL      6.0 - 8.4 g/dL 6.8   Albumin      3.5 - 5.2 g/dL 3.8   BILIRUBIN TOTAL      0.1 - 1.0 mg/dL 0.2   Alkaline Phosphatase      55 - 135 U/L 80   AST      10 - 40 U/L 19   ALT      10 - 44 U/L 25   Anion Gap      8 - 16 mmol/L 8   eGFR if African American      >60 mL/min/1.73 m:2 >60.0   eGFR if non African American      >60 mL/min/1.73 m:2 >60.0   LD      110 - 260 U/L 183   Uric Acid      3.4 - 7.0 mg/dL 7.0         2/7/19 Cerebrospinal Fluid     FINAL PATHOLOGIC DIAGNOSIS     Negative for malignant cells        3/1/19 CSF cytology FINAL PATHOLOGIC DIAGNOSIS     Cerebrospinal fluid (Cytology):Negative for  malignant cells       4/5/19 PET CT                COMPARISON:  Prior FDG PET-CT scan 01/15/2019    FINDINGS:  Quality of the study: Adequate.    There is more prominent non physiologic focal activity within the descending colon with a maximum SUV of 11.4, increased from 6.5 previously.  There are no hypermetabolic foci within the small bowel to suggest relapse.  There are no pathologically enlarged or hypermetabolic lymph nodes.  The spleen is normal in uptake and size at 11.2 cm in craniocaudal dimension.    There is diffusely enhanced uptake in the axial and appendicular skeleton.    Physiologic uptake of the tracer is present within the brain, salivary glands, myocardium, GI and  tracts.  There is persistent postsurgical uptake within the anterior abdominal wall.    Incidental CT findings: N/A    Internal reference SUVs are 2.2 and 4.9 for the mediastinal blood pool and normal liver background, respectively.       Impression         1. No definite evidence of residual/relapsed extranodal diffuse large B-cell lymphoma.    2.  Focal non physiologic activity within the descending colon without discrete CT correlate that is more conspicuous on the current PET exam.  This may represent a polyp.  Recommend direct visualization.    3.  Bone marrow hyperplasia, presumably related to pegfilgrastim administration.    The Deauville Score is X, presuming the colonic hypermetabolic focus is unrelated to DLBCL.  Otherwise, the score would be 5.         4/5/19 PET CT             FINDINGS:  Quality of the study: Adequate.    There is more prominent non physiologic focal activity within the descending colon with a maximum SUV of 11.4, increased from 6.5 previously.  There are no hypermetabolic foci within the small bowel to suggest relapse.  There are no pathologically enlarged or hypermetabolic lymph nodes.  The spleen is normal in uptake and size at 11.2 cm in craniocaudal dimension.    There is diffusely enhanced uptake in  the axial and appendicular skeleton.    Physiologic uptake of the tracer is present within the brain, salivary glands, myocardium, GI and  tracts.  There is persistent postsurgical uptake within the anterior abdominal wall.    Incidental CT findings: N/A    Internal reference SUVs are 2.2 and 4.9 for the mediastinal blood pool and normal liver background, respectively.       Impression         1. No definite evidence of residual/relapsed extranodal diffuse large B-cell lymphoma.    2.  Focal non physiologic activity within the descending colon without discrete CT correlate that is more conspicuous on the current PET exam.  This may represent a polyp.  Recommend direct visualization.    3.  Bone marrow hyperplasia, presumably related to pegfilgrastim administration.    The Deauville Score is X, presuming the colonic hypermetabolic focus is unrelated to DLBCL.  Otherwise, the score would be 5.         4/11/19 CSF cytology      FINAL PATHOLOGIC DIAGNOSIS  CEREBROSPINAL FLUID CYTOLOGY:  NEGATIVE EXAM-NO NEOPLASIA IDENTIFIED     6/19/19 FINAL PATHOLOGIC DIAGNOSIS     1. BIOPSIES OF COLON:  NO SIGNIFICANT HISTOLOGIC ALTERATION  NO COLITIS IDENTIFIED     BIOPSIES OF 2. DESCENDING COLON AND 3. SIGMOID COLON:  ADENOMATOUS POLYPS     6/21/19 PET CT              COMPARISON:  PET-CT 04/05/2019    FINDINGS:  Quality of the study: Adequate.    No abnormal foci of increased tracer uptake are present to suggest recurrence or metastatic disease.  Previously seen focal non physiologic uptake in the descending colon is absent on today's exam.  Patient has intervally undergone colonoscopy with removal of multiple polyps and most likely this lesion was removed.  Pathology is pending per chart review.  Thin FDG uptake superficial to the skin surface of which the vast majority is located about the lower pelvis below the laparotomy scar likely represents urinary excretion of radiotracer onto the skin surface.  Persistent mild diffuse  hypermetabolism throughout the axial skeleton most likely represents red marrow reconversion.    Physiologic uptake of the tracer is present within the brain, salivary glands, myocardium, GI and  tracts.    Incidental CT findings: Postsurgical changes of prior midline laparotomy scar and partial small-bowel resection in the midline lower abdomen.  Nonobstructing 4 mm right renal stone.  Mosaic attenuation throughout both lungs is similar to prior and may represent small airways disease or artifact from respiratory motion.       Impression         No FDG PET/CT findings to suggest recurrent or metastatic disease.      Assessment:     1. DLBCL of jejunum, GC sub type, high grade, with myc translocation  2. Hypertension  3. Obstructive sleep apnea  4. Morbid obesity  5. Anemia, hypochromic  6. Peripheral neuropathy secondary to chemotherapy        Plan:     1. 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 IPI score 0. Criteria for Burkitts not fulfilled. ( ki 67 was high, but not high enough). Also did not meet criteria for double or triple hit lymphoma ( no bcl2 or bcl6 translocation). No PET avid measurable disease post resection. No lymphoma involvement of bone marrow. I discussed the treatment of stage I GI high grade lymphoma. I explained that although there is no active/measurable disease, chemotherapy with RCHOP or dose adjusted R-EPOCH is recommended, as he had bulky (8cm), high grade ( ki 67 > 60% ) lymphoma with MYC/IgH fusion. Also discussed the need for diagnostic LP. He went to [facility] for 2nd opinion.   CSF cytology negative for malignant cells on 2/7/19 and on 3/1/19. ANC, platelet alvaro after C1 reviewed. Doses of Etoposide/Doxorubicin/Cytoxan were escalated by 1 level for C2. Doses for cycle 3 were the same as C2. C4 doses were escalated.   PET CT after 3 cycles showed no definite evidence of residual/relapsed extranodal diffuse large B-cell lymphoma. There was  focal non physiologic activity within the descending colon without discrete CT correlate.  He wanted to proceed with all 6 cycles, although the data for 4 versus 6 cycles for stage I E lymphoma is limited, even with c-myc translocation.   He completed 6 cycles.  Role of Rt to the initial disease site ( due to bulk) discussed. He was not inclined to undergo RT.   PET CT after C 6 showed CR. Colonoscopy on 6/19/19 showed adenomatous polyps in descending and sigmoid colon.     He is doing well, without any B symptoms. LDH normal today. He will have repeat imaging in 2 -3 months.      2.On Atenolol, Losartan HCTZ      5. Mild, asymptomatic.      6. Grade 1-2. Recommend gabapentin 300 mg q HS. It is improving.

## 2019-11-21 NOTE — PLAN OF CARE
Problem: Adult Inpatient Plan of Care  Goal: Plan of Care Review  Outcome: Ongoing (interventions implemented as appropriate)  1408-Patient tolerated treatment well. Discharged without complaints or S/S of adverse event.  Instructed to call provider for any questions or concerns. Patient will return tomorrow at 1330 for next pump exchange.         no

## 2019-11-27 ENCOUNTER — INFUSION (OUTPATIENT)
Dept: INFUSION THERAPY | Facility: HOSPITAL | Age: 49
End: 2019-11-27
Attending: INTERNAL MEDICINE
Payer: COMMERCIAL

## 2019-11-27 ENCOUNTER — HOSPITAL ENCOUNTER (OUTPATIENT)
Dept: RADIOLOGY | Facility: HOSPITAL | Age: 49
Discharge: HOME | End: 2019-11-27
Attending: INTERNAL MEDICINE
Payer: COMMERCIAL

## 2019-11-27 ENCOUNTER — OFFICE VISIT (OUTPATIENT)
Dept: HEMATOLOGY/ONCOLOGY | Facility: CLINIC | Age: 49
End: 2019-11-27
Payer: COMMERCIAL

## 2019-11-27 VITALS
WEIGHT: 315 LBS | RESPIRATION RATE: 18 BRPM | BODY MASS INDEX: 52.1 KG/M2 | SYSTOLIC BLOOD PRESSURE: 170 MMHG | HEART RATE: 62 BPM | OXYGEN SATURATION: 96 % | DIASTOLIC BLOOD PRESSURE: 95 MMHG | TEMPERATURE: 99 F

## 2019-11-27 DIAGNOSIS — D63.0 ANEMIA IN NEOPLASTIC DISEASE: ICD-10-CM

## 2019-11-27 DIAGNOSIS — C83.39 DIFFUSE LARGE B-CELL LYMPHOMA OF SOLID ORGAN EXCLUDING SPLEEN: ICD-10-CM

## 2019-11-27 DIAGNOSIS — C83.39 DIFFUSE LARGE B-CELL LYMPHOMA OF SOLID ORGAN EXCLUDING SPLEEN: Primary | ICD-10-CM

## 2019-11-27 DIAGNOSIS — E66.01 MORBID OBESITY: ICD-10-CM

## 2019-11-27 DIAGNOSIS — I10 ESSENTIAL HYPERTENSION: ICD-10-CM

## 2019-11-27 PROBLEM — Z91.89 AT HIGH RISK OF TUMOR LYSIS SYNDROME: Status: RESOLVED | Noted: 2019-02-05 | Resolved: 2019-11-27

## 2019-11-27 LAB
ALBUMIN SERPL BCP-MCNC: 4 G/DL (ref 3.5–5.2)
ALP SERPL-CCNC: 75 U/L (ref 55–135)
ALT SERPL W/O P-5'-P-CCNC: 28 U/L (ref 10–44)
ANION GAP SERPL CALC-SCNC: 10 MMOL/L (ref 8–16)
AST SERPL-CCNC: 22 U/L (ref 10–40)
BASOPHILS # BLD AUTO: 0.04 K/UL (ref 0–0.2)
BASOPHILS NFR BLD: 0.7 % (ref 0–1.9)
BILIRUB SERPL-MCNC: 0.6 MG/DL (ref 0.1–1)
BUN SERPL-MCNC: 13 MG/DL (ref 6–20)
CALCIUM SERPL-MCNC: 9.2 MG/DL (ref 8.7–10.5)
CHLORIDE SERPL-SCNC: 104 MMOL/L (ref 95–110)
CO2 SERPL-SCNC: 27 MMOL/L (ref 23–29)
CREAT SERPL-MCNC: 0.8 MG/DL (ref 0.5–1.4)
DIFFERENTIAL METHOD: ABNORMAL
EOSINOPHIL # BLD AUTO: 0.2 K/UL (ref 0–0.5)
EOSINOPHIL NFR BLD: 3.2 % (ref 0–8)
ERYTHROCYTE [DISTWIDTH] IN BLOOD BY AUTOMATED COUNT: 15.3 % (ref 11.5–14.5)
EST. GFR  (AFRICAN AMERICAN): >60 ML/MIN/1.73 M^2
EST. GFR  (NON AFRICAN AMERICAN): >60 ML/MIN/1.73 M^2
GLUCOSE SERPL-MCNC: 89 MG/DL (ref 70–110)
HCT VFR BLD AUTO: 40.3 % (ref 40–54)
HGB BLD-MCNC: 13 G/DL (ref 14–18)
IMM GRANULOCYTES # BLD AUTO: 0.02 K/UL (ref 0–0.04)
IMM GRANULOCYTES NFR BLD AUTO: 0.3 % (ref 0–0.5)
LDH SERPL L TO P-CCNC: 196 U/L (ref 110–260)
LYMPHOCYTES # BLD AUTO: 0.6 K/UL (ref 1–4.8)
LYMPHOCYTES NFR BLD: 10.3 % (ref 18–48)
MCH RBC QN AUTO: 29 PG (ref 27–31)
MCHC RBC AUTO-ENTMCNC: 32.3 G/DL (ref 32–36)
MCV RBC AUTO: 90 FL (ref 82–98)
MONOCYTES # BLD AUTO: 0.6 K/UL (ref 0.3–1)
MONOCYTES NFR BLD: 10.1 % (ref 4–15)
NEUTROPHILS # BLD AUTO: 4.4 K/UL (ref 1.8–7.7)
NEUTROPHILS NFR BLD: 75.4 % (ref 38–73)
NRBC BLD-RTO: 0 /100 WBC
PLATELET # BLD AUTO: 257 K/UL (ref 150–350)
PMV BLD AUTO: 9.3 FL (ref 9.2–12.9)
POTASSIUM SERPL-SCNC: 3.8 MMOL/L (ref 3.5–5.1)
PROT SERPL-MCNC: 7.2 G/DL (ref 6–8.4)
RBC # BLD AUTO: 4.49 M/UL (ref 4.6–6.2)
SODIUM SERPL-SCNC: 141 MMOL/L (ref 136–145)
URATE SERPL-MCNC: 7.2 MG/DL (ref 3.4–7)
WBC # BLD AUTO: 5.85 K/UL (ref 3.9–12.7)

## 2019-11-27 PROCEDURE — 71260 CT THORAX DX C+: CPT | Mod: 26,,, | Performed by: RADIOLOGY

## 2019-11-27 PROCEDURE — 84550 ASSAY OF BLOOD/URIC ACID: CPT

## 2019-11-27 PROCEDURE — 70491 CT SOFT TISSUE NECK WITH CONTRAST: ICD-10-PCS | Mod: 26,,, | Performed by: RADIOLOGY

## 2019-11-27 PROCEDURE — A4216 STERILE WATER/SALINE, 10 ML: HCPCS | Performed by: INTERNAL MEDICINE

## 2019-11-27 PROCEDURE — 99999 PR PBB SHADOW E&M-EST. PATIENT-LVL IV: CPT | Mod: PBBFAC,,, | Performed by: INTERNAL MEDICINE

## 2019-11-27 PROCEDURE — 3077F PR MOST RECENT SYSTOLIC BLOOD PRESSURE >= 140 MM HG: ICD-10-PCS | Mod: CPTII,S$GLB,, | Performed by: INTERNAL MEDICINE

## 2019-11-27 PROCEDURE — 3008F PR BODY MASS INDEX (BMI) DOCUMENTED: ICD-10-PCS | Mod: CPTII,S$GLB,, | Performed by: INTERNAL MEDICINE

## 2019-11-27 PROCEDURE — 3080F PR MOST RECENT DIASTOLIC BLOOD PRESSURE >= 90 MM HG: ICD-10-PCS | Mod: CPTII,S$GLB,, | Performed by: INTERNAL MEDICINE

## 2019-11-27 PROCEDURE — 85025 COMPLETE CBC W/AUTO DIFF WBC: CPT

## 2019-11-27 PROCEDURE — 74177 CT ABD & PELVIS W/CONTRAST: CPT | Mod: TC

## 2019-11-27 PROCEDURE — 70491 CT SOFT TISSUE NECK W/DYE: CPT | Mod: 26,,, | Performed by: RADIOLOGY

## 2019-11-27 PROCEDURE — 99999 PR PBB SHADOW E&M-EST. PATIENT-LVL IV: ICD-10-PCS | Mod: PBBFAC,,, | Performed by: INTERNAL MEDICINE

## 2019-11-27 PROCEDURE — 3080F DIAST BP >= 90 MM HG: CPT | Mod: CPTII,S$GLB,, | Performed by: INTERNAL MEDICINE

## 2019-11-27 PROCEDURE — 74177 CT CHEST ABDOMEN PELVIS WITH CONTRAST (XPD): ICD-10-PCS | Mod: 26,,, | Performed by: RADIOLOGY

## 2019-11-27 PROCEDURE — 99214 OFFICE O/P EST MOD 30 MIN: CPT | Mod: S$GLB,,, | Performed by: INTERNAL MEDICINE

## 2019-11-27 PROCEDURE — 63600175 PHARM REV CODE 636 W HCPCS: Performed by: INTERNAL MEDICINE

## 2019-11-27 PROCEDURE — 83615 LACTATE (LD) (LDH) ENZYME: CPT

## 2019-11-27 PROCEDURE — 25500020 PHARM REV CODE 255: Performed by: INTERNAL MEDICINE

## 2019-11-27 PROCEDURE — 99214 PR OFFICE/OUTPT VISIT, EST, LEVL IV, 30-39 MIN: ICD-10-PCS | Mod: S$GLB,,, | Performed by: INTERNAL MEDICINE

## 2019-11-27 PROCEDURE — 70491 CT SOFT TISSUE NECK W/DYE: CPT | Mod: TC

## 2019-11-27 PROCEDURE — 25000003 PHARM REV CODE 250: Performed by: INTERNAL MEDICINE

## 2019-11-27 PROCEDURE — 74177 CT ABD & PELVIS W/CONTRAST: CPT | Mod: 26,,, | Performed by: RADIOLOGY

## 2019-11-27 PROCEDURE — 3008F BODY MASS INDEX DOCD: CPT | Mod: CPTII,S$GLB,, | Performed by: INTERNAL MEDICINE

## 2019-11-27 PROCEDURE — 3077F SYST BP >= 140 MM HG: CPT | Mod: CPTII,S$GLB,, | Performed by: INTERNAL MEDICINE

## 2019-11-27 PROCEDURE — 80053 COMPREHEN METABOLIC PANEL: CPT

## 2019-11-27 PROCEDURE — 71260 CT CHEST ABDOMEN PELVIS WITH CONTRAST (XPD): ICD-10-PCS | Mod: 26,,, | Performed by: RADIOLOGY

## 2019-11-27 RX ORDER — SODIUM CHLORIDE 0.9 % (FLUSH) 0.9 %
10 SYRINGE (ML) INJECTION
Status: COMPLETED | OUTPATIENT
Start: 2019-11-27 | End: 2019-11-27

## 2019-11-27 RX ORDER — HEPARIN 100 UNIT/ML
500 SYRINGE INTRAVENOUS
Status: COMPLETED | OUTPATIENT
Start: 2019-11-27 | End: 2019-11-27

## 2019-11-27 RX ORDER — SODIUM CHLORIDE 0.9 % (FLUSH) 0.9 %
10 SYRINGE (ML) INJECTION
Status: CANCELLED | OUTPATIENT
Start: 2019-11-27

## 2019-11-27 RX ORDER — HEPARIN 100 UNIT/ML
500 SYRINGE INTRAVENOUS
Status: CANCELLED | OUTPATIENT
Start: 2019-11-27

## 2019-11-27 RX ORDER — ATENOLOL 100 MG/1
TABLET ORAL
Refills: 1 | COMMUNITY
Start: 2019-10-12

## 2019-11-27 RX ADMIN — Medication 10 ML: at 10:11

## 2019-11-27 RX ADMIN — IOHEXOL 15 ML: 350 INJECTION, SOLUTION INTRAVENOUS at 08:11

## 2019-11-27 RX ADMIN — HEPARIN 500 UNITS: 100 SYRINGE at 10:11

## 2019-11-27 RX ADMIN — IOHEXOL 125 ML: 350 INJECTION, SOLUTION INTRAVENOUS at 09:11

## 2019-11-27 NOTE — NURSING
Patient present for labs drawn from port. Port accessed without difficulty; blood return present - unable to collect sufficient quantity. Labs drawn peripherally. Labs collected and sent. Patient discharged to MD appointment.

## 2019-11-27 NOTE — PROGRESS NOTES
CC: Lymphoma, follow up visit         Oncology History:     Diagnosis: DLBCL   Sub type: Germinal center, with myc translocation by FISH ; negative for bcl2/ bcl6 translocations; CSF negative  Stage     : I E , bulky ( jejunum)  Treatment : DE R-EPOCH x 6 with IT MTX, (2/1/19 - 5/27/19)        HPI:  is a 49 y.o. male here for followup visit. He has hypertension, obstructive sleep apnea. He was hospitalized around Christmas 2018 for small bowel mass. Patient reports that he began having lower abdominal/pelvic pain toward the end of October 2018. This prompted a urologic evaluation and subsequent treatment for a presumed UTI. He states that his pain continued into November and evolved into more upper abdominal/epigastric pain that was worse with eating. He was subsequently seen by his PCP who ultimately ordered a CT abdomen/pelvis that revealed an abnormal thickening of an 8 cm segment of small bowel. He denied fever, chills, night sweats, or anorexia at that time. He had ~10 lb weight loss over the 1-2 months prior to his hospitalization in Dec 2018, but he was also actively taking part in a weight loss program.  On 12/21/18, he underwent Segmental small bowel resection.The mass was approximately 6 cm in length and was circumferential.  There was dilation of the small bowel above it suggesting a partial bowel obstruction.  There was no adenopathy in the adjacent   mesentery.  The tumor was clearly extending to the serosal surface of the bowel.Careful search was made for peritoneal implants and there were no lesions noted on the peritoneum or omentum.  The liver could not be inspected through the incision,   but it was palpated and there were no focal lesions within it. The remainder of the small bowel was run from the ligament of Treitz to the ileocecal valves and no other small bowel lesions were noted.    He had PET CT, 2D ECHO, bone marrow biopsy. PET CT did not reveal any evidence of lymphoma. 2D ECHO  was normal. Bone marrow was negative for involvement by lymphoma.       He received C1 EPOCH - R between 2/1-2/5/19 without complications. C2 day 1 was on 2/25/19. C3 day 1 was on 3/18, C4 day 1 on 4/8, C5 day 1 on 5/6/19, C 6 on .5/27/19.  No PET avid disease noted after C3 except for a hypermetabolic focus in colon. Repeat PET CT after 6 cycles was negative. Colonoscopy done after 6 cycles showed adenomatous polyps in colon.       Interval History : He is here for a follow up visit. He is doing well. He has no new complaints. He is eating well. He has no fevers, night sweats. He had repeat CT today.       Review of Systems   Constitutional: Positive for malaise/fatigue. Negative for chills, fever and weight loss.   HENT: Negative for congestion, ear discharge, ear pain, nosebleeds and sinus pain.    Eyes: Negative for blurred vision and photophobia.   Respiratory: Negative for hemoptysis and sputum production.    Cardiovascular: Negative for chest pain, palpitations, orthopnea and claudication.   Gastrointestinal: Negative for abdominal pain, heartburn, nausea and vomiting.   Genitourinary: Negative for dysuria and urgency.   Musculoskeletal: Negative for back pain, myalgias and neck pain.   Skin: Negative for rash.   Neurological: Negative for dizziness, tingling, tremors, sensory change and headaches.   Endo/Heme/Allergies: Negative for environmental allergies. Does not bruise/bleed easily.   Psychiatric/Behavioral: Negative for depression, substance abuse and suicidal ideas.         Current Outpatient Medications   Medication Sig    acyclovir (ZOVIRAX) 400 MG tablet Take 1 tablet (400 mg total) by mouth 2 (two) times daily.    ALPRAZolam (XANAX) 0.25 MG tablet Take 1 tablet (0.25 mg total) by mouth 2 (two) times daily as needed for Insomnia or Anxiety.    atenolol (TENORMIN) 50 MG tablet Take 1 tablet (50 mg total) by mouth once daily.    atenolol (TENORMIN) 50 MG tablet Take 1 tablet (50 mg total) by mouth  once daily.    famotidine (PEPCID) 20 MG tablet Take 1 tablet (20 mg total) by mouth 2 (two) times daily.    fexofenadine (ALLEGRA) 60 MG tablet Take 60 mg by mouth once daily.    fluticasone (FLONASE) 50 mcg/actuation nasal spray 1 spray by Each Nare route once daily.    gabapentin (NEURONTIN) 300 MG capsule Take 1 capsule (300 mg total) by mouth 3 (three) times daily.    ketoconazole (NIZORAL) 2 % cream     levoFLOXacin (LEVAQUIN) 500 MG tablet Take 1 tablet (500 mg total) by mouth once daily.    loratadine (CLARITIN) 10 mg tablet Take 10 mg by mouth once daily.    losartan-hydrochlorothiazide 100-12.5 mg (HYZAAR) 100-12.5 mg Tab Take 1 tablet by mouth once daily.    nystatin (MYCOSTATIN) powder Apply topically 2 (two) times daily.    predniSONE (DELTASONE) 10 MG tablet Take one tablet by mouth 2 times a day. On days 1-5 of chemotherapy cycles only. To be combined with 50mg tabs for a total of 160mg    predniSONE (DELTASONE) 50 MG Tab Take 3 tablets (150 mg total) by mouth 2 (two) times daily. On days 1-5 of chemotherapy cycles. To be combined with 10mg tabs for a total of 160mg     No current facility-administered medications for this visit.      Facility-Administered Medications Ordered in Other Visits   Medication    pegfilgrastim injection 6 mg       Vitals:    11/27/19 1104   BP: (!) 170/95   Pulse: 62   Resp: 18   Temp: 98.7 °F (37.1 °C)         Physical Exam   Constitutional: He is oriented to person, place, and time. He appears well-developed.   HENT:   Head: Normocephalic.   Mouth/Throat: No oropharyngeal exudate.   Eyes: No scleral icterus.   Cardiovascular: Normal rate.   No murmur heard.  Pulmonary/Chest: Effort normal. No respiratory distress. He has no wheezes.   Abdominal: He exhibits no distension. There is no tenderness.   Musculoskeletal: He exhibits no edema.   Lymphadenopathy:     He has no cervical adenopathy.   Neurological: He is alert and oriented to person, place, and time. No  cranial nerve deficit.   Skin: Skin is warm.   Psychiatric: He has a normal mood and affect.     2/7/19 Cerebrospinal Fluid     FINAL PATHOLOGIC DIAGNOSIS     Negative for malignant cells        3/1/19 CSF cytology FINAL PATHOLOGIC DIAGNOSIS     Cerebrospinal fluid (Cytology):Negative for malignant cells       4/5/19 PET CT                COMPARISON:  Prior FDG PET-CT scan 01/15/2019    FINDINGS:  Quality of the study: Adequate.    There is more prominent non physiologic focal activity within the descending colon with a maximum SUV of 11.4, increased from 6.5 previously.  There are no hypermetabolic foci within the small bowel to suggest relapse.  There are no pathologically enlarged or hypermetabolic lymph nodes.  The spleen is normal in uptake and size at 11.2 cm in craniocaudal dimension.    There is diffusely enhanced uptake in the axial and appendicular skeleton.    Physiologic uptake of the tracer is present within the brain, salivary glands, myocardium, GI and  tracts.  There is persistent postsurgical uptake within the anterior abdominal wall.    Incidental CT findings: N/A    Internal reference SUVs are 2.2 and 4.9 for the mediastinal blood pool and normal liver background, respectively.       Impression         1. No definite evidence of residual/relapsed extranodal diffuse large B-cell lymphoma.    2.  Focal non physiologic activity within the descending colon without discrete CT correlate that is more conspicuous on the current PET exam.  This may represent a polyp.  Recommend direct visualization.    3.  Bone marrow hyperplasia, presumably related to pegfilgrastim administration.    The Deauville Score is X, presuming the colonic hypermetabolic focus is unrelated to DLBCL.  Otherwise, the score would be 5.         4/5/19 PET CT             FINDINGS:  Quality of the study: Adequate.    There is more prominent non physiologic focal activity within the descending colon with a maximum SUV of 11.4,  increased from 6.5 previously.  There are no hypermetabolic foci within the small bowel to suggest relapse.  There are no pathologically enlarged or hypermetabolic lymph nodes.  The spleen is normal in uptake and size at 11.2 cm in craniocaudal dimension.    There is diffusely enhanced uptake in the axial and appendicular skeleton.    Physiologic uptake of the tracer is present within the brain, salivary glands, myocardium, GI and  tracts.  There is persistent postsurgical uptake within the anterior abdominal wall.    Incidental CT findings: N/A    Internal reference SUVs are 2.2 and 4.9 for the mediastinal blood pool and normal liver background, respectively.       Impression         1. No definite evidence of residual/relapsed extranodal diffuse large B-cell lymphoma.    2.  Focal non physiologic activity within the descending colon without discrete CT correlate that is more conspicuous on the current PET exam.  This may represent a polyp.  Recommend direct visualization.    3.  Bone marrow hyperplasia, presumably related to pegfilgrastim administration.    The Deauville Score is X, presuming the colonic hypermetabolic focus is unrelated to DLBCL.  Otherwise, the score would be 5.         4/11/19 CSF cytology      FINAL PATHOLOGIC DIAGNOSIS  CEREBROSPINAL FLUID CYTOLOGY:  NEGATIVE EXAM-NO NEOPLASIA IDENTIFIED     6/19/19 FINAL PATHOLOGIC DIAGNOSIS     1. BIOPSIES OF COLON:  NO SIGNIFICANT HISTOLOGIC ALTERATION  NO COLITIS IDENTIFIED     BIOPSIES OF 2. DESCENDING COLON AND 3. SIGMOID COLON:  ADENOMATOUS POLYPS     6/21/19 PET CT                 COMPARISON:  PET-CT 04/05/2019    FINDINGS:  Quality of the study: Adequate.    No abnormal foci of increased tracer uptake are present to suggest recurrence or metastatic disease.  Previously seen focal non physiologic uptake in the descending colon is absent on today's exam.  Patient has intervally undergone colonoscopy with removal of multiple polyps and most likely  this lesion was removed.  Pathology is pending per chart review.  Thin FDG uptake superficial to the skin surface of which the vast majority is located about the lower pelvis below the laparotomy scar likely represents urinary excretion of radiotracer onto the skin surface.  Persistent mild diffuse hypermetabolism throughout the axial skeleton most likely represents red marrow reconversion.    Physiologic uptake of the tracer is present within the brain, salivary glands, myocardium, GI and  tracts.    Incidental CT findings: Postsurgical changes of prior midline laparotomy scar and partial small-bowel resection in the midline lower abdomen.  Nonobstructing 4 mm right renal stone.  Mosaic attenuation throughout both lungs is similar to prior and may represent small airways disease or artifact from respiratory motion.       Impression         No FDG PET/CT findings to suggest recurrent or metastatic disease.      Component      Latest Ref Rng & Units 11/27/2019   WBC      3.90 - 12.70 K/uL 5.85   RBC      4.60 - 6.20 M/uL 4.49 (L)   Hemoglobin      14.0 - 18.0 g/dL 13.0 (L)   Hematocrit      40.0 - 54.0 % 40.3   MCV      82 - 98 fL 90   MCH      27.0 - 31.0 pg 29.0   MCHC      32.0 - 36.0 g/dL 32.3   RDW      11.5 - 14.5 % 15.3 (H)   Platelets      150 - 350 K/uL 257   MPV      9.2 - 12.9 fL 9.3   Immature Granulocytes      0.0 - 0.5 % 0.3   Gran # (ANC)      1.8 - 7.7 K/uL 4.4   Immature Grans (Abs)      0.00 - 0.04 K/uL 0.02   Lymph #      1.0 - 4.8 K/uL 0.6 (L)   Mono #      0.3 - 1.0 K/uL 0.6   Eos #      0.0 - 0.5 K/uL 0.2   Baso #      0.00 - 0.20 K/uL 0.04   nRBC      0 /100 WBC 0   Gran%      38.0 - 73.0 % 75.4 (H)   Lymph%      18.0 - 48.0 % 10.3 (L)   Mono%      4.0 - 15.0 % 10.1   Eosinophil%      0.0 - 8.0 % 3.2   Basophil%      0.0 - 1.9 % 0.7   Differential Method       Automated     Component      Latest Ref Rng & Units 11/27/2019   LD      110 - 260 U/L 196     11/27/19 CT neck, chest, abdomen,  pelvis with cont    TECHNIQUE:  Axial images of the neck, chest, abdomen, and pelvis were acquired after the use of 125 cc [id number] IV contrast. Approximately 30 cc of Omnipaque 350 administered for enteric/oral contrast.  Coronal and sagittal reconstructions were also obtained    COMPARISON:  PET-CT 06/21/2019, 04/05/2019    FINDINGS:  Neck: Partially visualized brain appears within normal limits without acute abnormality.  Orbits are normal.  Mucous retention cyst within bilateral maxillary sinuses.  Mastoid air cells essentially clear.  Salivary glands unremarkable.  Thyroid within normal limits.  No cervical lymphadenopathy.  Visualized neck vasculature within normal limits.  Mild cervical spondylosis without acute fracture or aggressive osseous lesion.    Thoracic soft tissues: No significant abnormality. Right chest port with central venous catheter tip terminating in the superior vena cava.    Aorta: Left-sided arch with normal branching pattern.  Aorta maintains normal caliber, contour, and course without significant calcific atherosclerosis.    Heart: Normal in size. No pericardial effusion.    Emerita/Mediastinum: No significant lymphadenopathy    Lungs: Well aerated bilaterally with scattered bandlike opacities, right greater than left, likely representative of subsegmental atelectasis versus fibrosis.  There is some increased irregular opacities right middle lobe.  Additional irregular opacities posteriorly.  These may all be related to atelectasis though developing infiltrate not excluded.  Few irregular opacities on the left as well.  No significant consolidation or pleural effusion.    Liver: Normal in size without focal hepatic mass.    Gallbladder: No calcified gallstones.    Bile Ducts: No evidence of dilated ducts.    Pancreas: No mass or peripancreatic fat stranding.    Spleen: Unremarkable.  Small splenule present.    Adrenals: Mild nonspecific thickening of the left adrenal gland.    Kidneys/  Ureters: Normal in size and location without suspicious renal mass or hydroureteronephrosis.  Bilateral nonobstructive nephrolithiasis present.    Bladder: Not well distended on today's exam.    Reproductive organs: Unremarkable.    GI Tract/Mesentery: Stable postoperative change status post partial small-bowel resection.  No evidence of bowel obstruction or inflammation.    Peritoneal Space: No ascites. No free air.    Retroperitoneum:  No significant adenopathy.    Abdominal wall:  Midline abdominal surgical scar.  No acute abnormalities.    Vasculature: No significant atherosclerosis or aneurysm.    Bones: Degenerative change without acute fracture or aggressive osseous lesion.      Impression       Postoperative change status post partial small-bowel resection for jejunal lymphoma.  No evidence of recurrent or metastatic disease on today's exam.    Bilateral nonobstructing nephrolithiasis.    Additional findings as above.           Assessment:     1. DLBCL of jejunum, GC sub type, high grade, with myc translocation  2. Hypertension  3. Obstructive sleep apnea  4. Morbid obesity  5. Anemia, hypochromic  6. Peripheral neuropathy secondary to chemotherapy        Plan:     1. 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 IPI score 0. Criteria for Burkitts not fulfilled. ( ki 67 was high, but not high enough). Also did not meet criteria for double or triple hit lymphoma ( no bcl2 or bcl6 translocation). No PET avid measurable disease post resection. No lymphoma involvement of bone marrow. I discussed the treatment of stage I GI high grade lymphoma. I explained that although there is no active/measurable disease, chemotherapy with RCHOP or dose adjusted R-EPOCH is recommended, as he had bulky (8cm), high grade ( ki 67 > 60% ) lymphoma with MYC/IgH fusion. Also discussed the need for diagnostic LP. He went to [facility] for 2nd opinion.   CSF cytology negative for malignant cells on  2/7/19 and on 3/1/19. ANC, platelet alvaro after C1 reviewed. Doses of Etoposide/Doxorubicin/Cytoxan were escalated by 1 level for C2. Doses for cycle 3 were the same as C2. C4 doses were escalated.   PET CT after 3 cycles showed no definite evidence of residual/relapsed extranodal diffuse large B-cell lymphoma. There was focal non physiologic activity within the descending colon without discrete CT correlate.  He wanted to proceed with all 6 cycles, although the data for 4 versus 6 cycles for stage I E lymphoma is limited, even with c-myc translocation.   He completed 6 cycles.  Role of Rt to the initial disease site ( due to bulk) discussed. He was not inclined to undergo RT.   PET CT after C 6 showed CR. Colonoscopy on 6/19/19 showed adenomatous polyps in descending and sigmoid colon.     CT repeated today. I personally reviewed the images. No new adenopathy or mass noted. LDH is normal. He has no pain or B symptoms. He will have his port removed.      2.On Atenolol, Losartan HCTZ . /95 mm Hg today.      5. Mild, asymptomatic.      6. Grade 1-2. Recommend gabapentin 300 mg q HS. It is improving.

## 2019-12-05 ENCOUNTER — TELEPHONE (OUTPATIENT)
Dept: INTERVENTIONAL RADIOLOGY/VASCULAR | Facility: HOSPITAL | Age: 49
End: 2019-12-05

## 2019-12-05 DIAGNOSIS — C83.39 DIFFUSE LARGE B-CELL LYMPHOMA OF SOLID ORGAN EXCLUDING SPLEEN: Primary | ICD-10-CM

## 2019-12-19 DIAGNOSIS — C83.39 DIFFUSE LARGE B-CELL LYMPHOMA OF SOLID ORGAN EXCLUDING SPLEEN: Primary | ICD-10-CM

## 2019-12-19 RX ORDER — FENTANYL CITRATE 50 UG/ML
50 INJECTION, SOLUTION INTRAMUSCULAR; INTRAVENOUS
Status: CANCELLED | OUTPATIENT
Start: 2019-12-19

## 2019-12-19 RX ORDER — MIDAZOLAM HYDROCHLORIDE 1 MG/ML
1 INJECTION INTRAMUSCULAR; INTRAVENOUS
Status: CANCELLED | OUTPATIENT
Start: 2019-12-19

## 2019-12-23 ENCOUNTER — HOSPITAL ENCOUNTER (OUTPATIENT)
Facility: HOSPITAL | Age: 49
Discharge: HOME | End: 2019-12-23
Attending: INTERNAL MEDICINE | Admitting: INTERNAL MEDICINE
Payer: COMMERCIAL

## 2019-12-23 VITALS
SYSTOLIC BLOOD PRESSURE: 150 MMHG | DIASTOLIC BLOOD PRESSURE: 86 MMHG | TEMPERATURE: 99 F | RESPIRATION RATE: 16 BRPM | OXYGEN SATURATION: 100 % | HEART RATE: 59 BPM

## 2019-12-23 DIAGNOSIS — C83.39 DIFFUSE LARGE B-CELL LYMPHOMA OF SOLID ORGAN EXCLUDING SPLEEN: ICD-10-CM

## 2019-12-23 PROCEDURE — 63600175 PHARM REV CODE 636 W HCPCS: Performed by: RADIOLOGY

## 2019-12-23 PROCEDURE — 63600175 PHARM REV CODE 636 W HCPCS: Performed by: FAMILY MEDICINE

## 2019-12-23 RX ORDER — SODIUM CHLORIDE 9 MG/ML
500 INJECTION, SOLUTION INTRAVENOUS ONCE
Status: DISCONTINUED | OUTPATIENT
Start: 2019-12-23 | End: 2019-12-23 | Stop reason: HOSPADM

## 2019-12-23 RX ORDER — CEFAZOLIN SODIUM 1 G/3ML
1 INJECTION, POWDER, FOR SOLUTION INTRAMUSCULAR; INTRAVENOUS
Status: COMPLETED | OUTPATIENT
Start: 2019-12-23 | End: 2019-12-23

## 2019-12-23 RX ORDER — MIDAZOLAM HYDROCHLORIDE 1 MG/ML
INJECTION INTRAMUSCULAR; INTRAVENOUS CODE/TRAUMA/SEDATION MEDICATION
Status: COMPLETED | OUTPATIENT
Start: 2019-12-23 | End: 2019-12-23

## 2019-12-23 RX ORDER — FENTANYL CITRATE 50 UG/ML
INJECTION, SOLUTION INTRAMUSCULAR; INTRAVENOUS CODE/TRAUMA/SEDATION MEDICATION
Status: COMPLETED | OUTPATIENT
Start: 2019-12-23 | End: 2019-12-23

## 2019-12-23 RX ADMIN — MIDAZOLAM HYDROCHLORIDE 1 MG: 1 INJECTION, SOLUTION INTRAMUSCULAR; INTRAVENOUS at 10:12

## 2019-12-23 RX ADMIN — FENTANYL CITRATE 50 MCG: 50 INJECTION, SOLUTION INTRAMUSCULAR; INTRAVENOUS at 10:12

## 2019-12-23 RX ADMIN — CEFAZOLIN 1 G: 330 INJECTION, POWDER, FOR SOLUTION INTRAMUSCULAR; INTRAVENOUS at 10:12

## 2019-12-23 NOTE — PLAN OF CARE
Port removal completed. Pt tolerated well. NAD noted or reported. Site bandaged, CDI. Pt to recover in [facility], report to be given at the bedside.

## 2019-12-23 NOTE — NURSING
Pt arrives to [facility]. Pt is AAOX4, able to state reason he is here. NAD noted or reported. Orders, hx, labs reviewed. Awaiting consent.

## 2019-12-23 NOTE — DISCHARGE SUMMARY
Vascular and Interventional Radiology Discharge Summary      Hospital Course: No complications    Admit Date: 12/23/2019    Discharge Date: 12/23/2019     Instructions Given to Patient:  Yes    Diet: Resume prior diet    Activity: activity as tolerated    Description of Condition on Discharge:  Stable    Vital Signs (Most Recent):  Pulse: (!) 59 (12/23/19 1110)  Resp: 17 (12/23/19 1110)  BP: (!) 171/76 (12/23/19 1110)  SpO2: 97 % (12/23/19 1110)    Discharge Disposition:  Home    Discharge Diagnosis:  Diffuse large B cell lymphoma    Procedure(s) Performed:  Right chest port removal     Follow-up:  Per referring provider    Discharge Prescriptions:  Not applicable        Vamsi Cummings MD  [facility], Diagnostic and Interventional Radiology  Pager: [phone]

## 2019-12-23 NOTE — PROCEDURES
Vascular and Interventional Radiology Post-Procedure Note    Pre Op Diagnosis: Diffuse large B cell lymphoma  Post Op Diagnosis: Same    Procedure: right chest port removal    Procedure Performed By: Brandon Rizzo MD; Vamsi Cummings MD (res)    Written Informed Consent Obtained?: Yes  Specimen Removed: YES, right chest port and catheter in entiretyh  Estimated Blood Loss: Minimal    Findings:   Using a combination of blunt and sharp dissection, a right chest port was removed. Incision closed deeply with 3-0 vicryl and superficially with dermabond and steri-strips. Patient tolerated procedure well. See imaging report for details.        Vamsi Cummings MD  [facility], Diagnostic and Interventional Radiology  Pager: [phone]

## 2019-12-23 NOTE — PLAN OF CARE
Pt arrives to . Pt is AAOx4, able to state reason he is here. Orders, hx, Labs, consent reviewed.

## 2019-12-23 NOTE — PROGRESS NOTES
Pt arrived to [facility] via stretcher on RA, pt alert, report received from Elijah BLAKE, dressing CDI, no bleeding, no hematoma

## 2019-12-23 NOTE — NURSING
Pt & wife received dc instructions, piv dcd, pt left via WC with escort & wife in NAD, dressing CDI

## 2020-01-24 ENCOUNTER — PATIENT MESSAGE (OUTPATIENT)
Dept: HEMATOLOGY/ONCOLOGY | Facility: CLINIC | Age: 50
End: 2020-01-24

## 2020-02-26 ENCOUNTER — OFFICE VISIT (OUTPATIENT)
Dept: HEMATOLOGY/ONCOLOGY | Facility: CLINIC | Age: 50
End: 2020-02-26
Payer: COMMERCIAL

## 2020-02-26 ENCOUNTER — LAB VISIT (OUTPATIENT)
Dept: LAB | Facility: HOSPITAL | Age: 50
End: 2020-02-26
Attending: INTERNAL MEDICINE
Payer: COMMERCIAL

## 2020-02-26 ENCOUNTER — PATIENT MESSAGE (OUTPATIENT)
Dept: HEMATOLOGY/ONCOLOGY | Facility: CLINIC | Age: 50
End: 2020-02-26

## 2020-02-26 VITALS
WEIGHT: 299.63 LBS | HEIGHT: 67 IN | RESPIRATION RATE: 18 BRPM | SYSTOLIC BLOOD PRESSURE: 136 MMHG | TEMPERATURE: 99 F | HEART RATE: 55 BPM | OXYGEN SATURATION: 98 % | DIASTOLIC BLOOD PRESSURE: 89 MMHG | BODY MASS INDEX: 47.03 KG/M2

## 2020-02-26 DIAGNOSIS — E66.01 MORBID OBESITY: ICD-10-CM

## 2020-02-26 DIAGNOSIS — B37.89 CANDIDA RASH OF GROIN: ICD-10-CM

## 2020-02-26 DIAGNOSIS — C83.39 DIFFUSE LARGE B-CELL LYMPHOMA OF SOLID ORGAN EXCLUDING SPLEEN: ICD-10-CM

## 2020-02-26 DIAGNOSIS — G62.0 PERIPHERAL NEUROPATHY DUE TO CHEMOTHERAPY: ICD-10-CM

## 2020-02-26 DIAGNOSIS — C83.39 DIFFUSE LARGE B-CELL LYMPHOMA OF SOLID ORGAN EXCLUDING SPLEEN: Primary | ICD-10-CM

## 2020-02-26 DIAGNOSIS — T45.1X5A PERIPHERAL NEUROPATHY DUE TO CHEMOTHERAPY: ICD-10-CM

## 2020-02-26 DIAGNOSIS — D63.0 ANEMIA IN NEOPLASTIC DISEASE: ICD-10-CM

## 2020-02-26 DIAGNOSIS — G47.33 OSA (OBSTRUCTIVE SLEEP APNEA): ICD-10-CM

## 2020-02-26 DIAGNOSIS — I10 ESSENTIAL HYPERTENSION: ICD-10-CM

## 2020-02-26 LAB
ALBUMIN SERPL BCP-MCNC: 3.8 G/DL (ref 3.5–5.2)
ALP SERPL-CCNC: 67 U/L (ref 55–135)
ALT SERPL W/O P-5'-P-CCNC: 26 U/L (ref 10–44)
ANION GAP SERPL CALC-SCNC: 9 MMOL/L (ref 8–16)
AST SERPL-CCNC: 20 U/L (ref 10–40)
BASOPHILS # BLD AUTO: 0.05 K/UL (ref 0–0.2)
BASOPHILS NFR BLD: 0.8 % (ref 0–1.9)
BILIRUB SERPL-MCNC: 0.3 MG/DL (ref 0.1–1)
BUN SERPL-MCNC: 14 MG/DL (ref 6–20)
CALCIUM SERPL-MCNC: 9.3 MG/DL (ref 8.7–10.5)
CHLORIDE SERPL-SCNC: 103 MMOL/L (ref 95–110)
CO2 SERPL-SCNC: 29 MMOL/L (ref 23–29)
CREAT SERPL-MCNC: 0.8 MG/DL (ref 0.5–1.4)
DIFFERENTIAL METHOD: ABNORMAL
EOSINOPHIL # BLD AUTO: 0.2 K/UL (ref 0–0.5)
EOSINOPHIL NFR BLD: 2.7 % (ref 0–8)
ERYTHROCYTE [DISTWIDTH] IN BLOOD BY AUTOMATED COUNT: 13.4 % (ref 11.5–14.5)
EST. GFR  (AFRICAN AMERICAN): >60 ML/MIN/1.73 M^2
EST. GFR  (NON AFRICAN AMERICAN): >60 ML/MIN/1.73 M^2
GLUCOSE SERPL-MCNC: 155 MG/DL (ref 70–110)
HCT VFR BLD AUTO: 41.9 % (ref 40–54)
HGB BLD-MCNC: 13.5 G/DL (ref 14–18)
IMM GRANULOCYTES # BLD AUTO: 0.01 K/UL (ref 0–0.04)
IMM GRANULOCYTES NFR BLD AUTO: 0.2 % (ref 0–0.5)
LDH SERPL L TO P-CCNC: 187 U/L (ref 110–260)
LYMPHOCYTES # BLD AUTO: 0.6 K/UL (ref 1–4.8)
LYMPHOCYTES NFR BLD: 10 % (ref 18–48)
MCH RBC QN AUTO: 29.6 PG (ref 27–31)
MCHC RBC AUTO-ENTMCNC: 32.2 G/DL (ref 32–36)
MCV RBC AUTO: 92 FL (ref 82–98)
MONOCYTES # BLD AUTO: 0.6 K/UL (ref 0.3–1)
MONOCYTES NFR BLD: 10.1 % (ref 4–15)
NEUTROPHILS # BLD AUTO: 4.6 K/UL (ref 1.8–7.7)
NEUTROPHILS NFR BLD: 76.2 % (ref 38–73)
NRBC BLD-RTO: 0 /100 WBC
PLATELET # BLD AUTO: 233 K/UL (ref 150–350)
PMV BLD AUTO: 9.6 FL (ref 9.2–12.9)
POTASSIUM SERPL-SCNC: 4.1 MMOL/L (ref 3.5–5.1)
PROT SERPL-MCNC: 7.1 G/DL (ref 6–8.4)
RBC # BLD AUTO: 4.56 M/UL (ref 4.6–6.2)
SODIUM SERPL-SCNC: 141 MMOL/L (ref 136–145)
URATE SERPL-MCNC: 6.6 MG/DL (ref 3.4–7)
WBC # BLD AUTO: 6.02 K/UL (ref 3.9–12.7)

## 2020-02-26 PROCEDURE — 83615 LACTATE (LD) (LDH) ENZYME: CPT

## 2020-02-26 PROCEDURE — 99999 PR PBB SHADOW E&M-EST. PATIENT-LVL III: CPT | Mod: PBBFAC,,, | Performed by: NURSE PRACTITIONER

## 2020-02-26 PROCEDURE — 3075F SYST BP GE 130 - 139MM HG: CPT | Mod: CPTII,S$GLB,, | Performed by: NURSE PRACTITIONER

## 2020-02-26 PROCEDURE — 3079F PR MOST RECENT DIASTOLIC BLOOD PRESSURE 80-89 MM HG: ICD-10-PCS | Mod: CPTII,S$GLB,, | Performed by: NURSE PRACTITIONER

## 2020-02-26 PROCEDURE — 3079F DIAST BP 80-89 MM HG: CPT | Mod: CPTII,S$GLB,, | Performed by: NURSE PRACTITIONER

## 2020-02-26 PROCEDURE — 80053 COMPREHEN METABOLIC PANEL: CPT

## 2020-02-26 PROCEDURE — 3075F PR MOST RECENT SYSTOLIC BLOOD PRESS GE 130-139MM HG: ICD-10-PCS | Mod: CPTII,S$GLB,, | Performed by: NURSE PRACTITIONER

## 2020-02-26 PROCEDURE — 36415 COLL VENOUS BLD VENIPUNCTURE: CPT

## 2020-02-26 PROCEDURE — 85025 COMPLETE CBC W/AUTO DIFF WBC: CPT

## 2020-02-26 PROCEDURE — 3008F BODY MASS INDEX DOCD: CPT | Mod: CPTII,S$GLB,, | Performed by: NURSE PRACTITIONER

## 2020-02-26 PROCEDURE — 99214 PR OFFICE/OUTPT VISIT, EST, LEVL IV, 30-39 MIN: ICD-10-PCS | Mod: S$GLB,,, | Performed by: NURSE PRACTITIONER

## 2020-02-26 PROCEDURE — 3008F PR BODY MASS INDEX (BMI) DOCUMENTED: ICD-10-PCS | Mod: CPTII,S$GLB,, | Performed by: NURSE PRACTITIONER

## 2020-02-26 PROCEDURE — 99999 PR PBB SHADOW E&M-EST. PATIENT-LVL III: ICD-10-PCS | Mod: PBBFAC,,, | Performed by: NURSE PRACTITIONER

## 2020-02-26 PROCEDURE — 99214 OFFICE O/P EST MOD 30 MIN: CPT | Mod: S$GLB,,, | Performed by: NURSE PRACTITIONER

## 2020-02-26 PROCEDURE — 84550 ASSAY OF BLOOD/URIC ACID: CPT

## 2020-02-26 RX ORDER — NYSTATIN 100000 [USP'U]/G
POWDER TOPICAL 2 TIMES DAILY
Qty: 60 G | Refills: 3 | Status: SHIPPED | OUTPATIENT
Start: 2020-02-26

## 2020-02-26 NOTE — PROGRESS NOTES
CC: Lymphoma, follow up visit         Oncology History:     Diagnosis: DLBCL   Sub type: Germinal center, with myc translocation by FISH ; negative for bcl2/ bcl6 translocations; CSF negative  Stage: I E , bulky ( jejunum)  Treatment : DE R-EPOCH x 6 with IT MTX, (2/1/19 - 5/27/19)        HPI:  is a 49 y.o. male with DLBCL; patient of Dr. Hwang. He has hypertension, obstructive sleep apnea. He was hospitalized around Christmas 2018 for small bowel mass. Patient reports that he began having lower abdominal/pelvic pain toward the end of October 2018. This prompted a urologic evaluation and subsequent treatment for a presumed UTI. He states that his pain continued into November and evolved into more upper abdominal/epigastric pain that was worse with eating. He was subsequently seen by his PCP who ultimately ordered a CT abdomen/pelvis that revealed an abnormal thickening of an 8 cm segment of small bowel. He denied fever, chills, night sweats, or anorexia at that time. He had ~10 lb weight loss over the 1-2 months prior to his hospitalization in Dec 2018, but he was also actively taking part in a weight loss program.  On 12/21/18, he underwent Segmental small bowel resection.The mass was approximately 6 cm in length and was circumferential.  There was dilation of the small bowel above it suggesting a partial bowel obstruction.  There was no adenopathy in the adjacent   mesentery.  The tumor was clearly extending to the serosal surface of the bowel.Careful search was made for peritoneal implants and there were no lesions noted on the peritoneum or omentum.  The liver could not be inspected through the incision,   but it was palpated and there were no focal lesions within it. The remainder of the small bowel was run from the ligament of Treitz to the ileocecal valves and no other small bowel lesions were noted.    He had PET CT, 2D ECHO, bone marrow biopsy. PET CT did not reveal any evidence of lymphoma. 2D  ECHO was normal. Bone marrow was negative for involvement by lymphoma.       He received C1 EPOCH - R between 2/1-2/5/19 without complications. C2 day 1 was on 2/25/19. C3 day 1 was on 3/18, C4 day 1 on 4/8, C5 day 1 on 5/6/19, C 6 on .5/27/19.  No PET avid disease noted after C3 except for a hypermetabolic focus in colon. Repeat PET CT after 6 cycles was negative. Colonoscopy done after 6 cycles showed adenomatous polyps in colon.     Interval History: He is here for a follow up visit today. He is doing well. He has no new complaints. He is eating well. Repeat CT done 11/27/19 negative for disease. Continues with fungal rash to right chest under breast fold and to groin region; PCP refilled with antifungal cream, pt requesting powder. No fevers, chills, chest pain, sob, n/v/d/c. No B symptoms or new lymphadenopathy.       Review of Systems   Constitutional: Negative for chills, fever, malaise/fatigue and weight loss.   HENT: Negative for congestion, ear discharge, ear pain, nosebleeds and sinus pain.    Eyes: Negative for blurred vision and photophobia.   Respiratory: Negative for hemoptysis, sputum production, shortness of breath and wheezing.    Cardiovascular: Positive for leg swelling (chronic BLE edema; nonpitting). Negative for chest pain, palpitations, orthopnea and claudication.   Gastrointestinal: Negative for abdominal pain, blood in stool, constipation, diarrhea, heartburn, nausea and vomiting.   Genitourinary: Negative for dysuria, frequency, hematuria and urgency.   Musculoskeletal: Negative for back pain, myalgias and neck pain.   Skin: Positive for itching and rash.   Neurological: Negative for dizziness, tingling, tremors, sensory change and headaches.   Endo/Heme/Allergies: Negative for environmental allergies. Does not bruise/bleed easily.   Psychiatric/Behavioral: Negative for depression. The patient is not nervous/anxious.        Current Outpatient Medications   Medication Sig    acyclovir  (ZOVIRAX) 400 MG tablet Take 1 tablet (400 mg total) by mouth 2 (two) times daily.    ALPRAZolam (XANAX) 0.25 MG tablet Take 1 tablet (0.25 mg total) by mouth 2 (two) times daily as needed for Insomnia or Anxiety.    atenolol (TENORMIN) 100 MG tablet TAKE 1 2 (ONE HALF) TABLET BY MOUTH TWICE DAILY    famotidine (PEPCID) 20 MG tablet Take 1 tablet (20 mg total) by mouth 2 (two) times daily.    fexofenadine (ALLEGRA) 60 MG tablet Take 60 mg by mouth once daily.    fluticasone (FLONASE) 50 mcg/actuation nasal spray 1 spray by Each Nare route once daily.    gabapentin (NEURONTIN) 300 MG capsule Take 1 capsule (300 mg total) by mouth 3 (three) times daily.    ketoconazole (NIZORAL) 2 % cream     levoFLOXacin (LEVAQUIN) 500 MG tablet Take 1 tablet (500 mg total) by mouth once daily.    loratadine (CLARITIN) 10 mg tablet Take 10 mg by mouth once daily.    losartan-hydrochlorothiazide 100-12.5 mg (HYZAAR) 100-12.5 mg Tab Take 1 tablet by mouth once daily.    nystatin (MYCOSTATIN) powder Apply topically 2 (two) times daily.     No current facility-administered medications for this visit.      Facility-Administered Medications Ordered in Other Visits   Medication    pegfilgrastim injection 6 mg       There were no vitals filed for this visit.      Physical Exam   Constitutional: He is oriented to person, place, and time. He appears well-developed and well-nourished.   HENT:   Head: Normocephalic and atraumatic.   Right Ear: External ear normal.   Left Ear: External ear normal.   Mouth/Throat: Oropharynx is clear and moist. No oropharyngeal exudate.   Eyes: Conjunctivae and EOM are normal. No scleral icterus.   Neck: Normal range of motion. Neck supple. No thyromegaly present.   Cardiovascular: Normal rate, regular rhythm, normal heart sounds and intact distal pulses.   No murmur heard.  Pulmonary/Chest: Effort normal and breath sounds normal. No respiratory distress. He has no wheezes.   Abdominal: Soft. Bowel  sounds are normal. He exhibits no distension. There is no tenderness.   Musculoskeletal: Normal range of motion. He exhibits edema (chronic BLE edema; nonpitting).   Lymphadenopathy:        Head (right side): No submental, no submandibular, no tonsillar, no preauricular, no posterior auricular and no occipital adenopathy present.        Head (left side): No submental, no submandibular, no tonsillar, no preauricular, no posterior auricular and no occipital adenopathy present.     He has no cervical adenopathy.     He has no axillary adenopathy.   Neurological: He is alert and oriented to person, place, and time. No cranial nerve deficit.   Skin: Rash noted. There is erythema.   Psychiatric: He has a normal mood and affect. His behavior is normal. Judgment and thought content normal.   Vitals reviewed.    2/7/19 Cerebrospinal Fluid     FINAL PATHOLOGIC DIAGNOSIS     Negative for malignant cells        3/1/19 CSF cytology FINAL PATHOLOGIC DIAGNOSIS     Cerebrospinal fluid (Cytology):Negative for malignant cells       4/5/19 PET CT                COMPARISON:  Prior FDG PET-CT scan 01/15/2019    FINDINGS:  Quality of the study: Adequate.    There is more prominent non physiologic focal activity within the descending colon with a maximum SUV of 11.4, increased from 6.5 previously.  There are no hypermetabolic foci within the small bowel to suggest relapse.  There are no pathologically enlarged or hypermetabolic lymph nodes.  The spleen is normal in uptake and size at 11.2 cm in craniocaudal dimension.    There is diffusely enhanced uptake in the axial and appendicular skeleton.    Physiologic uptake of the tracer is present within the brain, salivary glands, myocardium, GI and  tracts.  There is persistent postsurgical uptake within the anterior abdominal wall.    Incidental CT findings: N/A    Internal reference SUVs are 2.2 and 4.9 for the mediastinal blood pool and normal liver background, respectively.        Impression         1. No definite evidence of residual/relapsed extranodal diffuse large B-cell lymphoma.    2.  Focal non physiologic activity within the descending colon without discrete CT correlate that is more conspicuous on the current PET exam.  This may represent a polyp.  Recommend direct visualization.    3.  Bone marrow hyperplasia, presumably related to pegfilgrastim administration.    The Deauville Score is X, presuming the colonic hypermetabolic focus is unrelated to DLBCL.  Otherwise, the score would be 5.         4/5/19 PET CT             FINDINGS:  Quality of the study: Adequate.    There is more prominent non physiologic focal activity within the descending colon with a maximum SUV of 11.4, increased from 6.5 previously.  There are no hypermetabolic foci within the small bowel to suggest relapse.  There are no pathologically enlarged or hypermetabolic lymph nodes.  The spleen is normal in uptake and size at 11.2 cm in craniocaudal dimension.    There is diffusely enhanced uptake in the axial and appendicular skeleton.    Physiologic uptake of the tracer is present within the brain, salivary glands, myocardium, GI and  tracts.  There is persistent postsurgical uptake within the anterior abdominal wall.    Incidental CT findings: N/A    Internal reference SUVs are 2.2 and 4.9 for the mediastinal blood pool and normal liver background, respectively.       Impression         1. No definite evidence of residual/relapsed extranodal diffuse large B-cell lymphoma.    2.  Focal non physiologic activity within the descending colon without discrete CT correlate that is more conspicuous on the current PET exam.  This may represent a polyp.  Recommend direct visualization.    3.  Bone marrow hyperplasia, presumably related to pegfilgrastim administration.    The Deauville Score is X, presuming the colonic hypermetabolic focus is unrelated to DLBCL.  Otherwise, the score would be 5.         4/11/19 CSF  cytology      FINAL PATHOLOGIC DIAGNOSIS  CEREBROSPINAL FLUID CYTOLOGY:  NEGATIVE EXAM-NO NEOPLASIA IDENTIFIED     6/19/19 FINAL PATHOLOGIC DIAGNOSIS     1. BIOPSIES OF COLON:  NO SIGNIFICANT HISTOLOGIC ALTERATION  NO COLITIS IDENTIFIED     BIOPSIES OF 2. DESCENDING COLON AND 3. SIGMOID COLON:  ADENOMATOUS POLYPS     6/21/19 PET CT                 COMPARISON:  PET-CT 04/05/2019    FINDINGS:  Quality of the study: Adequate.    No abnormal foci of increased tracer uptake are present to suggest recurrence or metastatic disease.  Previously seen focal non physiologic uptake in the descending colon is absent on today's exam.  Patient has intervally undergone colonoscopy with removal of multiple polyps and most likely this lesion was removed.  Pathology is pending per chart review.  Thin FDG uptake superficial to the skin surface of which the vast majority is located about the lower pelvis below the laparotomy scar likely represents urinary excretion of radiotracer onto the skin surface.  Persistent mild diffuse hypermetabolism throughout the axial skeleton most likely represents red marrow reconversion.    Physiologic uptake of the tracer is present within the brain, salivary glands, myocardium, GI and  tracts.    Incidental CT findings: Postsurgical changes of prior midline laparotomy scar and partial small-bowel resection in the midline lower abdomen.  Nonobstructing 4 mm right renal stone.  Mosaic attenuation throughout both lungs is similar to prior and may represent small airways disease or artifact from respiratory motion.       Impression         No FDG PET/CT findings to suggest recurrent or metastatic disease.      Component      Latest Ref Rng & Units 11/27/2019   WBC      3.90 - 12.70 K/uL 5.85   RBC      4.60 - 6.20 M/uL 4.49 (L)   Hemoglobin      14.0 - 18.0 g/dL 13.0 (L)   Hematocrit      40.0 - 54.0 % 40.3   MCV      82 - 98 fL 90   MCH      27.0 - 31.0 pg 29.0   MCHC      32.0 - 36.0 g/dL 32.3   RDW       11.5 - 14.5 % 15.3 (H)   Platelets      150 - 350 K/uL 257   MPV      9.2 - 12.9 fL 9.3   Immature Granulocytes      0.0 - 0.5 % 0.3   Gran # (ANC)      1.8 - 7.7 K/uL 4.4   Immature Grans (Abs)      0.00 - 0.04 K/uL 0.02   Lymph #      1.0 - 4.8 K/uL 0.6 (L)   Mono #      0.3 - 1.0 K/uL 0.6   Eos #      0.0 - 0.5 K/uL 0.2   Baso #      0.00 - 0.20 K/uL 0.04   nRBC      0 /100 WBC 0   Gran%      38.0 - 73.0 % 75.4 (H)   Lymph%      18.0 - 48.0 % 10.3 (L)   Mono%      4.0 - 15.0 % 10.1   Eosinophil%      0.0 - 8.0 % 3.2   Basophil%      0.0 - 1.9 % 0.7   Differential Method       Automated     Component      Latest Ref Rng & Units 11/27/2019   LD      110 - 260 U/L 196     11/27/19 CT neck, chest, abdomen, pelvis with cont    TECHNIQUE:  Axial images of the neck, chest, abdomen, and pelvis were acquired after the use of 125 cc [id number] IV contrast. Approximately 30 cc of Omnipaque 350 administered for enteric/oral contrast.  Coronal and sagittal reconstructions were also obtained    COMPARISON:  PET-CT 06/21/2019, 04/05/2019    FINDINGS:  Neck: Partially visualized brain appears within normal limits without acute abnormality.  Orbits are normal.  Mucous retention cyst within bilateral maxillary sinuses.  Mastoid air cells essentially clear.  Salivary glands unremarkable.  Thyroid within normal limits.  No cervical lymphadenopathy.  Visualized neck vasculature within normal limits.  Mild cervical spondylosis without acute fracture or aggressive osseous lesion.    Thoracic soft tissues: No significant abnormality. Right chest port with central venous catheter tip terminating in the superior vena cava.    Aorta: Left-sided arch with normal branching pattern.  Aorta maintains normal caliber, contour, and course without significant calcific atherosclerosis.    Heart: Normal in size. No pericardial effusion.    Emerita/Mediastinum: No significant lymphadenopathy    Lungs: Well aerated bilaterally with scattered bandlike  opacities, right greater than left, likely representative of subsegmental atelectasis versus fibrosis.  There is some increased irregular opacities right middle lobe.  Additional irregular opacities posteriorly.  These may all be related to atelectasis though developing infiltrate not excluded.  Few irregular opacities on the left as well.  No significant consolidation or pleural effusion.    Liver: Normal in size without focal hepatic mass.    Gallbladder: No calcified gallstones.    Bile Ducts: No evidence of dilated ducts.    Pancreas: No mass or peripancreatic fat stranding.    Spleen: Unremarkable.  Small splenule present.    Adrenals: Mild nonspecific thickening of the left adrenal gland.    Kidneys/ Ureters: Normal in size and location without suspicious renal mass or hydroureteronephrosis.  Bilateral nonobstructive nephrolithiasis present.    Bladder: Not well distended on today's exam.    Reproductive organs: Unremarkable.    GI Tract/Mesentery: Stable postoperative change status post partial small-bowel resection.  No evidence of bowel obstruction or inflammation.    Peritoneal Space: No ascites. No free air.    Retroperitoneum:  No significant adenopathy.    Abdominal wall:  Midline abdominal surgical scar.  No acute abnormalities.    Vasculature: No significant atherosclerosis or aneurysm.    Bones: Degenerative change without acute fracture or aggressive osseous lesion.      Impression       Postoperative change status post partial small-bowel resection for jejunal lymphoma.  No evidence of recurrent or metastatic disease on today's exam.    Bilateral nonobstructing nephrolithiasis.    Additional findings as above.           Assessment:     1. DLBCL of jejunum, GC sub type, high grade, with myc translocation  2. Hypertension  3. Obstructive sleep apnea  4. Morbid obesity  5. Anemia, hypochromic  6. Peripheral neuropathy secondary to chemotherapy  7. Candida rash of groin        Plan:     1. MYC/IgH  fusion noted on FISH , in 27% of nuclei. 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 IPI score 0. Criteria for Burkitts not fulfilled. ( ki 67 was high, but not high enough). Also did not meet criteria for double or triple hit lymphoma ( no bcl2 or bcl6 translocation). No PET avid measurable disease post resection. No lymphoma involvement of bone marrow. I discussed the treatment of stage I GI high grade lymphoma. I explained that although there is no active/measurable disease, chemotherapy with RCHOP or dose adjusted R-EPOCH is recommended, as he had bulky (8cm), high grade ( ki 67 > 60% ) lymphoma with MYC/IgH fusion. Also discussed the need for diagnostic LP. He went to [facility] for 2nd opinion.   CSF cytology negative for malignant cells on 2/7/19 and on 3/1/19. ANC, platelet alvaro after C1 reviewed. Doses of Etoposide/Doxorubicin/Cytoxan were escalated by 1 level for C2. Doses for cycle 3 were the same as C2. C4 doses were escalated.   PET CT after 3 cycles showed no definite evidence of residual/relapsed extranodal diffuse large B-cell lymphoma. There was focal non physiologic activity within the descending colon without discrete CT correlate.  He wanted to proceed with all 6 cycles, although the data for 4 versus 6 cycles for stage I E lymphoma is limited, even with c-myc translocation.   He completed 6 cycles.  Role of Rt to the initial disease site ( due to bulk) discussed. He was not inclined to undergo RT.   PET CT after C6 showed CR. Colonoscopy on 6/19/19 showed adenomatous polyps in descending and sigmoid colon.     CT repeated 11/27/19 negative.  No new adenopathy or mass noted. LDH is normal. He has no pain or B symptoms.      2. On Atenolol, Losartan HCTZ    3. Continue CPAP    4. Continue weight management with diet and exercise     5. Mild, asymptomatic.      6. Grade 1-2. Recommend gabapentin 300 mg q HS. It is improving.     7. Rash to groin and right lower breast fold. PCP refilled with ketconazole cream,  pt prefers powder. Refill of powder sent today. States rash is stable. Encouraged and educated patient to keep areas as dry as possible; difficult at times with body habitus. Recommended interdry or moisture wicking towels to use at night.        Follow up: CBC, CMP, LDH, uric acid and appointment with Dr. Hwang in 3 months      Ashlee Luna NP  Hematology/Oncology/BMT

## 2020-03-06 ENCOUNTER — PATIENT MESSAGE (OUTPATIENT)
Dept: HEMATOLOGY/ONCOLOGY | Facility: CLINIC | Age: 50
End: 2020-03-06

## 2020-04-10 NOTE — TELEPHONE ENCOUNTER
Spoke with patient's family members since patient was still in procedure for IR. Patient's family members stated that they will wait until after procedure to make a decision on coming in tomorrow for admissions or waiting until Friday 2/1/2019 after they speak and see Dr. rothman in clinic. I advised to the patient's family members the office phone number and told them to ask for crystal when they are returning my phone call. Patient's family members verbalized understanding and will call back.    --Malu Bernabe   Video visit next week to review.

## 2020-05-18 ENCOUNTER — PATIENT MESSAGE (OUTPATIENT)
Dept: HEMATOLOGY/ONCOLOGY | Facility: CLINIC | Age: 50
End: 2020-05-18

## 2020-05-18 DIAGNOSIS — C83.39 DIFFUSE LARGE B-CELL LYMPHOMA OF SOLID ORGAN EXCLUDING SPLEEN: Primary | ICD-10-CM

## 2020-05-18 LAB
ALBUMIN SERPL-MCNC: 4.5 G/DL (ref 4–5)
ALBUMIN/GLOB SERPL: 1.8 {RATIO} (ref 1.2–2.2)
ALP SERPL-CCNC: 63 IU/L (ref 39–117)
ALT SERPL-CCNC: 27 IU/L (ref 0–44)
AST SERPL-CCNC: 22 IU/L (ref 0–40)
BASOPHILS # BLD AUTO: 0.1 X10E3/UL (ref 0–0.2)
BASOPHILS NFR BLD AUTO: 1 %
BILIRUB SERPL-MCNC: 0.4 MG/DL (ref 0–1.2)
BUN SERPL-MCNC: 14 MG/DL (ref 6–24)
BUN/CREAT SERPL: 16 (ref 9–20)
CALCIUM SERPL-MCNC: 10.2 MG/DL (ref 8.7–10.2)
CHLORIDE SERPL-SCNC: 98 MMOL/L (ref 96–106)
CO2 SERPL-SCNC: 26 MMOL/L (ref 20–29)
CREAT SERPL-MCNC: 0.87 MG/DL (ref 0.76–1.27)
EOSINOPHIL # BLD AUTO: 0.1 X10E3/UL (ref 0–0.4)
EOSINOPHIL NFR BLD AUTO: 2 %
ERYTHROCYTE [DISTWIDTH] IN BLOOD BY AUTOMATED COUNT: 14 % (ref 11.6–15.4)
GLOBULIN SER CALC-MCNC: 2.5 G/DL (ref 1.5–4.5)
GLUCOSE SERPL-MCNC: 144 MG/DL (ref 65–99)
HCT VFR BLD AUTO: 42.5 % (ref 37.5–51)
HGB BLD-MCNC: 14.2 G/DL (ref 13–17.7)
LDH SERPL-CCNC: 178 IU/L (ref 121–224)
LYMPHOCYTES # BLD AUTO: 0.8 X10E3/UL (ref 0.7–3.1)
LYMPHOCYTES NFR BLD AUTO: 11 %
MCH RBC QN AUTO: 30.1 PG (ref 26.6–33)
MCHC RBC AUTO-ENTMCNC: 33.4 G/DL (ref 31.5–35.7)
MCV RBC AUTO: 90 FL (ref 79–97)
MONOCYTES # BLD AUTO: 0.5 X10E3/UL (ref 0.1–0.9)
MONOCYTES NFR BLD AUTO: 8 %
NEUTROPHILS # BLD AUTO: 5.4 X10E3/UL (ref 1.4–7)
NEUTROPHILS NFR BLD AUTO: 78 %
PLATELET # BLD AUTO: 277 X10E3/UL (ref 150–450)
POTASSIUM SERPL-SCNC: 4.2 MMOL/L (ref 3.5–5.2)
PROT SERPL-MCNC: 7 G/DL (ref 6–8.5)
RBC # BLD AUTO: 4.72 X10E6/UL (ref 4.14–5.8)
SODIUM SERPL-SCNC: 141 MMOL/L (ref 134–144)
URATE SERPL-MCNC: 6.4 MG/DL (ref 3.7–8.6)
WBC # BLD AUTO: 6.9 X10E3/UL (ref 3.4–10.8)

## 2020-05-21 ENCOUNTER — OFFICE VISIT (OUTPATIENT)
Dept: HEMATOLOGY/ONCOLOGY | Facility: CLINIC | Age: 50
End: 2020-05-21
Payer: COMMERCIAL

## 2020-05-21 ENCOUNTER — TELEPHONE (OUTPATIENT)
Dept: HEMATOLOGY/ONCOLOGY | Facility: CLINIC | Age: 50
End: 2020-05-21

## 2020-05-21 DIAGNOSIS — I10 ESSENTIAL HYPERTENSION: ICD-10-CM

## 2020-05-21 DIAGNOSIS — R20.2 NUMBNESS AND TINGLING: ICD-10-CM

## 2020-05-21 DIAGNOSIS — E66.01 MORBID OBESITY: ICD-10-CM

## 2020-05-21 DIAGNOSIS — R45.89 ANXIETY ABOUT HEALTH: ICD-10-CM

## 2020-05-21 DIAGNOSIS — R20.0 NUMBNESS AND TINGLING: ICD-10-CM

## 2020-05-21 DIAGNOSIS — C83.39 DIFFUSE LARGE B-CELL LYMPHOMA OF SOLID ORGAN EXCLUDING SPLEEN: Primary | ICD-10-CM

## 2020-05-21 PROBLEM — C17.9 SMALL BOWEL CANCER: Status: RESOLVED | Noted: 2018-12-17 | Resolved: 2020-05-21

## 2020-05-21 PROCEDURE — 99213 PR OFFICE/OUTPT VISIT, EST, LEVL III, 20-29 MIN: ICD-10-PCS | Mod: 95,,, | Performed by: INTERNAL MEDICINE

## 2020-05-21 PROCEDURE — 99213 OFFICE O/P EST LOW 20 MIN: CPT | Mod: 95,,, | Performed by: INTERNAL MEDICINE

## 2020-05-21 NOTE — PROGRESS NOTES
The patient location is: home  The chief complaint leading to consultation is: lymphoma, follow up    Visit type: audiovisual    Face to Face time with patient: 20 minutes  20 minutes of total time spent on the encounter, which includes face to face time and non-face to face time preparing to see the patient (eg, review of tests), Obtaining and/or reviewing separately obtained history, Documenting clinical information in the electronic or other health record, Independently interpreting results (not separately reported) and communicating results to the patient/family/caregiver, or Care coordination (not separately reported).         Each patient to whom he or she provides medical services by telemedicine is:  (1) informed of the relationship between the physician and patient and the respective role of any other health care provider with respect to management of the patient; and (2) notified that he or she may decline to receive medical services by telemedicine and may withdraw from such care at any time.        CC: Lymphoma, follow up visit         Oncology History:     Diagnosis: DLBCL   Sub type: Germinal center, with myc translocation by FISH ; negative for bcl2/ bcl6 translocations; CSF negative  Stage     : I E , bulky ( jejunum)  Treatment : DE R-EPOCH x 6 with IT MTX, (2/1/19 - 5/27/19)        HPI:  is a 49 y.o. male here for followup visit. He has hypertension, obstructive sleep apnea. He was hospitalized around Christmas 2018 for small bowel mass. Patient reports that he began having lower abdominal/pelvic pain toward the end of October 2018. This prompted a urologic evaluation and subsequent treatment for a presumed UTI. He states that his pain continued into November and evolved into more upper abdominal/epigastric pain that was worse with eating. He was subsequently seen by his PCP who ultimately ordered a CT abdomen/pelvis that revealed an abnormal thickening of an 8 cm segment of small  bowel. He denied fever, chills, night sweats, or anorexia at that time. He had ~10 lb weight loss over the 1-2 months prior to his hospitalization in Dec 2018, but he was also actively taking part in a weight loss program.  On 12/21/18, he underwent Segmental small bowel resection.The mass was approximately 6 cm in length and was circumferential.  There was dilation of the small bowel above it suggesting a partial bowel obstruction.  There was no adenopathy in the adjacent   mesentery.  The tumor was clearly extending to the serosal surface of the bowel.Careful search was made for peritoneal implants and there were no lesions noted on the peritoneum or omentum.  The liver could not be inspected through the incision,   but it was palpated and there were no focal lesions within it. The remainder of the small bowel was run from the ligament of Treitz to the ileocecal valves and no other small bowel lesions were noted.    He had PET CT, 2D ECHO, bone marrow biopsy. PET CT did not reveal any evidence of lymphoma. 2D ECHO was normal. Bone marrow was negative for involvement by lymphoma.       He received C1 EPOCH - R between 2/1-2/5/19 without complications. C2 day 1 was on 2/25/19. C3 day 1 was on 3/18, C4 day 1 on 4/8, C5 day 1 on 5/6/19, C 6 on .5/27/19.  No PET avid disease noted after C3 except for a hypermetabolic focus in colon. Repeat PET CT after 6 cycles was negative. Colonoscopy done after 6 cycles showed adenomatous polyps in colon.         Interval History : He is here for a virtual follow up visit. He is doing well. He has no new complaints. He is eating well. He has no fevers, night sweats.       Review of Systems   Constitutional: Negative for fever and weight loss.   HENT: Negative for congestion, hearing loss, nosebleeds, sinus pain and tinnitus.    Eyes: Negative for blurred vision and pain.   Respiratory: Negative for cough, hemoptysis, sputum production and shortness of breath.    Cardiovascular:  Negative for chest pain, palpitations, orthopnea and claudication.   Gastrointestinal: Negative for abdominal pain, constipation, diarrhea and vomiting.   Genitourinary: Negative for flank pain, frequency and hematuria.   Musculoskeletal: Negative for back pain and neck pain.   Neurological: Positive for tingling. Negative for dizziness, focal weakness, seizures, weakness and headaches.   Endo/Heme/Allergies: Negative for environmental allergies. Does not bruise/bleed easily.   Psychiatric/Behavioral: Negative for depression, hallucinations, substance abuse and suicidal ideas.         Current Outpatient Medications   Medication Sig    atenolol (TENORMIN) 100 MG tablet TAKE 1 2 (ONE HALF) TABLET BY MOUTH TWICE DAILY    fexofenadine (ALLEGRA) 60 MG tablet Take 60 mg by mouth once daily.    fluticasone (FLONASE) 50 mcg/actuation nasal spray 1 spray by Each Nare route once daily.    ketoconazole (NIZORAL) 2 % cream     loratadine (CLARITIN) 10 mg tablet Take 10 mg by mouth once daily.    losartan-hydrochlorothiazide 100-12.5 mg (HYZAAR) 100-12.5 mg Tab Take 1 tablet by mouth once daily.    nystatin (MYCOSTATIN) powder Apply topically 2 (two) times daily.     No current facility-administered medications for this visit.      Facility-Administered Medications Ordered in Other Visits   Medication    pegfilgrastim injection 6 mg         2/7/19 Cerebrospinal Fluid     FINAL PATHOLOGIC DIAGNOSIS     Negative for malignant cells        3/1/19 CSF cytology FINAL PATHOLOGIC DIAGNOSIS     Cerebrospinal fluid (Cytology):Negative for malignant cells       4/5/19 PET CT                COMPARISON:  Prior FDG PET-CT scan 01/15/2019    FINDINGS:  Quality of the study: Adequate.    There is more prominent non physiologic focal activity within the descending colon with a maximum SUV of 11.4, increased from 6.5 previously.  There are no hypermetabolic foci within the small bowel to suggest relapse.  There are no pathologically  enlarged or hypermetabolic lymph nodes.  The spleen is normal in uptake and size at 11.2 cm in craniocaudal dimension.    There is diffusely enhanced uptake in the axial and appendicular skeleton.    Physiologic uptake of the tracer is present within the brain, salivary glands, myocardium, GI and  tracts.  There is persistent postsurgical uptake within the anterior abdominal wall.    Incidental CT findings: N/A    Internal reference SUVs are 2.2 and 4.9 for the mediastinal blood pool and normal liver background, respectively.       Impression         1. No definite evidence of residual/relapsed extranodal diffuse large B-cell lymphoma.    2.  Focal non physiologic activity within the descending colon without discrete CT correlate that is more conspicuous on the current PET exam.  This may represent a polyp.  Recommend direct visualization.    3.  Bone marrow hyperplasia, presumably related to pegfilgrastim administration.    The Deauville Score is X, presuming the colonic hypermetabolic focus is unrelated to DLBCL.  Otherwise, the score would be 5.         4/5/19 PET CT             FINDINGS:  Quality of the study: Adequate.    There is more prominent non physiologic focal activity within the descending colon with a maximum SUV of 11.4, increased from 6.5 previously.  There are no hypermetabolic foci within the small bowel to suggest relapse.  There are no pathologically enlarged or hypermetabolic lymph nodes.  The spleen is normal in uptake and size at 11.2 cm in craniocaudal dimension.    There is diffusely enhanced uptake in the axial and appendicular skeleton.    Physiologic uptake of the tracer is present within the brain, salivary glands, myocardium, GI and  tracts.  There is persistent postsurgical uptake within the anterior abdominal wall.    Incidental CT findings: N/A    Internal reference SUVs are 2.2 and 4.9 for the mediastinal blood pool and normal liver background, respectively.       Impression          1. No definite evidence of residual/relapsed extranodal diffuse large B-cell lymphoma.    2.  Focal non physiologic activity within the descending colon without discrete CT correlate that is more conspicuous on the current PET exam.  This may represent a polyp.  Recommend direct visualization.    3.  Bone marrow hyperplasia, presumably related to pegfilgrastim administration.    The Deauville Score is X, presuming the colonic hypermetabolic focus is unrelated to DLBCL.  Otherwise, the score would be 5.         4/11/19 CSF cytology      FINAL PATHOLOGIC DIAGNOSIS  CEREBROSPINAL FLUID CYTOLOGY:  NEGATIVE EXAM-NO NEOPLASIA IDENTIFIED     6/19/19 FINAL PATHOLOGIC DIAGNOSIS     1. BIOPSIES OF COLON:  NO SIGNIFICANT HISTOLOGIC ALTERATION  NO COLITIS IDENTIFIED     BIOPSIES OF 2. DESCENDING COLON AND 3. SIGMOID COLON:  ADENOMATOUS POLYPS     6/21/19 PET CT                 COMPARISON:  PET-CT 04/05/2019    FINDINGS:  Quality of the study: Adequate.    No abnormal foci of increased tracer uptake are present to suggest recurrence or metastatic disease.  Previously seen focal non physiologic uptake in the descending colon is absent on today's exam.  Patient has intervally undergone colonoscopy with removal of multiple polyps and most likely this lesion was removed.  Pathology is pending per chart review.  Thin FDG uptake superficial to the skin surface of which the vast majority is located about the lower pelvis below the laparotomy scar likely represents urinary excretion of radiotracer onto the skin surface.  Persistent mild diffuse hypermetabolism throughout the axial skeleton most likely represents red marrow reconversion.    Physiologic uptake of the tracer is present within the brain, salivary glands, myocardium, GI and  tracts.    Incidental CT findings: Postsurgical changes of prior midline laparotomy scar and partial small-bowel resection in the midline lower abdomen.  Nonobstructing 4 mm right renal stone.   Mosaic attenuation throughout both lungs is similar to prior and may represent small airways disease or artifact from respiratory motion.       Impression         No FDG PET/CT findings to suggest recurrent or metastatic disease.              11/27/19 CT neck, chest, abdomen, pelvis with cont          TECHNIQUE:  Axial images of the neck, chest, abdomen, and pelvis were acquired after the use of 125 cc [id number] IV contrast. Approximately 30 cc of Omnipaque 350 administered for enteric/oral contrast.  Coronal and sagittal reconstructions were also obtained    COMPARISON:  PET-CT 06/21/2019, 04/05/2019    FINDINGS:  Neck: Partially visualized brain appears within normal limits without acute abnormality.  Orbits are normal.  Mucous retention cyst within bilateral maxillary sinuses.  Mastoid air cells essentially clear.  Salivary glands unremarkable.  Thyroid within normal limits.  No cervical lymphadenopathy.  Visualized neck vasculature within normal limits.  Mild cervical spondylosis without acute fracture or aggressive osseous lesion.    Thoracic soft tissues: No significant abnormality. Right chest port with central venous catheter tip terminating in the superior vena cava.    Aorta: Left-sided arch with normal branching pattern.  Aorta maintains normal caliber, contour, and course without significant calcific atherosclerosis.    Heart: Normal in size. No pericardial effusion.    Emerita/Mediastinum: No significant lymphadenopathy    Lungs: Well aerated bilaterally with scattered bandlike opacities, right greater than left, likely representative of subsegmental atelectasis versus fibrosis.  There is some increased irregular opacities right middle lobe.  Additional irregular opacities posteriorly.  These may all be related to atelectasis though developing infiltrate not excluded.  Few irregular opacities on the left as well.  No significant consolidation or pleural effusion.    Liver: Normal in size without focal  hepatic mass.    Gallbladder: No calcified gallstones.    Bile Ducts: No evidence of dilated ducts.    Pancreas: No mass or peripancreatic fat stranding.    Spleen: Unremarkable.  Small splenule present.    Adrenals: Mild nonspecific thickening of the left adrenal gland.    Kidneys/ Ureters: Normal in size and location without suspicious renal mass or hydroureteronephrosis.  Bilateral nonobstructive nephrolithiasis present.    Bladder: Not well distended on today's exam.    Reproductive organs: Unremarkable.    GI Tract/Mesentery: Stable postoperative change status post partial small-bowel resection.  No evidence of bowel obstruction or inflammation.    Peritoneal Space: No ascites. No free air.    Retroperitoneum:  No significant adenopathy.    Abdominal wall:  Midline abdominal surgical scar.  No acute abnormalities.    Vasculature: No significant atherosclerosis or aneurysm.    Bones: Degenerative change without acute fracture or aggressive osseous lesion.       Impression         Postoperative change status post partial small-bowel resection for jejunal lymphoma.  No evidence of recurrent or metastatic disease on today's exam.    Bilateral nonobstructing nephrolithiasis.    Additional findings as above.                Component      Latest Ref Rng & Units 5/18/2020   WBC      3.4 - 10.8 x10E3/uL 6.9   RBC      4.14 - 5.80 x10E6/uL 4.72   Hemoglobin      13.0 - 17.7 g/dL 14.2   Hematocrit      37.5 - 51.0 % 42.5   MCV      79 - 97 fL 90   MCH      26.6 - 33.0 pg 30.1   MCHC      31.5 - 35.7 g/dL 33.4   RDW      11.6 - 15.4 % 14.0   Platelets      150 - 450 x10E3/uL 277   Neutrophils      Not Estab. % 78   Lymph%      Not Estab. % 11   Mono%      Not Estab. % 8   Eosinophil%      Not Estab. % 2   Basophil%      Not Estab. % 1   Neutrophils Absolute      1.4 - 7.0 x10E3/uL 5.4   Lymph #      0.7 - 3.1 x10E3/uL 0.8   Mono #      0.1 - 0.9 x10E3/uL 0.5   Eos #      0.0 - 0.4 x10E3/uL 0.1   Baso #      0.0 - 0.2  x10E3/uL 0.1   Glucose      65 - 99 mg/dL 144 (H)   BUN, Bld      6 - 24 mg/dL 14   Creatinine      0.76 - 1.27 mg/dL 0.87   eGFR if non African American      >59 mL/min/1.73 102   eGFR if African American      >59 mL/min/1.73 118   BUN/Creatinine Ratio      9 - 20 16   Sodium      134 - 144 mmol/L 141   Potassium      3.5 - 5.2 mmol/L 4.2   Chloride      96 - 106 mmol/L 98   CO2      20 - 29 mmol/L 26   Calcium      8.7 - 10.2 mg/dL 10.2   PROTEIN TOTAL      6.0 - 8.5 g/dL 7.0   Albumin      4.0 - 5.0 g/dL 4.5   Globulin, Total      1.5 - 4.5 g/dL 2.5   Albumin/Globulin Ratio      1.2 - 2.2 1.8   BILIRUBIN TOTAL      0.0 - 1.2 mg/dL 0.4   Alkaline Phosphatase      39 - 117 IU/L 63   AST      0 - 40 IU/L 22   ALT      0 - 44 IU/L 27   LD      121 - 224 IU/L 178   Uric Acid      3.7 - 8.6 mg/dL 6.4        Assessment:     1. DLBCL of jejunum, GC sub type, high grade, with myc translocation  2. Hypertension  3. Obstructive sleep apnea  4. Morbid obesity  5. Anemia, hypochromic  6. Peripheral neuropathy secondary to chemotherapy        Plan:     1. 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 IPI score 0. Criteria for Burkitts not fulfilled. ( ki 67 was high, but not high enough). Also did not meet criteria for double or triple hit lymphoma ( no bcl2 or bcl6 translocation). No PET avid measurable disease post resection. No lymphoma involvement of bone marrow. I discussed the treatment of stage I GI high grade lymphoma. I explained that although there is no active/measurable disease, chemotherapy with RCHOP or dose adjusted R-EPOCH is recommended, as he had bulky (8cm), high grade ( ki 67 > 60% ) lymphoma with MYC/IgH fusion. Also discussed the need for diagnostic LP. He went to [facility] for 2nd opinion.   CSF cytology negative for malignant cells on 2/7/19 and on 3/1/19. ANC, platelet alvaro after C1 reviewed. Doses of Etoposide/Doxorubicin/Cytoxan were escalated by 1 level for  C2. Doses for cycle 3 were the same as C2. C4 doses were escalated.   PET CT after 3 cycles showed no definite evidence of residual/relapsed extranodal diffuse large B-cell lymphoma. There was focal non physiologic activity within the descending colon without discrete CT correlate.  He wanted to proceed with all 6 cycles, although the data for 4 versus 6 cycles for stage I E lymphoma is limited, even with c-myc translocation.   He completed 6 cycles.  Role of Rt to the initial disease site ( due to bulk) discussed. He was not inclined to undergo RT.   PET CT after C 6 showed CR. Colonoscopy on 6/19/19 showed adenomatous polyps in descending and sigmoid colon.     CT on 11/27/19 did not demonstrate any new adenopathy or mass . He is doing well at this time, with no new symptoms. No B symptoms or abdominal pain. LDH is normal.   He will follow here in 3 months.      2.On Atenolol, Losartan HCTZ .     5. Mild, asymptomatic.      6. Grade 1-2. He is no longer on gabapentin . It is improving.

## 2020-05-21 NOTE — TELEPHONE ENCOUNTER
----- Message from Joseline Alex sent at 5/21/2020 11:43 AM CDT -----  Contact: Patient  Patient Advice/Staff Message     Caller name: Pt    Reason for call: Pt having technical issues connecting for his VV, would like the doctor to to a telephone appt instead.    Do you feel you need to be seen today:: No        Communication Preference: [phone]    Additional Information:

## 2020-08-26 LAB
ALBUMIN SERPL-MCNC: 4.4 G/DL (ref 4–5)
ALBUMIN/GLOB SERPL: 1.8 {RATIO} (ref 1.2–2.2)
ALP SERPL-CCNC: 56 IU/L (ref 39–117)
ALT SERPL-CCNC: 34 IU/L (ref 0–44)
AST SERPL-CCNC: 27 IU/L (ref 0–40)
BASOPHILS # BLD AUTO: 0.1 X10E3/UL (ref 0–0.2)
BASOPHILS NFR BLD AUTO: 1 %
BILIRUB SERPL-MCNC: 0.3 MG/DL (ref 0–1.2)
BUN SERPL-MCNC: 13 MG/DL (ref 6–24)
BUN/CREAT SERPL: 15 (ref 9–20)
CALCIUM SERPL-MCNC: 9.6 MG/DL (ref 8.7–10.2)
CHLORIDE SERPL-SCNC: 103 MMOL/L (ref 96–106)
CO2 SERPL-SCNC: 26 MMOL/L (ref 20–29)
CREAT SERPL-MCNC: 0.85 MG/DL (ref 0.76–1.27)
EOSINOPHIL # BLD AUTO: 0.2 X10E3/UL (ref 0–0.4)
EOSINOPHIL NFR BLD AUTO: 2 %
ERYTHROCYTE [DISTWIDTH] IN BLOOD BY AUTOMATED COUNT: 13.8 % (ref 11.6–15.4)
GLOBULIN SER CALC-MCNC: 2.5 G/DL (ref 1.5–4.5)
GLUCOSE SERPL-MCNC: 118 MG/DL (ref 65–99)
HCT VFR BLD AUTO: 40.2 % (ref 37.5–51)
HGB BLD-MCNC: 13.3 G/DL (ref 13–17.7)
LDH SERPL-CCNC: 198 IU/L (ref 121–224)
LYMPHOCYTES # BLD AUTO: 0.9 X10E3/UL (ref 0.7–3.1)
LYMPHOCYTES NFR BLD AUTO: 14 %
MCH RBC QN AUTO: 30.4 PG (ref 26.6–33)
MCHC RBC AUTO-ENTMCNC: 33.1 G/DL (ref 31.5–35.7)
MCV RBC AUTO: 92 FL (ref 79–97)
MONOCYTES # BLD AUTO: 0.7 X10E3/UL (ref 0.1–0.9)
MONOCYTES NFR BLD AUTO: 12 %
NEUTROPHILS # BLD AUTO: 4.3 X10E3/UL (ref 1.4–7)
NEUTROPHILS NFR BLD AUTO: 71 %
PLATELET # BLD AUTO: 263 X10E3/UL (ref 150–450)
POTASSIUM SERPL-SCNC: 4.4 MMOL/L (ref 3.5–5.2)
PROT SERPL-MCNC: 6.9 G/DL (ref 6–8.5)
RBC # BLD AUTO: 4.37 X10E6/UL (ref 4.14–5.8)
SODIUM SERPL-SCNC: 143 MMOL/L (ref 134–144)
WBC # BLD AUTO: 6.2 X10E3/UL (ref 3.4–10.8)

## 2020-09-01 ENCOUNTER — OFFICE VISIT (OUTPATIENT)
Dept: HEMATOLOGY/ONCOLOGY | Facility: CLINIC | Age: 50
End: 2020-09-01
Payer: COMMERCIAL

## 2020-09-01 DIAGNOSIS — I10 ESSENTIAL HYPERTENSION: ICD-10-CM

## 2020-09-01 DIAGNOSIS — R20.2 NUMBNESS AND TINGLING: Primary | ICD-10-CM

## 2020-09-01 DIAGNOSIS — E66.01 MORBID OBESITY: ICD-10-CM

## 2020-09-01 DIAGNOSIS — C83.39 DIFFUSE LARGE B-CELL LYMPHOMA OF SOLID ORGAN EXCLUDING SPLEEN: ICD-10-CM

## 2020-09-01 DIAGNOSIS — R20.0 NUMBNESS AND TINGLING: Primary | ICD-10-CM

## 2020-09-01 PROCEDURE — 99213 OFFICE O/P EST LOW 20 MIN: CPT | Mod: 95,,, | Performed by: INTERNAL MEDICINE

## 2020-09-01 PROCEDURE — 99213 PR OFFICE/OUTPT VISIT, EST, LEVL III, 20-29 MIN: ICD-10-PCS | Mod: 95,,, | Performed by: INTERNAL MEDICINE

## 2020-09-01 NOTE — PROGRESS NOTES
The patient location is: home   The chief complaint leading to consultation is: lymphoma, s/p chemotherapy, follow up    Visit type: audiovisual    Face to Face time with patient: 20 minutes  20 minutes of total time spent on the encounter, which includes face to face time and non-face to face time preparing to see the patient (eg, review of tests), Obtaining and/or reviewing separately obtained history, Documenting clinical information in the electronic or other health record, Independently interpreting results (not separately reported) and communicating results to the patient/family/caregiver, or Care coordination (not separately reported).         Each patient to whom he or she provides medical services by telemedicine is:  (1) informed of the relationship between the physician and patient and the respective role of any other health care provider with respect to management of the patient; and (2) notified that he or she may decline to receive medical services by telemedicine and may withdraw from such care at any time.      CC: Lymphoma, follow up visit         Oncology History:     Diagnosis: DLBCL   Sub type: Germinal center, with myc translocation by FISH ; negative for bcl2/ bcl6 translocations; CSF negative  Stage     : I E , bulky ( jejunum)  Treatment : DE R-EPOCH x 6 with IT MTX, (2/1/19 - 5/27/19)        HPI:  is a 49 y.o. male here for followup visit. He has hypertension, obstructive sleep apnea. He was hospitalized around Christmas 2018 for small bowel mass. Patient reports that he began having lower abdominal/pelvic pain toward the end of October 2018. This prompted a urologic evaluation and subsequent treatment for a presumed UTI. He states that his pain continued into November and evolved into more upper abdominal/epigastric pain that was worse with eating. He was subsequently seen by his PCP who ultimately ordered a CT abdomen/pelvis that revealed an abnormal thickening of an 8 cm  segment of small bowel. He denied fever, chills, night sweats, or anorexia at that time. He had ~10 lb weight loss over the 1-2 months prior to his hospitalization in Dec 2018, but he was also actively taking part in a weight loss program.  On 12/21/18, he underwent Segmental small bowel resection.The mass was approximately 6 cm in length and was circumferential.  There was dilation of the small bowel above it suggesting a partial bowel obstruction.  There was no adenopathy in the adjacent   mesentery.  The tumor was clearly extending to the serosal surface of the bowel.Careful search was made for peritoneal implants and there were no lesions noted on the peritoneum or omentum.  The liver could not be inspected through the incision,   but it was palpated and there were no focal lesions within it. The remainder of the small bowel was run from the ligament of Treitz to the ileocecal valves and no other small bowel lesions were noted.    He had PET CT, 2D ECHO, bone marrow biopsy. PET CT did not reveal any evidence of lymphoma. 2D ECHO was normal. Bone marrow was negative for involvement by lymphoma.       He received C1 EPOCH - R between 2/1-2/5/19 without complications. C2 day 1 was on 2/25/19. C3 day 1 was on 3/18, C4 day 1 on 4/8, C5 day 1 on 5/6/19, C 6 on .5/27/19.  No PET avid disease noted after C3 except for a hypermetabolic focus in colon. Repeat PET CT after 6 cycles was negative. Colonoscopy done after 6 cycles showed adenomatous polyps in colon.         Interval History : He is here for a virtual follow up visit. He is doing well. He has some intermittent pain at the site of abdominal incision. He is eating well. He has no fevers, night sweats.       Review of Systems   Constitutional: Positive for malaise/fatigue. Negative for chills, fever and weight loss.   HENT: Negative for ear discharge, ear pain, hearing loss, nosebleeds and tinnitus.    Eyes: Negative for blurred vision and pain.   Respiratory:  Negative for cough, hemoptysis and sputum production.    Cardiovascular: Negative for chest pain, palpitations and orthopnea.   Gastrointestinal: Negative for abdominal pain, constipation, diarrhea, heartburn and nausea.   Genitourinary: Negative for dysuria, frequency and urgency.   Musculoskeletal: Negative for back pain, myalgias and neck pain.   Neurological: Positive for tingling. Negative for dizziness and tremors.   Endo/Heme/Allergies: Negative for environmental allergies. Does not bruise/bleed easily.   Psychiatric/Behavioral: Negative for depression, substance abuse and suicidal ideas. The patient is nervous/anxious.        Current Outpatient Medications   Medication Sig    atenolol (TENORMIN) 100 MG tablet TAKE 1 2 (ONE HALF) TABLET BY MOUTH TWICE DAILY    fexofenadine (ALLEGRA) 60 MG tablet Take 60 mg by mouth once daily.    fluticasone (FLONASE) 50 mcg/actuation nasal spray 1 spray by Each Nare route once daily.    ketoconazole (NIZORAL) 2 % cream     loratadine (CLARITIN) 10 mg tablet Take 10 mg by mouth once daily.    losartan-hydrochlorothiazide 100-12.5 mg (HYZAAR) 100-12.5 mg Tab Take 1 tablet by mouth once daily    nystatin (MYCOSTATIN) powder Apply topically 2 (two) times daily.     No current facility-administered medications for this visit.      Facility-Administered Medications Ordered in Other Visits   Medication    pegfilgrastim injection 6 mg             2/7/19 Cerebrospinal Fluid     FINAL PATHOLOGIC DIAGNOSIS     Negative for malignant cells        3/1/19 CSF cytology FINAL PATHOLOGIC DIAGNOSIS     Cerebrospinal fluid (Cytology):Negative for malignant cells       4/5/19 PET CT                COMPARISON:  Prior FDG PET-CT scan 01/15/2019    FINDINGS:  Quality of the study: Adequate.    There is more prominent non physiologic focal activity within the descending colon with a maximum SUV of 11.4, increased from 6.5 previously.  There are no hypermetabolic foci within the small bowel  to suggest relapse.  There are no pathologically enlarged or hypermetabolic lymph nodes.  The spleen is normal in uptake and size at 11.2 cm in craniocaudal dimension.    There is diffusely enhanced uptake in the axial and appendicular skeleton.    Physiologic uptake of the tracer is present within the brain, salivary glands, myocardium, GI and  tracts.  There is persistent postsurgical uptake within the anterior abdominal wall.    Incidental CT findings: N/A    Internal reference SUVs are 2.2 and 4.9 for the mediastinal blood pool and normal liver background, respectively.       Impression         1. No definite evidence of residual/relapsed extranodal diffuse large B-cell lymphoma.    2.  Focal non physiologic activity within the descending colon without discrete CT correlate that is more conspicuous on the current PET exam.  This may represent a polyp.  Recommend direct visualization.    3.  Bone marrow hyperplasia, presumably related to pegfilgrastim administration.    The Deauville Score is X, presuming the colonic hypermetabolic focus is unrelated to DLBCL.  Otherwise, the score would be 5.         4/5/19 PET CT             FINDINGS:  Quality of the study: Adequate.    There is more prominent non physiologic focal activity within the descending colon with a maximum SUV of 11.4, increased from 6.5 previously.  There are no hypermetabolic foci within the small bowel to suggest relapse.  There are no pathologically enlarged or hypermetabolic lymph nodes.  The spleen is normal in uptake and size at 11.2 cm in craniocaudal dimension.    There is diffusely enhanced uptake in the axial and appendicular skeleton.    Physiologic uptake of the tracer is present within the brain, salivary glands, myocardium, GI and  tracts.  There is persistent postsurgical uptake within the anterior abdominal wall.    Incidental CT findings: N/A    Internal reference SUVs are 2.2 and 4.9 for the mediastinal blood pool and normal  liver background, respectively.       Impression         1. No definite evidence of residual/relapsed extranodal diffuse large B-cell lymphoma.    2.  Focal non physiologic activity within the descending colon without discrete CT correlate that is more conspicuous on the current PET exam.  This may represent a polyp.  Recommend direct visualization.    3.  Bone marrow hyperplasia, presumably related to pegfilgrastim administration.    The Deauville Score is X, presuming the colonic hypermetabolic focus is unrelated to DLBCL.  Otherwise, the score would be 5.         4/11/19 CSF cytology      FINAL PATHOLOGIC DIAGNOSIS  CEREBROSPINAL FLUID CYTOLOGY:  NEGATIVE EXAM-NO NEOPLASIA IDENTIFIED     6/19/19 FINAL PATHOLOGIC DIAGNOSIS     1. BIOPSIES OF COLON:  NO SIGNIFICANT HISTOLOGIC ALTERATION  NO COLITIS IDENTIFIED     BIOPSIES OF 2. DESCENDING COLON AND 3. SIGMOID COLON:  ADENOMATOUS POLYPS     6/21/19 PET CT                 COMPARISON:  PET-CT 04/05/2019    FINDINGS:  Quality of the study: Adequate.    No abnormal foci of increased tracer uptake are present to suggest recurrence or metastatic disease.  Previously seen focal non physiologic uptake in the descending colon is absent on today's exam.  Patient has intervally undergone colonoscopy with removal of multiple polyps and most likely this lesion was removed.  Pathology is pending per chart review.  Thin FDG uptake superficial to the skin surface of which the vast majority is located about the lower pelvis below the laparotomy scar likely represents urinary excretion of radiotracer onto the skin surface.  Persistent mild diffuse hypermetabolism throughout the axial skeleton most likely represents red marrow reconversion.    Physiologic uptake of the tracer is present within the brain, salivary glands, myocardium, GI and  tracts.    Incidental CT findings: Postsurgical changes of prior midline laparotomy scar and partial small-bowel resection in the midline lower  abdomen.  Nonobstructing 4 mm right renal stone.  Mosaic attenuation throughout both lungs is similar to prior and may represent small airways disease or artifact from respiratory motion.       Impression         No FDG PET/CT findings to suggest recurrent or metastatic disease.                  11/27/19 CT neck, chest, abdomen, pelvis with cont             TECHNIQUE:  Axial images of the neck, chest, abdomen, and pelvis were acquired after the use of 125 cc [id number] IV contrast. Approximately 30 cc of Omnipaque 350 administered for enteric/oral contrast.  Coronal and sagittal reconstructions were also obtained    COMPARISON:  PET-CT 06/21/2019, 04/05/2019    FINDINGS:  Neck: Partially visualized brain appears within normal limits without acute abnormality.  Orbits are normal.  Mucous retention cyst within bilateral maxillary sinuses.  Mastoid air cells essentially clear.  Salivary glands unremarkable.  Thyroid within normal limits.  No cervical lymphadenopathy.  Visualized neck vasculature within normal limits.  Mild cervical spondylosis without acute fracture or aggressive osseous lesion.    Thoracic soft tissues: No significant abnormality. Right chest port with central venous catheter tip terminating in the superior vena cava.    Aorta: Left-sided arch with normal branching pattern.  Aorta maintains normal caliber, contour, and course without significant calcific atherosclerosis.    Heart: Normal in size. No pericardial effusion.    Emerita/Mediastinum: No significant lymphadenopathy    Lungs: Well aerated bilaterally with scattered bandlike opacities, right greater than left, likely representative of subsegmental atelectasis versus fibrosis.  There is some increased irregular opacities right middle lobe.  Additional irregular opacities posteriorly.  These may all be related to atelectasis though developing infiltrate not excluded.  Few irregular opacities on the left as well.  No significant consolidation or  pleural effusion.    Liver: Normal in size without focal hepatic mass.    Gallbladder: No calcified gallstones.    Bile Ducts: No evidence of dilated ducts.    Pancreas: No mass or peripancreatic fat stranding.    Spleen: Unremarkable.  Small splenule present.    Adrenals: Mild nonspecific thickening of the left adrenal gland.    Kidneys/ Ureters: Normal in size and location without suspicious renal mass or hydroureteronephrosis.  Bilateral nonobstructive nephrolithiasis present.    Bladder: Not well distended on today's exam.    Reproductive organs: Unremarkable.    GI Tract/Mesentery: Stable postoperative change status post partial small-bowel resection.  No evidence of bowel obstruction or inflammation.    Peritoneal Space: No ascites. No free air.    Retroperitoneum:  No significant adenopathy.    Abdominal wall:  Midline abdominal surgical scar.  No acute abnormalities.    Vasculature: No significant atherosclerosis or aneurysm.    Bones: Degenerative change without acute fracture or aggressive osseous lesion.       Impression         Postoperative change status post partial small-bowel resection for jejunal lymphoma.  No evidence of recurrent or metastatic disease on today's exam.    Bilateral nonobstructing nephrolithiasis.    Additional findings as above.                  Component      Latest Ref Rng & Units 8/26/2020   WBC      3.4 - 10.8 x10E3/uL 6.2   RBC      4.14 - 5.80 x10E6/uL 4.37   Hemoglobin      13.0 - 17.7 g/dL 13.3   Hematocrit      37.5 - 51.0 % 40.2   MCV      79 - 97 fL 92   MCH      26.6 - 33.0 pg 30.4   MCHC      31.5 - 35.7 g/dL 33.1   RDW      11.6 - 15.4 % 13.8   Platelets      150 - 450 x10E3/uL 263   Neutrophils      Not Estab. % 71   Lymph%      Not Estab. % 14   Mono%      Not Estab. % 12   Eosinophil%      Not Estab. % 2   Basophil%      Not Estab. % 1   Neutrophils Absolute      1.4 - 7.0 x10E3/uL 4.3   Lymph #      0.7 - 3.1 x10E3/uL 0.9   Mono #      0.1 - 0.9 x10E3/uL 0.7   Eos  #      0.0 - 0.4 x10E3/uL 0.2   Baso #      0.0 - 0.2 x10E3/uL 0.1   Glucose      65 - 99 mg/dL 118 (H)   BUN, Bld      6 - 24 mg/dL 13   Creatinine      0.76 - 1.27 mg/dL 0.85   eGFR if non African American      >59 mL/min/1.73 102   eGFR if African American      >59 mL/min/1.73 118   BUN/Creatinine Ratio      9 - 20 15   Sodium      134 - 144 mmol/L 143   Potassium      3.5 - 5.2 mmol/L 4.4   Chloride      96 - 106 mmol/L 103   CO2      20 - 29 mmol/L 26   Calcium      8.7 - 10.2 mg/dL 9.6   PROTEIN TOTAL      6.0 - 8.5 g/dL 6.9   Albumin      4.0 - 5.0 g/dL 4.4   Globulin, Total      1.5 - 4.5 g/dL 2.5   Albumin/Globulin Ratio      1.2 - 2.2 1.8   BILIRUBIN TOTAL      0.0 - 1.2 mg/dL 0.3   Alkaline Phosphatase      39 - 117 IU/L 56   AST      0 - 40 IU/L 27   ALT      0 - 44 IU/L 34   LD      121 - 224 IU/L 198         Assessment:     1. DLBCL of jejunum, GC sub type, high grade, with myc translocation  2. Hypertension  3. Obstructive sleep apnea  4. Morbid obesity  5. Anemia, hypochromic  6. Peripheral neuropathy secondary to chemotherapy        Plan:     1. 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 IPI score 0. Criteria for Burkitts not fulfilled. ( ki 67 was high, but not high enough). Also did not meet criteria for double or triple hit lymphoma ( no bcl2 or bcl6 translocation). No PET avid measurable disease post resection. No lymphoma involvement of bone marrow. I discussed the treatment of stage I GI high grade lymphoma. I explained that although there is no active/measurable disease, chemotherapy with RCHOP or dose adjusted R-EPOCH is recommended, as he had bulky (8cm), high grade ( ki 67 > 60% ) lymphoma with MYC/IgH fusion. Also discussed the need for diagnostic LP. He went to [facility] opinion.   CSF cytology negative for malignant cells on 2/7/19 and on 3/1/19. ANC, platelet alvaro after C1 reviewed. Doses of Etoposide/Doxorubicin/Cytoxan were escalated by 1  level for C2. Doses for cycle 3 were the same as C2. C4 doses were escalated.   PET CT after 3 cycles showed no definite evidence of residual/relapsed extranodal diffuse large B-cell lymphoma. There was focal non physiologic activity within the descending colon without discrete CT correlate.  He wanted to proceed with all 6 cycles, although the data for 4 versus 6 cycles for stage I E lymphoma is limited, even with c-myc translocation.   He completed 6 cycles.  Role of Rt to the initial disease site ( due to bulk) discussed. He was not inclined to undergo RT.   PET CT after C 6 showed CR. Colonoscopy on 6/19/19 showed adenomatous polyps in descending and sigmoid colon.     CT on 11/27/19 did not demonstrate any new adenopathy or mass . He is doing well at this time, with no new symptoms. No B symptoms. He has occasional abdominal pain, at the site of incision. LDH is normal.   He will follow here in 3 months after CT.      2.On Atenolol, Losartan HCTZ .Follows with his PCP.     5. Mild, asymptomatic.      6. Grade 1-2. He is no longer on gabapentin . It is improving.

## 2020-09-01 NOTE — Clinical Note
Cbc, cmp, ldh, ct chest , abdomen and pelvis with contrast on 11/27  and f/u in clinic on 11/27 at 130 pm ( I am on service, but ok for 130 appt)

## 2020-11-27 ENCOUNTER — OFFICE VISIT (OUTPATIENT)
Dept: HEMATOLOGY/ONCOLOGY | Facility: CLINIC | Age: 50
End: 2020-11-27
Payer: COMMERCIAL

## 2020-11-27 ENCOUNTER — HOSPITAL ENCOUNTER (OUTPATIENT)
Dept: RADIOLOGY | Facility: HOSPITAL | Age: 50
Discharge: HOME | End: 2020-11-27
Attending: INTERNAL MEDICINE
Payer: COMMERCIAL

## 2020-11-27 VITALS
RESPIRATION RATE: 16 BRPM | SYSTOLIC BLOOD PRESSURE: 134 MMHG | WEIGHT: 315 LBS | TEMPERATURE: 99 F | HEART RATE: 90 BPM | DIASTOLIC BLOOD PRESSURE: 84 MMHG | OXYGEN SATURATION: 97 % | BODY MASS INDEX: 49.44 KG/M2 | HEIGHT: 67 IN

## 2020-11-27 DIAGNOSIS — I10 ESSENTIAL HYPERTENSION: ICD-10-CM

## 2020-11-27 DIAGNOSIS — D63.0 ANEMIA IN NEOPLASTIC DISEASE: ICD-10-CM

## 2020-11-27 DIAGNOSIS — C83.39 DIFFUSE LARGE B-CELL LYMPHOMA OF SOLID ORGAN EXCLUDING SPLEEN: ICD-10-CM

## 2020-11-27 DIAGNOSIS — G47.33 OSA (OBSTRUCTIVE SLEEP APNEA): Primary | ICD-10-CM

## 2020-11-27 DIAGNOSIS — E66.01 MORBID OBESITY: ICD-10-CM

## 2020-11-27 PROCEDURE — 99214 PR OFFICE/OUTPT VISIT, EST, LEVL IV, 30-39 MIN: ICD-10-PCS | Mod: S$GLB,,, | Performed by: INTERNAL MEDICINE

## 2020-11-27 PROCEDURE — 3008F PR BODY MASS INDEX (BMI) DOCUMENTED: ICD-10-PCS | Mod: CPTII,S$GLB,, | Performed by: INTERNAL MEDICINE

## 2020-11-27 PROCEDURE — 71260 CT CHEST ABDOMEN PELVIS WITH CONTRAST (XPD): ICD-10-PCS | Mod: 26,,, | Performed by: RADIOLOGY

## 2020-11-27 PROCEDURE — 3008F BODY MASS INDEX DOCD: CPT | Mod: CPTII,S$GLB,, | Performed by: INTERNAL MEDICINE

## 2020-11-27 PROCEDURE — 1126F AMNT PAIN NOTED NONE PRSNT: CPT | Mod: S$GLB,,, | Performed by: INTERNAL MEDICINE

## 2020-11-27 PROCEDURE — 99214 OFFICE O/P EST MOD 30 MIN: CPT | Mod: S$GLB,,, | Performed by: INTERNAL MEDICINE

## 2020-11-27 PROCEDURE — 3075F SYST BP GE 130 - 139MM HG: CPT | Mod: CPTII,S$GLB,, | Performed by: INTERNAL MEDICINE

## 2020-11-27 PROCEDURE — 1126F PR PAIN SEVERITY QUANTIFIED, NO PAIN PRESENT: ICD-10-PCS | Mod: S$GLB,,, | Performed by: INTERNAL MEDICINE

## 2020-11-27 PROCEDURE — 3079F PR MOST RECENT DIASTOLIC BLOOD PRESSURE 80-89 MM HG: ICD-10-PCS | Mod: CPTII,S$GLB,, | Performed by: INTERNAL MEDICINE

## 2020-11-27 PROCEDURE — 99999 PR PBB SHADOW E&M-EST. PATIENT-LVL IV: ICD-10-PCS | Mod: PBBFAC,,, | Performed by: INTERNAL MEDICINE

## 2020-11-27 PROCEDURE — 99999 PR PBB SHADOW E&M-EST. PATIENT-LVL IV: CPT | Mod: PBBFAC,,, | Performed by: INTERNAL MEDICINE

## 2020-11-27 PROCEDURE — 25500020 PHARM REV CODE 255: Performed by: INTERNAL MEDICINE

## 2020-11-27 PROCEDURE — 71260 CT THORAX DX C+: CPT | Mod: 26,,, | Performed by: RADIOLOGY

## 2020-11-27 PROCEDURE — 74177 CT CHEST ABDOMEN PELVIS WITH CONTRAST (XPD): ICD-10-PCS | Mod: 26,,, | Performed by: RADIOLOGY

## 2020-11-27 PROCEDURE — 74177 CT ABD & PELVIS W/CONTRAST: CPT | Mod: 26,,, | Performed by: RADIOLOGY

## 2020-11-27 PROCEDURE — 74177 CT ABD & PELVIS W/CONTRAST: CPT | Mod: TC

## 2020-11-27 PROCEDURE — 3079F DIAST BP 80-89 MM HG: CPT | Mod: CPTII,S$GLB,, | Performed by: INTERNAL MEDICINE

## 2020-11-27 PROCEDURE — 3075F PR MOST RECENT SYSTOLIC BLOOD PRESS GE 130-139MM HG: ICD-10-PCS | Mod: CPTII,S$GLB,, | Performed by: INTERNAL MEDICINE

## 2020-11-27 RX ORDER — VITAMIN B COMPLEX
1 CAPSULE ORAL DAILY
COMMUNITY

## 2020-11-27 RX ADMIN — IOHEXOL 100 ML: 350 INJECTION, SOLUTION INTRAVENOUS at 11:11

## 2020-11-27 RX ADMIN — IOHEXOL 15 ML: 350 INJECTION, SOLUTION INTRAVENOUS at 10:11

## 2020-11-27 NOTE — PROGRESS NOTES
CC: History of lymphoma, follow up visit         Oncology History:     Diagnosis: DLBCL   Sub type: Germinal center, with myc translocation by FISH ; negative for bcl2/ bcl6 translocations; CSF negative  Stage     : I E , bulky ( jejunum)  Treatment : DE R-EPOCH x 6 with IT MTX, (2/1/19 - 5/27/19)        HPI:  is a 49 y.o. male here for followup visit. He has hypertension, obstructive sleep apnea. He was hospitalized around Christmas 2018 for small bowel mass. Patient reports that he began having lower abdominal/pelvic pain toward the end of October 2018. This prompted a urologic evaluation and subsequent treatment for a presumed UTI. He states that his pain continued into November and evolved into more upper abdominal/epigastric pain that was worse with eating. He was subsequently seen by his PCP who ultimately ordered a CT abdomen/pelvis that revealed an abnormal thickening of an 8 cm segment of small bowel. He denied fever, chills, night sweats, or anorexia at that time. He had ~10 lb weight loss over the 1-2 months prior to his hospitalization in Dec 2018, but he was also actively taking part in a weight loss program.  On 12/21/18, he underwent Segmental small bowel resection.The mass was approximately 6 cm in length and was circumferential.  There was dilation of the small bowel above it suggesting a partial bowel obstruction.  There was no adenopathy in the adjacent   mesentery.  The tumor was clearly extending to the serosal surface of the bowel.Careful search was made for peritoneal implants and there were no lesions noted on the peritoneum or omentum.  The liver could not be inspected through the incision,   but it was palpated and there were no focal lesions within it. The remainder of the small bowel was run from the ligament of Treitz to the ileocecal valves and no other small bowel lesions were noted.    He had PET CT, 2D ECHO, bone marrow biopsy. PET CT did not reveal any evidence of  lymphoma. 2D ECHO was normal. Bone marrow was negative for involvement by lymphoma.       He received C1 EPOCH - R between 2/1-2/5/19 without complications. C2 day 1 was on 2/25/19. C3 day 1 was on 3/18, C4 day 1 on 4/8, C5 day 1 on 5/6/19, C 6 on 5/27/19.  No PET avid disease noted after C3 except for a hypermetabolic focus in colon. Repeat PET CT after 6 cycles was negative. Colonoscopy done after 6 cycles showed adenomatous polyps in colon.         Interval History : He is here for a follow up visit. He is doing well. He has some intermittent pain at the site of abdominal incision. He is eating well. He has no fevers, night sweats. He had CT today.      Review of Systems   Constitutional: Positive for malaise/fatigue. Negative for fever.   HENT: Negative for ear discharge, hearing loss, nosebleeds and tinnitus.    Eyes: Negative for blurred vision, double vision and pain.   Respiratory: Negative for cough, hemoptysis and sputum production.    Cardiovascular: Negative for chest pain, orthopnea and claudication.   Gastrointestinal: Negative for abdominal pain and heartburn.   Genitourinary: Negative for dysuria, frequency and urgency.   Musculoskeletal: Negative for back pain and neck pain.   Neurological: Negative for dizziness, tingling and tremors.   Endo/Heme/Allergies: Negative for environmental allergies. Does not bruise/bleed easily.   Psychiatric/Behavioral: Negative for substance abuse and suicidal ideas.         Current Outpatient Medications   Medication Sig    atenolol (TENORMIN) 100 MG tablet TAKE 1 2 (ONE HALF) TABLET BY MOUTH TWICE DAILY    fexofenadine (ALLEGRA) 60 MG tablet Take 60 mg by mouth once daily.    fluticasone (FLONASE) 50 mcg/actuation nasal spray 1 spray by Each Nare route once daily.    ketoconazole (NIZORAL) 2 % cream     loratadine (CLARITIN) 10 mg tablet Take 10 mg by mouth once daily.    losartan-hydrochlorothiazide 100-12.5 mg (HYZAAR) 100-12.5 mg Tab Take 1 tablet by mouth  once daily    nystatin (MYCOSTATIN) powder Apply topically 2 (two) times daily.     No current facility-administered medications for this visit.          Vitals:    11/27/20 1309   BP: 134/84   Pulse: 90   Resp: 16   Temp: 98.8 °F (37.1 °C)       Physical Exam   Constitutional: He is oriented to person, place, and time. He appears well-developed.   HENT:   Head: Normocephalic and atraumatic.   Mouth/Throat: No oropharyngeal exudate.   Eyes: No scleral icterus.   Cardiovascular: Normal rate.   Pulmonary/Chest: Effort normal and breath sounds normal. No respiratory distress. He has no wheezes. He has no rales.   Abdominal: He exhibits no mass.   Musculoskeletal:         General: No edema.   Lymphadenopathy:     He has no cervical adenopathy.   Neurological: He is alert and oriented to person, place, and time.   Skin: Skin is warm.   Psychiatric: He has a normal mood and affect.       2/7/19 Cerebrospinal Fluid     FINAL PATHOLOGIC DIAGNOSIS     Negative for malignant cells        3/1/19 CSF cytology FINAL PATHOLOGIC DIAGNOSIS     Cerebrospinal fluid (Cytology):Negative for malignant cells       4/5/19 PET CT                COMPARISON:  Prior FDG PET-CT scan 01/15/2019    FINDINGS:  Quality of the study: Adequate.    There is more prominent non physiologic focal activity within the descending colon with a maximum SUV of 11.4, increased from 6.5 previously.  There are no hypermetabolic foci within the small bowel to suggest relapse.  There are no pathologically enlarged or hypermetabolic lymph nodes.  The spleen is normal in uptake and size at 11.2 cm in craniocaudal dimension.    There is diffusely enhanced uptake in the axial and appendicular skeleton.    Physiologic uptake of the tracer is present within the brain, salivary glands, myocardium, GI and  tracts.  There is persistent postsurgical uptake within the anterior abdominal wall.    Incidental CT findings: N/A    Internal reference SUVs are 2.2 and 4.9 for  the mediastinal blood pool and normal liver background, respectively.       Impression         1. No definite evidence of residual/relapsed extranodal diffuse large B-cell lymphoma.    2.  Focal non physiologic activity within the descending colon without discrete CT correlate that is more conspicuous on the current PET exam.  This may represent a polyp.  Recommend direct visualization.    3.  Bone marrow hyperplasia, presumably related to pegfilgrastim administration.    The Deauville Score is X, presuming the colonic hypermetabolic focus is unrelated to DLBCL.  Otherwise, the score would be 5.         4/5/19 PET CT             FINDINGS:  Quality of the study: Adequate.    There is more prominent non physiologic focal activity within the descending colon with a maximum SUV of 11.4, increased from 6.5 previously.  There are no hypermetabolic foci within the small bowel to suggest relapse.  There are no pathologically enlarged or hypermetabolic lymph nodes.  The spleen is normal in uptake and size at 11.2 cm in craniocaudal dimension.    There is diffusely enhanced uptake in the axial and appendicular skeleton.    Physiologic uptake of the tracer is present within the brain, salivary glands, myocardium, GI and  tracts.  There is persistent postsurgical uptake within the anterior abdominal wall.    Incidental CT findings: N/A    Internal reference SUVs are 2.2 and 4.9 for the mediastinal blood pool and normal liver background, respectively.       Impression         1. No definite evidence of residual/relapsed extranodal diffuse large B-cell lymphoma.    2.  Focal non physiologic activity within the descending colon without discrete CT correlate that is more conspicuous on the current PET exam.  This may represent a polyp.  Recommend direct visualization.    3.  Bone marrow hyperplasia, presumably related to pegfilgrastim administration.    The Deauville Score is X, presuming the colonic hypermetabolic focus is  unrelated to DLBCL.  Otherwise, the score would be 5.         4/11/19 CSF cytology      FINAL PATHOLOGIC DIAGNOSIS  CEREBROSPINAL FLUID CYTOLOGY:  NEGATIVE EXAM-NO NEOPLASIA IDENTIFIED     6/19/19 FINAL PATHOLOGIC DIAGNOSIS     1. BIOPSIES OF COLON:  NO SIGNIFICANT HISTOLOGIC ALTERATION  NO COLITIS IDENTIFIED     BIOPSIES OF 2. DESCENDING COLON AND 3. SIGMOID COLON:  ADENOMATOUS POLYPS     6/21/19 PET CT                 COMPARISON:  PET-CT 04/05/2019    FINDINGS:  Quality of the study: Adequate.    No abnormal foci of increased tracer uptake are present to suggest recurrence or metastatic disease.  Previously seen focal non physiologic uptake in the descending colon is absent on today's exam.  Patient has intervally undergone colonoscopy with removal of multiple polyps and most likely this lesion was removed.  Pathology is pending per chart review.  Thin FDG uptake superficial to the skin surface of which the vast majority is located about the lower pelvis below the laparotomy scar likely represents urinary excretion of radiotracer onto the skin surface.  Persistent mild diffuse hypermetabolism throughout the axial skeleton most likely represents red marrow reconversion.    Physiologic uptake of the tracer is present within the brain, salivary glands, myocardium, GI and  tracts.    Incidental CT findings: Postsurgical changes of prior midline laparotomy scar and partial small-bowel resection in the midline lower abdomen.  Nonobstructing 4 mm right renal stone.  Mosaic attenuation throughout both lungs is similar to prior and may represent small airways disease or artifact from respiratory motion.       Impression         No FDG PET/CT findings to suggest recurrent or metastatic disease.                  11/27/19 CT neck, chest, abdomen, pelvis with cont             TECHNIQUE:  Axial images of the neck, chest, abdomen, and pelvis were acquired after the use of 125 cc [id number] IV contrast. Approximately 30 cc of  Omnipaque 350 administered for enteric/oral contrast.  Coronal and sagittal reconstructions were also obtained    COMPARISON:  PET-CT 06/21/2019, 04/05/2019    FINDINGS:  Neck: Partially visualized brain appears within normal limits without acute abnormality.  Orbits are normal.  Mucous retention cyst within bilateral maxillary sinuses.  Mastoid air cells essentially clear.  Salivary glands unremarkable.  Thyroid within normal limits.  No cervical lymphadenopathy.  Visualized neck vasculature within normal limits.  Mild cervical spondylosis without acute fracture or aggressive osseous lesion.    Thoracic soft tissues: No significant abnormality. Right chest port with central venous catheter tip terminating in the superior vena cava.    Aorta: Left-sided arch with normal branching pattern.  Aorta maintains normal caliber, contour, and course without significant calcific atherosclerosis.    Heart: Normal in size. No pericardial effusion.    Emerita/Mediastinum: No significant lymphadenopathy    Lungs: Well aerated bilaterally with scattered bandlike opacities, right greater than left, likely representative of subsegmental atelectasis versus fibrosis.  There is some increased irregular opacities right middle lobe.  Additional irregular opacities posteriorly.  These may all be related to atelectasis though developing infiltrate not excluded.  Few irregular opacities on the left as well.  No significant consolidation or pleural effusion.    Liver: Normal in size without focal hepatic mass.    Gallbladder: No calcified gallstones.    Bile Ducts: No evidence of dilated ducts.    Pancreas: No mass or peripancreatic fat stranding.    Spleen: Unremarkable.  Small splenule present.    Adrenals: Mild nonspecific thickening of the left adrenal gland.    Kidneys/ Ureters: Normal in size and location without suspicious renal mass or hydroureteronephrosis.  Bilateral nonobstructive nephrolithiasis present.    Bladder: Not well  distended on today's exam.    Reproductive organs: Unremarkable.    GI Tract/Mesentery: Stable postoperative change status post partial small-bowel resection.  No evidence of bowel obstruction or inflammation.    Peritoneal Space: No ascites. No free air.    Retroperitoneum:  No significant adenopathy.    Abdominal wall:  Midline abdominal surgical scar.  No acute abnormalities.    Vasculature: No significant atherosclerosis or aneurysm.    Bones: Degenerative change without acute fracture or aggressive osseous lesion.       Impression         Postoperative change status post partial small-bowel resection for jejunal lymphoma.  No evidence of recurrent or metastatic disease on today's exam.    Bilateral nonobstructing nephrolithiasis.    Additional findings as above.           11/27/20 CT chest, abdomen, pelvis with contrast        COMPARISON:  CT chest abdomen pelvis November 27, 2019.     FINDINGS:  In the chest visualized thyroid is normal.  No significant pleural or pericardial fluid.  No mediastinal or hilar adenopathy.  There is some diffuse esophageal wall thickening though it appears similar.  No supraclavicular nor axillary adenopathy.     Evaluation of the lungs demonstrate irregular opacities right lung base medially likely atelectasis.  This is slightly improved.  No focal consolidation or convincing mass lesion.     In the abdomen liver is normal in size.  No focal mass.  Gallbladder, pancreas, and spleen are normal.  No adrenal mass.  4 mm nonobstructing right renal calculus.  2 mm left renal calculus.  Hypodensity right lower pole kidney too small to characterize.  Vague hypodensity in the left upper pole kidney similar.     Aorta tapers normally.  Nonenlarged para-aortic nodes present similar.  No pelvic adenopathy.  Likewise nonenlarged groin nodes noted.     In the pelvis, bladder is not distended, prostate unremarkable for age.  Evaluation of the bowel demonstrates feces in the colon.  No focal  bowel dilatation.  Nonenlarged mesenteric nodes present.     Small fat containing supraumbilical hernia.  Degenerative changes in the spine.  Spondylolysis at L5.  Prominent disc osteophyte complexes T12-L1 and L1-L2.     Impression:     No convincing lymphadenopathy in this patient with history of lymphoma.     Nonobstructing bilateral nephrolithiasis.  Hypodensities in the kidneys too small to characterize though similar.     There is some wall thickening of the esophagus though similar.  Correlation with clinical findings and further evaluation if indicated.     Additional findings as above.        Component      Latest Ref Rng & Units 11/27/2020   WBC      3.90 - 12.70 K/uL 8.70   RBC      4.60 - 6.20 M/uL 4.61   Hemoglobin      14.0 - 18.0 g/dL 13.7 (L)   Hematocrit      40.0 - 54.0 % 43.3   MCV      82 - 98 fL 94   MCH      27.0 - 31.0 pg 29.7   MCHC      32.0 - 36.0 g/dL 31.6 (L)   RDW      11.5 - 14.5 % 13.9   Platelets      150 - 350 K/uL 251   MPV      9.2 - 12.9 fL 9.4   Immature Granulocytes      0.0 - 0.5 % 0.5   Gran # (ANC)      1.8 - 7.7 K/uL 6.5   Immature Grans (Abs)      0.00 - 0.04 K/uL 0.04   Lymph #      1.0 - 4.8 K/uL 1.3   Mono #      0.3 - 1.0 K/uL 0.7   Eos #      0.0 - 0.5 K/uL 0.2   Baso #      0.00 - 0.20 K/uL 0.05   nRBC      0 /100 WBC 0   Gran %      38.0 - 73.0 % 74.0 (H)   Lymph %      18.0 - 48.0 % 14.4 (L)   Mono %      4.0 - 15.0 % 8.5   Eosinophil %      0.0 - 8.0 % 2.0   Basophil %      0.0 - 1.9 % 0.6   Differential Method       Automated   Sodium      136 - 145 mmol/L 143   Potassium      3.5 - 5.1 mmol/L 4.3   Chloride      95 - 110 mmol/L 103   CO2      23 - 29 mmol/L 31 (H)   Glucose      70 - 110 mg/dL 105   BUN      6 - 20 mg/dL 8   Creatinine      0.5 - 1.4 mg/dL 0.8   Calcium      8.7 - 10.5 mg/dL 9.2   PROTEIN TOTAL      6.0 - 8.4 g/dL 7.6   Albumin      3.5 - 5.2 g/dL 3.8   BILIRUBIN TOTAL      0.1 - 1.0 mg/dL 0.4   Alkaline Phosphatase      55 - 135 U/L 58   AST       10 - 40 U/L 21   ALT      10 - 44 U/L 31   Anion Gap      8 - 16 mmol/L 9   eGFR if African American      >60 mL/min/1.73 m:2 >60.0   eGFR if non African American      >60 mL/min/1.73 m:2 >60.0   LD      110 - 260 U/L 180         Assessment:     1. DLBCL of jejunum, GC sub type, high grade, with myc translocation  2. Hypertension  3. Obstructive sleep apnea  4. Morbid obesity  5. Anemia, hypochromic  6. Peripheral neuropathy secondary to chemotherapy        Plan:     1. 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 IPI score 0. Criteria for Burkitts not fulfilled. ( ki 67 was high, but not high enough). Also did not meet criteria for double or triple hit lymphoma ( no bcl2 or bcl6 translocation). No PET avid measurable disease post resection. No lymphoma involvement of bone marrow. I discussed the treatment of stage I GI high grade lymphoma. I explained that although there is no active/measurable disease, chemotherapy with RCHOP or dose adjusted R-EPOCH is recommended, as he had bulky (8cm), high grade ( ki 67 > 60% ) lymphoma with MYC/IgH fusion. Also discussed the need for diagnostic LP. He went to [facility] for 2nd opinion.   CSF cytology negative for malignant cells on 2/7/19 and on 3/1/19. ANC, platelet alvaro after C1 reviewed. Doses of Etoposide/Doxorubicin/Cytoxan were escalated by 1 level for C2. Doses for cycle 3 were the same as C2. C4 doses were escalated.   PET CT after 3 cycles showed no definite evidence of residual/relapsed extranodal diffuse large B-cell lymphoma. There was focal non physiologic activity within the descending colon without discrete CT correlate.  He wanted to proceed with all 6 cycles, although the data for 4 versus 6 cycles for stage I E lymphoma is limited, even with c-myc translocation.   He completed 6 cycles.  Role of Rt to the initial disease site ( due to bulk) discussed. He was not inclined to undergo RT.   PET CT after C 6 showed CR.  Colonoscopy on 6/19/19 showed adenomatous polyps in descending and sigmoid colon.     CT on 11/27/19 did not demonstrate any new adenopathy or mass . He is doing well at this time, with no new symptoms. No B symptoms. He has occasional abdominal pain, at the site of incision. LDH is normal.   CT done today does not demonstrate any adenopathy / lesion suspicious for lymphoma.     2.On Atenolol, Losartan HCTZ .Follows with his PCP.     5. Mild, asymptomatic.      6. Grade 1-2. He is no longer on gabapentin . It is improving.

## 2021-05-12 ENCOUNTER — PATIENT MESSAGE (OUTPATIENT)
Dept: RESEARCH | Facility: HOSPITAL | Age: 51
End: 2021-05-12

## 2021-05-17 ENCOUNTER — PATIENT MESSAGE (OUTPATIENT)
Dept: HEMATOLOGY/ONCOLOGY | Facility: CLINIC | Age: 51
End: 2021-05-17

## 2021-06-11 ENCOUNTER — LAB VISIT (OUTPATIENT)
Dept: LAB | Facility: HOSPITAL | Age: 51
End: 2021-06-11
Attending: INTERNAL MEDICINE
Payer: COMMERCIAL

## 2021-06-11 ENCOUNTER — OFFICE VISIT (OUTPATIENT)
Dept: HEMATOLOGY/ONCOLOGY | Facility: CLINIC | Age: 51
End: 2021-06-11
Payer: COMMERCIAL

## 2021-06-11 VITALS
SYSTOLIC BLOOD PRESSURE: 154 MMHG | HEIGHT: 67 IN | RESPIRATION RATE: 16 BRPM | HEART RATE: 57 BPM | WEIGHT: 315 LBS | OXYGEN SATURATION: 98 % | DIASTOLIC BLOOD PRESSURE: 76 MMHG | BODY MASS INDEX: 49.44 KG/M2

## 2021-06-11 DIAGNOSIS — Z90.49 S/P SMALL BOWEL RESECTION: ICD-10-CM

## 2021-06-11 DIAGNOSIS — I10 ESSENTIAL HYPERTENSION: ICD-10-CM

## 2021-06-11 DIAGNOSIS — C83.39 DIFFUSE LARGE B-CELL LYMPHOMA OF SOLID ORGAN EXCLUDING SPLEEN: ICD-10-CM

## 2021-06-11 DIAGNOSIS — E66.01 MORBID OBESITY: ICD-10-CM

## 2021-06-11 DIAGNOSIS — G47.33 OSA (OBSTRUCTIVE SLEEP APNEA): ICD-10-CM

## 2021-06-11 DIAGNOSIS — R20.0 NUMBNESS AND TINGLING: Primary | ICD-10-CM

## 2021-06-11 DIAGNOSIS — R20.2 NUMBNESS AND TINGLING: Primary | ICD-10-CM

## 2021-06-11 PROBLEM — D63.0 ANEMIA IN NEOPLASTIC DISEASE: Status: RESOLVED | Noted: 2019-02-01 | Resolved: 2021-06-11

## 2021-06-11 LAB
ALBUMIN SERPL BCP-MCNC: 3.7 G/DL (ref 3.5–5.2)
ALP SERPL-CCNC: 53 U/L (ref 55–135)
ALT SERPL W/O P-5'-P-CCNC: 30 U/L (ref 10–44)
ANION GAP SERPL CALC-SCNC: 14 MMOL/L (ref 8–16)
AST SERPL-CCNC: 25 U/L (ref 10–40)
BASOPHILS # BLD AUTO: 0.04 K/UL (ref 0–0.2)
BASOPHILS NFR BLD: 0.5 % (ref 0–1.9)
BILIRUB SERPL-MCNC: 0.5 MG/DL (ref 0.1–1)
BUN SERPL-MCNC: 14 MG/DL (ref 6–20)
CALCIUM SERPL-MCNC: 9.7 MG/DL (ref 8.7–10.5)
CHLORIDE SERPL-SCNC: 107 MMOL/L (ref 95–110)
CO2 SERPL-SCNC: 22 MMOL/L (ref 23–29)
CREAT SERPL-MCNC: 0.9 MG/DL (ref 0.5–1.4)
DIFFERENTIAL METHOD: ABNORMAL
EOSINOPHIL # BLD AUTO: 0.1 K/UL (ref 0–0.5)
EOSINOPHIL NFR BLD: 1.9 % (ref 0–8)
ERYTHROCYTE [DISTWIDTH] IN BLOOD BY AUTOMATED COUNT: 13.8 % (ref 11.5–14.5)
EST. GFR  (AFRICAN AMERICAN): >60 ML/MIN/1.73 M^2
EST. GFR  (NON AFRICAN AMERICAN): >60 ML/MIN/1.73 M^2
GLUCOSE SERPL-MCNC: 100 MG/DL (ref 70–110)
HCT VFR BLD AUTO: 41.3 % (ref 40–54)
HGB BLD-MCNC: 13.8 G/DL (ref 14–18)
IMM GRANULOCYTES # BLD AUTO: 0.03 K/UL (ref 0–0.04)
IMM GRANULOCYTES NFR BLD AUTO: 0.4 % (ref 0–0.5)
LDH SERPL L TO P-CCNC: 180 U/L (ref 110–260)
LYMPHOCYTES # BLD AUTO: 1.1 K/UL (ref 1–4.8)
LYMPHOCYTES NFR BLD: 15.1 % (ref 18–48)
MCH RBC QN AUTO: 30.5 PG (ref 27–31)
MCHC RBC AUTO-ENTMCNC: 33.4 G/DL (ref 32–36)
MCV RBC AUTO: 91 FL (ref 82–98)
MONOCYTES # BLD AUTO: 0.8 K/UL (ref 0.3–1)
MONOCYTES NFR BLD: 10.8 % (ref 4–15)
NEUTROPHILS # BLD AUTO: 5.4 K/UL (ref 1.8–7.7)
NEUTROPHILS NFR BLD: 71.3 % (ref 38–73)
NRBC BLD-RTO: 0 /100 WBC
PLATELET # BLD AUTO: 255 K/UL (ref 150–450)
PMV BLD AUTO: 9.6 FL (ref 9.2–12.9)
POTASSIUM SERPL-SCNC: 4.2 MMOL/L (ref 3.5–5.1)
PROT SERPL-MCNC: 7.5 G/DL (ref 6–8.4)
RBC # BLD AUTO: 4.53 M/UL (ref 4.6–6.2)
SODIUM SERPL-SCNC: 143 MMOL/L (ref 136–145)
WBC # BLD AUTO: 7.5 K/UL (ref 3.9–12.7)

## 2021-06-11 PROCEDURE — 1126F AMNT PAIN NOTED NONE PRSNT: CPT | Mod: S$GLB,,, | Performed by: INTERNAL MEDICINE

## 2021-06-11 PROCEDURE — 3008F BODY MASS INDEX DOCD: CPT | Mod: CPTII,S$GLB,, | Performed by: INTERNAL MEDICINE

## 2021-06-11 PROCEDURE — 99215 OFFICE O/P EST HI 40 MIN: CPT | Mod: S$GLB,,, | Performed by: INTERNAL MEDICINE

## 2021-06-11 PROCEDURE — 99999 PR PBB SHADOW E&M-EST. PATIENT-LVL III: ICD-10-PCS | Mod: PBBFAC,,, | Performed by: INTERNAL MEDICINE

## 2021-06-11 PROCEDURE — 36415 COLL VENOUS BLD VENIPUNCTURE: CPT | Performed by: INTERNAL MEDICINE

## 2021-06-11 PROCEDURE — 1126F PR PAIN SEVERITY QUANTIFIED, NO PAIN PRESENT: ICD-10-PCS | Mod: S$GLB,,, | Performed by: INTERNAL MEDICINE

## 2021-06-11 PROCEDURE — 80053 COMPREHEN METABOLIC PANEL: CPT | Performed by: INTERNAL MEDICINE

## 2021-06-11 PROCEDURE — 85025 COMPLETE CBC W/AUTO DIFF WBC: CPT | Performed by: INTERNAL MEDICINE

## 2021-06-11 PROCEDURE — 3008F PR BODY MASS INDEX (BMI) DOCUMENTED: ICD-10-PCS | Mod: CPTII,S$GLB,, | Performed by: INTERNAL MEDICINE

## 2021-06-11 PROCEDURE — 83615 LACTATE (LD) (LDH) ENZYME: CPT | Performed by: INTERNAL MEDICINE

## 2021-06-11 PROCEDURE — 99215 PR OFFICE/OUTPT VISIT, EST, LEVL V, 40-54 MIN: ICD-10-PCS | Mod: S$GLB,,, | Performed by: INTERNAL MEDICINE

## 2021-06-11 PROCEDURE — 99999 PR PBB SHADOW E&M-EST. PATIENT-LVL III: CPT | Mod: PBBFAC,,, | Performed by: INTERNAL MEDICINE

## 2021-11-06 ENCOUNTER — PATIENT MESSAGE (OUTPATIENT)
Dept: HEMATOLOGY/ONCOLOGY | Facility: CLINIC | Age: 51
End: 2021-11-06
Payer: COMMERCIAL

## 2021-11-26 ENCOUNTER — HOSPITAL ENCOUNTER (OUTPATIENT)
Dept: RADIOLOGY | Facility: HOSPITAL | Age: 51
Discharge: HOME | End: 2021-11-26
Attending: INTERNAL MEDICINE
Payer: COMMERCIAL

## 2021-11-26 ENCOUNTER — OFFICE VISIT (OUTPATIENT)
Dept: HEMATOLOGY/ONCOLOGY | Facility: CLINIC | Age: 51
End: 2021-11-26
Payer: COMMERCIAL

## 2021-11-26 VITALS
TEMPERATURE: 98 F | HEIGHT: 67 IN | OXYGEN SATURATION: 98 % | DIASTOLIC BLOOD PRESSURE: 89 MMHG | RESPIRATION RATE: 17 BRPM | WEIGHT: 315 LBS | SYSTOLIC BLOOD PRESSURE: 151 MMHG | BODY MASS INDEX: 49.44 KG/M2 | HEART RATE: 77 BPM

## 2021-11-26 DIAGNOSIS — R20.0 NUMBNESS AND TINGLING: ICD-10-CM

## 2021-11-26 DIAGNOSIS — C83.39 DIFFUSE LARGE B-CELL LYMPHOMA OF SOLID ORGAN EXCLUDING SPLEEN: Primary | ICD-10-CM

## 2021-11-26 DIAGNOSIS — C83.39 DIFFUSE LARGE B-CELL LYMPHOMA OF SOLID ORGAN EXCLUDING SPLEEN: ICD-10-CM

## 2021-11-26 DIAGNOSIS — Z90.49 S/P SMALL BOWEL RESECTION: ICD-10-CM

## 2021-11-26 DIAGNOSIS — R20.2 NUMBNESS AND TINGLING: ICD-10-CM

## 2021-11-26 DIAGNOSIS — E66.01 MORBID OBESITY: ICD-10-CM

## 2021-11-26 DIAGNOSIS — I10 ESSENTIAL HYPERTENSION: ICD-10-CM

## 2021-11-26 PROCEDURE — 99215 PR OFFICE/OUTPT VISIT, EST, LEVL V, 40-54 MIN: ICD-10-PCS | Mod: S$GLB,,, | Performed by: INTERNAL MEDICINE

## 2021-11-26 PROCEDURE — 74177 CT CHEST ABDOMEN PELVIS WITH CONTRAST (XPD): ICD-10-PCS | Mod: 26,,, | Performed by: RADIOLOGY

## 2021-11-26 PROCEDURE — 71260 CT CHEST ABDOMEN PELVIS WITH CONTRAST (XPD): ICD-10-PCS | Mod: 26,,, | Performed by: RADIOLOGY

## 2021-11-26 PROCEDURE — 25500020 PHARM REV CODE 255: Performed by: INTERNAL MEDICINE

## 2021-11-26 PROCEDURE — 99215 OFFICE O/P EST HI 40 MIN: CPT | Mod: S$GLB,,, | Performed by: INTERNAL MEDICINE

## 2021-11-26 PROCEDURE — 74177 CT ABD & PELVIS W/CONTRAST: CPT | Mod: TC

## 2021-11-26 PROCEDURE — 71260 CT THORAX DX C+: CPT | Mod: TC

## 2021-11-26 PROCEDURE — 74177 CT ABD & PELVIS W/CONTRAST: CPT | Mod: 26,,, | Performed by: RADIOLOGY

## 2021-11-26 PROCEDURE — 99999 PR PBB SHADOW E&M-EST. PATIENT-LVL III: CPT | Mod: PBBFAC,,, | Performed by: INTERNAL MEDICINE

## 2021-11-26 PROCEDURE — 99999 PR PBB SHADOW E&M-EST. PATIENT-LVL III: ICD-10-PCS | Mod: PBBFAC,,, | Performed by: INTERNAL MEDICINE

## 2021-11-26 PROCEDURE — 71260 CT THORAX DX C+: CPT | Mod: 26,,, | Performed by: RADIOLOGY

## 2021-11-26 RX ADMIN — IOHEXOL 15 ML: 350 INJECTION, SOLUTION INTRAVENOUS at 03:11

## 2021-11-26 RX ADMIN — IOHEXOL 100 ML: 350 INJECTION, SOLUTION INTRAVENOUS at 03:11

## 2022-02-22 DIAGNOSIS — D84.9 IMMUNOSUPPRESSED STATUS: ICD-10-CM

## 2022-03-09 NOTE — OP NOTE
Dictation #1  MRN:[id number]  CSN:[id number]  [id number]   Hydroxychloroquine Counseling:  I discussed with the patient that a baseline ophthalmologic exam is needed at the start of therapy and every year thereafter while on therapy. A CBC may also be warranted for monitoring.  The side effects of this medication were discussed with the patient, including but not limited to agranulocytosis, aplastic anemia, seizures, rashes, retinopathy, and liver toxicity. Patient instructed to call the office should any adverse effect occur.  The patient verbalized understanding of the proper use and possible adverse effects of Plaquenil.  All the patient's questions and concerns were addressed.

## 2022-05-29 ENCOUNTER — PATIENT MESSAGE (OUTPATIENT)
Dept: HEMATOLOGY/ONCOLOGY | Facility: CLINIC | Age: 52
End: 2022-05-29
Payer: COMMERCIAL

## 2022-06-01 ENCOUNTER — LAB VISIT (OUTPATIENT)
Dept: LAB | Facility: HOSPITAL | Age: 52
End: 2022-06-01
Attending: INTERNAL MEDICINE
Payer: COMMERCIAL

## 2022-06-01 DIAGNOSIS — C83.39 DIFFUSE LARGE B-CELL LYMPHOMA OF SOLID ORGAN EXCLUDING SPLEEN: ICD-10-CM

## 2022-06-01 LAB
ALBUMIN SERPL BCP-MCNC: 4 G/DL (ref 3.5–5.2)
ALP SERPL-CCNC: 45 U/L (ref 55–135)
ALT SERPL W/O P-5'-P-CCNC: 38 U/L (ref 10–44)
ANION GAP SERPL CALC-SCNC: 14 MMOL/L (ref 8–16)
AST SERPL-CCNC: 31 U/L (ref 10–40)
BASOPHILS # BLD AUTO: 0.04 K/UL (ref 0–0.2)
BASOPHILS NFR BLD: 0.7 % (ref 0–1.9)
BILIRUB SERPL-MCNC: 0.7 MG/DL (ref 0.1–1)
BUN SERPL-MCNC: 14 MG/DL (ref 6–20)
CALCIUM SERPL-MCNC: 9.9 MG/DL (ref 8.7–10.5)
CHLORIDE SERPL-SCNC: 102 MMOL/L (ref 95–110)
CO2 SERPL-SCNC: 24 MMOL/L (ref 23–29)
CREAT SERPL-MCNC: 0.8 MG/DL (ref 0.5–1.4)
DIFFERENTIAL METHOD: ABNORMAL
EOSINOPHIL # BLD AUTO: 0.1 K/UL (ref 0–0.5)
EOSINOPHIL NFR BLD: 1.6 % (ref 0–8)
ERYTHROCYTE [DISTWIDTH] IN BLOOD BY AUTOMATED COUNT: 13.7 % (ref 11.5–14.5)
EST. GFR  (AFRICAN AMERICAN): >60 ML/MIN/1.73 M^2
EST. GFR  (NON AFRICAN AMERICAN): >60 ML/MIN/1.73 M^2
GLUCOSE SERPL-MCNC: 74 MG/DL (ref 70–110)
HCT VFR BLD AUTO: 39.8 % (ref 40–54)
HGB BLD-MCNC: 13.3 G/DL (ref 14–18)
IMM GRANULOCYTES # BLD AUTO: 0.01 K/UL (ref 0–0.04)
IMM GRANULOCYTES NFR BLD AUTO: 0.2 % (ref 0–0.5)
LDH SERPL L TO P-CCNC: 195 U/L (ref 110–260)
LYMPHOCYTES # BLD AUTO: 1.2 K/UL (ref 1–4.8)
LYMPHOCYTES NFR BLD: 21.2 % (ref 18–48)
MCH RBC QN AUTO: 30.2 PG (ref 27–31)
MCHC RBC AUTO-ENTMCNC: 33.4 G/DL (ref 32–36)
MCV RBC AUTO: 91 FL (ref 82–98)
MONOCYTES # BLD AUTO: 0.6 K/UL (ref 0.3–1)
MONOCYTES NFR BLD: 10.1 % (ref 4–15)
NEUTROPHILS # BLD AUTO: 3.8 K/UL (ref 1.8–7.7)
NEUTROPHILS NFR BLD: 66.2 % (ref 38–73)
NRBC BLD-RTO: 0 /100 WBC
PLATELET # BLD AUTO: 225 K/UL (ref 150–450)
PMV BLD AUTO: 10.3 FL (ref 9.2–12.9)
POTASSIUM SERPL-SCNC: 3.9 MMOL/L (ref 3.5–5.1)
PROT SERPL-MCNC: 7.8 G/DL (ref 6–8.4)
RBC # BLD AUTO: 4.4 M/UL (ref 4.6–6.2)
SODIUM SERPL-SCNC: 140 MMOL/L (ref 136–145)
WBC # BLD AUTO: 5.75 K/UL (ref 3.9–12.7)

## 2022-06-01 PROCEDURE — 36415 COLL VENOUS BLD VENIPUNCTURE: CPT | Mod: PO | Performed by: INTERNAL MEDICINE

## 2022-06-01 PROCEDURE — 80053 COMPREHEN METABOLIC PANEL: CPT | Performed by: INTERNAL MEDICINE

## 2022-06-01 PROCEDURE — 85025 COMPLETE CBC W/AUTO DIFF WBC: CPT | Performed by: INTERNAL MEDICINE

## 2022-06-01 PROCEDURE — 83615 LACTATE (LD) (LDH) ENZYME: CPT | Performed by: INTERNAL MEDICINE

## 2022-06-10 ENCOUNTER — OFFICE VISIT (OUTPATIENT)
Dept: HEMATOLOGY/ONCOLOGY | Facility: CLINIC | Age: 52
End: 2022-06-10
Payer: COMMERCIAL

## 2022-06-10 DIAGNOSIS — R20.2 NUMBNESS AND TINGLING: ICD-10-CM

## 2022-06-10 DIAGNOSIS — D64.9 NORMOCYTIC ANEMIA: ICD-10-CM

## 2022-06-10 DIAGNOSIS — I10 ESSENTIAL HYPERTENSION: Primary | ICD-10-CM

## 2022-06-10 DIAGNOSIS — R20.0 NUMBNESS AND TINGLING: ICD-10-CM

## 2022-06-10 DIAGNOSIS — E66.01 MORBID OBESITY: ICD-10-CM

## 2022-06-10 DIAGNOSIS — C83.39 DIFFUSE LARGE B-CELL LYMPHOMA OF SOLID ORGAN EXCLUDING SPLEEN: ICD-10-CM

## 2022-06-10 PROCEDURE — 99214 OFFICE O/P EST MOD 30 MIN: CPT | Mod: 95,,, | Performed by: INTERNAL MEDICINE

## 2022-06-10 PROCEDURE — 99214 PR OFFICE/OUTPT VISIT, EST, LEVL IV, 30-39 MIN: ICD-10-PCS | Mod: 95,,, | Performed by: INTERNAL MEDICINE

## 2022-06-10 NOTE — PROGRESS NOTES
The patient location is: home  The chief complaint leading to consultation is: h/o DLBCL, follow up    Visit type: audiovisual    Face to Face time with patient: 20 minutes  30minutes of total time spent on the encounter, which includes face to face time and non-face to face time preparing to see the patient (eg, review of tests), Obtaining and/or reviewing separately obtained history, Documenting clinical information in the electronic or other health record, Independently interpreting results (not separately reported) and communicating results to the patient/family/caregiver, or Care coordination (not separately reported).         Each patient to whom he or she provides medical services by telemedicine is:  (1) informed of the relationship between the physician and patient and the respective role of any other health care provider with respect to management of the patient; and (2) notified that he or she may decline to receive medical services by telemedicine and may withdraw from such care at any time.    Notes:     CC: History of lymphoma, follow up visit         Oncology History:     Diagnosis: DLBCL   Sub type: Germinal center, with myc translocation by FISH ; negative for bcl2/ bcl6 translocations; CSF negative  Stage     : I E , bulky ( jejunum)  Treatment : DE R-EPOCH x 6 with IT MTX, (2/1/19 - 5/27/19)        HPI:  is a 51 y.o. male here for followup visit. He has hypertension, obstructive sleep apnea. He was hospitalized around Christmas 2018 for small bowel mass. Patient reports that he began having lower abdominal/pelvic pain toward the end of October 2018. This prompted a urologic evaluation and subsequent treatment for a presumed UTI. He states that his pain continued into November and evolved into more upper abdominal/epigastric pain that was worse with eating. He was subsequently seen by his PCP who ultimately ordered a CT abdomen/pelvis that revealed an abnormal thickening of an 8 cm  segment of small bowel. He denied fever, chills, night sweats, or anorexia at that time. He had ~10 lb weight loss over the 1-2 months prior to his hospitalization in Dec 2018, but he was also actively taking part in a weight loss program.  On 12/21/18, he underwent Segmental small bowel resection.The mass was approximately 6 cm in length and was circumferential.  There was dilation of the small bowel above it suggesting a partial bowel obstruction.  There was no adenopathy in the adjacent   mesentery.  The tumor was clearly extending to the serosal surface of the bowel.Careful search was made for peritoneal implants and there were no lesions noted on the peritoneum or omentum.  The liver could not be inspected through the incision,   but it was palpated and there were no focal lesions within it. The remainder of the small bowel was run from the ligament of Treitz to the ileocecal valves and no other small bowel lesions were noted.    He had PET CT, 2D ECHO, bone marrow biopsy. PET CT did not reveal any evidence of lymphoma. 2D ECHO was normal. Bone marrow was negative for involvement by lymphoma.       He received C1 EPOCH - R between 2/1-2/5/19 without complications. C2 day 1 was on 2/25/19. C3 day 1 was on 3/18, C4 day 1 on 4/8, C5 day 1 on 5/6/19, C 6 on 5/27/19.  No PET avid disease noted after C3 except for a hypermetabolic focus in colon. Repeat PET CT after 6 cycles was negative. Colonoscopy done after 6 cycles showed adenomatous polyps in colon.         Interval History : He is here for a follow up virtual visit. He is doing well. He has some intermittent pain at the site of abdominal incision. He is eating well. He has no fevers, night sweats. He had CT today.      Review of Systems   Constitutional: Positive for malaise/fatigue. Negative for fever.   HENT: Negative for ear discharge, hearing loss, nosebleeds and tinnitus.    Eyes: Negative for blurred vision, double vision and pain.   Respiratory:  Negative for cough, hemoptysis and sputum production.    Cardiovascular: Negative for chest pain, orthopnea and claudication.   Gastrointestinal: Negative for abdominal pain and heartburn.   Genitourinary: Negative for dysuria, frequency and urgency.   Musculoskeletal: Negative for back pain and neck pain.   Neurological: Negative for dizziness, tingling and tremors.   Endo/Heme/Allergies: Negative for environmental allergies. Does not bruise/bleed easily.   Psychiatric/Behavioral: Negative for substance abuse and suicidal ideas.         Current Outpatient Medications   Medication Sig    atenolol (TENORMIN) 100 MG tablet TAKE 1 2 (ONE HALF) TABLET BY MOUTH TWICE DAILY    b complex vitamins capsule Take 1 capsule by mouth once daily.    fexofenadine (ALLEGRA) 60 MG tablet Take 60 mg by mouth once daily.    fluticasone (FLONASE) 50 mcg/actuation nasal spray 1 spray by Each Nare route once daily.    ketoconazole (NIZORAL) 2 % cream     loratadine (CLARITIN) 10 mg tablet Take 10 mg by mouth once daily.    losartan-hydrochlorothiazide 100-12.5 mg (HYZAAR) 100-12.5 mg Tab Take 1 tablet by mouth once daily    nystatin (MYCOSTATIN) powder Apply topically 2 (two) times daily.     No current facility-administered medications for this visit.       2/7/19 Cerebrospinal Fluid     FINAL PATHOLOGIC DIAGNOSIS     Negative for malignant cells        3/1/19 CSF cytology FINAL PATHOLOGIC DIAGNOSIS     Cerebrospinal fluid (Cytology):Negative for malignant cells       4/5/19 PET CT                COMPARISON:  Prior FDG PET-CT scan 01/15/2019    FINDINGS:  Quality of the study: Adequate.    There is more prominent non physiologic focal activity within the descending colon with a maximum SUV of 11.4, increased from 6.5 previously.  There are no hypermetabolic foci within the small bowel to suggest relapse.  There are no pathologically enlarged or hypermetabolic lymph nodes.  The spleen is normal in uptake and size at 11.2 cm in  craniocaudal dimension.    There is diffusely enhanced uptake in the axial and appendicular skeleton.    Physiologic uptake of the tracer is present within the brain, salivary glands, myocardium, GI and  tracts.  There is persistent postsurgical uptake within the anterior abdominal wall.    Incidental CT findings: N/A    Internal reference SUVs are 2.2 and 4.9 for the mediastinal blood pool and normal liver background, respectively.       Impression         1. No definite evidence of residual/relapsed extranodal diffuse large B-cell lymphoma.    2.  Focal non physiologic activity within the descending colon without discrete CT correlate that is more conspicuous on the current PET exam.  This may represent a polyp.  Recommend direct visualization.    3.  Bone marrow hyperplasia, presumably related to pegfilgrastim administration.    The Deauville Score is X, presuming the colonic hypermetabolic focus is unrelated to DLBCL.  Otherwise, the score would be 5.         4/5/19 PET CT             FINDINGS:  Quality of the study: Adequate.    There is more prominent non physiologic focal activity within the descending colon with a maximum SUV of 11.4, increased from 6.5 previously.  There are no hypermetabolic foci within the small bowel to suggest relapse.  There are no pathologically enlarged or hypermetabolic lymph nodes.  The spleen is normal in uptake and size at 11.2 cm in craniocaudal dimension.    There is diffusely enhanced uptake in the axial and appendicular skeleton.    Physiologic uptake of the tracer is present within the brain, salivary glands, myocardium, GI and  tracts.  There is persistent postsurgical uptake within the anterior abdominal wall.    Incidental CT findings: N/A    Internal reference SUVs are 2.2 and 4.9 for the mediastinal blood pool and normal liver background, respectively.       Impression         1. No definite evidence of residual/relapsed extranodal diffuse large B-cell  lymphoma.    2.  Focal non physiologic activity within the descending colon without discrete CT correlate that is more conspicuous on the current PET exam.  This may represent a polyp.  Recommend direct visualization.    3.  Bone marrow hyperplasia, presumably related to pegfilgrastim administration.    The Deauville Score is X, presuming the colonic hypermetabolic focus is unrelated to DLBCL.  Otherwise, the score would be 5.         4/11/19 CSF cytology      FINAL PATHOLOGIC DIAGNOSIS  CEREBROSPINAL FLUID CYTOLOGY:  NEGATIVE EXAM-NO NEOPLASIA IDENTIFIED     6/19/19 FINAL PATHOLOGIC DIAGNOSIS     1. BIOPSIES OF COLON:  NO SIGNIFICANT HISTOLOGIC ALTERATION  NO COLITIS IDENTIFIED     BIOPSIES OF 2. DESCENDING COLON AND 3. SIGMOID COLON:  ADENOMATOUS POLYPS     6/21/19 PET CT                 COMPARISON:  PET-CT 04/05/2019    FINDINGS:  Quality of the study: Adequate.    No abnormal foci of increased tracer uptake are present to suggest recurrence or metastatic disease.  Previously seen focal non physiologic uptake in the descending colon is absent on today's exam.  Patient has intervally undergone colonoscopy with removal of multiple polyps and most likely this lesion was removed.  Pathology is pending per chart review.  Thin FDG uptake superficial to the skin surface of which the vast majority is located about the lower pelvis below the laparotomy scar likely represents urinary excretion of radiotracer onto the skin surface.  Persistent mild diffuse hypermetabolism throughout the axial skeleton most likely represents red marrow reconversion.    Physiologic uptake of the tracer is present within the brain, salivary glands, myocardium, GI and  tracts.    Incidental CT findings: Postsurgical changes of prior midline laparotomy scar and partial small-bowel resection in the midline lower abdomen.  Nonobstructing 4 mm right renal stone.  Mosaic attenuation throughout both lungs is similar to prior and may represent small  airways disease or artifact from respiratory motion.       Impression         No FDG PET/CT findings to suggest recurrent or metastatic disease.               11/27/19 CT neck, chest, abdomen, pelvis with cont             TECHNIQUE:  Axial images of the neck, chest, abdomen, and pelvis were acquired after the use of 125 cc [id number] IV contrast. Approximately 30 cc of Omnipaque 350 administered for enteric/oral contrast.  Coronal and sagittal reconstructions were also obtained    COMPARISON:  PET-CT 06/21/2019, 04/05/2019    FINDINGS:  Neck: Partially visualized brain appears within normal limits without acute abnormality.  Orbits are normal.  Mucous retention cyst within bilateral maxillary sinuses.  Mastoid air cells essentially clear.  Salivary glands unremarkable.  Thyroid within normal limits.  No cervical lymphadenopathy.  Visualized neck vasculature within normal limits.  Mild cervical spondylosis without acute fracture or aggressive osseous lesion.    Thoracic soft tissues: No significant abnormality. Right chest port with central venous catheter tip terminating in the superior vena cava.    Aorta: Left-sided arch with normal branching pattern.  Aorta maintains normal caliber, contour, and course without significant calcific atherosclerosis.    Heart: Normal in size. No pericardial effusion.    Emerita/Mediastinum: No significant lymphadenopathy    Lungs: Well aerated bilaterally with scattered bandlike opacities, right greater than left, likely representative of subsegmental atelectasis versus fibrosis.  There is some increased irregular opacities right middle lobe.  Additional irregular opacities posteriorly.  These may all be related to atelectasis though developing infiltrate not excluded.  Few irregular opacities on the left as well.  No significant consolidation or pleural effusion.    Liver: Normal in size without focal hepatic mass.    Gallbladder: No calcified gallstones.    Bile Ducts: No evidence of  dilated ducts.    Pancreas: No mass or peripancreatic fat stranding.    Spleen: Unremarkable.  Small splenule present.    Adrenals: Mild nonspecific thickening of the left adrenal gland.    Kidneys/ Ureters: Normal in size and location without suspicious renal mass or hydroureteronephrosis.  Bilateral nonobstructive nephrolithiasis present.    Bladder: Not well distended on today's exam.    Reproductive organs: Unremarkable.    GI Tract/Mesentery: Stable postoperative change status post partial small-bowel resection.  No evidence of bowel obstruction or inflammation.    Peritoneal Space: No ascites. No free air.    Retroperitoneum:  No significant adenopathy.    Abdominal wall:  Midline abdominal surgical scar.  No acute abnormalities.    Vasculature: No significant atherosclerosis or aneurysm.    Bones: Degenerative change without acute fracture or aggressive osseous lesion.       Impression         Postoperative change status post partial small-bowel resection for jejunal lymphoma.  No evidence of recurrent or metastatic disease on today's exam.    Bilateral nonobstructing nephrolithiasis.    Additional findings as above.           11/27/20 CT chest, abdomen, pelvis with contrast        COMPARISON:  CT chest abdomen pelvis November 27, 2019.     FINDINGS:  In the chest visualized thyroid is normal.  No significant pleural or pericardial fluid.  No mediastinal or hilar adenopathy.  There is some diffuse esophageal wall thickening though it appears similar.  No supraclavicular nor axillary adenopathy.     Evaluation of the lungs demonstrate irregular opacities right lung base medially likely atelectasis.  This is slightly improved.  No focal consolidation or convincing mass lesion.     In the abdomen liver is normal in size.  No focal mass.  Gallbladder, pancreas, and spleen are normal.  No adrenal mass.  4 mm nonobstructing right renal calculus.  2 mm left renal calculus.  Hypodensity right lower pole kidney too  small to characterize.  Vague hypodensity in the left upper pole kidney similar.     Aorta tapers normally.  Nonenlarged para-aortic nodes present similar.  No pelvic adenopathy.  Likewise nonenlarged groin nodes noted.     In the pelvis, bladder is not distended, prostate unremarkable for age.  Evaluation of the bowel demonstrates feces in the colon.  No focal bowel dilatation.  Nonenlarged mesenteric nodes present.     Small fat containing supraumbilical hernia.  Degenerative changes in the spine.  Spondylolysis at L5.  Prominent disc osteophyte complexes T12-L1 and L1-L2.     Impression:     No convincing lymphadenopathy in this patient with history of lymphoma.     Nonobstructing bilateral nephrolithiasis.  Hypodensities in the kidneys too small to characterize though similar.     There is some wall thickening of the esophagus though similar.  Correlation with clinical findings and further evaluation if indicated.     Additional findings as above.        11/16/21 CT chest, abdomen, pelvis with contrast      Impression:     Patient history of lymphoma.  No lymphadenopathy identified.     Bilateral nonobstructive nephrolithiasis.     Bilateral L5 pars defects.    Component      Latest Ref Rng & Units 6/1/2022   WBC      3.90 - 12.70 K/uL 5.75   RBC      4.60 - 6.20 M/uL 4.40 (L)   Hemoglobin      14.0 - 18.0 g/dL 13.3 (L)   Hematocrit      40.0 - 54.0 % 39.8 (L)   MCV      82 - 98 fL 91   MCH      27.0 - 31.0 pg 30.2   MCHC      32.0 - 36.0 g/dL 33.4   RDW      11.5 - 14.5 % 13.7   Platelets      150 - 450 K/uL 225   MPV      9.2 - 12.9 fL 10.3   Immature Granulocytes      0.0 - 0.5 % 0.2   Gran # (ANC)      1.8 - 7.7 K/uL 3.8   Immature Grans (Abs)      0.00 - 0.04 K/uL 0.01   Lymph #      1.0 - 4.8 K/uL 1.2   Mono #      0.3 - 1.0 K/uL 0.6   Eos #      0.0 - 0.5 K/uL 0.1   Baso #      0.00 - 0.20 K/uL 0.04   nRBC      0 /100 WBC 0   Gran %      38.0 - 73.0 % 66.2   Lymph %      18.0 - 48.0 % 21.2   Mono %       4.0 - 15.0 % 10.1   Eosinophil %      0.0 - 8.0 % 1.6   Basophil %      0.0 - 1.9 % 0.7   Differential Method       Automated   Sodium      136 - 145 mmol/L 140   Potassium      3.5 - 5.1 mmol/L 3.9   Chloride      95 - 110 mmol/L 102   CO2      23 - 29 mmol/L 24   Glucose      70 - 110 mg/dL 74   BUN      6 - 20 mg/dL 14   Creatinine      0.5 - 1.4 mg/dL 0.8   Calcium      8.7 - 10.5 mg/dL 9.9   PROTEIN TOTAL      6.0 - 8.4 g/dL 7.8   Albumin      3.5 - 5.2 g/dL 4.0   BILIRUBIN TOTAL      0.1 - 1.0 mg/dL 0.7   Alkaline Phosphatase      55 - 135 U/L 45 (L)   AST      10 - 40 U/L 31   ALT      10 - 44 U/L 38   Anion Gap      8 - 16 mmol/L 14   eGFR if African American      >60 mL/min/1.73 m:2 >60.0   eGFR if non African American      >60 mL/min/1.73 m:2 >60.0   LD      110 - 260 U/L 195           Assessment:     1. DLBCL of jejunum, GC sub type, high grade, with myc translocation  2. Hypertension  3. Obstructive sleep apnea  4. Morbid obesity  5. 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 Colon polyps        Plan:     1. 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 IPI score 0. Criteria for Burkitts not fulfilled. ( ki 67 was high, but not high enough). Also did not meet criteria for double or triple hit lymphoma ( no bcl2 or bcl6 translocation). No PET avid measurable disease post resection. No lymphoma involvement of bone marrow. I discussed the treatment of stage I GI high grade lymphoma. I explained that although there is no active/measurable disease, chemotherapy with RCHOP or dose adjusted R-EPOCH is recommended, as he had bulky (8cm), high grade ( ki 67 > 60% ) lymphoma with MYC/IgH fusion. Also discussed the need for diagnostic LP. He went to [facility] for 2nd opinion.   CSF cytology negative for malignant cells on 2/7/19 and on 3/1/19. ANC, platelet alvaro after C1 reviewed. Doses of Etoposide/Doxorubicin/Cytoxan were escalated by 1 level for  C2. Doses for cycle 3 were the same as C2. C4 doses were escalated.   PET CT after 3 cycles showed no definite evidence of residual/relapsed extranodal diffuse large B-cell lymphoma. There was focal non physiologic activity within the descending colon without discrete CT correlate.  He wanted to proceed with all 6 cycles, although the data for 4 versus 6 cycles for stage I E lymphoma is limited, even with c-myc translocation.   He completed 6 cycles.  Role of Rt to the initial disease site ( due to bulk) discussed. He was not inclined to undergo RT.   PET CT after C 6 showed CR. Colonoscopy on 6/19/19 showed adenomatous polyps in descending and sigmoid colon.     CT on 11/27/19 did not demonstrate any new adenopathy or mass . He is doing well at this time, with no new symptoms. No B symptoms. He has occasional abdominal pain, at the site of incision. LDH is normal.   CT on 11/27/20 did not demonstrate any adenopathy / lesion suspicious for lymphoma. No evidence of recurrence on CT done today.   He is uptodate with COVID 19 vaccination. He will have repeat CT in Nov 2021, and follow up after the scan.      2.On Atenolol, Losartan HCTZ .Follows with his PCP.    4. He is trying a new diet.      5. Mild, asymptomatic.      6. It is under control. He is no longer on gabapentin .      7. He had small polyps on colonoscopy done in 2019. They were adnomatous polyps. Next due in 2024.                 BMT Chart Routing      Follow up with physician Other. Virtual f/u on 11/29 or later   Follow up with ANGI    Labs CBC, CMP and LDH   Lab interval:  Labs, CT on 11/21/22. f/u virtual 11/29   Imaging CT chest abdomen pelvis   CT with cont on 11/21/22   Pharmacy appointment    Other referrals

## 2022-11-21 ENCOUNTER — HOSPITAL ENCOUNTER (OUTPATIENT)
Dept: RADIOLOGY | Facility: HOSPITAL | Age: 52
Discharge: HOME | End: 2022-11-21
Attending: INTERNAL MEDICINE
Payer: COMMERCIAL

## 2022-11-21 DIAGNOSIS — C83.39 DIFFUSE LARGE B-CELL LYMPHOMA OF SOLID ORGAN EXCLUDING SPLEEN: ICD-10-CM

## 2022-11-21 DIAGNOSIS — D64.9 NORMOCYTIC ANEMIA: ICD-10-CM

## 2022-11-21 PROCEDURE — A9698 NON-RAD CONTRAST MATERIALNOC: HCPCS | Performed by: INTERNAL MEDICINE

## 2022-11-21 PROCEDURE — 74177 CT ABD & PELVIS W/CONTRAST: CPT | Mod: TC

## 2022-11-21 PROCEDURE — 71260 CT CHEST ABDOMEN PELVIS WITH CONTRAST (XPD): ICD-10-PCS | Mod: 26,,, | Performed by: RADIOLOGY

## 2022-11-21 PROCEDURE — 74177 CT ABD & PELVIS W/CONTRAST: CPT | Mod: 26,,, | Performed by: RADIOLOGY

## 2022-11-21 PROCEDURE — 71260 CT THORAX DX C+: CPT | Mod: TC

## 2022-11-21 PROCEDURE — 71260 CT THORAX DX C+: CPT | Mod: 26,,, | Performed by: RADIOLOGY

## 2022-11-21 PROCEDURE — 25500020 PHARM REV CODE 255: Performed by: INTERNAL MEDICINE

## 2022-11-21 PROCEDURE — 74177 CT CHEST ABDOMEN PELVIS WITH CONTRAST (XPD): ICD-10-PCS | Mod: 26,,, | Performed by: RADIOLOGY

## 2022-11-21 RX ADMIN — IOHEXOL 100 ML: 350 INJECTION, SOLUTION INTRAVENOUS at 11:11

## 2022-11-21 RX ADMIN — BARIUM SULFATE 450 ML: 20 SUSPENSION ORAL at 11:11

## 2022-11-30 ENCOUNTER — OFFICE VISIT (OUTPATIENT)
Dept: HEMATOLOGY/ONCOLOGY | Facility: CLINIC | Age: 52
End: 2022-11-30
Payer: COMMERCIAL

## 2022-11-30 DIAGNOSIS — I10 ESSENTIAL HYPERTENSION: ICD-10-CM

## 2022-11-30 DIAGNOSIS — E66.01 MORBID OBESITY: ICD-10-CM

## 2022-11-30 DIAGNOSIS — C83.39 DIFFUSE LARGE B-CELL LYMPHOMA OF SOLID ORGAN EXCLUDING SPLEEN: Primary | ICD-10-CM

## 2022-11-30 DIAGNOSIS — G47.33 OSA (OBSTRUCTIVE SLEEP APNEA): ICD-10-CM

## 2022-11-30 PROCEDURE — 99214 OFFICE O/P EST MOD 30 MIN: CPT | Mod: 95,,, | Performed by: INTERNAL MEDICINE

## 2022-11-30 PROCEDURE — 99214 PR OFFICE/OUTPT VISIT, EST, LEVL IV, 30-39 MIN: ICD-10-PCS | Mod: 95,,, | Performed by: INTERNAL MEDICINE

## 2022-11-30 NOTE — PROGRESS NOTES
The patient location is: home  The chief complaint leading to consultation is: h/o DLBCL, follow up    Visit type: audiovisual    Face to Face time with patient: 20 minutes  30minutes of total time spent on the encounter, which includes face to face time and non-face to face time preparing to see the patient (eg, review of tests), Obtaining and/or reviewing separately obtained history, Documenting clinical information in the electronic or other health record, Independently interpreting results (not separately reported) and communicating results to the patient/family/caregiver, or Care coordination (not separately reported).         Each patient to whom he or she provides medical services by telemedicine is:  (1) informed of the relationship between the physician and patient and the respective role of any other health care provider with respect to management of the patient; and (2) notified that he or she may decline to receive medical services by telemedicine and may withdraw from such care at any time.    Notes:     CC: History of lymphoma, follow up visit         Oncology History:     Diagnosis: DLBCL   Sub type: [facility], with myc translocation by FISH ; negative for bcl2/ bcl6 translocations; CSF negative  Stage     : I E , bulky ( jejunum)  Treatment : DE R-EPOCH x 6 with IT MTX, (2/1/19 - 5/27/19)        HPI:  is a 51 y.o. male here for followup visit. He has hypertension, obstructive sleep apnea. He was hospitalized around Christmas 2018 for small bowel mass. Patient reports that he began having lower abdominal/pelvic pain toward the end of October 2018. This prompted a urologic evaluation and subsequent treatment for a presumed UTI. He states that his pain continued into November and evolved into more upper abdominal/epigastric pain that was worse with eating. He was subsequently seen by his PCP who ultimately ordered a CT abdomen/pelvis that revealed an abnormal thickening of an 8 cm  segment of small bowel. He denied fever, chills, night sweats, or anorexia at that time. He had ~10 lb weight loss over the 1-2 months prior to his hospitalization in Dec 2018, but he was also actively taking part in a weight loss program.  On 12/21/18, he underwent Segmental small bowel resection.The mass was approximately 6 cm in length and was circumferential.  There was dilation of the small bowel above it suggesting a partial bowel obstruction.  There was no adenopathy in the adjacent   mesentery.  The tumor was clearly extending to the serosal surface of the bowel.Careful search was made for peritoneal implants and there were no lesions noted on the peritoneum or omentum.  The liver could not be inspected through the incision,   but it was palpated and there were no focal lesions within it. The remainder of the small bowel was run from the ligament of Treitz to the ileocecal valves and no other small bowel lesions were noted.    He had PET CT, 2D ECHO, bone marrow biopsy. PET CT did not reveal any evidence of lymphoma. 2D ECHO was normal. Bone marrow was negative for involvement by lymphoma.       He received C1 EPOCH - R between 2/1-2/5/19 without complications. C2 day 1 was on 2/25/19. C3 day 1 was on 3/18, C4 day 1 on 4/8, C5 day 1 on 5/6/19, C 6 on 5/27/19.  No PET avid disease noted after C3 except for a hypermetabolic focus in colon. Repeat PET CT after 6 cycles was negative. Colonoscopy done after 6 cycles showed adenomatous polyps in colon.         Interval History : He is here for a follow up virtual visit. He is doing well. He is eating well. He has no fevers, night sweats. He had repeat CT on 11/21/22. .      Review of Systems   Constitutional:  Positive for malaise/fatigue and weight loss. Negative for diaphoresis and fever.   HENT:  Negative for ear discharge, hearing loss, nosebleeds and tinnitus.    Eyes:  Negative for blurred vision, double vision and pain.   Respiratory:  Negative for cough,  hemoptysis and sputum production.    Cardiovascular:  Negative for chest pain, orthopnea and claudication.   Gastrointestinal:  Negative for abdominal pain, blood in stool, heartburn and melena.   Genitourinary:  Negative for dysuria, frequency and urgency.   Musculoskeletal:  Positive for joint pain. Negative for back pain and neck pain.   Neurological:  Negative for dizziness, tingling and tremors.   Endo/Heme/Allergies:  Negative for environmental allergies. Does not bruise/bleed easily.   Psychiatric/Behavioral:  Negative for substance abuse and suicidal ideas.        Current Outpatient Medications   Medication Sig    atenolol (TENORMIN) 100 MG tablet TAKE 1 2 (ONE HALF) TABLET BY MOUTH TWICE DAILY    b complex vitamins capsule Take 1 capsule by mouth once daily.    fexofenadine (ALLEGRA) 60 MG tablet Take 60 mg by mouth once daily.    fluticasone (FLONASE) 50 mcg/actuation nasal spray 1 spray by Each Nare route once daily.    ketoconazole (NIZORAL) 2 % cream     loratadine (CLARITIN) 10 mg tablet Take 10 mg by mouth once daily.    losartan-hydrochlorothiazide 100-12.5 mg (HYZAAR) 100-12.5 mg Tab Take 1 tablet by mouth once daily    nystatin (MYCOSTATIN) powder Apply topically 2 (two) times daily.     No current facility-administered medications for this visit.       2/7/19 Cerebrospinal Fluid     FINAL PATHOLOGIC DIAGNOSIS     Negative for malignant cells        3/1/19 CSF cytology FINAL PATHOLOGIC DIAGNOSIS     Cerebrospinal fluid (Cytology):Negative for malignant cells       4/5/19 PET CT                COMPARISON:  Prior FDG PET-CT scan 01/15/2019    FINDINGS:  Quality of the study: Adequate.    There is more prominent non physiologic focal activity within the descending colon with a maximum SUV of 11.4, increased from 6.5 previously.  There are no hypermetabolic foci within the small bowel to suggest relapse.  There are no pathologically enlarged or hypermetabolic lymph nodes.  The spleen is normal in  uptake and size at 11.2 cm in craniocaudal dimension.    There is diffusely enhanced uptake in the axial and appendicular skeleton.    Physiologic uptake of the tracer is present within the brain, salivary glands, myocardium, GI and  tracts.  There is persistent postsurgical uptake within the anterior abdominal wall.    Incidental CT findings: N/A    Internal reference SUVs are 2.2 and 4.9 for the mediastinal blood pool and normal liver background, respectively.       Impression         1. No definite evidence of residual/relapsed extranodal diffuse large B-cell lymphoma.    2.  Focal non physiologic activity within the descending colon without discrete CT correlate that is more conspicuous on the current PET exam.  This may represent a polyp.  Recommend direct visualization.    3.  Bone marrow hyperplasia, presumably related to pegfilgrastim administration.    The Deauville Score is X, presuming the colonic hypermetabolic focus is unrelated to DLBCL.  Otherwise, the score would be 5.         4/5/19 PET CT             FINDINGS:  Quality of the study: Adequate.    There is more prominent non physiologic focal activity within the descending colon with a maximum SUV of 11.4, increased from 6.5 previously.  There are no hypermetabolic foci within the small bowel to suggest relapse.  There are no pathologically enlarged or hypermetabolic lymph nodes.  The spleen is normal in uptake and size at 11.2 cm in craniocaudal dimension.    There is diffusely enhanced uptake in the axial and appendicular skeleton.    Physiologic uptake of the tracer is present within the brain, salivary glands, myocardium, GI and  tracts.  There is persistent postsurgical uptake within the anterior abdominal wall.    Incidental CT findings: N/A    Internal reference SUVs are 2.2 and 4.9 for the mediastinal blood pool and normal liver background, respectively.       Impression         1. No definite evidence of residual/relapsed extranodal  diffuse large B-cell lymphoma.    2.  Focal non physiologic activity within the descending colon without discrete CT correlate that is more conspicuous on the current PET exam.  This may represent a polyp.  Recommend direct visualization.    3.  Bone marrow hyperplasia, presumably related to pegfilgrastim administration.    The Deauville Score is X, presuming the colonic hypermetabolic focus is unrelated to DLBCL.  Otherwise, the score would be 5.         4/11/19 CSF cytology      FINAL PATHOLOGIC DIAGNOSIS  CEREBROSPINAL FLUID CYTOLOGY:  NEGATIVE EXAM-NO NEOPLASIA IDENTIFIED     6/19/19 FINAL PATHOLOGIC DIAGNOSIS     1. BIOPSIES OF COLON:  NO SIGNIFICANT HISTOLOGIC ALTERATION  NO COLITIS IDENTIFIED     BIOPSIES OF 2. DESCENDING COLON AND 3. SIGMOID COLON:  ADENOMATOUS POLYPS     6/21/19 PET CT                 COMPARISON:  PET-CT 04/05/2019    FINDINGS:  Quality of the study: Adequate.    No abnormal foci of increased tracer uptake are present to suggest recurrence or metastatic disease.  Previously seen focal non physiologic uptake in the descending colon is absent on today's exam.  Patient has intervally undergone colonoscopy with removal of multiple polyps and most likely this lesion was removed.  Pathology is pending per chart review.  Thin FDG uptake superficial to the skin surface of which the vast majority is located about the lower pelvis below the laparotomy scar likely represents urinary excretion of radiotracer onto the skin surface.  Persistent mild diffuse hypermetabolism throughout the axial skeleton most likely represents red marrow reconversion.    Physiologic uptake of the tracer is present within the brain, salivary glands, myocardium, GI and  tracts.    Incidental CT findings: Postsurgical changes of prior midline laparotomy scar and partial small-bowel resection in the midline lower abdomen.  Nonobstructing 4 mm right renal stone.  Mosaic attenuation throughout both lungs is similar to prior  and may represent small airways disease or artifact from respiratory motion.       Impression         No FDG PET/CT findings to suggest recurrent or metastatic disease.               11/27/19 CT neck, chest, abdomen, pelvis with cont             TECHNIQUE:  Axial images of the neck, chest, abdomen, and pelvis were acquired after the use of 125 cc [id number] IV contrast. Approximately 30 cc of Omnipaque 350 administered for enteric/oral contrast.  Coronal and sagittal reconstructions were also obtained    COMPARISON:  PET-CT 06/21/2019, 04/05/2019    FINDINGS:  Neck: Partially visualized brain appears within normal limits without acute abnormality.  Orbits are normal.  Mucous retention cyst within bilateral maxillary sinuses.  Mastoid air cells essentially clear.  Salivary glands unremarkable.  Thyroid within normal limits.  No cervical lymphadenopathy.  Visualized neck vasculature within normal limits.  Mild cervical spondylosis without acute fracture or aggressive osseous lesion.    Thoracic soft tissues: No significant abnormality. Right chest port with central venous catheter tip terminating in the superior vena cava.    Aorta: Left-sided arch with normal branching pattern.  Aorta maintains normal caliber, contour, and course without significant calcific atherosclerosis.    Heart: Normal in size. No pericardial effusion.    Emerita/Mediastinum: No significant lymphadenopathy    Lungs: Well aerated bilaterally with scattered bandlike opacities, right greater than left, likely representative of subsegmental atelectasis versus fibrosis.  There is some increased irregular opacities right middle lobe.  Additional irregular opacities posteriorly.  These may all be related to atelectasis though developing infiltrate not excluded.  Few irregular opacities on the left as well.  No significant consolidation or pleural effusion.    Liver: Normal in size without focal hepatic mass.    Gallbladder: No calcified gallstones.    Bile  Ducts: No evidence of dilated ducts.    Pancreas: No mass or peripancreatic fat stranding.    Spleen: Unremarkable.  Small splenule present.    Adrenals: Mild nonspecific thickening of the left adrenal gland.    Kidneys/ Ureters: Normal in size and location without suspicious renal mass or hydroureteronephrosis.  Bilateral nonobstructive nephrolithiasis present.    Bladder: Not well distended on today's exam.    Reproductive organs: Unremarkable.    GI Tract/Mesentery: Stable postoperative change status post partial small-bowel resection.  No evidence of bowel obstruction or inflammation.    Peritoneal Space: No ascites. No free air.    Retroperitoneum:  No significant adenopathy.    Abdominal wall:  Midline abdominal surgical scar.  No acute abnormalities.    Vasculature: No significant atherosclerosis or aneurysm.    Bones: Degenerative change without acute fracture or aggressive osseous lesion.       Impression         Postoperative change status post partial small-bowel resection for jejunal lymphoma.  No evidence of recurrent or metastatic disease on today's exam.    Bilateral nonobstructing nephrolithiasis.    Additional findings as above.           11/27/20 CT chest, abdomen, pelvis with contrast        COMPARISON:  CT chest abdomen pelvis November 27, 2019.     FINDINGS:  In the chest visualized thyroid is normal.  No significant pleural or pericardial fluid.  No mediastinal or hilar adenopathy.  There is some diffuse esophageal wall thickening though it appears similar.  No supraclavicular nor axillary adenopathy.     Evaluation of the lungs demonstrate irregular opacities right lung base medially likely atelectasis.  This is slightly improved.  No focal consolidation or convincing mass lesion.     In the abdomen liver is normal in size.  No focal mass.  Gallbladder, pancreas, and spleen are normal.  No adrenal mass.  4 mm nonobstructing right renal calculus.  2 mm left renal calculus.  Hypodensity right  lower pole kidney too small to characterize.  Vague hypodensity in the left upper pole kidney similar.     Aorta tapers normally.  Nonenlarged para-aortic nodes present similar.  No pelvic adenopathy.  Likewise nonenlarged groin nodes noted.     In the pelvis, bladder is not distended, prostate unremarkable for age.  Evaluation of the bowel demonstrates feces in the colon.  No focal bowel dilatation.  Nonenlarged mesenteric nodes present.     Small fat containing supraumbilical hernia.  Degenerative changes in the spine.  Spondylolysis at L5.  Prominent disc osteophyte complexes T12-L1 and L1-L2.     Impression:     No convincing lymphadenopathy in this patient with history of lymphoma.     Nonobstructing bilateral nephrolithiasis.  Hypodensities in the kidneys too small to characterize though similar.     There is some wall thickening of the esophagus though similar.  Correlation with clinical findings and further evaluation if indicated.     Additional findings as above.        11/16/21 CT chest, abdomen, pelvis with contrast      Impression:     Patient history of lymphoma.  No lymphadenopathy identified.     Bilateral nonobstructive nephrolithiasis.     Bilateral L5 pars defects.      11/21/22 CT chest, abdomen, pelvis with contrast    COMPARISON:  CT chest abdomen pelvis 06/20/2021     FINDINGS:  Thoracic soft tissues: Normal thyroid. No axillary lymphadenopathy.     Aorta: Thoracic aorta is normal in caliber and contour with no significant calcific atherosclerosis.     Heart: Normal in size. No pericardial effusion. No significant calcific coronary atherosclerosis.     Emerita/Mediastinum: No mediastinal or hilar lymphadenopathy.     Lungs: Trachea and bronchi are patent.  Lungs symmetrically expanded without consolidation. No pleural fluid or pneumothorax.  Calcified granuloma left upper lobe.  Multiple solid micronodules (axial series 6, images 209, 226, 255, 226, 338, and 427) stable from prior.  No new  suspicious pulmonary nodules.     Liver: Normal in size and contour.  Stable subcentimeter hypodensity in the right hepatic lobe too small to characterize (axial series 3, image 62).  No new focal hepatic lesions.     Gallbladder: No calcified gallstones.     Bile Ducts: No evidence of dilated ducts.     Pancreas: No mass or peripancreatic fat stranding.     Spleen: Normal in size measuring 11.1 cm.     Esophagus: Subtle circumferential esophageal wall thickening similar to prior exams.     Stomach and duodenum: Unremarkable.     Adrenals: Unremarkable.     Kidneys/Ureters: Normal in size and location. Normal enhancement. 6 mm right renal stone and 4 mm left renal stone.  No hydronephrosis or ureteral dilatation. Small left renal hypodensity stable from prior likely a small cyst.     Bladder: No evidence of wall thickening.     Reproductive organs: Unremarkable.     Bowel/Mesentery: Small bowel is normal in caliber with no evidence of obstruction.  No evidence of inflammation or wall thickening.  Appendix is visualized and appears normal.  Colon demonstrates no focal wall thickening.     Peritoneum: No intraperitoneal free air or fluid.     Lymph nodes: No lymphadenopathy.     Abdominal wall:  Fat containing supraumbilical hernia.     Vasculature: No aneurysm. No significant calcific atherosclerosis.     Bones: Bilateral L5 pars defects.  No acute fracture. No suspicious osseous lesions.     Impression:     1. In this patient with lymphoma, there is no evidence of lymphadenopathy above or below the diaphragm.  2. Nonobstructing bilateral nephrolithiasis.  3. Additional stable findings as detailed above.         Component      Latest Ref Rng & Units 11/21/2022   WBC      3.90 - 12.70 K/uL 7.16   RBC      4.60 - 6.20 M/uL 4.62   Hemoglobin      14.0 - 18.0 g/dL 13.9 (L)   Hematocrit      40.0 - 54.0 % 42.2   MCV      82 - 98 fL 91   MCH      27.0 - 31.0 pg 30.1   MCHC      32.0 - 36.0 g/dL 32.9   RDW      11.5 - 14.5  % 13.2   Platelets      150 - 450 K/uL 243   MPV      9.2 - 12.9 fL 9.9   Immature Granulocytes      0.0 - 0.5 % 0.3   Gran # (ANC)      1.8 - 7.7 K/uL 5.2   Immature Grans (Abs)      0.00 - 0.04 K/uL 0.02   Lymph #      1.0 - 4.8 K/uL 1.2   Mono #      0.3 - 1.0 K/uL 0.5   Eos #      0.0 - 0.5 K/uL 0.1   Baso #      0.00 - 0.20 K/uL 0.05   nRBC      0 /100 WBC 0   Gran %      38.0 - 73.0 % 73.1 (H)   Lymph %      18.0 - 48.0 % 16.8 (L)   Mono %      4.0 - 15.0 % 7.1   Eosinophil %      0.0 - 8.0 % 2.0   Basophil %      0.0 - 1.9 % 0.7   Differential Method       Automated   Sodium      136 - 145 mmol/L 139   Potassium      3.5 - 5.1 mmol/L 4.0   Chloride      95 - 110 mmol/L 103   CO2      23 - 29 mmol/L 29   Glucose      70 - 110 mg/dL 108   BUN      6 - 20 mg/dL 15   Creatinine      0.5 - 1.4 mg/dL 0.7   Calcium      8.7 - 10.5 mg/dL 9.6   PROTEIN TOTAL      6.0 - 8.4 g/dL 8.0   Albumin      3.5 - 5.2 g/dL 3.9   BILIRUBIN TOTAL      0.1 - 1.0 mg/dL 0.6   Alkaline Phosphatase      55 - 135 U/L 53 (L)   AST      10 - 40 U/L 27   ALT      10 - 44 U/L 31   Anion Gap      8 - 16 mmol/L 7 (L)   eGFR      >60 mL/min/1.73 m:2 >60.0   LD      110 - 260 U/L 185     Assessment:     1. DLBCL of jejunum, GC sub type, high grade, with myc translocation  2. Hypertension  3. Obstructive sleep apnea  4. Morbid obesity  5. 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 Colon polyps        Plan:     1. 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 IPI score 0. Criteria for Burkitts not fulfilled. ( ki 67 was high, but not high enough). Also did not meet criteria for double or triple hit lymphoma ( no bcl2 or bcl6 translocation). No PET avid measurable disease post resection. No lymphoma involvement of bone marrow. I discussed the treatment of stage I GI high grade lymphoma. I explained that although there is no active/measurable disease, chemotherapy with RCHOP or  dose adjusted R-EPOCH is recommended, as he had bulky (8cm), high grade ( ki 67 > 60% ) lymphoma with MYC/IgH fusion. Also discussed the need for diagnostic LP. He went to [facility] for 2nd opinion.   CSF cytology negative for malignant cells on 2/7/19 and on 3/1/19. ANC, platelet alvaro after C1 reviewed. Doses of Etoposide/Doxorubicin/Cytoxan were escalated by 1 level for C2. Doses for cycle 3 were the same as C2. C4 doses were escalated.   PET CT after 3 cycles showed no definite evidence of residual/relapsed extranodal diffuse large B-cell lymphoma. There was focal non physiologic activity within the descending colon without discrete CT correlate.  He wanted to proceed with all 6 cycles, although the data for 4 versus 6 cycles for stage I E lymphoma is limited, even with c-myc translocation.   He completed 6 cycles.  Role of Rt to the initial disease site ( due to bulk) discussed. He was not inclined to undergo RT.   PET CT after C 6 showed CR. Colonoscopy on 6/19/19 showed adenomatous polyps in descending and sigmoid colon.     CT on 11/27/19 did not demonstrate any new adenopathy or mass . CT on 11/27/20 did not demonstrate any adenopathy / lesion suspicious for lymphoma. He has intentional weight loss of 60 lbs in the past several months. He follows a new diet plan. No night sweats. No evidence of recurrence on CT done on 11/21/22. CBC shows minimal anemia. LDH normal.  He is uptodate with COVID 19 and influenza vaccination. He will have follow up here in 6 months.      2.On Atenolol, Losartan HCTZ .Follows with his PCP.    4. He is trying a new diet.      5. Mild, asymptomatic.      6. It is under control. He is no longer on gabapentin .      7. He had small polyps on colonoscopy done in 2019. They were adnomatous polyps. Next due in 2024.             BMT Chart Routing      Follow up with physician 6 months.   Follow up with ANGI    Provider visit type    Infusion scheduling note    Injection scheduling note    Labs  CBC, CMP and LDH   Lab interval:     Imaging    Pharmacy appointment    Other referrals

## 2023-05-05 ENCOUNTER — PATIENT MESSAGE (OUTPATIENT)
Dept: HEMATOLOGY/ONCOLOGY | Facility: CLINIC | Age: 53
End: 2023-05-05
Payer: COMMERCIAL

## 2023-05-05 ENCOUNTER — TELEPHONE (OUTPATIENT)
Dept: HEMATOLOGY/ONCOLOGY | Facility: CLINIC | Age: 53
End: 2023-05-05
Payer: COMMERCIAL

## 2023-06-01 NOTE — Clinical Note
F/U:- Schedule EPOCH-R cycle 4 Monday-Friday pump exchanges from 4/8/19-4/12/19 (Friday he will receive Neulasta OBI so no need to come Saturday)- IT chemo on 4/11/19 - CBC and CMP twice weekly in [place] after 4/12/19- CBC, CMP, LDH, uric acid and visit with Dr. Hwang in 3 weeks  Odomzo Counseling- I discussed with the patient the risks of Odomzo including but not limited to nausea, vomiting, diarrhea, constipation, weight loss, changes in the sense of taste, decreased appetite, muscle spasms, and hair loss.  The patient verbalized understanding of the proper use and possible adverse effects of Odomzo.  All of the patient's questions and concerns were addressed.

## 2023-06-19 ENCOUNTER — OFFICE VISIT (OUTPATIENT)
Dept: HEMATOLOGY/ONCOLOGY | Facility: CLINIC | Age: 53
End: 2023-06-19
Payer: COMMERCIAL

## 2023-06-19 ENCOUNTER — LAB VISIT (OUTPATIENT)
Dept: LAB | Facility: HOSPITAL | Age: 53
End: 2023-06-19
Attending: INTERNAL MEDICINE
Payer: COMMERCIAL

## 2023-06-19 VITALS
OXYGEN SATURATION: 100 % | DIASTOLIC BLOOD PRESSURE: 89 MMHG | WEIGHT: 300.25 LBS | HEART RATE: 58 BPM | HEIGHT: 67 IN | RESPIRATION RATE: 17 BRPM | TEMPERATURE: 99 F | BODY MASS INDEX: 47.12 KG/M2 | SYSTOLIC BLOOD PRESSURE: 170 MMHG

## 2023-06-19 DIAGNOSIS — R20.0 NUMBNESS AND TINGLING: ICD-10-CM

## 2023-06-19 DIAGNOSIS — E66.01 MORBID OBESITY: ICD-10-CM

## 2023-06-19 DIAGNOSIS — C83.39 DIFFUSE LARGE B-CELL LYMPHOMA OF SOLID ORGAN EXCLUDING SPLEEN: ICD-10-CM

## 2023-06-19 DIAGNOSIS — Z85.72 HISTORY OF LYMPHOMA: ICD-10-CM

## 2023-06-19 DIAGNOSIS — R20.2 NUMBNESS AND TINGLING: ICD-10-CM

## 2023-06-19 DIAGNOSIS — I10 ESSENTIAL HYPERTENSION: Primary | ICD-10-CM

## 2023-06-19 LAB
ALBUMIN SERPL BCP-MCNC: 3.7 G/DL (ref 3.5–5.2)
ALP SERPL-CCNC: 57 U/L (ref 55–135)
ALT SERPL W/O P-5'-P-CCNC: 23 U/L (ref 10–44)
ANION GAP SERPL CALC-SCNC: 10 MMOL/L (ref 8–16)
AST SERPL-CCNC: 22 U/L (ref 10–40)
BASOPHILS # BLD AUTO: 0.06 K/UL (ref 0–0.2)
BASOPHILS NFR BLD: 0.9 % (ref 0–1.9)
BILIRUB SERPL-MCNC: 0.5 MG/DL (ref 0.1–1)
BUN SERPL-MCNC: 20 MG/DL (ref 6–20)
CALCIUM SERPL-MCNC: 9.4 MG/DL (ref 8.7–10.5)
CHLORIDE SERPL-SCNC: 104 MMOL/L (ref 95–110)
CO2 SERPL-SCNC: 28 MMOL/L (ref 23–29)
CREAT SERPL-MCNC: 0.8 MG/DL (ref 0.5–1.4)
DIFFERENTIAL METHOD: ABNORMAL
EOSINOPHIL # BLD AUTO: 0.2 K/UL (ref 0–0.5)
EOSINOPHIL NFR BLD: 2.5 % (ref 0–8)
ERYTHROCYTE [DISTWIDTH] IN BLOOD BY AUTOMATED COUNT: 13.2 % (ref 11.5–14.5)
EST. GFR  (NO RACE VARIABLE): >60 ML/MIN/1.73 M^2
GLUCOSE SERPL-MCNC: 87 MG/DL (ref 70–110)
HCT VFR BLD AUTO: 43.9 % (ref 40–54)
HGB BLD-MCNC: 14.5 G/DL (ref 14–18)
IMM GRANULOCYTES # BLD AUTO: 0.04 K/UL (ref 0–0.04)
IMM GRANULOCYTES NFR BLD AUTO: 0.6 % (ref 0–0.5)
LDH SERPL L TO P-CCNC: 210 U/L (ref 110–260)
LYMPHOCYTES # BLD AUTO: 1.5 K/UL (ref 1–4.8)
LYMPHOCYTES NFR BLD: 22.2 % (ref 18–48)
MCH RBC QN AUTO: 30.1 PG (ref 27–31)
MCHC RBC AUTO-ENTMCNC: 33 G/DL (ref 32–36)
MCV RBC AUTO: 91 FL (ref 82–98)
MONOCYTES # BLD AUTO: 0.6 K/UL (ref 0.3–1)
MONOCYTES NFR BLD: 9.2 % (ref 4–15)
NEUTROPHILS # BLD AUTO: 4.4 K/UL (ref 1.8–7.7)
NEUTROPHILS NFR BLD: 64.6 % (ref 38–73)
NRBC BLD-RTO: 0 /100 WBC
PLATELET # BLD AUTO: 215 K/UL (ref 150–450)
PMV BLD AUTO: 9.8 FL (ref 9.2–12.9)
POTASSIUM SERPL-SCNC: 3.9 MMOL/L (ref 3.5–5.1)
PROT SERPL-MCNC: 7.4 G/DL (ref 6–8.4)
RBC # BLD AUTO: 4.82 M/UL (ref 4.6–6.2)
SODIUM SERPL-SCNC: 142 MMOL/L (ref 136–145)
WBC # BLD AUTO: 6.75 K/UL (ref 3.9–12.7)

## 2023-06-19 PROCEDURE — 99214 PR OFFICE/OUTPT VISIT, EST, LEVL IV, 30-39 MIN: ICD-10-PCS | Mod: S$GLB,,, | Performed by: INTERNAL MEDICINE

## 2023-06-19 PROCEDURE — 36415 COLL VENOUS BLD VENIPUNCTURE: CPT | Performed by: INTERNAL MEDICINE

## 2023-06-19 PROCEDURE — 3077F PR MOST RECENT SYSTOLIC BLOOD PRESSURE >= 140 MM HG: ICD-10-PCS | Mod: CPTII,S$GLB,, | Performed by: INTERNAL MEDICINE

## 2023-06-19 PROCEDURE — 99999 PR PBB SHADOW E&M-EST. PATIENT-LVL III: ICD-10-PCS | Mod: PBBFAC,,, | Performed by: INTERNAL MEDICINE

## 2023-06-19 PROCEDURE — 3008F BODY MASS INDEX DOCD: CPT | Mod: CPTII,S$GLB,, | Performed by: INTERNAL MEDICINE

## 2023-06-19 PROCEDURE — 85025 COMPLETE CBC W/AUTO DIFF WBC: CPT | Performed by: INTERNAL MEDICINE

## 2023-06-19 PROCEDURE — 83615 LACTATE (LD) (LDH) ENZYME: CPT | Performed by: INTERNAL MEDICINE

## 2023-06-19 PROCEDURE — 99214 OFFICE O/P EST MOD 30 MIN: CPT | Mod: S$GLB,,, | Performed by: INTERNAL MEDICINE

## 2023-06-19 PROCEDURE — 3077F SYST BP >= 140 MM HG: CPT | Mod: CPTII,S$GLB,, | Performed by: INTERNAL MEDICINE

## 2023-06-19 PROCEDURE — 80053 COMPREHEN METABOLIC PANEL: CPT | Performed by: INTERNAL MEDICINE

## 2023-06-19 PROCEDURE — 3079F PR MOST RECENT DIASTOLIC BLOOD PRESSURE 80-89 MM HG: ICD-10-PCS | Mod: CPTII,S$GLB,, | Performed by: INTERNAL MEDICINE

## 2023-06-19 PROCEDURE — 99999 PR PBB SHADOW E&M-EST. PATIENT-LVL III: CPT | Mod: PBBFAC,,, | Performed by: INTERNAL MEDICINE

## 2023-06-19 PROCEDURE — 3008F PR BODY MASS INDEX (BMI) DOCUMENTED: ICD-10-PCS | Mod: CPTII,S$GLB,, | Performed by: INTERNAL MEDICINE

## 2023-06-19 PROCEDURE — 3079F DIAST BP 80-89 MM HG: CPT | Mod: CPTII,S$GLB,, | Performed by: INTERNAL MEDICINE

## 2023-06-19 RX ORDER — PANTOPRAZOLE SODIUM 40 MG/1
40 TABLET, DELAYED RELEASE ORAL 3 TIMES DAILY
COMMUNITY
Start: 2023-06-07

## 2023-06-19 NOTE — PROGRESS NOTES
CC: History of lymphoma, follow up visit         Oncology History:     Diagnosis: DLBCL   Sub type: [facility], with myc translocation by FISH ; negative for bcl2/ bcl6 translocations; CSF negative  Stage     : I E , bulky ( jejunum)  Treatment : DE R-EPOCH x 6 with IT MTX, (2/1/19 - 5/27/19)        HPI:  is a 53 y.o. male here for followup visit. He has hypertension, obstructive sleep apnea. He was hospitalized around Christmas 2018 for small bowel mass. Patient reports that he began having lower abdominal/pelvic pain toward the end of October 2018. This prompted a urologic evaluation and subsequent treatment for a presumed UTI. He states that his pain continued into November and evolved into more upper abdominal/epigastric pain that was worse with eating. He was subsequently seen by his PCP who ultimately ordered a CT abdomen/pelvis that revealed an abnormal thickening of an 8 cm segment of small bowel. He denied fever, chills, night sweats, or anorexia at that time. He had ~10 lb weight loss over the 1-2 months prior to his hospitalization in Dec 2018, but he was also actively taking part in a weight loss program.  On 12/21/18, he underwent Segmental small bowel resection.The mass was approximately 6 cm in length and was circumferential.  There was dilation of the small bowel above it suggesting a partial bowel obstruction.  There was no adenopathy in the adjacent   mesentery.  The tumor was clearly extending to the serosal surface of the bowel.Careful search was made for peritoneal implants and there were no lesions noted on the peritoneum or omentum.  The liver could not be inspected through the incision,   but it was palpated and there were no focal lesions within it. The remainder of the small bowel was run from the ligament of Treitz to the ileocecal valves and no other small bowel lesions were noted.    He had PET CT, 2D ECHO, bone marrow biopsy. PET CT did not reveal any evidence of  lymphoma. 2D ECHO was normal. Bone marrow was negative for involvement by lymphoma.       He received C1 EPOCH - R between 2/1-2/5/19 without complications. C2 day 1 was on 2/25/19. C3 day 1 was on 3/18, C4 day 1 on 4/8, C5 day 1 on 5/6/19, C 6 on 5/27/19.  No PET avid disease noted after C3 except for a hypermetabolic focus in colon. Repeat PET CT after 6 cycles was negative. Colonoscopy done after 6 cycles showed adenomatous polyps in colon.    He had repeat CT on 11/21/22 that did not show any evidence of recurrence.      Interval History : He is here for a follow up visit. He is doing well. He is eating well. He has no fevers, night sweats. He has intentional weight loss in the past 6 months.     Review of Systems   Constitutional:  Positive for malaise/fatigue and weight loss. Negative for diaphoresis and fever.   HENT:  Negative for ear discharge, hearing loss, nosebleeds and tinnitus.    Eyes:  Negative for blurred vision, double vision and pain.   Respiratory:  Negative for cough, hemoptysis and sputum production.    Cardiovascular:  Negative for chest pain, orthopnea and claudication.   Gastrointestinal:  Negative for abdominal pain, blood in stool, heartburn and melena.   Genitourinary:  Negative for dysuria, frequency and urgency.   Musculoskeletal:  Positive for joint pain. Negative for back pain and neck pain.   Neurological:  Negative for dizziness, tingling and tremors.   Endo/Heme/Allergies:  Negative for environmental allergies. Does not bruise/bleed easily.   Psychiatric/Behavioral:  Negative for substance abuse and suicidal ideas.            Current Outpatient Medications   Medication Sig    atenolol (TENORMIN) 100 MG tablet TAKE 1 2 (ONE HALF) TABLET BY MOUTH TWICE DAILY    b complex vitamins capsule Take 1 capsule by mouth once daily.    fexofenadine (ALLEGRA) 60 MG tablet Take 60 mg by mouth once daily.    fluticasone (FLONASE) 50 mcg/actuation nasal spray 1 spray by Each Nare route once daily.     ketoconazole (NIZORAL) 2 % cream     loratadine (CLARITIN) 10 mg tablet Take 10 mg by mouth once daily.    losartan-hydrochlorothiazide 100-12.5 mg (HYZAAR) 100-12.5 mg Tab Take 1 tablet by mouth once daily    nystatin (MYCOSTATIN) powder Apply topically 2 (two) times daily.    pantoprazole (PROTONIX) 40 MG tablet Take 40 mg by mouth 3 (three) times daily.     No current facility-administered medications for this visit.           Vitals:    06/19/23 0828   BP: (!) 170/89   Pulse: (!) 58   Resp: 17   Temp: 98.6 °F (37 °C)         Physical Exam  Constitutional:       Appearance: He is obese.   HENT:      Head: Normocephalic.      Mouth/Throat:      Pharynx: No posterior oropharyngeal erythema.   Eyes:      General: No scleral icterus.  Cardiovascular:      Rate and Rhythm: Normal rate and regular rhythm.      Heart sounds: No murmur heard.  Pulmonary:      Effort: Pulmonary effort is normal. No respiratory distress.      Breath sounds: Normal breath sounds.   Abdominal:      General: There is no distension.      Palpations: There is no mass.   Musculoskeletal:         General: No swelling.   Lymphadenopathy:      Cervical: No cervical adenopathy.   Neurological:      General: No focal deficit present.      Mental Status: He is alert and oriented to person, place, and time.      Cranial Nerves: No cranial nerve deficit.        2/7/19 Cerebrospinal Fluid     FINAL PATHOLOGIC DIAGNOSIS     Negative for malignant cells        3/1/19 CSF cytology FINAL PATHOLOGIC DIAGNOSIS     Cerebrospinal fluid (Cytology):Negative for malignant cells       4/5/19 PET CT                COMPARISON:  Prior FDG PET-CT scan 01/15/2019    FINDINGS:  Quality of the study: Adequate.    There is more prominent non physiologic focal activity within the descending colon with a maximum SUV of 11.4, increased from 6.5 previously.  There are no hypermetabolic foci within the small bowel to suggest relapse.  There are no pathologically enlarged or  hypermetabolic lymph nodes.  The spleen is normal in uptake and size at 11.2 cm in craniocaudal dimension.    There is diffusely enhanced uptake in the axial and appendicular skeleton.    Physiologic uptake of the tracer is present within the brain, salivary glands, myocardium, GI and  tracts.  There is persistent postsurgical uptake within the anterior abdominal wall.    Incidental CT findings: N/A    Internal reference SUVs are 2.2 and 4.9 for the mediastinal blood pool and normal liver background, respectively.       Impression         1. No definite evidence of residual/relapsed extranodal diffuse large B-cell lymphoma.    2.  Focal non physiologic activity within the descending colon without discrete CT correlate that is more conspicuous on the current PET exam.  This may represent a polyp.  Recommend direct visualization.    3.  Bone marrow hyperplasia, presumably related to pegfilgrastim administration.    The Deauville Score is X, presuming the colonic hypermetabolic focus is unrelated to DLBCL.  Otherwise, the score would be 5.         4/5/19 PET CT             FINDINGS:  Quality of the study: Adequate.    There is more prominent non physiologic focal activity within the descending colon with a maximum SUV of 11.4, increased from 6.5 previously.  There are no hypermetabolic foci within the small bowel to suggest relapse.  There are no pathologically enlarged or hypermetabolic lymph nodes.  The spleen is normal in uptake and size at 11.2 cm in craniocaudal dimension.    There is diffusely enhanced uptake in the axial and appendicular skeleton.    Physiologic uptake of the tracer is present within the brain, salivary glands, myocardium, GI and  tracts.  There is persistent postsurgical uptake within the anterior abdominal wall.    Incidental CT findings: N/A    Internal reference SUVs are 2.2 and 4.9 for the mediastinal blood pool and normal liver background, respectively.       Impression         1. No  definite evidence of residual/relapsed extranodal diffuse large B-cell lymphoma.    2.  Focal non physiologic activity within the descending colon without discrete CT correlate that is more conspicuous on the current PET exam.  This may represent a polyp.  Recommend direct visualization.    3.  Bone marrow hyperplasia, presumably related to pegfilgrastim administration.    The Deauville Score is X, presuming the colonic hypermetabolic focus is unrelated to DLBCL.  Otherwise, the score would be 5.         4/11/19 CSF cytology      FINAL PATHOLOGIC DIAGNOSIS  CEREBROSPINAL FLUID CYTOLOGY:  NEGATIVE EXAM-NO NEOPLASIA IDENTIFIED     6/19/19 FINAL PATHOLOGIC DIAGNOSIS     1. BIOPSIES OF COLON:  NO SIGNIFICANT HISTOLOGIC ALTERATION  NO COLITIS IDENTIFIED     BIOPSIES OF 2. DESCENDING COLON AND 3. SIGMOID COLON:  ADENOMATOUS POLYPS     6/21/19 PET CT                 COMPARISON:  PET-CT 04/05/2019    FINDINGS:  Quality of the study: Adequate.    No abnormal foci of increased tracer uptake are present to suggest recurrence or metastatic disease.  Previously seen focal non physiologic uptake in the descending colon is absent on today's exam.  Patient has intervally undergone colonoscopy with removal of multiple polyps and most likely this lesion was removed.  Pathology is pending per chart review.  Thin FDG uptake superficial to the skin surface of which the vast majority is located about the lower pelvis below the laparotomy scar likely represents urinary excretion of radiotracer onto the skin surface.  Persistent mild diffuse hypermetabolism throughout the axial skeleton most likely represents red marrow reconversion.    Physiologic uptake of the tracer is present within the brain, salivary glands, myocardium, GI and  tracts.    Incidental CT findings: Postsurgical changes of prior midline laparotomy scar and partial small-bowel resection in the midline lower abdomen.  Nonobstructing 4 mm right renal stone.  Mosaic  attenuation throughout both lungs is similar to prior and may represent small airways disease or artifact from respiratory motion.       Impression         No FDG PET/CT findings to suggest recurrent or metastatic disease.               11/27/19 CT neck, chest, abdomen, pelvis with cont             TECHNIQUE:  Axial images of the neck, chest, abdomen, and pelvis were acquired after the use of 125 cc Anou145 IV contrast. Approximately 30 cc of Omnipaque 350 administered for enteric/oral contrast.  Coronal and sagittal reconstructions were also obtained    COMPARISON:  PET-CT 06/21/2019, 04/05/2019    FINDINGS:  Neck: Partially visualized brain appears within normal limits without acute abnormality.  Orbits are normal.  Mucous retention cyst within bilateral maxillary sinuses.  Mastoid air cells essentially clear.  Salivary glands unremarkable.  Thyroid within normal limits.  No cervical lymphadenopathy.  Visualized neck vasculature within normal limits.  Mild cervical spondylosis without acute fracture or aggressive osseous lesion.    Thoracic soft tissues: No significant abnormality. Right chest port with central venous catheter tip terminating in the superior vena cava.    Aorta: Left-sided arch with normal branching pattern.  Aorta maintains normal caliber, contour, and course without significant calcific atherosclerosis.    Heart: Normal in size. No pericardial effusion.    Emerita/Mediastinum: No significant lymphadenopathy    Lungs: Well aerated bilaterally with scattered bandlike opacities, right greater than left, likely representative of subsegmental atelectasis versus fibrosis.  There is some increased irregular opacities right middle lobe.  Additional irregular opacities posteriorly.  These may all be related to atelectasis though developing infiltrate not excluded.  Few irregular opacities on the left as well.  No significant consolidation or pleural effusion.    Liver: Normal in size without focal hepatic  mass.    Gallbladder: No calcified gallstones.    Bile Ducts: No evidence of dilated ducts.    Pancreas: No mass or peripancreatic fat stranding.    Spleen: Unremarkable.  Small splenule present.    Adrenals: Mild nonspecific thickening of the left adrenal gland.    Kidneys/ Ureters: Normal in size and location without suspicious renal mass or hydroureteronephrosis.  Bilateral nonobstructive nephrolithiasis present.    Bladder: Not well distended on today's exam.    Reproductive organs: Unremarkable.    GI Tract/Mesentery: Stable postoperative change status post partial small-bowel resection.  No evidence of bowel obstruction or inflammation.    Peritoneal Space: No ascites. No free air.    Retroperitoneum:  No significant adenopathy.    Abdominal wall:  Midline abdominal surgical scar.  No acute abnormalities.    Vasculature: No significant atherosclerosis or aneurysm.    Bones: Degenerative change without acute fracture or aggressive osseous lesion.       Impression         Postoperative change status post partial small-bowel resection for jejunal lymphoma.  No evidence of recurrent or metastatic disease on today's exam.    Bilateral nonobstructing nephrolithiasis.    Additional findings as above.           11/27/20 CT chest, abdomen, pelvis with contrast        COMPARISON:  CT chest abdomen pelvis November 27, 2019.     FINDINGS:  In the chest visualized thyroid is normal.  No significant pleural or pericardial fluid.  No mediastinal or hilar adenopathy.  There is some diffuse esophageal wall thickening though it appears similar.  No supraclavicular nor axillary adenopathy.     Evaluation of the lungs demonstrate irregular opacities right lung base medially likely atelectasis.  This is slightly improved.  No focal consolidation or convincing mass lesion.     In the abdomen liver is normal in size.  No focal mass.  Gallbladder, pancreas, and spleen are normal.  No adrenal mass.  4 mm nonobstructing right renal  calculus.  2 mm left renal calculus.  Hypodensity right lower pole kidney too small to characterize.  Vague hypodensity in the left upper pole kidney similar.     Aorta tapers normally.  Nonenlarged para-aortic nodes present similar.  No pelvic adenopathy.  Likewise nonenlarged groin nodes noted.     In the pelvis, bladder is not distended, prostate unremarkable for age.  Evaluation of the bowel demonstrates feces in the colon.  No focal bowel dilatation.  Nonenlarged mesenteric nodes present.     Small fat containing supraumbilical hernia.  Degenerative changes in the spine.  Spondylolysis at L5.  Prominent disc osteophyte complexes T12-L1 and L1-L2.     Impression:     No convincing lymphadenopathy in this patient with history of lymphoma.     Nonobstructing bilateral nephrolithiasis.  Hypodensities in the kidneys too small to characterize though similar.     There is some wall thickening of the esophagus though similar.  Correlation with clinical findings and further evaluation if indicated.     Additional findings as above.        11/16/21 CT chest, abdomen, pelvis with contrast      Impression:     Patient history of lymphoma.  No lymphadenopathy identified.     Bilateral nonobstructive nephrolithiasis.     Bilateral L5 pars defects.      11/21/22 CT chest, abdomen, pelvis with contrast    COMPARISON:  CT chest abdomen pelvis 06/20/2021     FINDINGS:  Thoracic soft tissues: Normal thyroid. No axillary lymphadenopathy.     Aorta: Thoracic aorta is normal in caliber and contour with no significant calcific atherosclerosis.     Heart: Normal in size. No pericardial effusion. No significant calcific coronary atherosclerosis.     Emerita/Mediastinum: No mediastinal or hilar lymphadenopathy.     Lungs: Trachea and bronchi are patent.  Lungs symmetrically expanded without consolidation. No pleural fluid or pneumothorax.  Calcified granuloma left upper lobe.  Multiple solid micronodules (axial series 6, images 209, 226,  255, 226, 338, and 427) stable from prior.  No new suspicious pulmonary nodules.     Liver: Normal in size and contour.  Stable subcentimeter hypodensity in the right hepatic lobe too small to characterize (axial series 3, image 62).  No new focal hepatic lesions.     Gallbladder: No calcified gallstones.     Bile Ducts: No evidence of dilated ducts.     Pancreas: No mass or peripancreatic fat stranding.     Spleen: Normal in size measuring 11.1 cm.     Esophagus: Subtle circumferential esophageal wall thickening similar to prior exams.     Stomach and duodenum: Unremarkable.     Adrenals: Unremarkable.     Kidneys/Ureters: Normal in size and location. Normal enhancement. 6 mm right renal stone and 4 mm left renal stone.  No hydronephrosis or ureteral dilatation. Small left renal hypodensity stable from prior likely a small cyst.     Bladder: No evidence of wall thickening.     Reproductive organs: Unremarkable.     Bowel/Mesentery: Small bowel is normal in caliber with no evidence of obstruction.  No evidence of inflammation or wall thickening.  Appendix is visualized and appears normal.  Colon demonstrates no focal wall thickening.     Peritoneum: No intraperitoneal free air or fluid.     Lymph nodes: No lymphadenopathy.     Abdominal wall:  Fat containing supraumbilical hernia.     Vasculature: No aneurysm. No significant calcific atherosclerosis.     Bones: Bilateral L5 pars defects.  No acute fracture. No suspicious osseous lesions.     Impression:     1. In this patient with lymphoma, there is no evidence of lymphadenopathy above or below the diaphragm.  2. Nonobstructing bilateral nephrolithiasis.  3. Additional stable findings as detailed above.         Component      Latest Ref Rng & Units 6/19/2023   WBC      3.90 - 12.70 K/uL 6.75   RBC      4.60 - 6.20 M/uL 4.82   Hemoglobin      14.0 - 18.0 g/dL 14.5   Hematocrit      40.0 - 54.0 % 43.9   MCV      82 - 98 fL 91   MCH      27.0 - 31.0 pg 30.1   MCHC       32.0 - 36.0 g/dL 33.0   RDW      11.5 - 14.5 % 13.2   Platelets      150 - 450 K/uL 215   MPV      9.2 - 12.9 fL 9.8   Immature Granulocytes      0.0 - 0.5 % 0.6 (H)   Gran # (ANC)      1.8 - 7.7 K/uL 4.4   Immature Grans (Abs)      0.00 - 0.04 K/uL 0.04   Lymph #      1.0 - 4.8 K/uL 1.5   Mono #      0.3 - 1.0 K/uL 0.6   Eos #      0.0 - 0.5 K/uL 0.2   Baso #      0.00 - 0.20 K/uL 0.06   nRBC      0 /100 WBC 0   Gran %      38.0 - 73.0 % 64.6   Lymph %      18.0 - 48.0 % 22.2   Mono %      4.0 - 15.0 % 9.2   Eosinophil %      0.0 - 8.0 % 2.5   Basophil %      0.0 - 1.9 % 0.9   Differential Method       Automated     Assessment:     1. History of DLBCL of jejunum, GC sub type, high grade, with myc translocation  2. Hypertension  3. Obstructive sleep apnea  4. Morbid obesity  5. 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 Colon polyps        Plan:     1. 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 IPI score 0. Criteria for Burkitts not fulfilled. ( ki 67 was high, but not high enough). Also did not meet criteria for double or triple hit lymphoma ( no bcl2 or bcl6 translocation). No PET avid measurable disease post resection. No lymphoma involvement of bone marrow. I discussed the treatment of stage I GI high grade lymphoma. I explained that although there is no active/measurable disease, chemotherapy with RCHOP or dose adjusted R-EPOCH is recommended, as he had bulky (8cm), high grade ( ki 67 > 60% ) lymphoma with MYC/IgH fusion. Also discussed the need for diagnostic LP. He went to [facility] for 2nd opinion.   CSF cytology negative for malignant cells on 2/7/19 and on 3/1/19. ANC, platelet alvaro after C1 reviewed. Doses of Etoposide/Doxorubicin/Cytoxan were escalated by 1 level for C2. Doses for cycle 3 were the same as C2. C4 doses were escalated.   PET CT after 3 cycles showed no definite evidence of residual/relapsed extranodal diffuse large  B-cell lymphoma. There was focal non physiologic activity within the descending colon without discrete CT correlate.  He wanted to proceed with all 6 cycles, although the data for 4 versus 6 cycles for stage I E lymphoma is limited, even with c-myc translocation.   He completed 6 cycles.  Role of Rt to the initial disease site ( due to bulk) discussed. He was not inclined to undergo RT.   PET CT after C 6 , done in June 2019, showed CR. Colonoscopy on 6/19/19 showed adenomatous polyps in descending and sigmoid colon.     CT on 11/27/19 did not demonstrate any new adenopathy or mass . CT on 11/27/20 did not demonstrate any adenopathy / lesion suspicious for lymphoma. He has intentional weight loss of 60 lbs in the past several months. He follows a new diet plan. No night sweats. No evidence of recurrence on CT done on 11/21/22. CBC, CMP, LDH all normal today. No mass or adenopathy on examination.   He is uptodate with COVID 19 and influenza vaccination. He will have follow up here in 6 months.      2. /89 mm Hg today. On Atenolol, Losartan HCTZ .Follows with his PCP.    4. He is trying a new diet.      5. Mild, asymptomatic.      6. It is under control. He is no longer on gabapentin .      7. He had small polyps on colonoscopy done in 2019. They were adnomatous polyps. Next due in 2024.         BMT Chart Routing      Follow up with physician . On 12/28/23   Follow up with [facility]    Provider visit type    Infusion scheduling note    Injection scheduling note    Labs CBC, CMP and LDH   Scheduling:  Preferred lab:  Lab interval:     Imaging Other   ct abdomen/pelvis with contrast   Pharmacy appointment    Other referrals

## 2023-12-16 ENCOUNTER — PATIENT MESSAGE (OUTPATIENT)
Dept: HEMATOLOGY/ONCOLOGY | Facility: CLINIC | Age: 53
End: 2023-12-16
Payer: COMMERCIAL

## 2023-12-22 ENCOUNTER — HOSPITAL ENCOUNTER (OUTPATIENT)
Dept: RADIOLOGY | Facility: HOSPITAL | Age: 53
Discharge: HOME | End: 2023-12-22
Attending: INTERNAL MEDICINE
Payer: COMMERCIAL

## 2023-12-22 DIAGNOSIS — Z85.72 HISTORY OF LYMPHOMA: ICD-10-CM

## 2023-12-22 PROCEDURE — A9698 NON-RAD CONTRAST MATERIALNOC: HCPCS | Mod: PO | Performed by: INTERNAL MEDICINE

## 2023-12-22 PROCEDURE — 25500020 PHARM REV CODE 255: Mod: PO | Performed by: INTERNAL MEDICINE

## 2023-12-22 PROCEDURE — 74178 CT ABD&PLV WO CNTR FLWD CNTR: CPT | Mod: 26,,, | Performed by: RADIOLOGY

## 2023-12-22 PROCEDURE — 74178 CT ABDOMEN PELVIS W WO CONTRAST: ICD-10-PCS | Mod: 26,,, | Performed by: RADIOLOGY

## 2023-12-22 PROCEDURE — 74178 CT ABD&PLV WO CNTR FLWD CNTR: CPT | Mod: TC,PO

## 2023-12-22 RX ADMIN — IOHEXOL 1000 ML: 12 SOLUTION ORAL at 10:12

## 2023-12-22 RX ADMIN — IOHEXOL 100 ML: 350 INJECTION, SOLUTION INTRAVENOUS at 10:12

## 2023-12-29 ENCOUNTER — OFFICE VISIT (OUTPATIENT)
Dept: HEMATOLOGY/ONCOLOGY | Facility: CLINIC | Age: 53
End: 2023-12-29
Payer: COMMERCIAL

## 2023-12-29 DIAGNOSIS — C83.39 DIFFUSE LARGE B-CELL LYMPHOMA OF SOLID ORGAN EXCLUDING SPLEEN: Primary | ICD-10-CM

## 2023-12-29 DIAGNOSIS — E66.01 MORBID OBESITY: ICD-10-CM

## 2023-12-29 DIAGNOSIS — Z85.72 HISTORY OF LYMPHOMA: ICD-10-CM

## 2023-12-29 PROCEDURE — 99213 OFFICE O/P EST LOW 20 MIN: CPT | Mod: 95,,, | Performed by: INTERNAL MEDICINE

## 2023-12-29 PROCEDURE — 99213 PR OFFICE/OUTPT VISIT, EST, LEVL III, 20-29 MIN: ICD-10-PCS | Mod: 95,,, | Performed by: INTERNAL MEDICINE

## 2023-12-29 NOTE — PROGRESS NOTES
The patient location is: home  The chief complaint leading to consultation is: History of lymphoma, follow up    Visit type: audiovisual    Face to Face time with patient: 20 minutes  20 minutes of total time spent on the encounter, which includes face to face time and non-face to face time preparing to see the patient (eg, review of tests), Obtaining and/or reviewing separately obtained history, Documenting clinical information in the electronic or other health record, Independently interpreting results (not separately reported) and communicating results to the patient/family/caregiver, or Care coordination (not separately reported).         Each patient to whom he or she provides medical services by telemedicine is:  (1) informed of the relationship between the physician and patient and the respective role of any other health care provider with respect to management of the patient; and (2) notified that he or she may decline to receive medical services by telemedicine and may withdraw from such care at any time.    Notes:      CC: History of lymphoma, follow up visit         Oncology History:     Diagnosis: DLBCL   Sub type: [facility], with myc translocation by FISH ; negative for bcl2/ bcl6 translocations; CSF negative  Stage     : I E , bulky ( jejunum)  Treatment : DE R-EPOCH x 6 with IT MTX, (2/1/19 - 5/27/19)        HPI:  is a 53 y.o. male here for followup visit. He has hypertension, obstructive sleep apnea. He was hospitalized around Christmas 2018 for small bowel mass. Patient reports that he began having lower abdominal/pelvic pain toward the end of October 2018. This prompted a urologic evaluation and subsequent treatment for a presumed UTI. He states that his pain continued into November and evolved into more upper abdominal/epigastric pain that was worse with eating. He was subsequently seen by his PCP who ultimately ordered a CT abdomen/pelvis that revealed an abnormal thickening  of an 8 cm segment of small bowel. He denied fever, chills, night sweats, or anorexia at that time. He had ~10 lb weight loss over the 1-2 months prior to his hospitalization in Dec 2018, but he was also actively taking part in a weight loss program.  On 12/21/18, he underwent Segmental small bowel resection.The mass was approximately 6 cm in length and was circumferential.  There was dilation of the small bowel above it suggesting a partial bowel obstruction.  There was no adenopathy in the adjacent   mesentery.  The tumor was clearly extending to the serosal surface of the bowel.Careful search was made for peritoneal implants and there were no lesions noted on the peritoneum or omentum.  The liver could not be inspected through the incision,   but it was palpated and there were no focal lesions within it. The remainder of the small bowel was run from the ligament of Treitz to the ileocecal valves and no other small bowel lesions were noted.    He had PET CT, 2D ECHO, bone marrow biopsy. PET CT did not reveal any evidence of lymphoma. 2D ECHO was normal. Bone marrow was negative for involvement by lymphoma.       He received C1 EPOCH - R between 2/1-2/5/19 without complications. C2 day 1 was on 2/25/19. C3 day 1 was on 3/18, C4 day 1 on 4/8, C5 day 1 on 5/6/19, C 6 on 5/27/19.  No PET avid disease noted after C3 except for a hypermetabolic focus in colon. Repeat PET CT after 6 cycles was negative. Colonoscopy done after 6 cycles showed adenomatous polyps in colon.    He had repeat CT on 11/21/22 that did not show any evidence of recurrence.      Interval History : He is here for a virtual follow up visit. He is doing well. He is eating well. He has no fevers, night sweats.No recent weight loss. He had repeat CT.     Review of Systems   Constitutional:  Positive for malaise/fatigue. Negative for diaphoresis and fever.   HENT:  Negative for ear discharge, hearing loss, nosebleeds and tinnitus.    Eyes:  Negative for  blurred vision, double vision and pain.   Respiratory:  Negative for cough, hemoptysis and sputum production.    Cardiovascular:  Negative for chest pain, orthopnea and claudication.   Gastrointestinal:  Negative for abdominal pain, blood in stool, heartburn and melena.   Genitourinary:  Negative for dysuria, frequency and urgency.   Musculoskeletal:  Negative for back pain, joint pain and neck pain.   Neurological:  Negative for dizziness, tingling and tremors.   Endo/Heme/Allergies:  Negative for environmental allergies. Does not bruise/bleed easily.   Psychiatric/Behavioral:  Negative for substance abuse and suicidal ideas.              Current Outpatient Medications   Medication Sig    atenolol (TENORMIN) 100 MG tablet TAKE 1 2 (ONE HALF) TABLET BY MOUTH TWICE DAILY    b complex vitamins capsule Take 1 capsule by mouth once daily.    fexofenadine (ALLEGRA) 60 MG tablet Take 60 mg by mouth once daily.    fluticasone (FLONASE) 50 mcg/actuation nasal spray 1 spray by Each Nare route once daily.    ketoconazole (NIZORAL) 2 % cream     loratadine (CLARITIN) 10 mg tablet Take 10 mg by mouth once daily.    losartan-hydrochlorothiazide 100-12.5 mg (HYZAAR) 100-12.5 mg Tab Take 1 tablet by mouth once daily    nystatin (MYCOSTATIN) powder Apply topically 2 (two) times daily.    pantoprazole (PROTONIX) 40 MG tablet Take 40 mg by mouth 3 (three) times daily.     No current facility-administered medications for this visit.           There were no vitals filed for this visit.      Physical exam deferred due to nature of visit today     2/7/19 Cerebrospinal Fluid     FINAL PATHOLOGIC DIAGNOSIS     Negative for malignant cells        3/1/19 CSF cytology FINAL PATHOLOGIC DIAGNOSIS     Cerebrospinal fluid (Cytology):Negative for malignant cells       4/5/19 PET CT                COMPARISON:  Prior FDG PET-CT scan 01/15/2019    FINDINGS:  Quality of the study: Adequate.    There is more prominent non physiologic focal activity  within the descending colon with a maximum SUV of 11.4, increased from 6.5 previously.  There are no hypermetabolic foci within the small bowel to suggest relapse.  There are no pathologically enlarged or hypermetabolic lymph nodes.  The spleen is normal in uptake and size at 11.2 cm in craniocaudal dimension.    There is diffusely enhanced uptake in the axial and appendicular skeleton.    Physiologic uptake of the tracer is present within the brain, salivary glands, myocardium, GI and  tracts.  There is persistent postsurgical uptake within the anterior abdominal wall.    Incidental CT findings: N/A    Internal reference SUVs are 2.2 and 4.9 for the mediastinal blood pool and normal liver background, respectively.       Impression         1. No definite evidence of residual/relapsed extranodal diffuse large B-cell lymphoma.    2.  Focal non physiologic activity within the descending colon without discrete CT correlate that is more conspicuous on the current PET exam.  This may represent a polyp.  Recommend direct visualization.    3.  Bone marrow hyperplasia, presumably related to pegfilgrastim administration.    The Deauville Score is X, presuming the colonic hypermetabolic focus is unrelated to DLBCL.  Otherwise, the score would be 5.         4/5/19 PET CT             FINDINGS:  Quality of the study: Adequate.    There is more prominent non physiologic focal activity within the descending colon with a maximum SUV of 11.4, increased from 6.5 previously.  There are no hypermetabolic foci within the small bowel to suggest relapse.  There are no pathologically enlarged or hypermetabolic lymph nodes.  The spleen is normal in uptake and size at 11.2 cm in craniocaudal dimension.    There is diffusely enhanced uptake in the axial and appendicular skeleton.    Physiologic uptake of the tracer is present within the brain, salivary glands, myocardium, GI and  tracts.  There is persistent postsurgical uptake within the  anterior abdominal wall.    Incidental CT findings: N/A    Internal reference SUVs are 2.2 and 4.9 for the mediastinal blood pool and normal liver background, respectively.       Impression         1. No definite evidence of residual/relapsed extranodal diffuse large B-cell lymphoma.    2.  Focal non physiologic activity within the descending colon without discrete CT correlate that is more conspicuous on the current PET exam.  This may represent a polyp.  Recommend direct visualization.    3.  Bone marrow hyperplasia, presumably related to pegfilgrastim administration.    The Deauville Score is X, presuming the colonic hypermetabolic focus is unrelated to DLBCL.  Otherwise, the score would be 5.         4/11/19 CSF cytology      FINAL PATHOLOGIC DIAGNOSIS  CEREBROSPINAL FLUID CYTOLOGY:  NEGATIVE EXAM-NO NEOPLASIA IDENTIFIED     6/19/19 FINAL PATHOLOGIC DIAGNOSIS     1. BIOPSIES OF COLON:  NO SIGNIFICANT HISTOLOGIC ALTERATION  NO COLITIS IDENTIFIED     BIOPSIES OF 2. DESCENDING COLON AND 3. SIGMOID COLON:  ADENOMATOUS POLYPS     6/21/19 PET CT                 COMPARISON:  PET-CT 04/05/2019    FINDINGS:  Quality of the study: Adequate.    No abnormal foci of increased tracer uptake are present to suggest recurrence or metastatic disease.  Previously seen focal non physiologic uptake in the descending colon is absent on today's exam.  Patient has intervally undergone colonoscopy with removal of multiple polyps and most likely this lesion was removed.  Pathology is pending per chart review.  Thin FDG uptake superficial to the skin surface of which the vast majority is located about the lower pelvis below the laparotomy scar likely represents urinary excretion of radiotracer onto the skin surface.  Persistent mild diffuse hypermetabolism throughout the axial skeleton most likely represents red marrow reconversion.    Physiologic uptake of the tracer is present within the brain, salivary glands, myocardium, GI and   tracts.    Incidental CT findings: Postsurgical changes of prior midline laparotomy scar and partial small-bowel resection in the midline lower abdomen.  Nonobstructing 4 mm right renal stone.  Mosaic attenuation throughout both lungs is similar to prior and may represent small airways disease or artifact from respiratory motion.       Impression         No FDG PET/CT findings to suggest recurrent or metastatic disease.               11/27/19 CT neck, chest, abdomen, pelvis with cont             TECHNIQUE:  Axial images of the neck, chest, abdomen, and pelvis were acquired after the use of 125 cc [id number] IV contrast. Approximately 30 cc of Omnipaque 350 administered for enteric/oral contrast.  Coronal and sagittal reconstructions were also obtained    COMPARISON:  PET-CT 06/21/2019, 04/05/2019    FINDINGS:  Neck: Partially visualized brain appears within normal limits without acute abnormality.  Orbits are normal.  Mucous retention cyst within bilateral maxillary sinuses.  Mastoid air cells essentially clear.  Salivary glands unremarkable.  Thyroid within normal limits.  No cervical lymphadenopathy.  Visualized neck vasculature within normal limits.  Mild cervical spondylosis without acute fracture or aggressive osseous lesion.    Thoracic soft tissues: No significant abnormality. Right chest port with central venous catheter tip terminating in the superior vena cava.    Aorta: Left-sided arch with normal branching pattern.  Aorta maintains normal caliber, contour, and course without significant calcific atherosclerosis.    Heart: Normal in size. No pericardial effusion.    Emerita/Mediastinum: No significant lymphadenopathy    Lungs: Well aerated bilaterally with scattered bandlike opacities, right greater than left, likely representative of subsegmental atelectasis versus fibrosis.  There is some increased irregular opacities right middle lobe.  Additional irregular opacities posteriorly.  These may all be related to  atelectasis though developing infiltrate not excluded.  Few irregular opacities on the left as well.  No significant consolidation or pleural effusion.    Liver: Normal in size without focal hepatic mass.    Gallbladder: No calcified gallstones.    Bile Ducts: No evidence of dilated ducts.    Pancreas: No mass or peripancreatic fat stranding.    Spleen: Unremarkable.  Small splenule present.    Adrenals: Mild nonspecific thickening of the left adrenal gland.    Kidneys/ Ureters: Normal in size and location without suspicious renal mass or hydroureteronephrosis.  Bilateral nonobstructive nephrolithiasis present.    Bladder: Not well distended on today's exam.    Reproductive organs: Unremarkable.    GI Tract/Mesentery: Stable postoperative change status post partial small-bowel resection.  No evidence of bowel obstruction or inflammation.    Peritoneal Space: No ascites. No free air.    Retroperitoneum:  No significant adenopathy.    Abdominal wall:  Midline abdominal surgical scar.  No acute abnormalities.    Vasculature: No significant atherosclerosis or aneurysm.    Bones: Degenerative change without acute fracture or aggressive osseous lesion.       Impression         Postoperative change status post partial small-bowel resection for jejunal lymphoma.  No evidence of recurrent or metastatic disease on today's exam.    Bilateral nonobstructing nephrolithiasis.    Additional findings as above.           11/27/20 CT chest, abdomen, pelvis with contrast        COMPARISON:  CT chest abdomen pelvis November 27, 2019.     FINDINGS:  In the chest visualized thyroid is normal.  No significant pleural or pericardial fluid.  No mediastinal or hilar adenopathy.  There is some diffuse esophageal wall thickening though it appears similar.  No supraclavicular nor axillary adenopathy.     Evaluation of the lungs demonstrate irregular opacities right lung base medially likely atelectasis.  This is slightly improved.  No focal  consolidation or convincing mass lesion.     In the abdomen liver is normal in size.  No focal mass.  Gallbladder, pancreas, and spleen are normal.  No adrenal mass.  4 mm nonobstructing right renal calculus.  2 mm left renal calculus.  Hypodensity right lower pole kidney too small to characterize.  Vague hypodensity in the left upper pole kidney similar.     Aorta tapers normally.  Nonenlarged para-aortic nodes present similar.  No pelvic adenopathy.  Likewise nonenlarged groin nodes noted.     In the pelvis, bladder is not distended, prostate unremarkable for age.  Evaluation of the bowel demonstrates feces in the colon.  No focal bowel dilatation.  Nonenlarged mesenteric nodes present.     Small fat containing supraumbilical hernia.  Degenerative changes in the spine.  Spondylolysis at L5.  Prominent disc osteophyte complexes T12-L1 and L1-L2.     Impression:     No convincing lymphadenopathy in this patient with history of lymphoma.     Nonobstructing bilateral nephrolithiasis.  Hypodensities in the kidneys too small to characterize though similar.     There is some wall thickening of the esophagus though similar.  Correlation with clinical findings and further evaluation if indicated.     Additional findings as above.        11/16/21 CT chest, abdomen, pelvis with contrast      Impression:     Patient history of lymphoma.  No lymphadenopathy identified.     Bilateral nonobstructive nephrolithiasis.     Bilateral L5 pars defects.      11/21/22 CT chest, abdomen, pelvis with contrast    COMPARISON:  CT chest abdomen pelvis 06/20/2021     FINDINGS:  Thoracic soft tissues: Normal thyroid. No axillary lymphadenopathy.     Aorta: Thoracic aorta is normal in caliber and contour with no significant calcific atherosclerosis.     Heart: Normal in size. No pericardial effusion. No significant calcific coronary atherosclerosis.     Emerita/Mediastinum: No mediastinal or hilar lymphadenopathy.     Lungs: Trachea and bronchi  are patent.  Lungs symmetrically expanded without consolidation. No pleural fluid or pneumothorax.  Calcified granuloma left upper lobe.  Multiple solid micronodules (axial series 6, images 209, 226, 255, 226, 338, and 427) stable from prior.  No new suspicious pulmonary nodules.     Liver: Normal in size and contour.  Stable subcentimeter hypodensity in the right hepatic lobe too small to characterize (axial series 3, image 62).  No new focal hepatic lesions.     Gallbladder: No calcified gallstones.     Bile Ducts: No evidence of dilated ducts.     Pancreas: No mass or peripancreatic fat stranding.     Spleen: Normal in size measuring 11.1 cm.     Esophagus: Subtle circumferential esophageal wall thickening similar to prior exams.     Stomach and duodenum: Unremarkable.     Adrenals: Unremarkable.     Kidneys/Ureters: Normal in size and location. Normal enhancement. 6 mm right renal stone and 4 mm left renal stone.  No hydronephrosis or ureteral dilatation. Small left renal hypodensity stable from prior likely a small cyst.     Bladder: No evidence of wall thickening.     Reproductive organs: Unremarkable.     Bowel/Mesentery: Small bowel is normal in caliber with no evidence of obstruction.  No evidence of inflammation or wall thickening.  Appendix is visualized and appears normal.  Colon demonstrates no focal wall thickening.     Peritoneum: No intraperitoneal free air or fluid.     Lymph nodes: No lymphadenopathy.     Abdominal wall:  Fat containing supraumbilical hernia.     Vasculature: No aneurysm. No significant calcific atherosclerosis.     Bones: Bilateral L5 pars defects.  No acute fracture. No suspicious osseous lesions.     Impression:     1. In this patient with lymphoma, there is no evidence of lymphadenopathy above or below the diaphragm.  2. Nonobstructing bilateral nephrolithiasis.  3. Additional stable findings as detailed above.       12/22/23 CT abdomen/pelvis with contrast      COMPARISON:  CT  abdomen pelvis of November 21, 2022.     FINDINGS:  The liver is of normal size contour and CT density without focal mass.  The gallbladder is of normal size without CT evidence of stone.  The pancreas is of normal contour and CT density without edema or mass.  The spleen is of normal size and CT density.  A small accessory spleen is seen near the splenic hilum unchanged from prior study.     The adrenal glands are not enlarged.  The kidneys are of normal size contour and contrast enhancement.  A small cyst at the upper pole of the left kidney measures 1.4 cm unchanged from the prior studies.  There is a 6 mm stone in the right renal pelvis without hydronephrosis.  In the left kidney is a 4 mm stone in the mid without hydronephrosis.  The abdominal aorta and inferior vena cava are of normal caliber.  No retroperitoneal or periaortic adenopathy is seen.  Enlarged nodes in the jomar hepatis or within the pelvis or inguinal areas are not seen.     The stomach is of normal configuration.  Small bowel dilatation air-fluid levels or soft tissue masses are not identified.  The colon appears of normal configuration without distention or mass.  A normal appendix is noted.  Free fluid or free air is not seen.     The bladder is of normal contour without mass or asymmetry.  The prostate is not enlarged.     Impression:     No present evidence of adenopathy within the abdomen or pelvis.  Small cyst of the left kidney.  6 mm right intrarenal stone and 4 mm left intrarenal stone without hydronephrosis.    Component      Latest Ref [facility] 12/22/2023   WBC      3.90 - 12.70 K/uL 6.65    RBC      4.60 - 6.20 M/uL 4.72    Hemoglobin      14.0 - 18.0 g/dL 14.2    Hematocrit      40.0 - 54.0 % 42.6    MCV      82 - 98 fL 90    MCH      27.0 - 31.0 pg 30.1    MCHC      32.0 - 36.0 g/dL 33.3    RDW      11.5 - 14.5 % 13.3    Platelet Count      150 - 450 K/uL 229    MPV      9.2 - 12.9 fL 9.5    Immature Granulocytes      0.0 - 0.5 % 0.5     Gran # (ANC)      1.8 - 7.7 K/uL 4.3    Immature Grans (Abs)      0.00 - 0.04 K/uL 0.03    Lymph #      1.0 - 4.8 K/uL 1.4    Mono #      0.3 - 1.0 K/uL 0.7    Eos #      0.0 - 0.5 K/uL 0.2    Baso #      0.00 - 0.20 K/uL 0.04    nRBC      0 /100 WBC 0    Gran %      38.0 - 73.0 % 64.4    Lymph %      18.0 - 48.0 % 21.5    Mono %      4.0 - 15.0 % 10.4    Eosinophil %      0.0 - 8.0 % 2.6    Basophil %      0.0 - 1.9 % 0.6    Differential Method Automated    Sodium      136 - 145 mmol/L 143    Potassium      3.5 - 5.1 mmol/L 4.3    Chloride      95 - 110 mmol/L 104    CO2      23 - 29 mmol/L 28    Glucose      70 - 110 mg/dL 101    BUN      6 - 20 mg/dL 13    Creatinine      0.5 - 1.4 mg/dL 0.8    Calcium      8.7 - 10.5 mg/dL 9.3    PROTEIN TOTAL      6.0 - 8.4 g/dL 7.9    Albumin      3.5 - 5.2 g/dL 3.8    BILIRUBIN TOTAL      0.1 - 1.0 mg/dL 0.6    ALP      55 - 135 U/L 55    AST      10 - 40 U/L 27    ALT      10 - 44 U/L 33    eGFR      >60 mL/min/1.73 m^2 >60    Anion Gap      8 - 16 mmol/L 11    LD      110 - 260 U/L 195          Assessment:     1. History of DLBCL of jejunum, GC sub type, high grade, with myc translocation  2. Hypertension  3. Obstructive sleep apnea  4. Morbid obesity  5. 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 Colon polyps        Plan:     1. 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 IPI score 0. Criteria for Burkitts not fulfilled. ( ki 67 was high, but not high enough). Also did not meet criteria for double or triple hit lymphoma ( no bcl2 or bcl6 translocation). No PET avid measurable disease post resection. No lymphoma involvement of bone marrow. I discussed the treatment of stage I GI high grade lymphoma. I explained that although there is no active/measurable disease, chemotherapy with RCHOP or dose adjusted R-EPOCH is recommended, as he had bulky (8cm), high grade ( ki 67 > 60% ) lymphoma with MYC/IgH  fusion. Also discussed the need for diagnostic LP. He went to [facility] for 2nd opinion.   CSF cytology negative for malignant cells on 2/7/19 and on 3/1/19. ANC, platelet alvaro after C1 reviewed. Doses of Etoposide/Doxorubicin/Cytoxan were escalated by 1 level for C2. Doses for cycle 3 were the same as C2. C4 doses were escalated.   PET CT after 3 cycles showed no definite evidence of residual/relapsed extranodal diffuse large B-cell lymphoma. There was focal non physiologic activity within the descending colon without discrete CT correlate.  He wanted to proceed with all 6 cycles, although the data for 4 versus 6 cycles for stage I E lymphoma is limited, even with c-myc translocation.   He completed 6 cycles.  Role of Rt to the initial disease site ( due to bulk) discussed. He was not inclined to undergo RT.   PET CT after C 6 , done in June 2019, showed CR. Colonoscopy on 6/19/19 showed adenomatous polyps in descending and sigmoid colon.     CT on 11/27/19 did not demonstrate any new adenopathy or mass . CT on 11/27/20 did not demonstrate any adenopathy / lesion suspicious for lymphoma. He has intentional weight loss of 60 lbs in the past several months. He follows a new diet plan. No night sweats. No evidence of recurrence on CT done on 11/21/22. CBC, CMP, LDH all normal today. No adenopathy/mass on CT done on 12/22/23.  He is uptodate with COVID 19 and influenza vaccination. He will have follow up here in 6 months.      2. On Atenolol, Losartan HCTZ .Follows with his PCP.    4. He wants to resume weight loss program.      5. Mild, asymptomatic.      6. It is under control. He is no longer on gabapentin .      7. He had small polyps on colonoscopy done in 2019. They were adenomatous polyps. Next due in 2024.         BMT Chart Routing      Follow up with physician 1 year.   Follow up with ANGI    Provider visit type    Infusion scheduling note    Injection scheduling note    Labs CBC, CMP, LDH and uric acid    Scheduling:  Preferred lab:  Lab interval:     Imaging    Pharmacy appointment    Other referrals

## 2024-08-21 NOTE — PLAN OF CARE
Giorgioann Streeter has met all discharge criteria from Phase II. Vital Signs are stable, ambulating  without difficulty. Discharge instructions given, patient verbalized understanding. Discharged from facility via wheelchair in stable condition.        show

## 2024-11-26 DIAGNOSIS — C83.30 DIFFUSE LARGE B CELL LYMPHOMA: Primary | ICD-10-CM

## 2024-12-18 NOTE — PROGRESS NOTES
Subjective:       Patient ID: Giorgio Streeter is a 54 y.o. male.    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  CT A/P showed abnormal thickening of an 8 cm segment of small bowel.  Small bowel resection 12/21/18 showed a circumferential small-bowel mass measuring 6 cm in length.  Pathology showed a diffuse large B-cell lymphoma, GC type with MYC translocation by FISH.  Testing was negative for BCL 2/BCL 6 translocations.  CT-PET scan showed no evidence of measurable disease.  CSF was negative and bone marrow was negative.  Echocardiogram normal.  He had no evidence of B symptoms.  He completed 6 cycles of dose adjusted R-EPOCH with prophylactic intrathecal methotrexate.  Follow-up CT-PET scan 11/21/22 was negative.  Colonoscopy post treatment showed several polyps which were adenomas by pathology.  His last follow-up visit with Medical [facility]cology in [place] was by telemedicine 12/29/23.    CT A/P 12/22/23:  No abnormal or suspicious adenopathy.  Small bilateral intrarenal stones, no hydronephrosis.  Colonoscopy 10/7/24:  3 mm polyp ascending colon.  Repeat exam in 5 years.    He presents today to establish local Oncology follow-up do the relocation of his previous treating physician who no longer practices in [place].  He was last seen for a surveillance appointment in December, 2023.  CT imaging at that time showed no significant abnormalities.  He had no suspicious symptoms.  Surveillance colonoscopy 10/7/24 was relatively unremarkable.  He has not had any recent illnesses or infections.  He has no evidence of B symptoms.  He will also be starting follow-up with a new PCP.      Interval History  New  "patient visit    Review of Systems   Constitutional:  Negative for appetite change, fatigue, fever and unexpected weight change.   HENT:  Negative for mouth sores, sore throat and trouble swallowing.    Eyes: Negative.    Respiratory:  Negative for cough, chest tightness and shortness of breath.    Cardiovascular:  Negative for chest pain, palpitations and leg swelling.   Gastrointestinal:  Negative for abdominal distention, abdominal pain, constipation, diarrhea and nausea.   Genitourinary:  Negative for dysuria, frequency and urgency.   Musculoskeletal:  Negative for arthralgias and back pain.   Integumentary:  Negative for pallor and rash.   Neurological:  Negative for dizziness, weakness, numbness and headaches.   Hematological:  Negative for adenopathy. Does not bruise/bleed easily.   Psychiatric/Behavioral: Negative.         PMHx:  HTN, obesity, THUAN, GERD  PSHx:  Small-bowel resection, bone marrow, MediPort insertion and removal  SH:  Lifetime nonsmoker, occasional alcohol use.  Lives in [place] with his wife and daughter, works as a  at [employer].  FH:  His sister had melanoma.    Objective:        BP (!) 155/93   Pulse 80   Temp 98.3 °F (36.8 °C)   Resp 18   Ht 5' 7" (1.702 m)   Wt (!) 164.7 kg (363 lb)   SpO2 97%   BMI 56.85 kg/m²    Physical Exam  Constitutional:       Comments: Morbidly obese WM in NAD   HENT:      Head: Normocephalic.      Mouth/Throat:      Mouth: Mucous membranes are moist.      Pharynx: Oropharynx is clear. No posterior oropharyngeal erythema.   Eyes:      Extraocular Movements: Extraocular movements intact.      Conjunctiva/sclera: Conjunctivae normal.      Pupils: Pupils are equal, round, and reactive to light.   Cardiovascular:      Rate and Rhythm: Normal rate and regular rhythm.      Heart sounds: No murmur heard.  Pulmonary:      Comments: Lungs clear to auscultation  Abdominal:      General: Bowel sounds are normal. There is no distension.      Palpations: " Abdomen is soft.      Tenderness: There is no abdominal tenderness.      Comments: Obese.  No appreciable masses or organomegaly.   Musculoskeletal:         General: No swelling or tenderness. Normal range of motion.      Cervical back: Neck supple. No tenderness.   Lymphadenopathy:      Comments: No palpable peripheral adenopathy.   Skin:     General: Skin is warm and dry.      Findings: No rash.      Comments: MediPort removal incision right chest wall   Neurological:      General: No focal deficit present.      Mental Status: He is alert and oriented to person, place, and time.      Cranial Nerves: No cranial nerve deficit.      Motor: No weakness.       ECOG SCORE    0 - Fully active-able to carry on all pre-disease performance without restriction          LABORATORY  Recent Results (from the past week)   CBC with Differential    Collection Time: 12/30/24  2:09 PM   Result Value Ref Range    WBC 7.81 4.50 - 11.50 x10(3)/mcL    RBC 4.69 (L) 4.70 - 6.10 x10(6)/mcL    Hgb 14.2 14.0 - 18.0 g/dL    Hct 42.3 42.0 - 52.0 %    MCV 90.2 80.0 - 94.0 fL    MCH 30.3 27.0 - 31.0 pg    MCHC 33.6 33.0 - 36.0 g/dL    RDW 13.7 11.5 - 17.0 %    Platelet 269 130 - 400 x10(3)/mcL    MPV 9.1 7.4 - 10.4 fL    Neut % 70.6 %    Lymph % 17.2 %    Mono % 9.6 %    Eos % 1.9 %    Basophil % 0.4 %    Lymph # 1.34 0.6 - 4.6 x10(3)/mcL    Neut # 5.52 2.1 - 9.2 x10(3)/mcL    Mono # 0.75 0.1 - 1.3 x10(3)/mcL    Eos # 0.15 0 - 0.9 x10(3)/mcL    Baso # 0.03 <=0.2 x10(3)/mcL    IG# 0.02 0 - 0.04 x10(3)/mcL    IG% 0.3 %            Assessment:   History of IAE DLBCL - CULLEN      Plan:   Patient has no suspicious clinical symptoms or findings on his physical exam.  CBC drawn today is normal.  CMP and LDH level are pending.  No indication for ongoing surveillance imaging unless he develops new symptoms or suspicious clinical findings.  Continue follow-up on an annual basis to screen for late toxicity from his previous chemotherapy.  RTC in 1 year for a  follow-up visit and clinical exam with repeat laboratory.      MASOUD MENDEZ MD    Other Physicians  Dr. Claudia Rodriguez

## 2024-12-30 ENCOUNTER — OFFICE VISIT (OUTPATIENT)
Dept: HEMATOLOGY/ONCOLOGY | Facility: CLINIC | Age: 54
End: 2024-12-30
Payer: COMMERCIAL

## 2024-12-30 VITALS
BODY MASS INDEX: 49.44 KG/M2 | WEIGHT: 315 LBS | SYSTOLIC BLOOD PRESSURE: 155 MMHG | HEIGHT: 67 IN | HEART RATE: 80 BPM | DIASTOLIC BLOOD PRESSURE: 93 MMHG | OXYGEN SATURATION: 97 % | RESPIRATION RATE: 18 BRPM | TEMPERATURE: 98 F

## 2024-12-30 DIAGNOSIS — C83.30 DIFFUSE LARGE B CELL LYMPHOMA: ICD-10-CM

## 2024-12-30 DIAGNOSIS — C83.398 DIFFUSE LARGE B-CELL LYMPHOMA OF SOLID ORGAN EXCLUDING SPLEEN: Primary | ICD-10-CM

## 2024-12-30 LAB
ALBUMIN SERPL-MCNC: 3.7 G/DL (ref 3.5–5)
ALBUMIN/GLOB SERPL: 0.9 RATIO (ref 1.1–2)
ALP SERPL-CCNC: 57 UNIT/L (ref 40–150)
ALT SERPL-CCNC: 30 UNIT/L (ref 0–55)
ANION GAP SERPL CALC-SCNC: 11 MEQ/L
AST SERPL-CCNC: 27 UNIT/L (ref 5–34)
BASOPHILS # BLD AUTO: 0.03 X10(3)/MCL
BASOPHILS NFR BLD AUTO: 0.4 %
BILIRUB SERPL-MCNC: 0.4 MG/DL
BUN SERPL-MCNC: 12.9 MG/DL (ref 8.4–25.7)
CALCIUM SERPL-MCNC: 9.9 MG/DL (ref 8.4–10.2)
CHLORIDE SERPL-SCNC: 102 MMOL/L (ref 98–107)
CO2 SERPL-SCNC: 28 MMOL/L (ref 22–29)
CREAT SERPL-MCNC: 0.95 MG/DL (ref 0.72–1.25)
CREAT/UREA NIT SERPL: 14
EOSINOPHIL # BLD AUTO: 0.15 X10(3)/MCL (ref 0–0.9)
EOSINOPHIL NFR BLD AUTO: 1.9 %
ERYTHROCYTE [DISTWIDTH] IN BLOOD BY AUTOMATED COUNT: 13.7 % (ref 11.5–17)
GFR SERPLBLD CREATININE-BSD FMLA CKD-EPI: >60 ML/MIN/1.73/M2
GLOBULIN SER-MCNC: 4.3 GM/DL (ref 2.4–3.5)
GLUCOSE SERPL-MCNC: 153 MG/DL (ref 74–100)
HCT VFR BLD AUTO: 42.3 % (ref 42–52)
HGB BLD-MCNC: 14.2 G/DL (ref 14–18)
IMM GRANULOCYTES # BLD AUTO: 0.02 X10(3)/MCL (ref 0–0.04)
IMM GRANULOCYTES NFR BLD AUTO: 0.3 %
LDH SERPL-CCNC: 328 U/L (ref 125–220)
LYMPHOCYTES # BLD AUTO: 1.34 X10(3)/MCL (ref 0.6–4.6)
LYMPHOCYTES NFR BLD AUTO: 17.2 %
MCH RBC QN AUTO: 30.3 PG (ref 27–31)
MCHC RBC AUTO-ENTMCNC: 33.6 G/DL (ref 33–36)
MCV RBC AUTO: 90.2 FL (ref 80–94)
MONOCYTES # BLD AUTO: 0.75 X10(3)/MCL (ref 0.1–1.3)
MONOCYTES NFR BLD AUTO: 9.6 %
NEUTROPHILS # BLD AUTO: 5.52 X10(3)/MCL (ref 2.1–9.2)
NEUTROPHILS NFR BLD AUTO: 70.6 %
PLATELET # BLD AUTO: 269 X10(3)/MCL (ref 130–400)
PMV BLD AUTO: 9.1 FL (ref 7.4–10.4)
POTASSIUM SERPL-SCNC: 4 MMOL/L (ref 3.5–5.1)
PROT SERPL-MCNC: 8 GM/DL (ref 6.4–8.3)
RBC # BLD AUTO: 4.69 X10(6)/MCL (ref 4.7–6.1)
SODIUM SERPL-SCNC: 141 MMOL/L (ref 136–145)
WBC # BLD AUTO: 7.81 X10(3)/MCL (ref 4.5–11.5)

## 2024-12-30 PROCEDURE — 1160F RVW MEDS BY RX/DR IN RCRD: CPT | Mod: CPTII,S$GLB,, | Performed by: INTERNAL MEDICINE

## 2024-12-30 PROCEDURE — 36415 COLL VENOUS BLD VENIPUNCTURE: CPT | Performed by: INTERNAL MEDICINE

## 2024-12-30 PROCEDURE — 80053 COMPREHEN METABOLIC PANEL: CPT | Performed by: INTERNAL MEDICINE

## 2024-12-30 PROCEDURE — 3080F DIAST BP >= 90 MM HG: CPT | Mod: CPTII,S$GLB,, | Performed by: INTERNAL MEDICINE

## 2024-12-30 PROCEDURE — 3008F BODY MASS INDEX DOCD: CPT | Mod: CPTII,S$GLB,, | Performed by: INTERNAL MEDICINE

## 2024-12-30 PROCEDURE — 1159F MED LIST DOCD IN RCRD: CPT | Mod: CPTII,S$GLB,, | Performed by: INTERNAL MEDICINE

## 2024-12-30 PROCEDURE — 99999 PR PBB SHADOW E&M-EST. PATIENT-LVL IV: CPT | Mod: PBBFAC,,, | Performed by: INTERNAL MEDICINE

## 2024-12-30 PROCEDURE — 83615 LACTATE (LD) (LDH) ENZYME: CPT | Performed by: INTERNAL MEDICINE

## 2024-12-30 PROCEDURE — 3077F SYST BP >= 140 MM HG: CPT | Mod: CPTII,S$GLB,, | Performed by: INTERNAL MEDICINE

## 2024-12-30 PROCEDURE — 99204 OFFICE O/P NEW MOD 45 MIN: CPT | Mod: S$GLB,,, | Performed by: INTERNAL MEDICINE

## 2024-12-30 PROCEDURE — 85025 COMPLETE CBC W/AUTO DIFF WBC: CPT | Performed by: INTERNAL MEDICINE

## 2024-12-30 RX ORDER — AMLODIPINE BESYLATE 5 MG/1
5 TABLET ORAL
COMMUNITY

## 2025-02-27 ENCOUNTER — RESULTS FOLLOW-UP (OUTPATIENT)
Dept: OTHER | Facility: CLINIC | Age: 55
End: 2025-02-27

## (undated) DEVICE — SEE MEDLINE ITEM 156918

## (undated) DEVICE — SUT SILK 3-0 SH 18IN BLACK

## (undated) DEVICE — SUT SILK 2-0 SH 18IN BLACK

## (undated) DEVICE — DRAPE THYROID WITH ARMBOARD

## (undated) DEVICE — PAD K-THERMIA 24IN X 60IN

## (undated) DEVICE — DRESSING MEPORE ISLAND 31/2X4

## (undated) DEVICE — SET MICPUNC ACC STIFF CANNULA

## (undated) DEVICE — GLOVE PROTEXIS PI CLASSIC 8.0

## (undated) DEVICE — SEE MEDLINE ITEM 157128

## (undated) DEVICE — SUT SILK 2-0 STRANDS 30IN

## (undated) DEVICE — APPLICATOR CHLORAPREP ORN 26ML

## (undated) DEVICE — STAPLER SKIN PROXIMATE WIDE

## (undated) DEVICE — SYR 30CC LUER LOCK

## (undated) DEVICE — Device

## (undated) DEVICE — SUT 3/0 27IN COATED VICRYL

## (undated) DEVICE — SUT VICRYL PLUS 3-0 SH 18IN

## (undated) DEVICE — SEE MEDLINE ITEM 156902

## (undated) DEVICE — SEE MEDLINE ITEM 152622

## (undated) DEVICE — SUT 1 48IN PDS II VIO MONO

## (undated) DEVICE — SUT SILK 3-0 STRANDS 30IN

## (undated) DEVICE — TRAY PORT IR PLACEMENT REMOVAL

## (undated) DEVICE — RELOAD PROXIMATE CUT BLUE 55MM

## (undated) DEVICE — SUT 2-0 12-18IN SILK

## (undated) DEVICE — NDL 20GX1-1/2IN IB

## (undated) DEVICE — SUT 3-0 12-18IN SILK

## (undated) DEVICE — DRAPE ABDOMINAL TIBURON 14X11

## (undated) DEVICE — DILATOR VESSEL 8FR

## (undated) DEVICE — DILATOR VESSEL 6FR

## (undated) DEVICE — GOWN SMART IMP BREATHABLE XXLG

## (undated) DEVICE — ELECTRODE REM PLYHSV RETURN 9

## (undated) DEVICE — CONTAINER SPECIMEN STRL 4OZ

## (undated) DEVICE — CUTTER PROXIMATE BLUE 55MM

## (undated) DEVICE — TRAY FOLEY 16FR INFECTION CONT

## (undated) DEVICE — ADHESIVE DERMABOND ADVANCED

## (undated) DEVICE — KIT PROBE COVER WITH GEL

## (undated) DEVICE — SEE MEDLINE ITEM 157144

## (undated) DEVICE — GLOVE PROTEXIS PI CLASSIC 7.5

## (undated) DEVICE — FLOWSWITCH HP 1-W W/O LL

## (undated) DEVICE — SUT 4/0 27IN PDS II VIO MON